# Patient Record
Sex: MALE | Race: WHITE | NOT HISPANIC OR LATINO | Employment: UNEMPLOYED | URBAN - METROPOLITAN AREA
[De-identification: names, ages, dates, MRNs, and addresses within clinical notes are randomized per-mention and may not be internally consistent; named-entity substitution may affect disease eponyms.]

---
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
## 2019-01-03 ENCOUNTER — MEDICAL CORRESPONDENCE (OUTPATIENT)
Dept: HEALTH INFORMATION MANAGEMENT | Facility: CLINIC | Age: 19
End: 2019-01-03

## 2019-01-03 ENCOUNTER — OFFICE VISIT (OUTPATIENT)
Dept: PSYCHIATRY | Facility: CLINIC | Age: 19
End: 2019-01-03
Payer: COMMERCIAL

## 2019-01-03 DIAGNOSIS — F29 PSYCHOSIS, UNSPECIFIED PSYCHOSIS TYPE (H): Primary | ICD-10-CM

## 2019-01-03 NOTE — Clinical Note
Kade Ordaz,Here's my screening note for your Eval appt with Eloy on Wednesday 1/9. He has an unspecified schizophrenia spectrum disorder with r/o for substance induced psychosis from [facility]. He's been sober since his discharge and is attending inpatient CD treatment at [facility], with still experiencing some psychosis symptoms. I have some of his forms I will get to you as well.Tamiko

## 2019-01-03 NOTE — PROGRESS NOTES
"[facility] NAVIGATE Clinical Assessment of Need  A Part of the [facility] First Episode of Psychosis Program    Patient: Eloy Storm (2000, 18 year old)     MRN: [id number]  Date:  1/03/19  Clinician: Tamiko Hartman, [facility]     Length of Actual Contact: Start Time: 3pm; End Time: 4pm  Location of Contact:  [facility], [facility]  People present:  Writer, Client,     Reviewed limits to confidentiality, Yes     Chief Complaint    \"I'm ending treatment soon and this was suggested as a follow up service.\"    History of Presenting Illness    Modified [place] Symptom Index completed (see below)    Writer met client for FEP screen appt. Client attended the appt individually and was brought by staff at [facility] in [place], where he is currently attending inpatient programming.   Client reported first noticing psychosis symptoms last New Years Brandeee 2017(making weird connections, ideas of reference, VH, people's faces had another set of eyes, glowing eyes, people looked demonic.)  He was hospitalized at [facility] and reported having more delusional thinking and feeling paranoid while there. He said he was also suicidal and having panic attacks. He reported he hadn't used drugs within the week prior to his admission, though could have been experiencing withdrawal. He was hospitalized 12/30/17-1/2/2018. He stopped taking meds right away following the discharge but was later prescribed lexapro by his primary care provider, but only took this for a short while.     Following that hospitalization, client reported he felt fine for most of the year. He continued to smoke weed regularly and went [facility]. He said he kept using during this time, though attended treatment programming for 3-4 months.   He reports his hx of drug use began in high school with using cocaine 1-2x, MDMA 3x, opiates/pills several times, and xanax.  He also reported he drank a " "lot in college and high school.     Client was living at home with his mom, but eventually got kicked out so was couch hopping at friends' houses. He was using regularly during this time.  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     He reports using weed, alcohol, and xanax, leading up to recent hospitalization at [facility] on 10/29/18 -11/14/18. Reported symptoms at that time included a \"wierd fantasy world in my head,\" ideas of reference, and paranoia. He reports using weed multiple times per day at this point, xanax occasionally, alcohol on the weekends.  He was discharged to [facility] in [place] following hospitalization and has been there for 51 days. He will be graduating programming in another couple of weeks. He will be discharging to a sober house in [place].    Reports sobriety while in treatment and feels his memory coming back. He reports still having ideas of reference at treatmentt, but can work through them. Provided example of IOR from listening to music, songs talking about stuff going on around him. He denies AH and reports having had light distortation and tracers, said he feels like he's getting an acid flash back, feeling trippy. Also reports some paranoia while at [facility] but now feels like it's more like general anxiety. Continues to experience racing thoughts, \"words get jumpled up, thinking about a lot at once, have to remember a lot of different stuff.\" Also reports continued problems with long-term memory in thinking about the past. Described regularly having clear dejavu.     Client reports no current SI, and says he experiences less depression than in the past.  Reported last having SI during the first few weeks at [facility]. No hx of attempts. Hx of ideation, never acted on them, never had a plan. No HI or hx " "of violence    Client reported possibility of hx of jamila symptoms, describing he sometimes tries to work a lot at once and get a lot done. He can pull all nighters to get stuff done, though said he will get tired the next day. He reported hx of jamila like symptoms increased energy while using.     Client also reported currently experiencing some social anxiety, with feeling shaky in social situations, sweaty palms, and racing thoughts.    Hoped for outcomes  \"Hoping to remove symptoms of psychosis I was having.\" To have a clear head, think more clearly, go back to school, do good in school without being troubled by this stuff.\"  Is interested in med management, therapy, and vocational support.     Past Psychiatric History (Brief)     Psychiatric diagnoses, date diagnosed, and associated symptoms   Per [facility] discharge note on 11/14/18:  DISCHARGE DIAGNOSES:   Unspecified schizophrenia spectrum disorder and other psychotic disorder (rule out a substance-induced psychotic disorder versus a primary psychotic illness)  Cannabis use disorder, severe  Sedative hypnotic use disorder, moderate  Alcohol use disorder, moderate    -History of a diagnosis of autism spectrum disorder? No  -History of a diagnosis of borderline personality disorder? No    Psychiatric hospitalization(s), location, admit date, and length of stay  Two hospitalizations at [facility]: 10/29/18 - 11/14/18 and 12/30/17- 1/2/18    Medications, length of use, benefits, and unpleasant effects  abilify 10 mg  Acetylcysteine 600 mg  Depakote 500mg  lexapro 300 mg  trasazone 100mg    -History of antipsychotic use (cumulative months)? Yes - Since most recent hospitalization in Oct 2018    Outpatient Programs & Services  Efe Espinoza at [facility] prescribes meds currently. He was previously seeing pcp at [facility].    HX of therapists in the past but cannot remember details, reporting services were never very long in " "duration.    Developmental & Medical History (Brief)    Past/Present developmental and/or medical issues, date diagnosed, and impact  low blood pressure, does not take medication for this    -History of significant head injury or loss of consciousness? If yes, history of brain imaging? Yes - Reports last year, he stood up really quick and fainted and hit head, woke up on the floor.   -History of a developmental delay or use of an IEP or 504? 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    Scorin-Not at all    1-Once during the month    2-Several times during the month    3-Several times a week    4-At least every day    1. In the past month, how often have you felt nervous, tense, worried, frustrated, or afraid? 4  2. In the past month, how often have you felt depressed? 3  3. In the past month, how often have you felt lonely? 3  4. In the past month, how often have others told you that you acted \"paranoid\" or \"suspicious\"? 0  5. In the past month, how often did you hear voices or hear or see things that other people didn't think were there? 2  6. (Read slowly) In the past month, how often did you have trouble making up your mind about something, like deciding where you wanted to go or what you wanted to do, or how to solve a problem? 3  7. (Read slowly) In the past month, how often did you have trouble thinking straight, or concentrating on something you needed to do like " worrying so much, or thinking about problems so much that you can't remember or focus on other things? 3  8. In the past month, how often did you feel that your behavior or actions were strange or different from that of other people? 2  9. In the past month, how often did you feel out of place or like you did not fit in? 4  10. In the past month, how often did you forget important things? 2  11. In the past month, how often did you have problems with thinking too fast (thoughts racing)? 3  12. In the past month, how often did you feel suspicious or paranoid? 2  13. In the past month, how often did you feel like hurting or killing yourself? 1  14. In the past month, how often have you felt like seriously hurting someone else? 0      Mental Status Exams:  Alertness: alert  and oriented  Appearance: well groomed  Behavior/Demeanor: cooperative and calm, with good  eye contact   Speech: regular rate and rhythm  Language: intact. Preferred language identified as English.  Psychomotor: normal or unremarkable  Mood: anxious  Affect: restricted; was congruent to mood; was congruent to content  Thought Process/Associations: unremarkable  Thought Content:  Reports delusions, preoccupations and paranoid ideation;  Denies suicidal and violent ideation  Perception:  Reports Visual hallucinations and distortions;  Denies auditory hallucinations  Insight: good  Judgment: fair  Cognition: does  appear grossly intact; formal cognitive testing was not done    Techniques Utilized:   CBT Teaching Strategies:  Reinforcement and shaping (positive feedback for steps towards goals and gains in knowledge & skills)  Coping skills training (review current coping skills)    Motivational Teaching Strategies:  Connect info and skills with personal goals  Promote hope and positive expectations  Explore pros and cons of change  Re-frame experiences in positive light    Educational Teaching Strategies:  Review of written material/education  Relate  information to client's experience  Ask questions to check comprehension  Break down information into small chunks  Adopt client's language     Psychiatric Diagnosis(es):    Psychosis, unspecified psychosis type; F29  R/O substance induced psychosis    Assessment/Progress Note:     Eloy Storm is a 18 year old single (never )  male who presented for psychosis assessment of need visit to determine potential eligibility for participation in [facility] First Episode of Psychosis services. Eloy was referred by his providers at [facility]. Eloy presented today as a Good historian with Good insight. He has a lifetime history of 2 hospitalizations and carries psychiatric diagnoses of psychosis, unspecified psychosis type. Further diagnostic clarification is needed. A psychiatric diagnostic assessment was scheduled with ROHITH Sheridan, [facility].      Eloy identified present symptoms to include social anxiety, ideas of reference, paranoia, racing thoughts, visual distortions, and difficulty with memory. Psychosocial stressors were identified as housing , mental health symptoms and occupational / vocational stress. Explored Eloy's goal(s) to include sobriety, coping and managing symptoms of psychosis.     Eloy is currently participating in inpatient CD treatment and med management services at [facility]. He may benefit from services such as psychosis specific therapy, med management, family therapy, and vocational support for the purposes of building understanding, awareness, and insight into symptoms for optimal symptom management. Eloy's willingness to pursue said services seems fair.     Identified risk factors and/or vulnerabilities include male and recent substance abuse. Protective factors and/or strengths identified as committed to sobriety, creative, educated, has a previous history of therapy, open to learning, support of family, friends and providers, wants to learn and work  "history. Suicidal ideation was not present at the time of today's visit. Safety plan was discussed and included using crisis resources.    Eloy agrees to treatment with the capacity to do so. Agrees to call clinic for any problems. The patient understands to call 911 or come to the nearest ED if life threatening or urgent symptoms present.    Billing for \"Interactive Complexity\"?    No    Plan/Referrals:     Diagnostic Assessment schedule on 1/9/19 at 2:30pm with ROHITH Sheridan, [facility]IG[facility] .      ROHITH Sun     [use CPT codes: 65553 (16-37 min), 75602 (38-52 min), 61136 (53+ min)]    "

## 2019-01-07 ASSESSMENT — PATIENT HEALTH QUESTIONNAIRE - PHQ9
SUM OF ALL RESPONSES TO PHQ QUESTIONS 1-9: 17
5. POOR APPETITE OR OVEREATING: MORE THAN HALF THE DAYS

## 2019-01-07 ASSESSMENT — ANXIETY QUESTIONNAIRES
5. BEING SO RESTLESS THAT IT IS HARD TO SIT STILL: SEVERAL DAYS
3. WORRYING TOO MUCH ABOUT DIFFERENT THINGS: NEARLY EVERY DAY
1. FEELING NERVOUS, ANXIOUS, OR ON EDGE: NEARLY EVERY DAY
6. BECOMING EASILY ANNOYED OR IRRITABLE: NOT AT ALL
2. NOT BEING ABLE TO STOP OR CONTROL WORRYING: MORE THAN HALF THE DAYS
7. FEELING AFRAID AS IF SOMETHING AWFUL MIGHT HAPPEN: NOT AT ALL
GAD7 TOTAL SCORE: 11

## 2019-01-09 ENCOUNTER — OFFICE VISIT (OUTPATIENT)
Dept: PSYCHIATRY | Facility: CLINIC | Age: 19
End: 2019-01-09
Payer: COMMERCIAL

## 2019-01-09 ENCOUNTER — TELEPHONE (OUTPATIENT)
Dept: PSYCHIATRY | Facility: CLINIC | Age: 19
End: 2019-01-09

## 2019-01-09 DIAGNOSIS — F13.20 SEDATIVE, HYPNOTIC OR ANXIOLYTIC DEPENDENCE (H): ICD-10-CM

## 2019-01-09 DIAGNOSIS — F10.20 ALCOHOL DEPENDENCE (H): ICD-10-CM

## 2019-01-09 DIAGNOSIS — F40.10 SOCIAL ANXIETY DISORDER: ICD-10-CM

## 2019-01-09 DIAGNOSIS — F41.0 PANIC DISORDER WITHOUT AGORAPHOBIA: ICD-10-CM

## 2019-01-09 DIAGNOSIS — F17.200 TOBACCO DEPENDENCE SYNDROME: ICD-10-CM

## 2019-01-09 DIAGNOSIS — F29 PSYCHOSIS, UNSPECIFIED PSYCHOSIS TYPE (H): Primary | ICD-10-CM

## 2019-01-09 DIAGNOSIS — F12.20 CANNABIS DEPENDENCE (H): ICD-10-CM

## 2019-01-09 ASSESSMENT — ANXIETY QUESTIONNAIRES
7. FEELING AFRAID AS IF SOMETHING AWFUL MIGHT HAPPEN: NOT AT ALL
3. WORRYING TOO MUCH ABOUT DIFFERENT THINGS: NEARLY EVERY DAY
2. NOT BEING ABLE TO STOP OR CONTROL WORRYING: NEARLY EVERY DAY
GAD7 TOTAL SCORE: 11
GAD7 TOTAL SCORE: 15
1. FEELING NERVOUS, ANXIOUS, OR ON EDGE: NEARLY EVERY DAY
6. BECOMING EASILY ANNOYED OR IRRITABLE: NOT AT ALL
5. BEING SO RESTLESS THAT IT IS HARD TO SIT STILL: NEARLY EVERY DAY

## 2019-01-09 ASSESSMENT — PATIENT HEALTH QUESTIONNAIRE - PHQ9: 5. POOR APPETITE OR OVEREATING: NEARLY EVERY DAY

## 2019-01-09 NOTE — TELEPHONE ENCOUNTER
----- Message from Case Flannery MA sent at 1/9/2019  9:19 AM CST -----  Regarding: navigate   Caller: Michelle    Relationship to Patient: mom    Call Back Number: [phone]    Reason for Call: Wants some info about the navigate program.

## 2019-01-09 NOTE — TELEPHONE ENCOUNTER
-Writer returned mom's call.  Discussed how Navigate Program works.  Writer answered questions that mom had regarding appointments and what would be expected of Eloy.

## 2019-01-09 NOTE — PATIENT INSTRUCTIONS
Medication Management Appointment  Thursday 1/17 at 1:00PM for 60 minutes  With Sera Sanders CNP  [address], [address]  [place] [address]  Phone: [phone]    Directions: Enter through the main entrance next to the Emergency Room entrance. Take the main elevators in the lobby to the 2nd floor. Exit to your right and the clinic is on your right.     Psychosis Young Adult Group  Friday 3:00-4:00PM  [facility]    Individual Therapy  Tuesday, 1/15 at 10:00AM for 60 minutes  With Tamiko Hartman, [facility]  [address] [place] [id number]  Phone: [phone]

## 2019-01-09 NOTE — PROGRESS NOTES
"[facility] NAVIGATE Diagnostic Assessment  A part of the [facility] First Episode of Psychosis Treatment Program    Eloy Storm MRN# [id number]   Age: 18 year old YOB: 2000      Date of Evaluation: 1/09/19  Start Time: 2:30; End Time: 3:50PM         Contributors to the Assessment     Chart Reviewed.   Interview completed with Eloy Storm.  Releases of information signed by Eloy for Terryen and mom.  Collateral information obtained from medical record only; no family present at the time of today's interview.         Chief Complaint      \"My mom found this program specific to psychosis\"         History of Present Illness      Eloy Storm is a 18 year old male who presents for evaluation for [facility] NAVIGATE services to treat first episode psychosis.    Per medical records:  Per [facility] Discharge Summary 1/2/18  Eloy Storm is a very pleasant 17-year-old  male who was admitted with altered mental status and suicide statements.  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  He failed all of his classes.  He went to visit his father yesterday, he has not seen him for 1.5 years. They had a fight and the patient threaten suicide by overdosing on marijuana.  The patient has a history of using marijuana every day since high school.  He has been using dabs almost every day.  When he was at his father's house he appeared to be very confused, anxious and had delayed responses. His stepmother called his mother and reported that he is acting strange and staring at a lamp.  His mother was not aware that he was depressed and suicidal.        Eloy appeared to be reliable historian.  He is lying in bed and falling asleep while talking to this provider.  He states that he has been depressed for quite some time and is self-medicating with marijuana.  He has been smoking every day for over a year, however, states that recently he had stopped it.  Reports last use about 2 weeks " "ago.  When confronted with positive U-tox result for marijuana, he stated that it might not have been that long.  The patient states that he is depressed, sad, and empty.  He reports sleeping \"okay but I want to sleep a little bit more.\"  His energy is low.  He has problems with memory and concentration.  He denies any issues with appetite, psychomotor slowing or agitation, feeling hopeless, helpless and worthless.  He is tired. He denies any history of jamila, psychosis, anxiety, panic attacks, PTSD, OCD, eating disorder and borderline personality disorder.  He had never attempted suicide.  He has never seen a therapist or a psychiatrist.  He does not have an official diagnosis of depression but thinks he has been depressed for a while.  He has never been on medications. He is willing to start \"if you think it will help my depression.\"  Currently, the patient denies panic attacks, hallucinations, delusions, suicidal or homicidal ideation, self-injuring behaviors, obsessions, compulsions, specific fears and manic symptoms.     Per [facility] 10/31/18:  He reports first noting auditory hallucinations 1 year ago.  He described hearing a voice that talks to him, calls his name, or comments on things that are happening.  He frequently becomes overwhelmed by this experience or the content of the hallucination which gives rise to anxiety and emotional distress.  He frequently resorts to substances to help self medicate these secondary symptoms.  He described taking 4-5 mg of Xanax on occasion and a single dose for this purpose.  He has not had Xanax for several days and his usage was fairly sporadic the past few weeks.  He denied any significant alcohol usage over the past 2 weeks.  He did endorse regular marijuana usage.  He suspects that the hallucinations worsen with cannabis use however his anxiety does reduce with usage.  He seemed to imply that hallucinations occur independent of substance usage.     He also " "endorsed depressed mood and suicidal ideation.  He identified worsening of the symptoms when he stopped taking Lexapro.  He seemed interested in restarting antidepressants.  He attributed his mood symptoms to a vague mention of psychosocial stressors however he did not elaborate.    Per [facility] 11/1/18:  The patient was noted to be pacing the halls and frequently talking to himself.  The content of his conversations seem to be disorganized and fairly random.  It represented a conversational dialogue, answering imposing questions.  When I approached him for an interview, this self talk pattern continued however the content seem to change as he mumbled to himself \"he is not by , don't say that, may be he is.\"     The patient was not able to offer much clarification regarding the content of his hallucinations.  He continues to report hearing a voice talking to him however when I attempted to explore the exact content, his responses became dismissive and he would passively agree to any descriptors I would offer.    Per family's report:  Family did not accompany patient to today's appointment.    Per [facility] Assessment 10/26/18:  Patient's mother was contacted for collateral information. She states patient has a long history of heavy marijuana use, with dabs, vapor, and bottles of alcohol have been found in his room. She reports two days ago he agreed to an intake and admission to [facility]. He called today for his phone interview, and they told him he needs to come for a mental health assessment, as it appears he was making suicidal statements to them. They could possibly admit him Monday or Tuesday depending on insurance approval and requested that this assessment be faxed to them. Mother reports patient has not eaten or slept for 24-48 hours, that in twice in the last two week he has left the house with her car and disappeared for two days, He was at parties with teenage students from [employer]. She reports he " "was in college in [place] 8/2017 - 12/2017 which he failed out of and could not return. He is scheduled to start a gap year in [place] in January. She reports he is not working, not going to appointments, and that he has lost all contact with friends. She states that he has talked about suicide in the past but never attempted to harm himself. Patient has been admitted to [facility] 12/2017 for substance induced mood disorder and discharged on Lexapro. In May he was attending [facility] at [facility]. It is unclear if he completed with program or how long he maintained his sobriety.     Per [facility] Crisis Assessment 10/29/18:  Patient's mother was contacted for collateral information... Mother reports that over the weekend, the patient's behavior worsened. She reports that he verbally threatened her. He has not been eating, sleeping or showering in almost a week. She states that he has talked about suicide in the past but never attempted to harm himself.    Per patient's report:    Eloy reports one more week of treatment at [facility] and then plans to transition to sober living in [facility]. He is hoping to establish med mgmt and psychotherapy services related to psychosis.     As discussed in Eloy's FEP Screening, He \"reported first noticing psychosis symptoms last New Years Brandeee 2017(making weird connections, ideas of reference, VH, people's faces had another set of eyes, glowing eyes, people looked demonic.)  He was hospitalized at [facility] and reported having more delusional thinking and feeling paranoid while there. He said he was also suicidal and having panic attacks. He reported he hadn't used drugs within the week prior to his admission, though could have been experiencing withdrawal. He was hospitalized 12/30/17-1/2/2018. He stopped taking meds right away following the discharge but was later prescribed lexapro by his primary care provider, but only took this for a short while. Following that hospitalization, " "client reported he felt fine for most of the year. He continued to smoke weed regularly and went to Outpatient CD treatment [facility] 2018. He said he kept using during this time, though attended treatment programming for 3-4 months. He reports his hx of drug use began in high school with using cocaine 1-2x, MDMA 3x, opiates/pills several times, and xanax.  He also reported he drank a lot in college and high school.   Client was living at home with his mom, but eventually got kicked out so was couch hopping at friends' houses. He was using regularly during this time.  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 He reports using weed, alcohol, and xanax, leading up to recent hospitalization at [facility] on 10/29/18 -11/14/18. Reported symptoms at that time included a \"wierd fantasy world in my head,\" ideas of reference, and paranoia. He reports using weed multiple times per day at this point, xanax occasionally, alcohol on the weekends. He was discharged to [facility] in [place] following hospitalization and has been there for 51 days. He will be graduating programming in another couple of weeks. He will be discharging to a sober house in [place]. Reports sobriety while in treatment and feels his memory coming back. He reports still having ideas of reference at treatmentt, but can work through them. Provided example of IOR from listening to music, songs talking about stuff going on around him. He denies AH and reports having had light distortation and tracers, said he feels like he's getting an acid flash back, feeling trippy. Also reports some paranoia while at [facility] but now feels like it's more like general anxiety. Continues to experience racing thoughts, \"words get jumpled up, thinking about a lot at once, have to remember a lot of " "different stuff.\" Also reports continued problems with long-term memory in thinking about the past. Described regularly having clear dejavu.\"    Upon further interview, Eloy reports hx of AH during his first hospitalization where he thought he heard voices talking about him as well as times where voices were muffled and intelligible.     Discussed depressive symptoms. Eloy reports \"always\" feeling depressed with anhedonia since middle school and increasing in severity throughout high school. He reports many other major depressive symptoms throughout high school as well - almost every day of decrease in appetite and weight loss, sleeping excessively, low energy, feelings of worthlessness, and difficulty concentrating and making decisions; some days (not all) with suicidal ideation; cannot recall psychomotor changes. With regard to current depressive symptoms, Eloy reports improved mood which he attributes to Lexapro and \"keeping busy.\" He reports going on/off Lexapro x3 despite reportedly finding it helpful each time. He tried counseling for depression once. He reports present increase in appetite and low energy, possibly as a side effect from antipsychotic use. He reports present difficulties with concentration and feelings of guilt about his current mental health state. When assessing for OCD, Eloy reports intrusive self deprecating thoughts.     Eloy has a history of suicidal ideation. Current frequency of suicidal thoughts is \"not often.\" He denies command AH and dreams with suicidal content. He reports having thought of a suicidal plan in the past (\"overdose on pain killers\") but has never thoughts of when or where. He reports time spent thinking about suicide has been a max of 30 minutes/day. He denies hx of suicide attempts. He reports hx of SIB (burning) 3x over the course of one month in college. Discussed safety planning to include going to a friend, use of crisis hotlines, calling 9-1-1 or visiting " "the nearest ED should safety be a concern.     Discussed potential manic/hypomanix symptoms. Eloy reports increase energy and elevated mood in the past outside of substance use. At max, he states this change in mood/energy has lasted 24-36 hours - \"all night and next day, then I crash.\" During these episodes he reports increased confidence (\"I think I am really smart\"), decreased need for sleep, racing thoughts, and increase drive in work and hobbies (computer, music, photoshop). He reports \"maybe\" having pressured speech and states distraction is a lifetime difficulty. He is unable to state if he had risk taking behaviors. He is unable to identify a number of episodes, rather, estimates this occurs 1-2x/month.     Eloy reports a history of panic attacks both precipitated by \"life stressors\" and for unidentifiable reasons. He reports his last panic attack was when he was in the hospital. To cope, he identifies helpful strategies to include distraction, meditation, and taking quite time to himself.     When assessing for agoraphobia Eloy shares a hx of feeling claustrophobic with no formal diagnosis. He denies anxiety alone at or away from home but this prompts him to share, \"I feel home sick.\" He does identify with social anxiety. He denies generalized anxiety about daily routine things. He reports mild compulsions to clean as he is bothered by \"gross things.\" He reports feeling bothered by dishwashing for this reason. He reported, \"it is hard to say if this interfered\" with functioning.     Eloy reports a hx of trauma - 1) Age 5, while playing \"I smashed my hand through a window and went to the hospital. Since I've been scared of the hospital\"; 2) Age 10-11 was bit by a dog in the face and required hospitalization. He denies symptoms of PTSD apart from having a visceral response (shiver) to the memory and having difficulty recalling some details of the events.     Eloy questions the presence of ADHD; \"in " "social situation I will randomly lose focus. I will tune out.\"    When attempting to place onset of symptoms and functional impairment on a timeline, Eloy reports:  -7th grade \"was good\"  -8th grade his mood seemed to decline  -9th grade marked onset of cannabis and alcohol use; averages C's and B's  -10th-11th grade substance use increased; grades dropped to B's, C's and D's  -11th grade marked onset of \"hard drugs,\" increase in severity of depressive symptoms, anxiety, and social and school avoidance         Psychiatric Review of Systems (Completed M.I.N.I. Version 7.0.2: Yes)     A. DEPRESSION:    Past 2 Weeks:  Low mood nearly every day: no  Anhedonia most of the time: unclear - reports more than half of the days, not sure about almost every day    Also reports:  A. Appetite or weight change (decrease or increase): Yes (appetite increase)  B. Trouble sleeping: No  C. Psychomotor changes (restless or slowed): Yes  D. Low energy: Yes  E. Worthlessness and/or guilt: Yes  F. Difficulty concentrating, thinking or making decisions: Yes  G. Suicidal ideation: No    Past Episode:  Low mood nearly every day for at least two weeks: yes  Anhedonia most of the time for at least two weeks: yes    During these times, also reports:  A. Appetite or weight change (decrease or increase): Yes  B. Trouble sleeping: Yes  C. Psychomotor changes (restless or slowed): Cannot remember  D. Low energy: Yes  E. Worthlessness and/or guilt: Yes  F. Difficulty concentrating, thinking or making decisions: Yes  G. Suicidal ideation: Yes    Approximate life time number of depressive episodes: Reports persistent feelings of depression beginning in middle school and increasing with in severity with time    B. SUICIDALITY: Current: Yes , risk High  Denies current SI, denies intent and plan  Denies current HI    C. OZZY/HYPOMANIA:    Current Episode:   Elevated mood/energy: no  Persistent irritability: no    Past Episode:   Elevated mood/energy: " "yes; max 24 hours  Persistent irritability: no    During these times, also reports:  A. Grandiosity: Yes  B. Need less sleep: Yes  C. Pressured speech: Yes  D. Racing thoughts: Yes  E. Distracted: Yes  F. Increased drive: Yes  G. Risk taking: \"maybe\"    Approximate life time number of manic/hypomanic episodes: several times, unable to articulate, max 24 hours per episode    D. PANIC:  yes  Spells peak in < 10 minutes: yes  Unprovoked: yes; Provoked: Yes - paranoia/psychotic symptoms    During worst attack reports:  A. Heart palpitations: Yes  B. Sweating/Clammy Hands: Yes  C. Tremors: Yes  D. SOB: Yes  E. Choking sensation: Yes  F. Chest pain: No  G. GI distress: Yes  H. Dizziness: No  I. Flushing or chills: No  J. Extremity or Perioral (fingers, hand) paresthesia: No  K. Derealization or depersonalization: No  L. Fear of losing control or going crazy: Yes  M. Fear of dying: No    E. AGORAPHOBIA:  Self reports feelings of claustrophobia (no hx formal diagnosis); Reports anxiety in crowds due to social anxieties     F. SOCIAL ANXIETY:  yes, always in the following situations, beginning in middle school  -Initiating or maintaining conversation: Yes  -Participating in small groups: Yes  -Dating: Yes  -Speaking to authority figures: Yes  -Attending parties: Yes  -Public speaking: Yes  -Eating in front of others: No  -Performing in front of others: Yes  -Urinating in a public washroom: No    G. OBSESSIVE-COMPULSIVE:    Obsessions: yes  Compulsions: no    Examples include paranoid thoughts amidst psychotic experiences, self deprecating thoughts such as \"I'm no good\"    H. TRAUMA:    Witnessed a traumatic event: Yes - 1) Age 5, while playing \"I smashed my hand through a window and went to the hospital. Since I've been scared of the hospital\"; 2) Age 10-11 was bit by a dog in the face and required hospitalization  Re-experienced trauma: Yes  Persistent avoidance of recollections of trauma: Yes    Also reports:   A. " Difficulty recalling trauma: Yes  B. Negativity about others or self: No  C. Blaming self or others: No  D. Negative emotions: No  E. Anhedonia: No  F. Detachment from others: No  G. Inability to experience happiness: No    I. ALCOHOL & J. NON-ALCOHOL:  See below    K. PSYCHOSIS:    Past/Present Symptoms:   1. Paranoia - past: yes; present: yes  2. Thought broadcasting or ability to read others' thoughts - past: yes; present: no  3. Thought insertion or delusions of control - past: no; present: no  4. Ideas of reference - past: yes; present: no  5. Odd beliefs - past: yes; present: no  6. Auditory hallucinations -   past: yes; command hallucinations: No  present (in last month): no; command hallucinations: N/A  7. Visual hallucinations - past: no; present: no    Clinician Observations:  8. Disorganized or incoherent speech - past: yes; present: no  9. Disorganized or catatonic behavior - past: yes; present: no  10. Negative symptoms (flat affect, alogia, avolition) - past: yes; present: yes    L-M. EATING DISORDER:   -Intense fear of weight gain: Yes  -Distorted body image: Yes  -Food restriction: Yes  -Binging: Yes  -Purging: No    N. GENERALIZED ANXIETY:  No    O. RULE OUT MEDICAL, ORGANIC OR DRUG CAUSES FOR ALL DISORDERS  During any current disorder or past mood episode, patient reports:  A. Substance use or withdrawal: Yes but not always  B. Medical illness: No    P. ANTISOCIAL PERSONALITY:  none    Other Cluster B Traits: Reports impulsivity: substance abuse, binge eating, affective instability         Past Psychiatric History     Past diagnoses:   -[facility] 1/2/18:  Substance induced mood, mood disorder  Rule out major depressive disorder    -Skyler Yeager 12/31/18:  Alcohol use disorder, moderate, dependence  Cannabis use disorder, severe, dependence  Sedative, hypnotic or anxiolytic use disorder, mild, abuse  Substance-induced psychotic disorder  Tobacco use disorder, moderate, dependence  Unspecified  "anxiety disorder  Unspecified depressive disorder    -Per [facility] 11/14/18:  Unspecified schizophrenia spectrum disorder and other psychotic disorder (rule out a substance-induced psychotic disorder versus a primary psychotic illness)  Cannabis use disorder, severe  Sedative hypnotic use disorder, moderate  Alcohol use disorder, moderate    Past medication trials: Zyprexa and Depakote    Hospitalizations: 2  -[facility] 12/30/17 - 1/2/18 \"admitted with altered mental status and suicide statements.\"    -[facility] \"admitted to our Behavioral Health Unit under a 72-hour hold for concerns related to disorganized thought process along with reports of suicidal ideation.  Noting a prominence of psychosis and manic-like symptoms on admission...\"    Commitment: No, Current [facility] order: No    ECT trials: No    Suicide attempts: No    Self-injurious behavior: Yes - burning self 3x in one month time frame during college    Violent behavior: No    Outpatient Programs & Services [Psychotherapy, DBT, Day Treatment, Eating Disorder Tx etc]:   Psychiatry  -Dr Espinoza ([phone]) at [facility] ([phone]) before [facility]    Psychotherapy  -Boo Anderson ([phone]) at [facility]  -Ernestine Parisi ([phone]) before [facility]         Substance Use History: (review [facility])     Caffeine: Coffee, tea or soda ~ 1-2 cups/day       Tobacco: Daily   Age of first tobacco use: 15   Amount of tobacco used per week: \"1 vape\"    ETOH: Past (prior averaged monthly consumption)     Age of first alcohol use: 15   Number of days patient drank over the last 30 days: 0   Number of drinks patient had per day over the last 30 days: NA   Last period of sobriety, when and how long: ~ 2 months since hospitalization and treatment   Review of Alcohol Use Disorder symptomology reveals: Alcohol use disorder, moderate    Cannabis: Past (prior averaged daily consumption of 2-3 grams/day)           Age of first cannabis use: " 15   Number of days patient used cannabis over the last 30 days: 0   Last period of sobriety, when and how long: ~ 2 months since hospitalization and treatment   Review of Substance Use Disorder (Non-Alcohol) symptomology reveals: Cannabis use disorder, severe    Other Drugs:   Past - MDMA, Cocaine, LSD   Age of first other drug use: [age]   Number of days patient used opiods over the last 30 days: 0   Last period of sobriety, when and how long: Greater than 6 months     Past - Xanaz, Oxycodone, Klonopin, Hydrocodone, Percocet (prior averaged monthly use)   Age of first other drug use: [age]   Number of days patient used opiods over the last 30 days: 0   Last period of sobriety, when and how long: ~ 2 months since hospitalization and treatment    CD treatment hx: Yes -   [facility] OP March-August 2018  [facility] IP October-January 2018    Withdrawal hx: Yes but no hx of withdrawal seizures or DTs.     Current sober supports include family.         Past Medical History:      Patient Active Problem List    Diagnosis Date Noted     Psychosis (H) 10/30/2018     Priority: Medium     Suicidal ideation 12/30/2017     Priority: Medium     Primary Care Physician: Ramon Price  Last PCP Appointment Date: Unknown    Medical problems: No  Surgical history: No    No History of: seizures or head trauma/loss of consciousness.           Allergies:      No Known Allergies         Medications:     Per patient report:  Abilify 10 mg  Acetylcysteine 600 mg  Lexapro 300 mg  Trazodone 100mg  Recent addition of Buspar unknown mg  Recent d/c Depakote 500mg          Social History:      Living situation: Eloy lives with in residential/IP treatment at [facility]; prior he lived with his mom in [place], [place]   Guns, weapons, or other means to harm oneself in the home? No  Pets at home? Yes - a dog at mom's     Education: Eloy s highest level of education is high school graduate and some college. Failed out of [employer] in [place].      Occupation: Eloy is currently unemployed.    Finances: Eloy is financial supported by Family.    Relationships: Significant relationships include family. Reports little to no contact with peers.    Family members include parents (), two stepsisters and one half sister.     Spiritual considerations: No    Cultural influences: Eloy identifies is race as . Eloy reports  No  to cultural considerations to take into account when providing treatment.     Sexuality:  Eloy identifies as male, heterosexual, with preferred pronouns he/him/his.     Legal Hx: Yes - Misdemeanor for speeding and out of state misdemeanor for possession. He is not on probation.     Abuse Hx: No     Hx: No           Developmental History:     Eloy Storm was born without complications. He reports meeting developmental milestones on time. He denies receipt of interventions for developmental delays.          Family History:     Family history of: Eloy reports unaware of a family history  denies history of completed suicides.         Most Recent Labs & Vitals (per EPIC):     Recent Labs   Lab Test 10/31/18  0745 12/31/17  0737   CHOL 115 102   TRIG 82 63   LDL 61 36   HDL 38* 53     Recent Labs   Lab Test 11/08/18  0807 10/31/18  0745 12/30/17  2024   GLC 88 89 92     Recent Labs   Lab Test 11/08/18  0807 10/31/18  0745 12/30/17 2024   WBC 6.3 9.4 14.7*   ANEU  --   --  12.5*   HGB 15.4 16.3 16.5    260 337       There were no vitals taken for this visit.         Screening/Assessment Measures     PHQ9 was completed today, 1/09/19  Scored at 7  Over the last 2 weeks, how often have you been bothered by any the following problems?     Not at all = 0     Several days = 1     More than half the days = 2     Nearly every day = 3    1. Little interest or pleasure in doing things [3]  2. Feeling down, depressed, or hopeless [1]  3. Trouble falling or staying asleep, or sleeping too much [0]  4. Feeling tired  or having little energy [0]  5. Poor appetite or overeating [0]  6. Feeling bad about yourself - or that you are a failure or have let yourself or your family down [0]  7. Trouble concentrating on things, such as reading the newspaper or watching television [2]  8. Moving or speaking so slowly that other people could have noticed. Or the opposite-being fidgety or restless that you have been moving around a lot more than usual [2]  9. Thoughts that you would be better off dead, or of hurting yourself in some way [0]    If you checked off any problems, how difficulty have these problems made it for you to do your work, take care of things at home, or get along with other people? Not answered     GAD7 was completed today, 1/09/19  Scored at 15  Over the last 2 weeks, how often have you been bothered by the following problems?     Not at all = 0     Several days = 1     More than half the days = 2     Nearly every day = 3    1. Feeling nervous, anxious or on edge [3]  2. Not being able to stop or control worrying [3]  3. Worrying too much about different things [3]  4. Trouble relaxing [3]  5. Being so restless that it is hard to sit still [3]  6. Becoming easily annoyed or irritable [0]  7. Feeling afraid as if something awful might happen [0]     CAGE-AID was completed today, 1/09/19  1. In the last three months, have you felt you should cut down or stop drinking or using drugs? yes  2. In the last three months, has anyone annoyed you or gotten on your nerves by telling you to cut down or stop drinking or using drugs? yes  3. In the last three months, have you felt guilty or bad about how much you drink or use drugs? yes  4. In the last three months, have you been waking up wanting to have an alcoholic drink or use drugs? no    WHODAS 2.0 was completed today, 1/09/19  Scored at 11  Over the past 30 days, how much difficulty you had doing the following activities.     None     Mild     Moderate     Severe     Extreme  "or cannot do    S1. Standing for long periods such as 30 minutes? [none]  S2. Taking care of your household responsibilities? [none]  S3. Learning a new task, for examples, learning how to get a new place? [mild]  S4. How much of a problem did you have joining in community activities (for example, festivities, religous or other activities) in the same way as anyone else can? [moderate]  S5. How much have you been emotionally affected by your health problems? [moderate]  S6. Concentrating on doing something for ten minutes? [moderate]  S7. Walking a long distance such as a kilometre (or equivalent)? [none]  S8. Washing your whole body? [none]  S9. Getting dressed? [none]  S10. Dealing with people you do not know? [moderate]  S11. Maintaining a friendship? [mild]  S12. Your day-to-day work? [mild]    H1. Overall, the past 30 days, how many days were these difficulties present? [~20]  H2. In the past 30 days, for how many days were you totally unable to carry out your usual activities or work because of any health condition? [0]  H3. In the past 30 days, not counting the days that you were totally unable, for how many days did you cut back or reduce your usual activities or work because of any health condition? [~10]     Modified [place] Symptom Index was completed 19.                              Scorin-Not at all                              1-Once during the month                              2-Several times during the month                              3-Several times a week                              4-At least every day     1. In the past month, how often have you felt nervous, tense, worried, frustrated, or afraid? 4  2. In the past month, how often have you felt depressed? 3  3. In the past month, how often have you felt lonely? 3  4. In the past month, how often have others told you that you acted \"paranoid\" or \"suspicious\"? 0  5. In the past month, how often did you " hear voices or hear or see things that other people didn't think were there? 2  6. (Read slowly) In the past month, how often did you have trouble making up your mind about something, like deciding where you wanted to go or what you wanted to do, or how to solve a problem? 3  7. (Read slowly) In the past month, how often did you have trouble thinking straight, or concentrating on something you needed to do like worrying so much, or thinking about problems so much that you can't remember or focus on other things? 3  8. In the past month, how often did you feel that your behavior or actions were strange or different from that of other people? 2  9. In the past month, how often did you feel out of place or like you did not fit in? 4  10. In the past month, how often did you forget important things? 2  11. In the past month, how often did you have problems with thinking too fast (thoughts racing)? 3  12. In the past month, how often did you feel suspicious or paranoid? 2  13. In the past month, how often did you feel like hurting or killing yourself? 1  14. In the past month, how often have you felt like seriously hurting someone else? 0         Mental Status Exam     Alertness: alert  and oriented  Appearance: casually groomed  Behavior/Demeanor: cooperative and pleasant, with fair  eye contact   Speech: normal  Language: intact. Preferred language identified as English.  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 Eloy was referred by his mom. Eloy presented today as a Fair historian with Fair insight. The duration of untreated psychosis was approximately 10 months. Prodromal symptoms may have been present in the form of increasing depressive and anxiety symptoms from middle school to high school, social and school avoidance, and decline in academic performace. Diagnosis of Unspecified schizophrenia spectrum and other psychotic disorder (298.9, F29) seems supported by the present of psychotic symptoms over this last year. Further diagnostic clarification is needed as nearly daily substance use precipitated his first episode of psychosis and was substance use was present on/off throughout the months to follow. Current symptoms include social anxiety, occasional paranoia, possible ideas of reference, racing thoughts and difficulty with memory, attention and concentration.  There are no medical comorbidities which impact this treatment.    Goal is to increase social/vocational/occupational functioning. Psychosocial stressors were identified as financial hardship, health issues, housing , mental health symptoms and occupational / vocational stress. Identified risk factors and/or vulnerabilities include male and recent substance abuse. Protective factors and/or strengths identified as committed to sobriety, creative,  "educated, has a previous history of therapy, open to learning, support of family, friends and providers, wants to learn and work history. Suicidal ideation was not present at the time of today's visit. Safety plan was discussed and included use of crisis hotlines, visiting the nearest ED or calling 9-1-1 with safety concerns for self or others.     Eloy is currently participating in CD treatment and med mgmt services at [facility]. He does seem appropriate for [facility] due to unspecified psychosis diagnosis and limited use of antipsychotics. Writer has provided verbal and/or written information about [facility]. Identified [facility] as an evidence based approach to treatment first episode of psychosis in schizophrenia spectrum disorders. Reported able to enroll Eloy in [facility] if interested and transfer care approx 3-5 months down the road should he be found to align more with a primary mood disorder of substance induced psychosis. He agreed.     Eloy agrees to treatment with the capacity to do so. Agrees to call clinic for any problems. The patient understands to call 911 or come to the nearest ED if life threatening or urgent symptoms present.    Billing for \"Interactive Complexity\"?    No         Plan     Medication: Per prescriber. Eloy was agreeable to seeing a [facility] prescriber.     Psychotherapy: Eloy was agreeable to meeting with an IRT. Eloy was not and family was not present to discuss participating in the [facility] Family Education Program. Eloy reported able to share information with his mom.     Supported Employment & Education/SEE: Eloy is interested in meeting with a SEE Specialist.     Case Management: Eloy is not followed by a . [facility] Case Management is not an identified need at this time.     Other Psychosocial Supports: Eloy is interested in the  Young Adult Group. Family was provided information for  Family Psychoeducation & Support Group.     Medical " "Referrals: None    Safety: Crisis Text Line (text \"LIFE\" to 15626 or call 9-575-488-GCKB, 1-186.292.6807)     Without the recommended intervention, Eloy is likely to experience possible increase in psychotic symptoms requiring hospitalization.     RTC: One week for med eval and psychotherapy.     LVM 1/9/19 at 4:30pm inquiring if patient was able/willing to schedule IRT with FÁTIMA Reed, LGSW, [facility] IRT on Wednesday 1/16/19 at 1:00PM.     ROHITH Medina   [facility] Director and Family Clinician    "

## 2019-01-14 ASSESSMENT — PATIENT HEALTH QUESTIONNAIRE - PHQ9: SUM OF ALL RESPONSES TO PHQ QUESTIONS 1-9: 7

## 2019-01-15 ASSESSMENT — ANXIETY QUESTIONNAIRES: GAD7 TOTAL SCORE: 15

## 2019-01-15 NOTE — PROGRESS NOTES
"  Psychiatry Clinic New Patient Medication Evaluation                                           Eloy Storm is a 18 year old male who prefers the name Eloy and pronoun he, him.  Therapist: Tamiko Hartman [facility]  PCP: Ramon Price  Other Providers: [facility]EP team  Referred by [facility] for evaluation of psychosis.     History was provided by patient      Chief Complaint                                                                                                        \" anxiety \"     History of Present Illness                                                                                4, 4      Mr. Eloy Storm is a 18 year old male who presents today for a new patient medication evaluation as part of the [facility] First Episode Psychosis program.      Eloy reports today that he has experienced mental health symptoms since middle school, which have increased in severity over time.    Reports being very unmotivated during this time and experiencing social anxiety and panic attacks which impacted his comfort level in school.  He started using cannabis regularly (daily) at age 15 to help cope with mood and anxiety symptoms.  Later in high school he began using other substances including cocaine, MDMA, opiates and benzodiazepines (approx 1x monthly use per records).  Reports he did poorly in high school, secondary to substance use, anxiety, and depression.     In 2017 he had his first semester of college at [employer].  Initially while at college depression and anxiety improved, however substance use picked up and he began feeling depressed with low motivation.  He failed all classes his first semester.  He reports he first experienced symptoms of psychosis in late 2017 lasting 24 hours, including VH and AH.  Denies substance use x 1-2 weeks during this episode.  Psychosis symptoms then resolved on their own after approx 24 hours.  He was first psychiatrically hospitalized for a brief admission " from 12/30/17-1/2/18 for depression, thought to be substance induced.  He was noted to be confused with delayed responses by parents prior to admission, and threatened to kill himself via overdose on cannabis.  During this admission he was started on lexapro 5 mg daily, vistaril and hydroxyzine.  He reports he did not take this medication for very long after discharge, but may have later restarted it.  Per records, he did report improvement with lexapro.  Following this admission he eventually returned to school but spent most of his time in his dorm, missing classes, with continued regular alcohol and cannabis use.  He was later referred to [facility] for outpatient treatment, which he attended however did not achieve sobriety.      In late October 2018 he reports he began experiencing psychosis again.  He reports ideas of reference when watching music videos, TV re: messages about his own fate.  Also felt people were talking about him often.  He reports paranoid ideation about cameras that could be watching him because he was part of an experiment.  He reports AH but does not recall content of these.  He was admitted for a second time to [facility] from 10/30-11/14/18 for increasing disorganization and mood lability in the context of increased cannabis use.  Per admission H&P two weeks prior to his admission he had an increase in cannabis use.  He attempted to detox himself which resulted in rebound mood and anxiety symptoms, and a relapse on substances.  He attempted two intakes at [facility], however due to SI and disorganization, they recommended mental health evaluation.  He presented to the emergency room prior to his admission at which time he was noted to be disorganized, which prompted a 72 hour hold.  During this admission he was started on zyprexa and depakote.     He reports that after his hospital discharge he started at [facility] in [place] for residential treatment.  He was in residential for 2 months and is  now living in a sober house, finished residential last week. He was followed by Dr. Espinoza for medication management while at [facility]. He reports that after about a week of being at [facility] Zyprexa was changed to Abilify due to mood symptoms and ongoing psychosis symptoms.  Denies side effects from zyprexa. He reports within a week of Abilify psychosis symptoms began to improve.  Depakote was then stopped.  He reports lexapro was started around the same time as Abilify.  He does not feel this is helpful for anxiety and cannot tell if it is making anxiety worse.  Due to continued anxiety, buspar was added.     He reports that while at [facility] his psychosis symptoms gradually improved, which he attributes to Abilify, and have been resolved since he left [facility] 1 week ago.  Has now been sober x 60+ days.  Denies cravings but reports it is hard to stay sober due to anxiety and depression.  States he feels like he needs to self medicate.      He reports ongoing depression symptoms. Endorses persistent low mood, anhedonia, amotivation, feeling worthless (not good enough, not going to reach his dreams), death ideation.  He endorses hypersomnia, low energy and low appetite.  Denies suicidal thoughts x 2 weeks, denies intent or plan. He reports depression has been persistent for at least a year.  Reports last not feeling depressed in fall 2017 when he first got to college.      He also reports frequent anxiety and panic attacks.  Panic attacks occur up to daily.  Often this will happen after thinking negative thoughts about himself.  He reports continued social anxiety that interferes with his ability to make connections or feel comfortable in school or work settings.  Reports benzodiazepines were helpful in the past for anxiety.     Regarding jamila/hypomania history:  He reports since approx age [age] he has had periods of increased energy and decreased for up to 24 hours.  Mood will be mildly elevated during this time,  and he will spend more time working on his computer or playing video games, feels more creative, sometimes racing thoughts, mildly increased self esteem (can get more done, more social) but no grandiosity.  Possibly had some mild IOR during these times an pressured speech.  This has not occurred since he has been persistently sober.      Other symptoms: He describes chronic feelings of emptiness and worthlessness and difficulty with emotion regulation.  Reports he burned himself on a few occassions to regulate emotions.  SI has  Been chronic.  He reports he has often felt lost, struggles with his identity and knowing who he is.     Medication response: He is unsure how helpful lexapro has been for depression, does not find it very helpful for anxiety.  Denies agitation or insomnia with lexapro.  Is unsure if it has lead to increased anxiety.  No effect from buspr.     Current psychiatric medications:  Lexapro 20 mg daily   Buspar 5 mg daily   Abilify 10 mg daily  Trazodone 100-150 mg at bedtime    Recent Symptoms:   Negative unless noted in the HPI       Recent Substance Use:  none reported      Substance Use History     Cannabis - daily since age 15, approx 2-3 grams per day.  Sober since 10/30/18  Tobacco - since age 15, vapor    Other Drugs:   Past - MDMA, Cocaine, LSD                Age of first other drug use: [age]                Number of days patient used opiods over the last 30 days: 0                Last period of sobriety, when and how long: Greater than 6 months      Past - Xanaz, Oxycodone, Klonopin, Hydrocodone, Percocet (prior averaged monthly use)                Age of first other drug use: [age]                Number of days patient used opiods over the last 30 days: 0                Last period     CD treatment hx: Yes -   Shobha LUEVANO March-August 2018  Shobha MONTERROSO October-January 2018     Withdrawal hx: Yes but no hx of withdrawal seizures or DTs.      Current sober supports include family.      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  Lexapro 5 mg daily started in Fall 2017 with poor adherence       Hospitalizations: 2  -[facility] 12/30/17 - 1/2/18 \"admitted with altered mental status and suicide statements.\"     -[facility]/[place] \"admitted to our Behavioral Health Unit under a 72-hour hold for concerns related to disorganized thought process along with reports of suicidal ideation.  Noting a prominence of psychosis and manic-like symptoms on admission...\"     Commitment: No, Current [facility] order: No     ECT trials: No     Suicide attempts: No     Self-injurious behavior: Yes - burning self 3x in one month time frame during college     Violent behavior: No     Outpatient Programs & Services [Psychotherapy, DBT, Day Treatment, Eating Disorder Tx etc]:   Psychiatry  -Dr Espinoza ([phone]) at [facility] ([phone]) before [facility]     Psych[facility]  [facility][phone][phone][facility] before [facility]    Family psychiatric history: unknown     Social/ Family History               [per patient report]    "                                              1ea, 1ea       Per 1/9/19 note by Sushma Blackburn, reviewed and updated as needed today:  Living situation: Eloy lives with in residential/IP treatment at [facility]; prior he lived with his mom in [place], [place]   Guns, weapons, or other means to harm oneself in the home? No  Pets at home? Yes - a dog at mom's                  Education: Eloy s highest level of education is high school graduate and some college. Failed out of [employer] in [place].      Occupation: Eloy is currently unemployed.     Finances: Eloy is financial supported by Family.     Relationships: Significant relationships include family. Reports little to no contact with peers.     Family members include parents (), two stepsisters and one half sister.      Spiritual considerations: No     Cultural influences: Eloy identifies is race as . Eloy reports  No  to cultural considerations to take into account when providing treatment.      Sexuality:  Eloy identifies as male, heterosexual, with preferred pronouns he/him/his.      Legal Hx: Yes - Misdemeanor for speeding and out of state misdemeanor for possession. He is not on probation.      Abuse Hx: No      Hx: No     Medical / Surgical History     Patient Active Problem List   Diagnosis     Suicidal ideation     Psychosis (H)       No past surgical history on file.      Medical Review of Systems                                                                                                    2, 10     A comprehensive review of systems was performed and is negative other than noted in the HPI or directly below:  Headaches throughout the day, come and go.  Dry mouth with anxiety       Allergy   Patient has no known allergies.   Current Medications     Current Outpatient Medications   Medication Sig Dispense Refill     divalproex sodium delayed-release (DEPAKOTE) 500 MG DR tablet Take 1 tablet (500 mg) by mouth every 12 hours  60 tablet 0     multivitamin, therapeutic (THERA-VIT) TABS tablet Take 1 tablet by mouth At Bedtime 30 tablet 0     OLANZapine (ZYPREXA) 20 MG tablet Take 1 tablet (20 mg) by mouth At Bedtime 30 tablet 0     traZODone (DESYREL) 100 MG tablet Take 2 tablets (200 mg) by mouth At Bedtime 30 tablet 0      Vitals                                                                                                                        3, 3     /67   Pulse 90   Wt 91.4 kg (201 lb 9.6 oz)   BMI 27.34 kg/m         Mental Status Exam                                                                                   9, 14 cog gs     Alertness: alert  and oriented  Appearance: casually groomed  Behavior/Demeanor: cooperative and pleasant, with good  eye contact   Speech: regular rate and rhythm  Language: intact  Psychomotor: normal or unremarkable  Mood: depressed and anxious  Affect: subdued; was congruent to mood; was congruent to content  Thought Process/Associations: unremarkable  Thought Content:  Reports none;  Denies suicidal ideation, violent ideation and delusions  Perception:  Reports none;  Denies auditory hallucinations and visual hallucinations  Insight: adequate  Judgment: adequate for safety  Cognition: (6) oriented: time, person, and place  attention span: intact  concentration: intact  recent memory: intact  remote memory: intact  fund of knowledge: appropriate  Gait and Station: unremarkable     Labs and Data       PHQ9 Today: see flow sheet if completed   PHQ-9 SCORE 1/7/2019 1/9/2019   PHQ-9 Total Score 17 7         Recent Labs   Lab Test 11/08/18  0807 10/31/18  0745 12/30/17 2024   CR 0.95 0.98 1.01*   GFRESTIMATED >90 >90 >90     Recent Labs   Lab Test 11/08/18  0807 10/31/18  0745   AST 16 16   ALT 16 18   ALKPHOS 84 76     Safety Assessment                                                                         m2, h3     Today Eloy Storm denies suicidal intent or plan, however he  reports chronic SI. In addition, he has notable risk factors for self-harm, including anxiety, psychosis and substance abuse. However, risk is mitigated by absence of past attempts, ability to volunteer a safety plan and history of seeking help when needed. Therefore, based on all available evidence including the factors cited above, he does not appear to be at imminent risk for self-harm, does not meet criteria for a 72-hr hold, and therefore remains appropriate for ongoing outpatient level of care. He has close follow up through [facility].     Diagnosis,  Assessment   & Plan                                                                          m2, h3     Diagnostic impressions (provisional)  Unspecified schizophrenia spectrum and other psychotic disorder (298.9, F29) (primary psychotic disorder versus primary mood disorder versus substance induced psychosis)  Social anxiety disorder   Panic disorder  Polysubstance use disorder     Mr. Eloy Storm is a 18 year old male who presents today for a new patient medication evaluation as part of the [facility] First Episode Psychosis program.  He reports a history of chronic depression and anxiety starting in middle school, which have increased in severity since this time.  He has a history of substance use starting by age 15 including heavy cannabis use, as well as use of alcohol, benzodiazepines, opiates.  He reports a duration of untreated psychosis of approximately 10 months.  Psychosis symptoms occurred in the context of substance use, and longitudinal assessment will be needed to determine the extent to which symptoms were substance induced. He is currently sober (60 days).  Psychosis symptoms have been gradually resolving with sobriety and [facility] treatment.  Today, he reports his primary concerns are managing anxiety, depression and maintaining sobriety.  He reports limited benefit from buspar for anxiety symptoms.  Will discontinue buspar today and start gabapentin.   Also recommended focusing on anxiety management in individual therapy.  Will also refer to MTM for a more thorough medication history and evaluation of efficacy/side effects of previous medications.  He agrees to contact the clinic tomorrow to confirm medication doses as he does not have this information today, however he believes the doses below are correct.       Psychosis  Continue Abilify 10 mg daily    Depression  Continue lexapro 20 mg daily     Anxiety  Lexapro as above  Discontinue buspar  Start gabapentin 100 mg TID PRN    Insomnia  Hold trazodone 100-150 mg daily due to hypersomnia    Substance use disorder  Continue to provide counseling regarding worsening of mood, anxiety and psychotic  symptoms with continued substance use. Encouraged abstinence     Long term management of high risk medications  10/18: lipids, glucose reviewed  Wt today = 201 lbs    Referals:  MTM        RTC: 1-2 weeks or sooner if needed     CRISIS NUMBERS:   Provided routinely in AVS.    Treatment Risk Statement:  The patient understands the risks, benefits, adverse effects and alternatives. Agrees to treatment with the capacity to do so. No medical contraindications to treatment. Agrees to call clinic for any problems. The patient understands to call 911 or go to the nearest ED if life threatening or urgent symptoms occur.          PROVIDER:  MAGALIS Sears CNP

## 2019-01-17 ENCOUNTER — OFFICE VISIT (OUTPATIENT)
Dept: PSYCHIATRY | Facility: CLINIC | Age: 19
End: 2019-01-17
Attending: NURSE PRACTITIONER
Payer: COMMERCIAL

## 2019-01-17 VITALS
SYSTOLIC BLOOD PRESSURE: 109 MMHG | DIASTOLIC BLOOD PRESSURE: 67 MMHG | BODY MASS INDEX: 27.34 KG/M2 | HEART RATE: 90 BPM | WEIGHT: 201.6 LBS

## 2019-01-17 DIAGNOSIS — F41.9 ANXIETY: Primary | ICD-10-CM

## 2019-01-17 PROCEDURE — G0463 HOSPITAL OUTPT CLINIC VISIT: HCPCS | Mod: ZF

## 2019-01-17 RX ORDER — GABAPENTIN 100 MG/1
100 CAPSULE ORAL 3 TIMES DAILY
Qty: 90 CAPSULE | Refills: 3 | Status: SHIPPED | OUTPATIENT
Start: 2019-01-17 | End: 2019-02-01

## 2019-01-17 ASSESSMENT — PAIN SCALES - GENERAL: PAINLEVEL: NO PAIN (0)

## 2019-01-17 NOTE — PATIENT INSTRUCTIONS
Stop taking buspar  Start gabapentin 100 mg two to three times per day    Please call the clinic with the exact doses of your medication.  [phone] - ask for Keisha Escamilla RN

## 2019-01-17 NOTE — Clinical Note
Kade Pimentel -This is a [facility] patient I saw this week.  I want to refer him for an MTM to get a better sense of his med history and response to medications.  He may be good for your study too.  Could you reach out to him about scheduling the MTM?

## 2019-01-21 ENCOUNTER — OFFICE VISIT (OUTPATIENT)
Dept: PSYCHIATRY | Facility: CLINIC | Age: 19
End: 2019-01-21
Payer: COMMERCIAL

## 2019-01-21 ENCOUNTER — TELEPHONE (OUTPATIENT)
Dept: PHARMACY | Facility: CLINIC | Age: 19
End: 2019-01-21

## 2019-01-21 DIAGNOSIS — F41.9 ANXIETY: Primary | ICD-10-CM

## 2019-01-21 DIAGNOSIS — F29 PSYCHOSIS, UNSPECIFIED PSYCHOSIS TYPE (H): Primary | ICD-10-CM

## 2019-01-21 ASSESSMENT — ANXIETY QUESTIONNAIRES
5. BEING SO RESTLESS THAT IT IS HARD TO SIT STILL: SEVERAL DAYS
2. NOT BEING ABLE TO STOP OR CONTROL WORRYING: MORE THAN HALF THE DAYS
6. BECOMING EASILY ANNOYED OR IRRITABLE: NOT AT ALL
GAD7 TOTAL SCORE: 9
1. FEELING NERVOUS, ANXIOUS, OR ON EDGE: MORE THAN HALF THE DAYS
3. WORRYING TOO MUCH ABOUT DIFFERENT THINGS: MORE THAN HALF THE DAYS
7. FEELING AFRAID AS IF SOMETHING AWFUL MIGHT HAPPEN: NOT AT ALL

## 2019-01-21 ASSESSMENT — PATIENT HEALTH QUESTIONNAIRE - PHQ9: 5. POOR APPETITE OR OVEREATING: MORE THAN HALF THE DAYS

## 2019-01-23 NOTE — PROGRESS NOTES
[facility] Program IRT Session  A Part of the [facility] First Episode of Psychosis Program     Patient Name: Eloy Storm  /Age:  2000 (18 year old)  Diagnosis(es):   Anxiety (F41.9)  Clinician: SAVANNA Reed     1. Type of contact:   IRT Session    2. People involved:   Client: Yes  SAPPHIRE IRT: FÁTIMA Reed LGSW  Other: MSW InternIrina    3. Total number of persons who participated in contact: 3, including [facility] team    4. Length of Actual Contact: 60 minutes, Record Minutes Travel Time: 0 minutes    5. Location of contact:  Psychiatry Clinic, [facility]    6. Assessment/Progress Note:     This writer and MSW Intern, Irina, facilitated an IRT orientation session with Eloy. The [facility] team members roles and contact information were discussed. Irina provided information about the standard and individualized [facility] modules. Eloy asked appropriate questions and seemed engaged. He reported depression, passive suicidal ideation, social anxiety, and ideas of reference delusions. This writer provided crisis resources to Eloy and Irina assessed for safety. Please see Irina's encounter dated 19 for more information.    This is a non-billable encounter as it was billed under a separate encounter.     8. Plan/Referrals:     This writer and Irina will continue with Module 2: Goal Setting and Assessment.    SAVANNA Reed   [facility] Direction & Family Clinician

## 2019-01-23 NOTE — PROGRESS NOTES
SAPPHIRE Clinician Contact & Progress Note  For Individual Resiliency Training (IRT)  A Part of the [facility] First Episode of Psychosis Program    NAVIGATE Enrollee: Eloy Storm (2000)     MRN: [id number]  Date:  1/21/19  Diagnosis: F41.9 Anxiety  Clinician: SAPPHIRE Individual Resiliency Trainer, Irina Davis     1. Type of contact: (majority of time spent)  IRT Session    2. People present:   Writer  Client: Eloy Storm  SAPPHIRE IRT: FÁTIMA Reed, SAVANNA    3. Total number of persons who participated in contact: 3, including writer    4. Length of Actual Contact: Start Time: 2 PM; End Time: 3 PM   Traveled?    No     5. Location of contact:  Psychiatry Clinic, [place]    6. Did the client complete the home practice option(s) from the previous session: Not Applicable    7. Motivational Teaching Strategies:  Connect info and skills with personal goals  Promote hope and positive expectations  Re-frame experiences in positive light    8. Educational Teaching Strategies:  Review of written material/education  Relate information to client's experience  Ask questions to check comprehension  Break down information into small chunks    9. CBT Teaching Strategies:  Reinforcement and shaping (positive feedback for steps towards goals and gains in knowledge & skills)  Relaxation training (model relaxation technique, practice technique and plan home practice)    10. IRT Module(s) Addressed:  Module 1 - Orientation    11. Techniques utilized:   [place] announced at beginning of session  Review of goal  Present new material  Help client choose a home practice option  Summarize progress made in current session    12. Mental Status Exam:    Alertness: alert  and oriented  Appearance: adequately groomed  Behavior/Demeanor: cooperative, pleasant and calm, with good  eye contact   Speech: normal  Language: intact. Preferred language identified as English.  Psychomotor: normal or unremarkable  Mood:  depressed  Affect: restricted and appropriate; was congruent to mood; was congruent to content  Thought Process/Associations: unremarkable  Thought Content:  Reports suicidal ideation without plan; without intent [details in Interim History] and delusions;  Denies violent ideation and paranoid ideation  Perception:  Reports none;  Denies auditory hallucinations and visual hallucinations  Insight: good  Judgment: good  Cognition: does  appear grossly intact; formal cognitive testing was not done  Suicidal ideation: reports passive SI, denies intent,  and denies plan  Homicidal Ideation: denies    13. Assessment/Progress Note:     This writer met with client for IRT orientation session. Client endorsed the following symptoms: depression, loneliness, hopelessness, and passive suicidal ideation. This writer assessed for safety and client denied suicidal intent or plan. Writer gave crisis resources and discussed coping strategies, including talking with family members, playing video games, watching TV. Client reported feeling under the weather from anxiety and sleeping up to 13 hours a day, including 8 hours at night and up to 5 hours nap. Client also discussed ideas of reference, delusions and social anxiety. Client resides at recovery facility[facility], and reports having been sober for at least a month. Patient completed outplacement CD program and discussed alcohol and marijuana as substances of choice. Writer introduced client to IRT modules. Client was engaged and responsive. Of particular interest, client identified Education about Psychosis, Relapse Prevention Planning, Processing the Psychotic Episode, Building a Bridge to Your Goals, and Dealing with Negative Feelings. Writer gave an overview to the care team members and their roles. Writer discussed client's experience with hospitalization and he defined it as scary. Client mentioned an interest in working on his budgeting skills, which had declined  "during substance use. Writer introduced relaxed breathing exercise, provided a guided exercise, and client agreed on this for home practice. Writer completed the CANSAS, [place] Symptom Index, and IMR Self-Rating Form with the client.    14. Plan/Referrals:     Writer will continue to consult with the team. Writer will assess for ongoing safety concerns. Writer will introduce module 2 Goal Setting and Assessment.     Billing for \"Interactive Complexity\"?    No      FÁTIMA Villalta Intern  NAVIGATE Individual Resiliency Trainer    Attestation:    I did not see this patient directly. This patient is discussed with me in individual clinical social work supervision, and I agree with the plan as documented.     FÁTIMA Oconnor, [facility], January 23, 2019      "

## 2019-01-23 NOTE — PROGRESS NOTES
SAPPHIRE SEE Progress Note   For Supported Employment & Education    SAPPHIRE Enrollee: Eloy Storm (2000)     MRN: [id number]  Date:  1/23/2019  Clinician: SAPPHIRE Supported Employment & , Sushma Peña    SEE met with Eloy Storm and SHERRY Gaffney to introduce services and explain what that SEE will conduct orientation and begin completing the Career and Education Inventory. Also explained that SEE will work with person to create goals and work with them to achieve these goals.     Orientation appointment set for 1/28/19 at 1p.    Sushma Peña Supported Employment and , SAPPHIRE

## 2019-01-28 ENCOUNTER — OFFICE VISIT (OUTPATIENT)
Dept: PHARMACY | Facility: CLINIC | Age: 19
End: 2019-01-28
Payer: COMMERCIAL

## 2019-01-28 ENCOUNTER — OFFICE VISIT (OUTPATIENT)
Dept: PSYCHIATRY | Facility: CLINIC | Age: 19
End: 2019-01-28
Payer: COMMERCIAL

## 2019-01-28 ENCOUNTER — BEH TREATMENT PLAN (OUTPATIENT)
Dept: PSYCHIATRY | Facility: CLINIC | Age: 19
End: 2019-01-28

## 2019-01-28 DIAGNOSIS — F29 PSYCHOSIS, UNSPECIFIED PSYCHOSIS TYPE (H): Primary | ICD-10-CM

## 2019-01-28 DIAGNOSIS — F40.10 SOCIAL ANXIETY DISORDER: ICD-10-CM

## 2019-01-28 DIAGNOSIS — F32.A DEPRESSION: ICD-10-CM

## 2019-01-28 DIAGNOSIS — F29 PSYCHOSIS (H): Primary | ICD-10-CM

## 2019-01-28 PROCEDURE — 99605 MTMS BY PHARM NP 15 MIN: CPT | Performed by: PHARMACIST

## 2019-01-28 PROCEDURE — 99607 MTMS BY PHARM ADDL 15 MIN: CPT | Performed by: PHARMACIST

## 2019-01-28 RX ORDER — ESCITALOPRAM OXALATE 20 MG/1
20 TABLET ORAL DAILY
Refills: 0 | COMMUNITY
Start: 2019-01-14 | End: 2019-02-01

## 2019-01-28 RX ORDER — TRAZODONE HYDROCHLORIDE 150 MG/1
100-150 TABLET ORAL AT BEDTIME
Refills: 0 | COMMUNITY
Start: 2019-01-02 | End: 2019-02-01 | Stop reason: DRUGHIGH

## 2019-01-28 RX ORDER — ARIPIPRAZOLE 10 MG/1
10 TABLET ORAL DAILY
Refills: 0 | COMMUNITY
Start: 2019-01-14 | End: 2019-02-01

## 2019-01-28 ASSESSMENT — ANXIETY QUESTIONNAIRES
1. FEELING NERVOUS, ANXIOUS, OR ON EDGE: SEVERAL DAYS
GAD7 TOTAL SCORE: 4
3. WORRYING TOO MUCH ABOUT DIFFERENT THINGS: NOT AT ALL
6. BECOMING EASILY ANNOYED OR IRRITABLE: NOT AT ALL
7. FEELING AFRAID AS IF SOMETHING AWFUL MIGHT HAPPEN: NOT AT ALL
2. NOT BEING ABLE TO STOP OR CONTROL WORRYING: SEVERAL DAYS
5. BEING SO RESTLESS THAT IT IS HARD TO SIT STILL: SEVERAL DAYS

## 2019-01-28 ASSESSMENT — PATIENT HEALTH QUESTIONNAIRE - PHQ9: 5. POOR APPETITE OR OVEREATING: SEVERAL DAYS

## 2019-01-28 NOTE — Clinical Note
Kade Zamora- You see Eloy tomorrow. Some med recommendations in my note. I am happy to see him again to follow-up on any changes you make. I let him know that you and I may be alternating some visits. Thanks!Loren

## 2019-01-28 NOTE — PATIENT INSTRUCTIONS
Recommendations from today's MTM visit:                                                      1. Ok to Lexapro at bedtime instead of the morning    2. I will talk to Sera about changing Lexapro and possibly increasing gabapentin    3. I will talk to Sera about writing a new prescription for trazodone to take 50-100mg nightly as needed.        To schedule another MTM appointment, please call the clinic directly or you may call the MTM scheduling line at [phone] or toll-free at [phone].     My Clinical Pharmacist's contact information:                                                      It was a pleasure talking with you today!  Please feel free to contact me with any questions or concerns you have.      Loren Castro, Annemarie, BCPP  Medication Therapy Management Pharmacist  [facility]  [phone]      You may receive a survey about the MTM services you received.  I would appreciate your feedback to help me serve you better in the future. Please fill it out and return it when you can. Your comments will be anonymous.

## 2019-01-28 NOTE — PROGRESS NOTES
"SUBJECTIVE/OBJECTIVE:                           Eloy Storm is a 18 year old male coming in for an initial visit for Medication Therapy Management.  He was referred to me from Sera Sanders. Eloy is seen as part of [facility] FEP Program.    Chief Complaint: [facility] First Episode Psychosis Team- Medication Assessment.    Allergies/ADRs: Reviewed in Epic    Substance Use:  Tobacco: Currently Vaping. Previous smoker. Has used nicotine patches for cessation in the past- didn't find them very helpful. Is not interested in other smoking cessation aids at this time.   Alcohol: sober x 3months. [facility]x [facility]   Cannabis: sober x 3months.    *Denies cravings for MJ/alcohol. Did have one fleeting thought of MJ use in the last week, but was able to overcome this.     Medication Adherence/Access:  no issues reported, living Facility assists with medications  Fills at [facility]. Consistent med fills.     Psychosis, social anxiety disorder, depression:   Current therapy includes:   Abilify 10 mg daily, helpful for psychosis  Lexapro 20 mg daily, unclear benefit  Gabapentin 100 mg 3 times daily (currently taking BID & feels like it is lasting throughout the day), helpful for anxiety  Trazodone  mg nightly as needed, hasn't needed as much since gabapentin started      In terms of medication efficacy:    - Eloy reports Abilify has been very helpful for psychosis. Denies current psychosis symptoms including AH, VH, paranoia, ideas of reference.  Reports symptoms improved within 1 week of starting Abilify.  Abilify more helpful than Zyprexa. \"My personality came back with Abilify and my symptoms improved\".   - Gabapentin was started 1/17 and has been somewhat helpful for anxiety. Reports feeling calmer, less edgy, less heart pounding.  He wonders if he can increase the dose for more benefit.  - Lexapro- Eloy has been taking Lexapro consistently since it was started " "at [facility] around the end of 11/2018 (~2 months) and is unsure if it is helpful- still having a lot of anxiety (on edge, worry, social anxiety, panic) and depression (low mood, anhedonia, amotivation, low energy, feeling worthless). Dr. Espinoza at [facility] had mentioned trying Zoloft. Eloy wonders if this would be a good choice.   - Trazodone: hasn't needed it the last 2 nights since starting gabapentin. Takes 75-150mg if needed, but sometimes feels the dose is too high. Has 100mg and 150mg tablets. Typically sleeping 10pm-8am.   - Patient asks about evaluation for ADHD. Endorses trouble with concentration and focus. Has never been evaluated for ADHD.       In terms of medication side effects:   Endorses:    - Daytime sedation: Eloy is unclear if this is related to meds. Takes all meds QAM. Sedation has not worsened with gabapentin.     - Restlessness:  Reports not liking to sit still. When probed identifies with \"wanting to crawl out of my skin sensation\". Rates as a 2 on scale of 1-10. Was taking Cogentin for ~1 week with Abilify. Discontinued d/t sedation.     Denies: Tremor, muscle stiffness/jerking, increased appetite, increased agitation/decreased need for sleep with Lexapro      HPI:   Patient was seen by Sera Sanders for diagnostic assessment (DA) on 1/17/2019 as part of [facility] First Episode Psychosis Program.  See DA for details regarding symptoms and history.    In brief, patient reports history of depression and anxiety starting in middle school. He first experienced symptoms of psychosis in late 2017 (VH and AH).  Denied substance use x 1-2 weeks during this episode. He was psychiatrically hospitalized at [facility] from 12/30/17-1/2/18 for depression, thought to be substance induced. He was started on lexapro 5 mg daily but stopped taking it shortly after discharge.      In late October 2018 he began experiencing psychosis again.  He reported ideas of reference when watching music videos, TV re: messages about " "his own fate.  [place] people were talking about him often.  Also reported paranoid ideation about cameras that could be watching him because he was part of an experiment.  He reported AH during this time.  He was admitted for a second time to [facility] from 10/30-11/14/18 for increasing disorganization and mood lability in the context of increased cannabis use. He was started on zyprexa and depakote. He was discharged to [facility] in [place] for residential treatment.  Was at [facility] for 2 months and is now living in a sober house.     Med changes made at [facility] (by Dr. Espinoza):   - Zyprexa changed to Abilify (per patient: d/t sedation, ongoing psychosis and mood symptoms)  - Depakote discontinued shortly after Abilify started  - Buspar started for anxiety      Past medication trials:     Drug Dose Duration Efficacy Side effects   Lexapro 20mg daily 2 months (20mg started 12/14/18)  11/2018-present  brief trial 5-6/2018 Unclear depression benefit, not helpful for anxiety Possibly sedation   Buspar (+Lexapro) 10mg BID 3-4 weeks  12/2018-1/2019 No- likely not adequate trial No   Depakote (+Zyprexa) 500mg BID, level 99 1-2 months  11-12/2018 Prescribed for jamila-like symptoms during [facility] hospitalization. D/C'ed by Shobha SARAH Possibly sedation   Zyprexa (+Depakote) 20mg QHS 1-2 months  11-12/1018 Somewhat, ongoing mood and psychosis symptoms per pt \"Zombie-like\", Sedation, weight gain   Abilify 10mg daily 2 months  12/2018-present Yes- improved psychosis  Possibly mild akathisia   Cogentin (+Abilify) 0.5mg BID 1 week  12/2018 Unknown Possibly sedation   Trazodone 75-150mg QHS PRN  3 months  11/2018-present Yes Possible next day sedation              Cholesterol   Date Value Ref Range Status   10/31/2018 115 <170 mg/dL Final   12/31/2017 102 <170 mg/dL Final     HDL Cholesterol   Date Value Ref Range Status   10/31/2018 38 (L) >45 mg/dL Final     Comment:     Low:             <40 mg/dl  Borderline low:   40-45 mg/dl   "   12/31/2017 53 >45 mg/dL Final     LDL Cholesterol Calculated   Date Value Ref Range Status   10/31/2018 61 <110 mg/dL Final   12/31/2017 36 <110 mg/dL Final     Triglycerides   Date Value Ref Range Status   10/31/2018 82 <90 mg/dL Final   12/31/2017 63 <90 mg/dL Final     Glucose:  88 11/8/2018    Wt Readings from Last 4 Encounters:   01/17/19 201 lb 9.6 oz (91.4 kg) (93 %)*   11/13/18 179 lb (81.2 kg) (81 %)*   05/26/18 167 lb 6.4 oz (75.9 kg) (71 %)*     * Growth percentiles are based on CDC (Boys, 2-20 Years) data.     BP Readings from Last 1 Encounters:   01/17/19 109/67     Pulse Readings from Last 1 Encounters:   01/17/19 90     Wt Readings from Last 1 Encounters:   01/17/19 201 lb 9.6 oz (91.4 kg) (93 %)*     * Growth percentiles are based on CDC (Boys, 2-20 Years) data.     Ht Readings from Last 1 Encounters:   10/30/18 6' (1.829 m) (81 %)*     * Growth percentiles are based on CDC (Boys, 2-20 Years) data.     Estimated body mass index is 27.34 kg/m  as calculated from the following:    Height as of 10/30/18: 6' (1.829 m).    Weight as of 1/17/19: 201 lb 9.6 oz (91.4 kg).        ASSESSMENT:                             Current medications were reviewed today.     Medication Adherence: good    Psychosis, social anxiety disorder, depression: Improving.  Abilify appears helpful for psychosis and gabapentin is somewhat helpful for anxiety. However, depression and anxiety still uncontrolled.  Eloy has been on max dose of Lexapro for 6 weeks, with limited benefit. It is noted that [facility] inpatient team was concerned for possible manic symptoms on 10/30/18 admission, however pt has not endorsed jamila symptoms since Lexapro initiation. Could consider antidepressant medication change. He has no other past antidepressant trials. Agree that Zoloft would be a good next choice as it tends not to be sedating and can be helpful for low energy, low motivation, hypersomnia.  Given that it may take several weeks to see  efficacy from antidepressant change, pt may benefit from gabapentin dose increase (i.e. 300mg BID) to cover transition period. If Zoloft is effective for depression/anxiety, gabapentin may be able to be eliminated.     Pt's low energy and daytime fatigue can be a medication side effect or a symptom of depression or psychosis.  Abilify and Lexapro tend not to be very sedating medications, but may be in some people.  Directed Eloy to change Lexapro from QAM to QHS administration this week to see if sedation improves.  If not, may consider changing Abilify to QHS dosing next week. Would continue to monitor sedation if gabapentin dose is increased. Counseled pt on side effect profiles of all his medications today. Also recommend trazodone dose reduction as pt's insomnia has improved and he is needing less trazodone. Counseled to use PRN only.     Pt may be experiencing mild akathesia from Abilify. Recommend continued monitoring. If psychosis symptoms remain stable and pt continues to abstain from substance use, may be able to reduce Abilify dose. If not, addition of propranolol may be helpful.     Finally, we discussed ADHD symptoms (focus, concentration) and that these symptoms can be present in depression, anxiety and psychosis as well. Reviewed that these symptoms may improve as we gain better control of depression and anxiety.  Pt expressed understanding. Will also discuss with Sera Sanders.       PLAN:                               1. Eloy to change Lexapro to QHS dosing. May consider changing Abilify to QHS dosing next week.     Considerations for psychiatry:   1. Could consider changing Lexapro to Zoloft d/t limited efficacy  2. Could consider increasing gabapentin to 300mg BID (pt feels AM dose lasts until evening) for anxiety control  3. Could consider reducing trazodone to 25-50mg PRN at bedtime.    4. Ongoing monitoring for akathesia. In the future: Could consider small Abilify dose reduction to 7.5mg if  symptoms remain stable or initiation of propranolol.      I spent 60 minutes with this patient today. A copy of the visit note was provided to the patient's referring provider.    Follow-up timeline per Sera jama. Eloy to see Sera 2/1/19.    The patient was given a summary of these recommendations as an after visit summary.     Chart documentation was completed in part with Dragon voice-recognition software. Even though reviewed, some grammatical, spelling, and word errors may remain.    Loren Castro, PharmD, BCPP  Medication Therapy Management Pharmacist  [facility]  [phone]

## 2019-01-28 NOTE — PROGRESS NOTES
NAVIGATE SEE Progress Note   For Supported Employment & Education    NAVIGATE Enrollee: Eloy Storm (2000)     MRN: [id number]  Date:  1/28/19  Clinician: ARIANNAATE Supported Employment & , Sushma Peña    1. Client Status Update:   Eloy Storm is interested in education (Client working on completing Career Profile) and employment (Client working on completing Career Profile)    2. People present:   SEE/Writer  Client: Eloy Storm      3. Total number of persons who participated in contact: (do not count yourself/SEE) 1    4. Length of Actual Contact: 60 minutes   Traveled? No    5. Location of contact:  Psychiatry Clinic, [place]    6. Brief description of session, contact, or client status (include: strategies, interventions, client reaction to contact, next steps, etc)    Eloy and this writer met at the [facility] clinic to complete the SEE orientation and to start the career and education inventory. Eloy is a 18 year old who is living in a sober living home with staff supports. Eloy said he would like to move out within the next 6 months though he likes the home and his roommates. Eloy will be allowed to work once he has lived at the home for 30 days, which will be in 2 weeks. He describes himself as liking people, good at snowboarding, enjoys music, the outdoors, creativity, rap and seeing friends. His short term goals are to start working soon as well as work out more and meet new, sober friends. We discussed ways we could search for jobs and Eloy is open to doing traditional apply/interview type searching as well as informational interviews and interest assessments to explore different careers.    7. Completion of mutually agreed upon client task from previous meeting:  Not Applicable    8. Orientation and Treatment Planning:  SEE orientation meeting    9. Assessment:  Gathering SEE information/inventory regarding work and/or education history,  skills, goals, and preferences with client    10. Placement:  Not Applicable    11. Follow Along Supports: (for clients who are working or attending school)   Not Applicable    12. Mutually agreed upon client task for next meeting:     na    13. Next Meeting Scheduled for: next Monday at 1    [facility] Supported Employment &

## 2019-01-29 ASSESSMENT — PATIENT HEALTH QUESTIONNAIRE - PHQ9: SUM OF ALL RESPONSES TO PHQ QUESTIONS 1-9: 13

## 2019-01-29 NOTE — PROGRESS NOTES
NAVIGATE Program IRT Session  A Part of the [facility] First Episode of Psychosis Program     Patient Name: Eloy Storm  /Age:  2000 (18 year old)  Diagnosis(es):   Psychosis, unspecified (F29)  Clinician: SAVANNA Reed     1. Type of contact:   IRT Session    2. People involved:   Client: Yes  SAPPHIRE IRT: FÁTIMA Reed LGSW  Other: SAPPHIRE MSW Intern: Irina Davis     3. Total number of persons who participated in contact: 3, including NAVIGATE team    4. Length of Actual Contact: 60 minutes, Record Minutes Travel Time: 0 minutes    5. Location of contact:  Psychiatry Clinic, [facility]    6. Techniques utilized:   [place] announced at beginning of session  Review of goals and associated strengths, barriers, objectives, and interventions  Illicit client/family feedback    7. Assessment/Progress Note:     This writer attended an IRT session with Irina (MSW Intern) and Eloy. This writer provided observations, feedback, and facilitated treatment planning during the session. Eloy reviewed his previous week and the reduction in symptoms he's been experiencing. He reported hope for his future recovery throughout the session. Irina guided Eloy through the beginning of Module 2: Goal Planning and Assessment. Please see Irina's encounter dated 19 for more information.     This is a non-billable encounter as it was billed under a separate encounter.    8. Plan/Referrals:     This writer will create an initial treatment plan for Eloy.     SAVANNA Reed Direction & Family Clinician

## 2019-01-30 ASSESSMENT — ANXIETY QUESTIONNAIRES: GAD7 TOTAL SCORE: 4

## 2019-02-01 ENCOUNTER — OFFICE VISIT (OUTPATIENT)
Dept: PSYCHIATRY | Facility: CLINIC | Age: 19
End: 2019-02-01
Attending: NURSE PRACTITIONER
Payer: COMMERCIAL

## 2019-02-01 VITALS
DIASTOLIC BLOOD PRESSURE: 54 MMHG | BODY MASS INDEX: 27.26 KG/M2 | SYSTOLIC BLOOD PRESSURE: 99 MMHG | WEIGHT: 201 LBS | HEART RATE: 70 BPM

## 2019-02-01 DIAGNOSIS — F41.9 ANXIETY: ICD-10-CM

## 2019-02-01 DIAGNOSIS — F29 PSYCHOSIS, UNSPECIFIED PSYCHOSIS TYPE (H): Primary | ICD-10-CM

## 2019-02-01 PROCEDURE — G0463 HOSPITAL OUTPT CLINIC VISIT: HCPCS | Mod: ZF

## 2019-02-01 RX ORDER — ARIPIPRAZOLE 10 MG/1
10 TABLET ORAL DAILY
Qty: 30 TABLET | Refills: 2 | Status: SHIPPED | OUTPATIENT
Start: 2019-02-01 | End: 2019-03-01

## 2019-02-01 RX ORDER — ESCITALOPRAM OXALATE 20 MG/1
20 TABLET ORAL DAILY
Qty: 30 TABLET | Refills: 2 | Status: SHIPPED | OUTPATIENT
Start: 2019-02-01 | End: 2019-03-01

## 2019-02-01 RX ORDER — GABAPENTIN 300 MG/1
300 CAPSULE ORAL 2 TIMES DAILY PRN
Qty: 60 CAPSULE | Refills: 2 | Status: SHIPPED | OUTPATIENT
Start: 2019-02-01 | End: 2019-03-01

## 2019-02-01 ASSESSMENT — PAIN SCALES - GENERAL: PAINLEVEL: NO PAIN (0)

## 2019-02-01 ASSESSMENT — PATIENT HEALTH QUESTIONNAIRE - PHQ9: SUM OF ALL RESPONSES TO PHQ QUESTIONS 1-9: 4

## 2019-02-01 NOTE — PATIENT INSTRUCTIONS
Medication changes today:    Increase gabapentin to 300 mg BID as needed for anxiety  Decrease trazodone to 25-50 mg at bedtime

## 2019-02-01 NOTE — NURSING NOTE
Patient arrived late for appointment and was taken back to treatment room before getting vitals done today. Laura Daniels MA

## 2019-02-01 NOTE — PROGRESS NOTES
"  Psychiatry Clinic New Patient Medication Evaluation                                           Eloy Storm is a 18 year old male who prefers the name Eloy and pronoun he, him.  Therapist: Tamiko Hartman [facility]  PCP: Ramon Price  Other Providers: [facility] FEP team  Referred by [facility] for evaluation of psychosis.     History was provided by patient      Chief Complaint                                                                                                        \" anxiety \"     History of Present Illness                                                                                4, 4      Mr. Eloy Storm is a 18 year old male with a history of polysubstance use disorder, psychosis NOS, social anxiety and panic attacks. He is enrolled in [facility]. He was first and last seen by me on 1/17/19 at which time gabapentin was started at 100 mg TID for anxiety and he was instructed to hold trazodone due to low energy/daytime sedation.  Since his visit with me he completed an MTM with Loren Castro, PharmD.  We reviewed recommendations today.    He reports that gabapentin has been helpful for anxiety, feels much more \"mellowed out.\"  Is not experiencing as much chest tightness with his anxiety.  He denies any recent panic attacks.  However, continues to experience persistent anxiety symptoms and would like to increase gabapentin today.   Describes mood as \"bored.\"  Reports little interest in doing activities and not enjoying activities as much.  Reports feelings of hopelessness and worthlessness that come and go throughout the day.  Denies any recent change in appetite.  Energy is somewhat lower than usual. Is sleeping 9-10 hours per night, which is longer than usual for him.  Concentration continues to be poor. Suggests ADHD evaluation.   Denies any recent psychosis symptoms.     He is living at [facility].  Medications are self dispensed.  He goes to some groups in the morning and then " has free time throughout the day.  Is considering getting a job at a coffee shop.  Has to wait 30 days before starting to work per his residential treatment rules.  Denies any recent cravings for substances.    He reports in the evening he feels that he can't get comfortable, needs to move around more.  Denies any other medication SE today.     Current psychiatric medications:  Lexapro 20 mg daily   Abilify 10 mg daily  Gabapentin 100 mg TID PRN  Trazodone 100-150 mg at bedtime    Recent Symptoms:   Negative unless noted in the HPI       Recent Substance Use:  none reported      Substance Use History (no change today)     Cannabis - daily since age 15, approx 2-3 grams per day.  Sober since 10/30/18  Tobacco - since age 15, vapor    Other Drugs:   Past - MDMA, Cocaine, LSD                Age of first other drug use: [age]                Number of days patient used opiods over the last 30 days: 0                Last period of sobriety, when and how long: Greater than 6 months      Past - Xanaz, Oxycodone, Klonopin, Hydrocodone, Percocet (prior averaged monthly use)                Age of first other drug use: [age]                Number of days patient used opiods over the last 30 days: 0                Last period     CD treatment hx: Yes -   [facility] March-August 2018  [facility] October-January 2018     Withdrawal hx: Yes but no hx of withdrawal seizures or DTs.   Current sober supports include family.     Psychiatric History     Per 1/9/18 DA, reviewed with patient and updated where needed:    Past diagnoses:   -[facility] 1/2/18:  Substance induced mood, mood disorder  Rule out major depressive disorder     -Per [facility] 12/31/18:  Alcohol use disorder, moderate, dependence  Cannabis use disorder, severe, dependence  Sedative, hypnotic or anxiolytic use disorder, mild, abuse  Substance-induced psychotic disorder  Tobacco use disorder, moderate, dependence  Unspecified anxiety disorder  Unspecified  "depressive disorder     -Per [facility] 11/14/18:  Unspecified schizophrenia spectrum disorder and other psychotic disorder (rule out a substance-induced psychotic disorder versus a primary psychotic illness)  Cannabis use disorder, severe  Sedative hypnotic use disorder, moderate  Alcohol use disorder, moderate     Past medication trials: Zyprexa, Depakote started during 10/2018 admission.  Lexapro 5 mg daily started in Fall 2017 with poor adherence  See note by Loren Castro, PharmD dated 1/28/18 for detailed med hx    Hospitalizations: 2  -[facility] 12/30/17 - 1/2/18 \"admitted with altered mental status and suicide statements.\"     -[facility]/[place] \"admitted to our Behavioral Health Unit under a 72-hour hold for concerns related to disorganized thought process along with reports of suicidal ideation.  Noting a prominence of psychosis and manic-like symptoms on admission...\"     Commitment: No, Current [place] order: No     ECT trials: No  Suicide attempts: No  Self-injurious behavior: Yes - burning self 3x in one month time frame during college  Violent behavior: No  Outpatient Programs & Services [Psychotherapy, DBT, Day Treatment, Eating Disorder Tx etc]:   Psychiatry  -Dr Espinoza ([phone]) at [facility] ([phone]) before [facility]     Psychotherapy  -Boo Anderson ([phone]) at [facility]  -Ernestine Parisi ([phone]) before [facility]    Family psychiatric history: unknown    Recent medication changes:  1/17/19: Start gabapentin 100 mg TID PRN for anxiety      Social/ Family History    (no change today)           [per patient report]                                                 1ea, 1ea       Per 1/9/19 note by Sushma Blackburn, reviewed and updated as needed today:  Living situation: Eloy lives with in residential/IP treatment at [facility]; prior he lived with his mom in [place], [place]   Guns, weapons, or other means to harm oneself in the home? No  Pets at home? Yes - a dog at " mom's                  Education: Eloy s highest level of education is high school graduate and some college. Failed out of [employer] in [place].   Occupation: Eloy is currently unemployed.  Finances: Eloy is financial supported by Family.  Relationships: Significant relationships include family. Reports little to no contact with peers.  Family members include parents (), two stepsisters and one half sister.   Cultural influences: Eloy identifies is race as . Eloy reports  No  to cultural considerations to take into account when providing treatment.   Sexuality:  Eloy identifies as male, heterosexual, with preferred pronouns he/him/his.   Legal Hx: Yes - Misdemeanor for speeding and out of state misdemeanor for possession. He is not on probation.   Abuse Hx: No   Hx: No     Medical / Surgical History     Patient Active Problem List   Diagnosis     Suicidal ideation     Psychosis (H)       No past surgical history on file.      Medical Review of Systems                                                                                                    2, 10     A comprehensive review of systems was performed and is negative other than noted in the HPI     Allergy   Patient has no known allergies.   Current Medications     Current Outpatient Medications   Medication Sig Dispense Refill     ARIPiprazole (ABILIFY) 10 MG tablet Take 10 mg by mouth daily  0     escitalopram (LEXAPRO) 20 MG tablet Take 20 mg by mouth daily  0     gabapentin (NEURONTIN) 100 MG capsule Take 1 capsule (100 mg) by mouth 3 times daily 90 capsule 3     multivitamin, therapeutic (THERA-VIT) TABS tablet Take 1 tablet by mouth At Bedtime 30 tablet 0     traZODone (DESYREL) 150 MG tablet Take 100-150 mg by mouth At Bedtime  0      Vitals                                                                                                                        3, 3   BP 99/54   Pulse 70   Wt 91.2 kg (201 lb)   BMI  27.26 kg/m         Mental Status Exam                                                                                   9, 14 cog gs     Alertness: alert  and oriented  Appearance: casually groomed  Behavior/Demeanor: cooperative and pleasant, with good  eye contact   Speech: regular rate and rhythm  Language: intact  Psychomotor: normal or unremarkable  Mood: depressed and anxious  Affect: subdued; was congruent to mood; was congruent to content  Thought Process/Associations: unremarkable  Thought Content:  Reports none;  Denies suicidal ideation, violent ideation and delusions  Perception:  Reports none;  Denies auditory hallucinations and visual hallucinations  Insight: adequate  Judgment: adequate for safety  Cognition: (6) oriented: time, person, and place  attention span: intact  concentration: intact  recent memory: intact  remote memory: intact  fund of knowledge: appropriate  Gait and Station: unremarkable     Labs and Data       PHQ9 Today: see flow sheet if completed   PHQ-9 SCORE 1/9/2019 1/21/2019 1/28/2019   PHQ-9 Total Score 7 4 13         Recent Labs   Lab Test 11/08/18  0807 10/31/18  0745 12/30/17 2024   CR 0.95 0.98 1.01*   GFRESTIMATED >90 >90 >90     Recent Labs   Lab Test 11/08/18  0807 10/31/18  0745   AST 16 16   ALT 16 18   ALKPHOS 84 76     Safety Assessment                                                                         m2, h3     Today Eloy Storm denies suicidal intent or plan, however he reports chronic SI. In addition, he has notable risk factors for self-harm, including anxiety, psychosis and substance abuse. However, risk is mitigated by absence of past attempts, ability to volunteer a safety plan and history of seeking help when needed. Therefore, based on all available evidence including the factors cited above, he does not appear to be at imminent risk for self-harm, does not meet criteria for a 72-hr hold, and therefore remains appropriate for ongoing outpatient  level of care. He has close follow up through [facility].     Diagnosis,  Assessment   & Plan                                                                          m2, h3     Diagnostic impressions (provisional)  Unspecified schizophrenia spectrum and other psychotic disorder (298.9, F29) (primary psychotic disorder versus primary mood disorder versus substance induced psychosis)  Social anxiety disorder   Panic disorder  Polysubstance use disorder     Mr. Eloy Storm is a 18 year old male who is followed in the [facility] First Episode Psychosis program.  He reports a history of chronic depression and anxiety starting in middle school, which have increased in severity since this time.  He has a history of substance use starting by age 15 including heavy cannabis use, as well as use of alcohol, benzodiazepines, opiates.  He reports a duration of untreated psychosis of approximately 10 months.  Psychosis symptoms occurred in the context of substance use, and longitudinal assessment will be needed to determine the extent to which symptoms were substance induced. He is currently sober (60+ days).  Psychosis symptoms have been gradually resolving with sobriety and SGA treatment.  He reports some improvement in anxiety with gabapentin and requests dose increase today.  Will consider change from lexapro to Zoloft per MTM recommendations at next visit.      Psychosis  Continue Abilify 10 mg daily    Depression  Continue lexapro 20 mg daily     Anxiety  Lexapro as above  Increase gabapentin 300 mg BID PRN    Insomnia  Decrease trazodone to 25-50 mg at bedtime, as needed only     Substance use disorder  Continue to provide counseling regarding worsening of mood, anxiety and psychotic  symptoms with continued substance use. Encouraged abstinence     Long term management of high risk medications  10/18: lipids, glucose reviewed  Wt today = 201 lbs      RTC: 2-3 weeks or sooner if needed     CRISIS NUMBERS:   Provided  routinely in AVS.    Treatment Risk Statement:  The patient understands the risks, benefits, adverse effects and alternatives. Agrees to treatment with the capacity to do so. No medical contraindications to treatment. Agrees to call clinic for any problems. The patient understands to call 911 or go to the nearest ED if life threatening or urgent symptoms occur.          PROVIDER:  MAGALIS Sears CNP

## 2019-02-02 ASSESSMENT — ANXIETY QUESTIONNAIRES: GAD7 TOTAL SCORE: 9

## 2019-02-04 ENCOUNTER — OFFICE VISIT (OUTPATIENT)
Dept: PSYCHIATRY | Facility: CLINIC | Age: 19
End: 2019-02-04
Payer: COMMERCIAL

## 2019-02-04 DIAGNOSIS — F29 PSYCHOSIS (H): Primary | ICD-10-CM

## 2019-02-04 ASSESSMENT — PATIENT HEALTH QUESTIONNAIRE - PHQ9: SUM OF ALL RESPONSES TO PHQ QUESTIONS 1-9: 9

## 2019-02-11 ENCOUNTER — OFFICE VISIT (OUTPATIENT)
Dept: PSYCHIATRY | Facility: CLINIC | Age: 19
End: 2019-02-11
Payer: COMMERCIAL

## 2019-02-11 DIAGNOSIS — F29 PSYCHOSIS, UNSPECIFIED PSYCHOSIS TYPE (H): Primary | ICD-10-CM

## 2019-02-11 ASSESSMENT — ANXIETY QUESTIONNAIRES
GAD7 TOTAL SCORE: 5
1. FEELING NERVOUS, ANXIOUS, OR ON EDGE: MORE THAN HALF THE DAYS
3. WORRYING TOO MUCH ABOUT DIFFERENT THINGS: NOT AT ALL
7. FEELING AFRAID AS IF SOMETHING AWFUL MIGHT HAPPEN: NOT AT ALL
2. NOT BEING ABLE TO STOP OR CONTROL WORRYING: NOT AT ALL
6. BECOMING EASILY ANNOYED OR IRRITABLE: NOT AT ALL
5. BEING SO RESTLESS THAT IT IS HARD TO SIT STILL: MORE THAN HALF THE DAYS

## 2019-02-11 ASSESSMENT — PATIENT HEALTH QUESTIONNAIRE - PHQ9: 5. POOR APPETITE OR OVEREATING: SEVERAL DAYS

## 2019-02-11 NOTE — PROGRESS NOTES
SAPPHIRE Clinician Contact & Progress Note  For Individual Resiliency Training (IRT)  A Part of the [facility] First Episode of Psychosis Program    NAVIGATE Enrollee: Eloy Storm (2000)     MRN: [id number]  Date:  2/11/19  Diagnosis: Psychosis, unspecified psychosis type (H)   Clinician: SAPPHIRE Individual Resiliency Trainer, Irina Davis     1. Type of contact: (majority of time spent)  IRT Session    2. People present:   Writer  Client: Eloy Storm  Employer/Work Supervisor, [employer] IRT: FÁTIMA Reed, SAVANNA    3. Total number of persons who participated in contact: 3, including writer    4. Length of Actual Contact: Start Time: 1:00 PM; End Time: 2:00 PM   Traveled?    No     5. Location of contact:  Psychiatry Clinic, [place]    6. Did the client complete the home practice option(s) from the previous session: Completed    7. Motivational Teaching Strategies:  Connect info and skills with personal goals  Promote hope and positive expectations  Explore pros and cons of change  Re-frame experiences in positive light  Affirm client for self-guided meditation practice    8. Educational Teaching Strategies:  Review of written material/education  Relate information to client's experience  Ask questions to check comprehension  Break down information into small chunks  Adopt client's language     9. CBT Teaching Strategies:  Reinforcement and shaping (positive feedback for steps towards goals, gains in knowledge & skills and follow-through on home assignments)    10. IRT Module(s) Addressed:  Module 2 - Assessment/Initial Goal Setting    11. Techniques utilized:   [place] announced at beginning of session  Review of homework  Review of goal  Review of previous meeting  Present new material  Problem-solving practice  Help client choose a home practice option  Summarize progress made in current session    12. Mental Status Exam:    Alertness: drowsy  Appearance: adequately  groomed  Behavior/Demeanor: cooperative, pleasant and calm, with good  eye contact   Speech: slowed  Language: intact. Preferred language identified as English.  Psychomotor: slowed  Mood: depressed  Affect: appropriate; was congruent to mood; was congruent to content  Thought Process/Associations: unremarkable  Thought Content:  Reports none;  Denies suicidal and violent ideation  Perception:  Reports none;  Denies auditory hallucinations and visual hallucinations  Insight: good  Judgment: good  Cognition: does  appear grossly intact; formal cognitive testing was not done  Suicidal ideation: denies SI, denies intent,  and denies plan  Homicidal Ideation: denies    13. Assessment/Progress Note:     Client report current symptoms as feeling tired (sleeping 6-8 hours nightly and not taking naps this past week), feeling restless and not able to sit still, having a hard time focusing and feels distracted, feeling restless internally. Clinician (Katelynn) suggested a possible tie to medication and offered to write a note to the prescriber, which client agreed with.   Client reports no current SI, HI, SIB, or safety concerns.  Client discussed being sober but experiencing cravings this past week. We discussed coping mechanisms including meditation, new friends, time with parents, and clinician alerting provider of new symptoms.  Client concerned about low energy and low interest in normally enjoyable activities.  The session focus was Module two, Identifying Strengths. Client's assessed top strengths were 1. Playfulness & Humor 2. Modesty & Humility 3. Curiosity & Interest 4. Zest & Enthusiasm and 5. Love & Attachment.   Client completed home practice of discussing resiliency with Mom. Client also visited Dad, which was a monthly goal. Agreed upon home practice for this week is working on a video editing project using laptop computer.     14. Plan/Referrals:     Navigate [facility]/Sushma Peña for session next week. Client has  "interest in working towards community college, work, and independent living long-term goals.    Billing for \"Interactive Complexity\"?    No      FÁTIMA Villalta Intern  NAVIGATE Individual Resiliency Trainer    Attestation:    I did not see this patient directly. This patient is discussed with me in individual clinical social work supervision, and I agree with the plan as documented.     FÁTIMA Oconnor, [facility], February 20, 2019      "

## 2019-02-12 NOTE — PROGRESS NOTES
NAVIGATE SEE Progress Note   For Supported Employment & Education    NAVIGATE Enrollee: Eloy Storm (2000)     MRN: [id number]  Date:  2/11/19  Clinician: ARIANNAATE Supported Employment & , Sushma Peña    1. Client Status Update:   Eloy Storm is interested in employment (Client completed Career Profile)    2. People present:   SEE/Writer  Client: Eloy Storm  Other: Inocencia Gar mSW intern    3. Total number of persons who participated in contact: (do not count yourself/SEE) 2    4. Length of Actual Contact: 15 minutes   Traveled? No    5. Location of contact:  Psychiatry Clinic, [place]    6. Brief description of session, contact, or client status (include: strategies, interventions, client reaction to contact, next steps, etc)    Eloy, this writer and MSW intern Inocencia Gar met to complete the career and education inventory and to check in on how his employment search is going. Eloy is unsure at this time if he is ready for employment. He has concerns that his lack of motivation and feelings of depression and anxiety will get in the way of him being successful. He is more interested in learning about graphic design and attending college online. We discussed ways we could explore these options such as visiting colleges or exploring online school formats, taking personality and/or Onet interest assessments which Eloy was interested in. He also reports significant concerns about his attention and concentration and would like an assessment for ADHD. We discussed the Coping with Cognitive Difficulties checklist and he was also open to learning about techniques that can assist with cognitive difficulties. Eloy had to leave early for another appt today, so time meeting was only 20 min.    7. Completion of mutually agreed upon client task from previous meeting:  Completed    8. Orientation and Treatment Planning:  Developing SEE treatment plan goals    9.  Assessment:  Gathering SEE information/inventory regarding work and/or education history, skills, goals, and preferences with client    10. Placement:  Not Applicable    11. Follow Along Supports: (for clients who are working or attending school)   Not Applicable    12. Mutually agreed upon client task for next meeting:     na    13. Next Meeting Scheduled for: Monday at 2    [facility] Supported Employment &

## 2019-02-13 ASSESSMENT — PATIENT HEALTH QUESTIONNAIRE - PHQ9: SUM OF ALL RESPONSES TO PHQ QUESTIONS 1-9: 15

## 2019-02-14 ASSESSMENT — ANXIETY QUESTIONNAIRES: GAD7 TOTAL SCORE: 5

## 2019-02-18 ENCOUNTER — OFFICE VISIT (OUTPATIENT)
Dept: PSYCHIATRY | Facility: CLINIC | Age: 19
End: 2019-02-18
Payer: COMMERCIAL

## 2019-02-18 DIAGNOSIS — F29 PSYCHOSIS, UNSPECIFIED PSYCHOSIS TYPE (H): Primary | ICD-10-CM

## 2019-02-18 NOTE — PROGRESS NOTES
SAPPHIRE Clinician Contact & Progress Note  For Individual Resiliency Training (IRT)  A Part of the [facility] First Episode of Psychosis Program    NAVIGATE Enrollee: Eloy Storm (2000)     MRN: [id number]  Date:  1/28/19  Diagnosis: Psychosis, unspecified type (H) F29  Clinician: SAPPHIRE Individual Resiliency Trainer, Irina Davis     1. Type of contact: (majority of time spent)  IRT Session    2. People present:   Writer  Client: Eloy Storm  NAVIGVESTA IRT: FÁTIMA Reed, SAVANNA    3. Total number of persons who participated in contact: 3, including writer    4. Length of Actual Contact: Start Time: 2:00 PM; End Time: 3:00 PM   Traveled?    No     5. Location of contact:  Psychiatry Clinic, [place]    6. Did the client complete the home practice option(s) from the previous session: Completed    7. Motivational Teaching Strategies:  Connect info and skills with personal goals  Promote hope and positive expectations  Re-frame experiences in positive light    8. Educational Teaching Strategies:  Review of written material/education  Relate information to client's experience  Ask questions to check comprehension  Break down information into small chunks  Adopt client's language   Affirmation of progress, sobriety, meditation practice    9. CBT Teaching Strategies:  Reinforcement and shaping (positive feedback for steps towards goals, gains in knowledge & skills and follow-through on home assignments)    10. IRT Module(s) Addressed:  Module 2 - Assessment/Initial Goal Setting    11. Techniques utilized:   [place] announced at beginning of session  Review of homework  Review of goal  Review of previous meeting  Present new material  Help client choose a home practice option  Summarize progress made in current session    12. Mental Status Exam:    Alertness: oriented  Appearance: adequately groomed  Behavior/Demeanor: cooperative, pleasant and calm, with good  eye contact   Speech:  slowed  Language: intact. Preferred language identified as English.  Psychomotor: normal or unremarkable  Mood: depressed  Affect: restricted and appropriate; was congruent to mood; was congruent to content  Thought Process/Associations: unremarkable  Thought Content:  Reports none;  Denies suicidal and violent ideation  Perception:  Reports none;  Denies auditory hallucinations and visual hallucinations  Insight: good  Judgment: good  Cognition: does  appear grossly intact; formal cognitive testing was not done  Suicidal ideation: denies SI, denies intent,  and denies plan  Homicidal Ideation: denies    13. Assessment/Progress Note:     Client reported current symptoms as low energy, tired, and depressed. Client denied all positive psychosis symptoms, denies SI, HI, SIG, and safety concerns. Regular med compliance confirmed, no new medication concerns.  Significant updates this week include: None reported.   The session module was Recovery and Resiliency. The focus was What is Recovery, What is Resiliency, client/writer expectations of each other, and introduction to Assessment and Goal Setting.  Client reiterated goals to include: making new non-using friends, eventually getting a job and an apartment, and focusing immediately on better physical health and exercise. Writer discussed recovery and resiliency module, including client's current strategies of making new friend in the Sober Living residence, sleeping 7 hours nightly, drinking more water, continuing sober lifestyle, exercising 4-5 times weekly, using client's phone brigido for nightly medication exercise, staying substance free, and coming to weekly IRT sessions. Client also expressed interest in talking to his mom about getting involved in family IRT - phone consult was offered as an option if in-person not possible.   Home practice included: Talk to mom about concepts covered in session about recovery. Client also wanted to work out 4-5 times, sleep 7+  "hours nightly, and have med compliance.  Client completed assessments including: The CANSAS, [place] Symptoms Index, IMR Self-Rating Form  Writer assessed for current needs and challenges. Client reported a history of feeling like life isn't going anywhere, reported that watching TV and reading books helps with that thought. Client also reports enjoying video games and playing soccer.   Client discussed a goal to go back to community college, as he reported previous difficultly proceeding in his college.    14. Plan/Referrals:     Writer will continue with module two, goal setting and assessment.    Billing for \"Interactive Complexity\"?    No      FÁTIMA Villalta Intern  NAVIGATE Individual Resiliency Trainer    Attestation:    I did not see this patient directly. This patient is discussed with me in individual clinical social work supervision, and I agree with the plan as documented.     FÁTIMA Oconnor, [facility], February 20, 2019      "

## 2019-02-25 ENCOUNTER — OFFICE VISIT (OUTPATIENT)
Dept: PSYCHIATRY | Facility: CLINIC | Age: 19
End: 2019-02-25
Payer: COMMERCIAL

## 2019-02-25 DIAGNOSIS — F29 PSYCHOSIS, UNSPECIFIED PSYCHOSIS TYPE (H): Primary | ICD-10-CM

## 2019-02-25 ASSESSMENT — ANXIETY QUESTIONNAIRES
5. BEING SO RESTLESS THAT IT IS HARD TO SIT STILL: SEVERAL DAYS
6. BECOMING EASILY ANNOYED OR IRRITABLE: NOT AT ALL
1. FEELING NERVOUS, ANXIOUS, OR ON EDGE: NOT AT ALL
3. WORRYING TOO MUCH ABOUT DIFFERENT THINGS: NOT AT ALL
2. NOT BEING ABLE TO STOP OR CONTROL WORRYING: NOT AT ALL
7. FEELING AFRAID AS IF SOMETHING AWFUL MIGHT HAPPEN: NOT AT ALL
GAD7 TOTAL SCORE: 2

## 2019-02-25 ASSESSMENT — PATIENT HEALTH QUESTIONNAIRE - PHQ9: 5. POOR APPETITE OR OVEREATING: SEVERAL DAYS

## 2019-02-25 NOTE — PROGRESS NOTES
"NAVIGATE Clinician Contact & Progress Note   For Family Education Program    NAVIGATE Enrollee: Eloy Storm (2000)     MRN: [id number]  Date:  2/04/19  Diagnosis(es):   Psychosis (H)  Clinician: SAPPHIRE Family Clinician, Isa Helms     1. Type of contact: (majority of time spent)  Family Session    2. People present:   Writer  Client: No  Significant Other/Family/Friend:  Mother  Family Clinician Keisha Charles    3. Total number of persons who participated in contact: 3, including writer    4. Length of Actual Contact: Start Time: 1:05; End Time: 2:00   Traveled?    No     5. Location of contact:  Psychiatry Clinic, [place]    6. Did the client complete the home practice option(s) from the previous session: Not Applicable    7. Motivational Teaching Strategies:  Promote hope and positive expectations    11. Techniques utilized:   [place] announced at beginning of session  Present new material  Other techniques (please specify): family interview questions     12. Assessment/Progress Note:     Omarir, Mom/Michelle and Keisha Charles, Family Clinician met for the first Family Session. The family introduction to Navigate module was reviewed on a high level and the family member interview was started.     Mom reports that Eloy has now been sober for 4 months, having spent 60 days at [facility] and currently residing at [facility] where he will be for 3 months and has completed 2 weeks as of the 2/4/19 appointment. He is participating in a sober living environment and receives counseling in house. Program offers weekly outings as well to support skills of enjoying activities while sober.     Mom shared that she and Eloy get along pretty well, and that they are working on their trust together. Eloy has been pretty talkative; but she is not sure if he is forthcoming about his symptoms. Mom reports having limited understanding about Eloy's psychiatric problems, and holds the belief that \"chronic " "marijuana use may have triggered psychosis in his genes.\"     Mom also shared that stress, depression, anxiety are part of his history. Mom  Dad when Eloy was 1 (they were  in their teens) and she had a restraining order placed on Eloy's dad when Eloy was 9 due to harassing behavior. Dad relationship continues to be 'tough' for Eloy. Due to Mom's restraining order, communication with dad all happens through dad's wife. They live in [facility] [place] [facility] [place] [facility].     For home practice, Michelle will review Roderick's story.     13. Plan/Referrals:     Will meet with family weekly as schedule allows for evidence based family psychoeducation and therapeutic support aimed at maximizing Eloy's opportunity for recovery from psychosis.     Isa Helms   NAVIGATE Family Clinician     Attestation:    I did not see this patient directly. This patient is discussed with me in individual clinical social work supervision as well as between Ms Helms and her , Tamiko Hartman, FÁTIMA BURNS, LICSW. I agree with the plan as documented.     Sushma Blackburn, FÁTIMA, LICSW, February 26, 2019          "

## 2019-02-25 NOTE — PROGRESS NOTES
SAPPHIRE Clinician Contact & Progress Note  For Individual Resiliency Training (IRT)  A Part of the [facility] First Episode of Psychosis Program    NAVIGATE Enrollee: Eloy Storm (2000)     MRN: [id number]  Date:  2/25/19  Diagnosis: Psychosis, unspecified   Clinician: SAPPHIRE Individual Resiliency Trainer, FÁTIMA Villalta Intern    1. Type of contact: (majority of time spent)  IRT Session    2. People present:   Writer  Client: Eloy Storm  SAPPHIRE IRT: FÁTIMA Reed, LGNIR    3. Total number of persons who participated in contact: 3, including writer    4. Length of Actual Contact: Start Time: 2 PM; End Time: 2:55 PM   Traveled?    No     5. Location of contact:  Psychiatry Clinic, [place]    6. Did the client complete the home practice option(s) from the previous session: Completed    7. Motivational Teaching Strategies:  Connect info and skills with personal goals  Promote hope and positive expectations  Explore pros and cons of change  Re-frame experiences in positive light    8. Educational Teaching Strategies:  Review of written material/education  Relate information to client's experience  Ask questions to check comprehension  Break down information into small chunks    9. CBT Teaching Strategies:  Reinforcement and shaping (positive feedback for steps towards goals, gains in knowledge & skills and follow-through on home assignments)    10. IRT Module(s) Addressed:  Module 2 - Assessment/Initial Goal Setting    11. Techniques utilized:   [place] announced at beginning of session  Review of homework  Review of goal  Review of previous meeting  Present new material  Role-play  Problem-solving practice  Help client choose a home practice option  Summarize progress made in current session    12. Mental Status Exam:    Alertness: alert  and oriented  Appearance: well groomed  Behavior/Demeanor: cooperative, pleasant and calm, with good  eye contact   Speech: normal  Language: intact.  Preferred language identified as English.  Psychomotor: normal or unremarkable  Mood: depressed and anxious  Affect: full range; was congruent to mood; was congruent to content  Thought Process/Associations: unremarkable  Thought Content:  Reports none;  Denies suicidal and violent ideation  Perception:  Reports none;  Denies auditory hallucinations and visual hallucinations  Insight: good  Judgment: good  Cognition: does  appear grossly intact; formal cognitive testing was not done  Suicidal ideation: denies SI, denies intent,  and denies plan  Homicidal Ideation: denies    13. Assessment/Progress Note:     Client report current symptoms as feeling tired, feeling restless and not able to sit still, having a hard time focusing and feels distracted, feeling restless internally. Client will see prescriber this week. Client reports no current SI, HI, SIB, or safety concerns.  Client discussed being sober but experiencing continued cravings this past week, mainly for Zanax. We discussed coping mechanisms including meditation, talking to his two friends at [employer], time with parents, time with sister, distracting self with creative editing projects, movies, and TV. Client plans to alert  provider of new med symptoms. As discussed in 2/11 note, provider received a note from IRT clinician, Katelynn, last week as to these new symptoms.  Client reported feeling better all the way around today, recently enjoying a zoo day with his 6 year old sister, enjoying skiing at [facility] last weekend, and feeling more positive, while still feeling also feeling depressed and anxious.   The session focus was Module two, reviewing substance use history. 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 Writer role played an interview. Client reported the practice helpful and requested emailed interview questions. Writer followed-up with [facility] - Sushma MUNOZ had just emailed client some questions.  Client completed  "home practice of completing an editing project, which he reported as a positive experience. Client reported enjoying recent family time, appreciating being able to talk about cravings with 2 friends at [employer], and agreed upon home practice for this week of preparing for his job interview by reviewing mock questions.     14. Plan/Referrals:      Navigate [facility]/Sushma Peña for session next week. Client has interest in working towards community college, work, and independent living long-term goals.  IRT plan for 3/4 session is setting goals and creating an action plan.     Billing for \"Interactive Complexity\"?    No     FÁTIMA Villalta Intern  NAVIGATE Individual Resiliency     14. Plan/Referrals:     Will work activity with SEE - Sushma Peña. Will see psychiatrist this week Friday.    FÁTIMA Villalta Intern  ARIANNAATE Individual Resiliency Trainer    Attestation:    I did not see this patient directly. This patient is discussed with me in individual clinical social work supervision, and I agree with the plan as documented.     FÁTIMA Oconnor, [facility], February 26, 2019      "

## 2019-02-26 ASSESSMENT — PATIENT HEALTH QUESTIONNAIRE - PHQ9: SUM OF ALL RESPONSES TO PHQ QUESTIONS 1-9: 9

## 2019-02-26 NOTE — PROGRESS NOTES
NAVIGATE Clinician Contact & Progress Note   For Family Education Program    NAVIGATE Enrollee: Eloy Storm (2000)     MRN: [id number]  Date:  2/18/19  Diagnosis(es):   Psychosis, unspecified (F29)  Clinician: SAPPHIRE Individual Resiliency  & Family Clinician, SAVANNA Mena     1. Type of contact: (majority of time spent)  Family Session    2. People present:   Writer  Client: No  Significant Other/Family/Friend:  Mother    3. Total number of persons who participated in contact: 2, including writer    4. Length of Actual Contact: Start Time: 11am; End Time: 12pm   Traveled?    No     [facility]    6. Did the client complete the home practice option(s) from the previous session: Completed    7. Motivational Teaching Strategies:  Connect info and skills with personal goals  Promote hope and positive expectations  Explore pros and cons of change  Re-frame experiences in positive light    8. Educational Teaching Strategies:  Review of written material/education  Relate information to client's experience  Ask questions to check comprehension  Break down information into small chunks  Adopt client's language     9. CBT Teaching Strategies:  Reinforcement and shaping (positive feedback for steps towards goals and gains in knowledge & skills)  Relapse prevention planning (review of stressors and early warning signs)  Coping skills training (review current coping skills and increase currently used skills)  Behavioral tailoring (fit taking medication into client's daily routine)    10. Psychoeducational Topic(s) Addressed:  Family Education Orientation & Tip Sheet    11. Techniques utilized:   [place] announced at beginning of session  Review of goal  Review of previous meeting  Present new material  Problem-solving practice  Help client choose a home practice option  Summarize progress made in current session    12. Assessment/Progress Note:     Writer  "met with Eloy's Mom for family session on this date. Writer set agenda to check-in, discuss and assess symptoms, explore material in family modules, discuss ways in which family can provide support and encouragement for ongoing symptom management, and identify goals for family's continued participation in Eloy's well-being and recovery.    During check-in, Michelle reported Eloy seems to be doing well. She shared that he seems to be bored but overall his highs and lows seem more leveled out. She described having daily contact with him via phone and typically seeing him about 2-3x/week, one time usually being at a the larger family dinner held in their home Sunday nights. Family member interview completed and designed to illicit knowledge of illness, diagnosis, medication prescribed, patient and family goals, relationship with patient, strengths, prognosis, and barriers to successful engagement and treatment related \"hoped for\" outcomes. Considered together the ways in which Eloy's substance use contributed to his experience of symptoms and emphasized the importance of ongoing sobriety for symptom management. Additionally discussed Eloy's relationships with other family members and identified potential protective and detrimental dynamics within the family system. Praised the efforts of both Eloy and family in proactively taking steps promoting recovery and well-being.   13. Plan/Referrals:     Will meet with family weekly as schedule allows for evidence based family psychoeducation and therapeutic support aimed at maximizing Eloy's opportunity for recovery from psychosis.     Next family session scheduled for next week. Client and family aware they can reach out to writer directly and/or NAVIGATE team should concerns or needs arise prior to next scheduled appointment.     Keisha Charles, [facility]   NAVIGATE Individual Resiliency  & Family Clinician     Attestation:    I did not see this patient directly. This " patient is discussed with me in individual clinical social work supervision, and I agree with the plan as documented.     FÁTIMA Medina, [facility], March 15, 2019

## 2019-02-27 ENCOUNTER — TELEPHONE (OUTPATIENT)
Dept: PSYCHIATRY | Facility: CLINIC | Age: 19
End: 2019-02-27

## 2019-02-27 ASSESSMENT — ANXIETY QUESTIONNAIRES: GAD7 TOTAL SCORE: 2

## 2019-02-27 NOTE — TELEPHONE ENCOUNTER
Writer received incoming phone call from pt and [facility] staff member, Cori at pt's cell number. Cori and pt were wanting to confirm medication doses and directions now that he has been discharged from treatment. Writer relayed the providers orders. Cori and pt voiced understanding. Pt said he was also prescribed a medication by the provider in treatment and is uncertain of this dose and directions. Asked that he call this provider directly, as this was not prescribed by the clinic provider. He will do this and discuss with provider further on Friday, 3/1 at his appt.

## 2019-02-28 NOTE — PROGRESS NOTES
"NAVIGATE SEE Progress Note   For Supported Employment & Education    NAVIGATE Enrollee: Eloy Storm (2000)     MRN: [id number]  Date:  2/25/19  Clinician: NAVIGATE Supported Employment & , Sushma Peña    1. Client Status Update:   Eloy Storm is interested in employment (Client completed Career Profile)    2. People present:   SEE/Writer  Client: Eloy Storm  Other: MSW intern July Zheng    3. Total number of persons who participated in contact: (do not count yourself/SEE) 2    4. Length of Actual Contact: 60 minutes   Traveled? No    5. Location of contact:  [facility]    6. Brief description of session, contact, or client status (include: strategies, interventions, client reaction to contact, next steps, etc)    MSW intern July Gar, this writer and Eloy met for a follow up SEE session. July led today's appt by checking in with Eloy on his last week and how he has been feeling. Eloy reports he went snowboarding with friends and visited the [employer] with his family and little sister. Eloy said he enjoys seeing his sister but that going out and doing fun things \"felt like a chore\". He reports that he has an interview on Friday as a  but isn't sure if he wants a job right now. We briefly brushed up on interview skills but spent the majority of the session completing the Martínez Montalvo personality inventory and completing the [facility] interest . 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 Eloy was agreeable to this and his interest  came back as a photo or , creative arts or other artistic endeavors. We will continue to explore career and online schooling options as well as interview prep for a job working pt as a .    7. Completion of mutually agreed upon client task from previous meeting:  Not Applicable    8. Orientation and Treatment " Planning:  Pursuing current SEE goals    9. Assessment:  Gathering SEE information/inventory regarding work and/or education history, skills, goals, and preferences with client    10. Placement:  Not Applicable    11. Follow Along Supports: (for clients who are working or attending school)   Not Applicable    12. Mutually agreed upon client task for next meeting:     Practice interviewing skills    13. Next Meeting Scheduled for: Monday at 1    [facility] Supported Employment &

## 2019-03-01 ENCOUNTER — OFFICE VISIT (OUTPATIENT)
Dept: PSYCHIATRY | Facility: CLINIC | Age: 19
End: 2019-03-01
Attending: NURSE PRACTITIONER
Payer: COMMERCIAL

## 2019-03-01 VITALS
DIASTOLIC BLOOD PRESSURE: 65 MMHG | SYSTOLIC BLOOD PRESSURE: 113 MMHG | WEIGHT: 206.2 LBS | BODY MASS INDEX: 27.97 KG/M2 | HEART RATE: 76 BPM

## 2019-03-01 DIAGNOSIS — F41.9 ANXIETY: ICD-10-CM

## 2019-03-01 DIAGNOSIS — F29 PSYCHOSIS, UNSPECIFIED PSYCHOSIS TYPE (H): ICD-10-CM

## 2019-03-01 DIAGNOSIS — F32.A DEPRESSION, UNSPECIFIED DEPRESSION TYPE: Primary | ICD-10-CM

## 2019-03-01 PROCEDURE — G0463 HOSPITAL OUTPT CLINIC VISIT: HCPCS | Mod: ZF

## 2019-03-01 RX ORDER — BUPROPION HYDROCHLORIDE 150 MG/1
150 TABLET ORAL EVERY MORNING
Qty: 30 TABLET | Refills: 2 | Status: SHIPPED | OUTPATIENT
Start: 2019-03-01 | End: 2019-05-30

## 2019-03-01 RX ORDER — GABAPENTIN 300 MG/1
300 CAPSULE ORAL 2 TIMES DAILY PRN
Qty: 60 CAPSULE | Refills: 2 | Status: SHIPPED | OUTPATIENT
Start: 2019-03-01 | End: 2019-06-03

## 2019-03-01 RX ORDER — ARIPIPRAZOLE 10 MG/1
10 TABLET ORAL DAILY
Qty: 30 TABLET | Refills: 2 | Status: SHIPPED | OUTPATIENT
Start: 2019-03-01 | End: 2024-04-08

## 2019-03-01 RX ORDER — ESCITALOPRAM OXALATE 20 MG/1
20 TABLET ORAL DAILY
Qty: 30 TABLET | Refills: 2 | Status: SHIPPED | OUTPATIENT
Start: 2019-03-01 | End: 2019-04-16

## 2019-03-01 ASSESSMENT — PATIENT HEALTH QUESTIONNAIRE - PHQ9: SUM OF ALL RESPONSES TO PHQ QUESTIONS 1-9: 8

## 2019-03-01 ASSESSMENT — PAIN SCALES - GENERAL: PAINLEVEL: NO PAIN (0)

## 2019-03-01 NOTE — PROGRESS NOTES
"  Psychiatry Clinic New Patient Medication Evaluation                                           Eloy Storm is a 18 year old male who prefers the name Eloy and pronoun he, him.  Therapist: Tamiko [facility]  PCP: Ramon Price  Other Providers: [facility] FEP team  Referred by [facility] for evaluation of psychosis.     History was provided by patient      Chief Complaint                                                                                                        \" anxiety is improved, depression is worse \"     History of Present Illness                                                                                4, 4      Mr. Eloy Storm is a 18 year old male with a history of polysubstance use disorder, psychosis NOS, social anxiety and panic attacks. He is enrolled in [facility]. He was last seen by me on 2/1/19 at which time gabapentin dose was increased and trazodone dose decreased. He reports today that since his last visit he has been feeling more depressed, hopeless, tired and would rather sleep than engage in activities.  Denies death ideation or SI.  Is sleeping 8 hours per night.  Appetite is somewhat increased.  He reports depression symptoms have been present for several months and currently rates these as a 6/10. Reports last period of prolonged euthymia was around age [age] when using substances.    Reports mild IOR.  Denies any other psychosis symptoms.  He recently interviewed and was offered a job as a .  Will be starting in the next few weeks. Continues to live at [facility], plans for another 3 months.  Has been remaining sober.  Some cravings for cannabis.   Medication SE: reports restlessness which mostly occurs when in groups, not when engaged in enjoyable activities, however finds this very uncomfrotable. Denies any other medication SE today.  He is requesting to start Wellbutrin today due to limited efficacy with lexapro.    Current psychiatric " medications:  Lexapro 20 mg daily   Abilify 10 mg daily  Gabapentin 300 BID PRN  Trazodone 25-50 mg at bedtime    Recent Symptoms:   Negative unless noted in the HPI       Recent Substance Use:  none reported      Substance Use History (no change today)     Cannabis - daily since age 15, approx 2-3 grams per day.  Sober since 10/30/18  Tobacco - since age 15, vapor    Other Drugs:   Past - MDMA, Cocaine, LSD                Age of first other drug use: [age]                Number of days patient used opiods over the last 30 days: 0                Last period of sobriety, when and how long: Greater than 6 months      Past - Xanaz, Oxycodone, Klonopin, Hydrocodone, Percocet (prior averaged monthly use)                Age of first other drug use: [age]                Number of days patient used opiods over the last 30 days: 0                Last period     CD treatment hx: Yes -   [facility] LUEVANO March-August 2018  [facility] MONTERROSO October-January 2018     Withdrawal hx: Yes but no hx of withdrawal seizures or DTs.   Current sober supports include family.     Psychiatric History     Per 1/9/18 DA, reviewed with patient and updated where needed:    Past diagnoses:   -[facility] 1/2/18:  Substance induced mood, mood disorder  Rule out major depressive disorder     -Per [facility] 12/31/18:  Alcohol use disorder, moderate, dependence  Cannabis use disorder, severe, dependence  Sedative, hypnotic or anxiolytic use disorder, mild, abuse  Substance-induced psychotic disorder  Tobacco use disorder, moderate, dependence  Unspecified anxiety disorder  Unspecified depressive disorder     -Per [facility] 11/14/18:  Unspecified schizophrenia spectrum disorder and other psychotic disorder (rule out a substance-induced psychotic disorder versus a primary psychotic illness)  Cannabis use disorder, severe  Sedative hypnotic use disorder, moderate  Alcohol use disorder, moderate     Past medication trials: Zyprexa, Depakote started during  "10/2018 admission.  Lexapro 5 mg daily started in Fall 2017 with poor adherence  See note by Loren Castro, PharmD dated 1/28/18 for detailed med hx    Hospitalizations: 2  -[facility] 12/30/17 - 1/2/18 \"admitted with altered mental status and suicide statements.\"     -[facility] \"admitted to our Behavioral Health Unit under a 72-hour hold for concerns related to disorganized thought process along with reports of suicidal ideation.  Noting a prominence of psychosis and manic-like symptoms on admission...\"     Commitment: No, Current [place] order: No     ECT trials: No  Suicide attempts: No  Self-injurious behavior: Yes - burning self 3x in one month time frame during college  Violent behavior: No  Outpatient Programs & Services [Psychotherapy, DBT, Day Treatment, Eating Disorder Tx etc]:   Psychiatry  -Dr Espinoza ([phone]) at [facility] ([phone]) before [facility]     Psychotherapy  [facility][phone][facility] before [facility]    Family psychiatric history: unknown    Recent medication changes:  1/17/19: Start gabapentin 100 mg TID PRN for anxiety   2/1/19: Increase gabapentin to 300  Mg BID PRN, decrease trazodone to 25-50 mg at bedtime     Social/ Family History    (no change today)           [per patient report]                                                 1ea, 1ea       Per 1/9/19 note by Sushma Blackburn, reviewed and updated as needed today:  Living situation: Eloy lives with in residential/IP treatment at [facility]; prior he lived with his mom in [place], [place]   Guns, weapons, or other means to harm oneself in the home? No  Pets at home? Yes - a dog at mom's                  Education: Eloy s highest level of education is high school graduate and some college. Failed out of [employer] in [place].   Occupation: Eloy is currently unemployed.  Finances: Eloy is financial supported by Family.  Relationships: Significant " relationships include family. Reports little to no contact with peers.  Family members include parents (), two stepsisters and one half sister.   Cultural influences: Eloy identifies is race as . Eloy reports  No  to cultural considerations to take into account when providing treatment.   Sexuality:  Eloy identifies as male, heterosexual, with preferred pronouns he/him/his.   Legal Hx: Yes - Misdemeanor for speeding and out of state misdemeanor for possession. He is not on probation.   Abuse Hx: No   Hx: No     Medical / Surgical History     Patient Active Problem List   Diagnosis     Suicidal ideation     Psychosis (H)       No past surgical history on file.      Medical Review of Systems                                                                                                    2, 10     A comprehensive review of systems was performed and is negative other than noted in the HPI     Allergy   Patient has no known allergies.   Current Medications     Current Outpatient Medications   Medication Sig Dispense Refill     ARIPiprazole (ABILIFY) 10 MG tablet Take 1 tablet (10 mg) by mouth daily 30 tablet 2     escitalopram (LEXAPRO) 20 MG tablet Take 1 tablet (20 mg) by mouth daily 30 tablet 2     gabapentin (NEURONTIN) 300 MG capsule Take 1 capsule (300 mg) by mouth 2 times daily as needed (anxiety) 60 capsule 2     multivitamin, therapeutic (THERA-VIT) TABS tablet Take 1 tablet by mouth At Bedtime 30 tablet 0      Vitals                                                                                                                        3, 3     /65   Pulse 76   Wt 93.5 kg (206 lb 3.2 oz)   BMI 27.97 kg/m         Mental Status Exam                                                                                   9, 14 cog gs     Alertness: alert  and oriented  Appearance: casually groomed  Behavior/Demeanor: cooperative and pleasant, with good  eye contact   Speech: regular  rate and rhythm  Language: intact  Psychomotor: normal or unremarkable  Mood: depressed and anxious  Affect: subdued; was congruent to mood; was congruent to content  Thought Process/Associations: unremarkable  Thought Content:  Reports none;  Denies suicidal ideation, violent ideation and delusions  Perception:  Reports none;  Denies auditory hallucinations and visual hallucinations  Insight: adequate  Judgment: adequate for safety  Cognition: (6) oriented: time, person, and place  attention span: intact  concentration: intact  recent memory: intact  remote memory: intact  fund of knowledge: appropriate  Gait and Station: unremarkable     Labs and Data       PHQ9 Today: see flow sheet if completed   PHQ-9 SCORE 2/1/2019 2/11/2019 2/25/2019   PHQ-9 Total Score 9 15 9         Recent Labs   Lab Test 11/08/18  0807 10/31/18  0745 12/30/17 2024   CR 0.95 0.98 1.01*   GFRESTIMATED >90 >90 >90     Recent Labs   Lab Test 11/08/18  0807 10/31/18  0745   AST 16 16   ALT 16 18   ALKPHOS 84 76     Safety Assessment                                                                         m2, h3     Today Eloy Storm denies suicidal intent or plan, however he reports chronic SI. In addition, he has notable risk factors for self-harm, including anxiety, psychosis and substance abuse. However, risk is mitigated by absence of past attempts, ability to volunteer a safety plan and history of seeking help when needed. Therefore, based on all available evidence including the factors cited above, he does not appear to be at imminent risk for self-harm, does not meet criteria for a 72-hr hold, and therefore remains appropriate for ongoing outpatient level of care. He has close follow up through [facility].     Diagnosis,  Assessment   & Plan                                                                          m2, h3     Diagnostic impressions (provisional)  Unspecified schizophrenia spectrum and other psychotic disorder  (298.9, F29) (primary psychotic disorder versus primary mood disorder versus substance induced psychosis)  Social anxiety disorder   Panic disorder  Polysubstance use disorder     Mr. Eloy Storm is a 18 year old male who is followed in the [facility] First Episode Psychosis program.  He reports a history of chronic depression and anxiety starting in middle school, which have increased in severity since this time.  He has a history of substance use starting by age 15 including heavy cannabis use, as well as use of alcohol, benzodiazepines, opiates.  He reports a duration of untreated psychosis of approximately 10 months.  Psychosis symptoms occurred in the context of substance use, and longitudinal assessment will be needed to determine the extent to which symptoms were substance induced. He is currently sober (60+ days).  Psychosis symptoms have been gradually resolving with sobriety and [facility] treatment.    Today, he reports improved anxiety with gabapentin, however reports worsening depression that has been ongoing x years.  He is requesting to start Wellbutrin.  Discussed risks/benefits of Wellbutrin, including exacerbation of anxiety, and discussed alternative medications including SNRIs and SSRIs.  He opts to start Wellbutrin. Plan will be to taper/discontinue lexapro if tolerating.  He declines dose reduction of Abilify d/t possible akathisia today.     Psychosis  Continue Abilify 10 mg daily    Depression  Continue lexapro 20 mg daily   Start Wellbutrin  mg in the morning    Anxiety  Lexapro as above  Increase gabapentin 300 mg BID PRN    Insomnia  Continue trazodone to 25-50 mg at bedtime, as needed only     Substance use disorder  Continue to provide counseling regarding worsening of mood, anxiety and psychotic  symptoms with continued substance use. Encouraged abstinence     Long term management of high risk medications  10/18: lipids, glucose reviewed  Wt today = 206 lbs (last = 201 lbs)        RTC: 4  weeks or sooner if needed     CRISIS NUMBERS:   Provided routinely in AVS.    Treatment Risk Statement:  The patient understands the risks, benefits, adverse effects and alternatives. Agrees to treatment with the capacity to do so. No medical contraindications to treatment. Agrees to call clinic for any problems. The patient understands to call 911 or go to the nearest ED if life threatening or urgent symptoms occur.          PROVIDER:  MAGALIS Sears CNP

## 2019-03-01 NOTE — NURSING NOTE
Chief Complaint   Patient presents with     RECHECK     Psychosis, unspecified psychosis type

## 2019-03-04 ENCOUNTER — OFFICE VISIT (OUTPATIENT)
Dept: PSYCHIATRY | Facility: CLINIC | Age: 19
End: 2019-03-04
Payer: COMMERCIAL

## 2019-03-04 DIAGNOSIS — F29 PSYCHOSIS, UNSPECIFIED PSYCHOSIS TYPE (H): Primary | ICD-10-CM

## 2019-03-05 NOTE — PROGRESS NOTES
"[facility] Clinician Contact & Progress Note  For Individual Resiliency Training (IRT)  A Part of the [facility] First Episode of Psychosis Program    NAVIGATE Enrollee: Eloy Storm (2000)     MRN: [id number]  Date:  2/11/19  Diagnosis: Psychosis, unspecified (F29)  Clinician: [facility] Individual Resiliency Trainer, SAVANNA Reed     1. Type of contact: (majority of time spent)  IRT Session    2. People present:   Writer  Client: Eloy Storm  Other: MSW Intern, Irina    3. Total number of persons who participated in contact: 3, including writer    4. Length of Actual Contact: Start Time: 1:00; End Time: 2:00   Traveled?    No    13. Assessment/Progress Note:     This note will be billed under Irina Davis's encounter dated 2/11/19. This writer provided guidance and support during the IRT session facilitated by Irina. During this session, Eloy discussed internal restlessness and having a hard time sitting still. This writer will follow up with his prescriber, Sera Sanders. Eloy was able to discuss and identify his top strengths and identify a home practice for the week. Eloy denied any immediate concerns at this time. Please see Irina's note for more information regarding the session.    14. Plan/Referrals:     This writer will follow-up with eSra Sanders regarding a concern in possible side effects.    This writer will continue to provide support during IRT sessions.    Billing for \"Interactive Complexity\"?    No      SAVANNA Reed Individual Resiliency Trainer  "

## 2019-03-15 NOTE — PROGRESS NOTES
[facility] Clinician Contact & Progress Note   For Family Education Program     NAVIGATE Enrollee: Eloy Storm (2000)     MRN: [id number]  Date:  2/25/19  Diagnosis(es):   Psychosis, unspecified (F29)  Clinician: [facility] Individual Resiliency  & Family Clinician, SAVANNA Mena      1. Type of contact: (majority of time spent)  Family Session     2. People present:   Writer  Client: No  Significant Other/Family/Friend:  Mother     3. Total number of persons who participated in contact: 2, including writer     4. Length of Actual Contact: Start Time: 2pm; End Time: 3pm                Traveled?    No                  [facility]     6. Did the client complete the home practice option(s) from the previous session: Completed     7. Motivational Teaching Strategies:  Connect info and skills with personal goals  Promote hope and positive expectations  Explore pros and cons of change  Re-frame experiences in positive light     8. Educational Teaching Strategies:  Review of written material/education  Relate information to client's experience  Ask questions to check comprehension  Break down information into small chunks  Adopt client's language      9. CBT Teaching Strategies:  Reinforcement and shaping (positive feedback for steps towards goals and gains in knowledge & skills)  Relapse prevention planning (review of stressors and early warning signs)  Coping skills training (review current coping skills and increase currently used skills)  Behavioral tailoring (fit taking medication into client's daily routine)     10. Psychoeducational Topic(s) Addressed:  Family Education Orientation & Tip Sheet     11. Techniques utilized:   [place] announced at beginning of session  Review of goal  Review of previous meeting  Present new material  Problem-solving practice  Help client choose a home practice option  Summarize progress made in current session     12.  "Assessment/Progress Note:      Writer met with Eloy's Mom for family session on this date. Writer set agenda to check-in, discuss and assess symptoms, explore material in family modules, discuss ways in which family can provide support and encouragement for ongoing symptom management, and identify goals for family's continued participation in Eloy's well-being and recovery.     During check-in, Michelle shared about this past week and interactions she had with Eloy including going to the zoo on Saturday. She reports that Eloy continues to seem to be doing well overall and emphasized again that he seems bored. Writer normalized this and offered praise and encouragement for Michelle's role in taking Eloy out and offering different activities to offset some of his boredom. Continued and completed Family Member Interview. Michelle shared about Eloy's childhood and some of the challenging dynamics Eloy was subject to from an early age with she and her ex-. Michelle shared about the events that led up to Eloy's most recent hospitalization from her perspective. Writer offered support and validation. Explored things that seem to help Eloy with regards to symptom management to include sobriety, getting good sleep and taking his medication. Identified detrimental factors to include using marijuana or other drugs, not getting enough sleep, too much time alone and \"too much screen time.\" Michelle shared she feels overall his treatment is going well. She expressed hope that Eloy will be able to build a new social community that is positive and sober. Michelle described Eloy's strengths to include having a variety of interests, a good sense of humor and an innate ability to connect with others. Writer emphasized these in the context of resiliency factors for ongoing symptom management and recovery.     13. Plan/Referrals:      Will meet with family weekly as schedule allows for evidence based family psychoeducation and " therapeutic support aimed at maximizing Eloy's opportunity for recovery from psychosis.      Next family session scheduled for next week. Client and family aware they can reach out to writer directly and/or [facility] team should concerns or needs arise prior to next scheduled appointment.      Keisha Charles NIR   [facility] Individual Resiliency  & Family Clinician     Attestation:    I did not see this patient directly. This patient is discussed with me in individual clinical social work supervision, and I agree with the plan as documented.     FÁTIMA Medina, LICSW, March 15, 2019

## 2019-03-15 NOTE — PROGRESS NOTES
[facility] Clinician Contact & Progress Note   For Family Education Program     NAVIGATE Enrollee: Eloy Storm (2000)     MRN: [id number]  Date:  3/4/19  Diagnosis(es):   Psychosis, unspecified (F29)  Clinician: [facility] Individual Resiliency  & Family Clinician, SAVANNA Mena      1. Type of contact: (majority of time spent)  Family Session     2. People present:   Writer  Client: No  Significant Other/Family/Friend:  Mother     3. Total number of persons who participated in contact: 2, including writer     4. Length of Actual Contact: Start Time: 2pm; End Time: 3pm                Traveled?    No                  [facility]. [facility]     6. Did the client complete the home practice option(s) from the previous session: Completed     7. Motivational Teaching Strategies:  Connect info and skills with personal goals  Promote hope and positive expectations  Explore pros and cons of change  Re-frame experiences in positive light     8. Educational Teaching Strategies:  Review of written material/education  Relate information to client's experience  Ask questions to check comprehension  Break down information into small chunks  Adopt client's language      9. CBT Teaching Strategies:  Reinforcement and shaping (positive feedback for steps towards goals and gains in knowledge & skills)  Relapse prevention planning (review of stressors and early warning signs)  Coping skills training (review current coping skills and increase currently used skills)  Behavioral tailoring (fit taking medication into client's daily routine)     10. Psychoeducational Topic(s) Addressed:  Family Education Just the Facts - Psychosis     11. Techniques utilized:   [place] announced at beginning of session  Review of goal  Review of previous meeting  Present new material  Problem-solving practice  Help client choose a home practice option  Summarize progress made in current session     12.  "Assessment/Progress Note:      Writer met with Eloy's Mom for family session on this date. Writer set agenda to check-in, discuss and assess symptoms, explore material in family modules, discuss ways in which family can provide support and encouragement for ongoing symptom management, and identify goals for family's continued participation in Eloy's well-being and recovery.     During check-in, Michelle shared positively about her interactions with Eloy this past week. She also reported he has been going out with people from his [employer] and appears actively present during family interactions. Began Just the Facts - Psychosis. Asked for family's definition of psychosis. Identified psychosis as different for each individual but consisting of common types of symptoms (hallucinations, delusions, confused thinking). Michelle described observing hallucinations, some delusions and confused thinking. She described Eloy's presentation when symptoms were present as \"an empty shell,\" \"blank,\" \"zoned out,\" and \"flat.\" She also reports remembering a time in the hospital before she left where Eloy commented to her that she had \"cat eyes.\" Writer offered space for Michelle to process Eloy's hospitalizations and provided support and validation. Highlighted Eloy and Michelle's strengths and resiliency in participating in treatment for ongoing symptom management and recovery. Identified plan to continue JTF-Psychosis module next session.    13. Plan/Referrals:      Will meet with family weekly as schedule allows for evidence based family psychoeducation and therapeutic support aimed at maximizing Eloy's opportunity for recovery from psychosis.      Next family session scheduled for next week. Client and family aware they can reach out to writer directly and/or [facility] team should concerns or needs arise prior to next scheduled appointment.      Keisha Charles, [facility]   [facility] Individual Resiliency  & Family Clinician "     Attestation:    I did not see this patient directly. This patient is discussed with me in individual clinical social work supervision, and I agree with the plan as documented.     FÁTIMA Medina, [facility], March 15, 2019

## 2019-03-18 ENCOUNTER — OFFICE VISIT (OUTPATIENT)
Dept: PSYCHIATRY | Facility: CLINIC | Age: 19
End: 2019-03-18
Payer: COMMERCIAL

## 2019-03-18 DIAGNOSIS — F32.A DEPRESSION: Primary | ICD-10-CM

## 2019-03-20 NOTE — PROGRESS NOTES
SAPPHIRE IRT session  A Part of the [facility] First Episode of Psychosis Program     Patient Name: Eloy Storm  /Age:  2000 (18 year old)  Diagnosis(es):   Psychosis, unspecified (F29)  Clinician: SAVANNA Reed     1. Type of contact:   IRT session    2. People involved:   Client: Yes  SAPPHIRE IRT: FÁTIMA Reed LGSW  Other: FÁTIMA InternIrina    3. Total number of persons who participated in contact: 3, including NAVIGATE team    4. Length of Actual Contact: 60 minutes    5. Location of contact:  Psychiatry Clinic, [facility]    6. Assessment/Progress Note:     This writer attended an IRT session facilitated by MSW InternIrina. Eloy discussed his current cravings and coping mechanisms he is utilizing. He reported ongoing depressive symptoms and feelings of restlessness. He plans to discuss these with his prescriber. Eloy has an upcoming interview and was able to practice interview questions with Irina during this session. Irina will be referring to Sushma MCMULLEN) for coordination of other interview practice questions. Please see Irina's encounter dated 19 for more information.     This is a non-billable encounter as it was billed under a separate encounter.    SAVANNA Reed Direction & Family Clinician

## 2019-03-22 ENCOUNTER — TELEPHONE (OUTPATIENT)
Dept: PSYCHIATRY | Facility: CLINIC | Age: 19
End: 2019-03-22

## 2019-03-22 NOTE — TELEPHONE ENCOUNTER
NAVIGATE Outreach  A Part of the [facility] First Episode of Psychosis Program     Patient Name: Eloy Storm  /Age:  2000 (18 year old)      Writer attempted to call patient to discuss enrollment in program as he has missed several appointments.  Received voicemail.  Left message asking for a return call.          Poonam Tillman, RN   NAVIGATE RN Care Coordinator

## 2019-03-25 ENCOUNTER — OFFICE VISIT (OUTPATIENT)
Dept: PSYCHIATRY | Facility: CLINIC | Age: 19
End: 2019-03-25
Payer: COMMERCIAL

## 2019-03-25 DIAGNOSIS — F32.A DEPRESSION, UNSPECIFIED DEPRESSION TYPE: Primary | ICD-10-CM

## 2019-03-25 DIAGNOSIS — F29 PSYCHOSIS, UNSPECIFIED PSYCHOSIS TYPE (H): Primary | ICD-10-CM

## 2019-03-25 DIAGNOSIS — F32.A DEPRESSION: Primary | ICD-10-CM

## 2019-03-25 ASSESSMENT — ANXIETY QUESTIONNAIRES
2. NOT BEING ABLE TO STOP OR CONTROL WORRYING: SEVERAL DAYS
7. FEELING AFRAID AS IF SOMETHING AWFUL MIGHT HAPPEN: NOT AT ALL
5. BEING SO RESTLESS THAT IT IS HARD TO SIT STILL: NOT AT ALL
6. BECOMING EASILY ANNOYED OR IRRITABLE: NOT AT ALL
GAD7 TOTAL SCORE: 3
3. WORRYING TOO MUCH ABOUT DIFFERENT THINGS: NOT AT ALL
1. FEELING NERVOUS, ANXIOUS, OR ON EDGE: SEVERAL DAYS

## 2019-03-25 ASSESSMENT — PATIENT HEALTH QUESTIONNAIRE - PHQ9: 5. POOR APPETITE OR OVEREATING: SEVERAL DAYS

## 2019-03-25 NOTE — PROGRESS NOTES
SAPPHIRE Clinician Contact & Progress Note  For Individual Resiliency Training (IRT)  A Part of the [facility] First Episode of Psychosis Program    NAVIGATE Enrollee: Eloy Storm (2000)     MRN: [id number]  Date:  3/25/19  Diagnosis: Unspecified psychosis  Clinician: SAPPHIRE Individual Resiliency Trainer, FÁTIMA Villalta Intetn    1. Type of contact: (majority of time spent)  IRT Session    2. People present:   Writer  Client: Eloy Storm  SAPPHIRE IRT: FÁTIMA Reed, LGNIR    3. Total number of persons who participated in contact: 3, including writer    4. Length of Actual Contact: Start Time: 2:00; End Time: 3:00 Traveled?    No     5. Location of contact:  Psychiatry Clinic, [place]    6. Did the client complete the home practice option(s) from the previous session: Partially Completed    7. Motivational Teaching Strategies:  Connect info and skills with personal goals  Promote hope and positive expectations  Explore pros and cons of change  Re-frame experiences in positive light    8. Educational Teaching Strategies:  Review of written material/education  Relate information to client's experience  Ask questions to check comprehension  Break down information into small chunks  Adopt client's language     9. CBT Teaching Strategies:  Reinforcement and shaping (positive feedback for steps towards goals, gains in knowledge & skills and follow-through on home assignments)    10. IRT Module(s) Addressed:  Module 2 - Assessment/Initial Goal Setting    11. Techniques utilized:   [place] announced at beginning of session  Review of homework  Review of goal  Review of previous meeting  Present new material  Help client choose a home practice option  Summarize progress made in current session    12. Mental Status Exam:    Alertness: alert   Appearance: well groomed  Behavior/Demeanor: cooperative and pleasant, with good  eye contact   Speech: normal  Language: intact. Preferred language  identified as English.  Psychomotor: restless  Mood: depressed  Affect: full range and restricted; was congruent to mood; was congruent to content  Thought Process/Associations: unremarkable  Thought Content:  Reports none;  Denies suicidal and violent ideation  Perception:  Reports none;  Denies auditory hallucinations and visual hallucinations  Insight: good  Judgment: good  Cognition: does  appear grossly intact; formal cognitive testing was not done  Suicidal ideation: denies SI, denies intent,  and denies plan  Homicidal Ideation: denies    13. Assessment/Progress Note:     Client was engaged in the goal setting process today.  Top 3 overarching goals established by client are 1. Apply for jobs and get one (Steps: 5 [employer] job applications this week. Meet with SEE. Evaluate happy job fit) 2. Save Money (Steps: After getting a job, create spending budget and goals, set aside % of each paycheck) 3. Be Happy (Steps: Prescriber Med appointment, exercise at [facility] 3x's this week, walk/hike outside). Client expressed feeling depressed, anxious, and feeling as if he may have ADHD (not assessed or diagnosed). He reported that he has stayed sober, is making closer friends in sober housing, and enjoyed time ice fishing with dad and ice skating with sister last weekend. 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     14. Plan/Referrals:     Client plans to continue meeting for weekly IRT, SEE, and has an appointment set up with provider for med update. Clinician (Katelynn) sending update to provider regarding symptoms.      FÁTIMA Villalta Intern  NAVIGATE Individual Resiliency Trainer    Attestation:    I did not see this patient directly. This patient is discussed with me in individual clinical social work supervision, and I agree with the plan as documented.     FÁTIMA Medina, [facility], March 27, 2019

## 2019-03-25 NOTE — PROGRESS NOTES
NAVIGATE SEE Progress Note   For Supported Employment & Education    NAVIGATE Enrollee: Eloy Storm (2000)     MRN: [id number]  Date:  3/25/2019  Clinician: SAPPHIRE Supported Employment & Benji Supported Employment&, Benji Gar    1. Client Status Update:   Eloy Storm is interested in employment (Client developed employement goals)    2. People present:   SEE/Writer SEE/Specialist   Client: Eloy Storm      3. Total number of persons who participated in contact: (do not count yourself/SEE) 2    4. Length of Actual Contact: 30 minutes   Traveled? No    5. Location of contact:  Psychiatry Clinic, [place]    6. Brief description of session, contact, or client status (include: strategies, interventions, client reaction to contact, next steps, etc)     Client dressed well and behaved very polite and friendly to SEE specialist. He said that he had pretty great weekend with his dad and he just went to grocery store shopping today. 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 The employer asked him to to drug test, he went there two times, however, his code was  in last time. SEE speaclist encouraged him to contact them again and try to explain. Client stated that he was preferring to work in coffee store or tea spot. There is a bubble tea store that he is very interested in.     He also told us his doctor updated his medication, and sometimes this would make him feel tired. Client asked about social security, because he needed more money to support his interests and life. He is under his mom's insurance. Client stated that his mom helps him most and his dad helps a little bit, but he has a good relationship with his dad. He was hospitalized about 6 months ago. For now, he just feels depression sometimes and suffers some anxiety. He didn't  "know what those anxiety comes from. One of his most important wishes for now is to get back his  license and he also wants to have his own car, like [employer]. Client said that he has more energy than before, especially for social activities. He would drink energy boost beverage to increase his energy but not too much. He goes to gym three times a week with friends, which also helps him with increased energy.    Client said that his educational goal is to get back to school and take some online-class first. He is interested in music editing or IT. He wants to start a job with minim $12.00/hour and 3 times/week. He lives in [place], he prefers to find a job locate closer to his apartment. After he gets a job, he will learn more budgeting skills. His current goal is to searching more job opportunities in  Indeed and work with SEE specialist for next time to go through all job opportunities. He is confident with interview skills. He said that he didn't need any help with training interview tips but he accepted materials of interview tips from SEE specialists.     7. Completion of mutually agreed upon client task from previous meeting:  Completed    8. Orientation and Treatment Planning:  Developing SEE treatment plan goals  Pursuing current SEE goals    9. Assessment:  Other, specify: Specify client's current goals and support him to find a new job    10. Placement:  Employment  (Information gathering/planning re: \"benefits applications\" or \"work incentive planning\")    11. Follow Along Supports: (for clients who are working or attending school)   Employment  (Assisting with development and use of natural support for work)    12. Mutually agreed upon client task for next meeting:     Check with clients about his job opportunities  Check with clients about his on-line class possibilities (Music editing or IT)    13. Next Meeting Scheduled for: April 1st, 2019 Monday    Benji LEARY Supported " Employment &

## 2019-03-27 ENCOUNTER — TELEPHONE (OUTPATIENT)
Dept: PSYCHIATRY | Facility: CLINIC | Age: 19
End: 2019-03-27

## 2019-03-27 ASSESSMENT — PATIENT HEALTH QUESTIONNAIRE - PHQ9: SUM OF ALL RESPONSES TO PHQ QUESTIONS 1-9: 9

## 2019-03-27 NOTE — TELEPHONE ENCOUNTER
NAVIGATE Family Peer Support  A Part of the [employer] First Episode of Psychosis Program     Patient Name: Eloy Storm  /Age:  2000 (18 year old)  Date of Encounter: 19  Length of Contact: 7 minutes    Reached out to offer resources and support to mother, Michelle Storm.     Michelle stated that she believes Eloy is getting a lot out of the services he's receiving.      This writer will be meeting with Michelle on 19 at 10:00 a.m. at the [facility] prior to her meeting with GREGORY Charles.    JAMES GoldmanATE Family Peer    [Billing Code 52193 for No Billable Service as family peer support is a nonbillable service]

## 2019-03-28 ASSESSMENT — ANXIETY QUESTIONNAIRES: GAD7 TOTAL SCORE: 3

## 2019-03-29 ENCOUNTER — TELEPHONE (OUTPATIENT)
Dept: PSYCHIATRY | Facility: CLINIC | Age: 19
End: 2019-03-29

## 2019-03-29 NOTE — TELEPHONE ENCOUNTER
-Writer attempted to reach out to patient, but received voicemail.  Left message asking for a return call.  Clinic number provided.

## 2019-03-29 NOTE — TELEPHONE ENCOUNTER
----- Message from SAVANNA Reed sent at 3/27/2019 11:23 AM CDT -----  Regarding: Call to Follow-Up  Please call Eloy to discuss why he shouldn't donate plasma due to medications. I told him this, but it seemed he had additional questions. Thanks!

## 2019-04-01 ENCOUNTER — OFFICE VISIT (OUTPATIENT)
Dept: PSYCHIATRY | Facility: CLINIC | Age: 19
End: 2019-04-01
Payer: COMMERCIAL

## 2019-04-01 DIAGNOSIS — F29 PSYCHOSIS, UNSPECIFIED PSYCHOSIS TYPE (H): Primary | ICD-10-CM

## 2019-04-01 ASSESSMENT — ANXIETY QUESTIONNAIRES
GAD7 TOTAL SCORE: 2
1. FEELING NERVOUS, ANXIOUS, OR ON EDGE: SEVERAL DAYS
2. NOT BEING ABLE TO STOP OR CONTROL WORRYING: NOT AT ALL
6. BECOMING EASILY ANNOYED OR IRRITABLE: NOT AT ALL
5. BEING SO RESTLESS THAT IT IS HARD TO SIT STILL: NOT AT ALL
7. FEELING AFRAID AS IF SOMETHING AWFUL MIGHT HAPPEN: NOT AT ALL
3. WORRYING TOO MUCH ABOUT DIFFERENT THINGS: NOT AT ALL

## 2019-04-01 ASSESSMENT — PATIENT HEALTH QUESTIONNAIRE - PHQ9: 5. POOR APPETITE OR OVEREATING: SEVERAL DAYS

## 2019-04-02 NOTE — PROGRESS NOTES
NAVIGATE SEE Progress Note   For Supported Employment & Education    NAVIGATE Enrollee: Eloy Storm (2000)     MRN: [id number]  Date:  4/1/19  Clinician: SAPPHIRE Supported Employment & , Sushma Peña    1. Client Status Update:   Eloy Storm is interested in employment (Client had in person contact with potential employer)    2. People present:   SEE/Writer  Client: Eloy Storm  Other: Karrie Sheffield - research    3. Total number of persons who participated in contact: (do not count yourself/SEE) 2    4. Length of Actual Contact: 10 minutes   Traveled? No    5. Location of contact:  Psychiatry Clinic, [place]    6. Brief description of session, contact, or client status (include: strategies, interventions, client reaction to contact, next steps, etc)    Eloy arrived at 1:50 for his 1pm appt so this writer briefly checked in on how he is doing. He has not applied to any other jobs but visited a [employer] shop and said he liked the casual environment. Writer encouraged him to reach out and/or send this writer his resume for review. Eloy also met with Karrie for research purposes.    7. Completion of mutually agreed upon client task from previous meeting:  Nothing Completed    8. Orientation and Treatment Planning:  Pursuing current SEE goals    9. Assessment:  Assisting client to visit work or school settings to develop client preferences and goals re: work and/or school    10. Placement:  Not Applicable    11. Follow Along Supports: (for clients who are working or attending school)   Not Applicable    12. Mutually agreed upon client task for next meeting:     na    13. Next Meeting Scheduled for: next week 1    Sushma LEARY Supported Employment &

## 2019-04-03 ENCOUNTER — TELEPHONE (OUTPATIENT)
Dept: PSYCHIATRY | Facility: CLINIC | Age: 19
End: 2019-04-03

## 2019-04-03 NOTE — TELEPHONE ENCOUNTER
Writer received incoming phone call from pt at [phone]. Pt reports he has continued to struggle with anxiety and depression. The pt started Wellbutrin approximately one month ago, but has not noticed a change in his depressive symptoms, which includes fatigue, increased sleep, low mood, decreased energy, and anxiety. Pt denies SI or other safety concerns. He currently lives in a sober living house. He went onto explain that his anxiety has not improved either. The pt still suffers from unmanageable social anxiety. He feels the PRN gabapentin is somewhat helpful, but when he's in social settings, he feels it's unhelpful. He confirmed he's taking the following medications:    -Lexapro 20 mg every day  -Abilify 10 mg every day  -gabapentin 300 mg BID PRN  -Wellbutrin 150 mg every day    He takes the gabapentin twice a day everyday. He reports manageable akathisia, but says this is not a concern. Agreed to reach out to the provider for recommendations. Pt's upcoming appt is 4/19. He would like to be scheduled sooner if possible.

## 2019-04-03 NOTE — TELEPHONE ENCOUNTER
If he wants to move his appointment up that would be ok.  I would like to continue the current doses until I can see him in person. His next appointment would put him at about 6 wks since starting the medication, which is a normal time frame before increasing the dose.      Vicenta Sanders, CNP, 4/3/2019 2:30 PM

## 2019-04-03 NOTE — TELEPHONE ENCOUNTER
Writer called pt and relayed the provider's recommendations. Pt rescheduled for 4/16. He is okay with waiting until that time to make med changes. Writer discussed safety plan of going into the ED/calling 911, if he has safety concerns or feels his symptoms worsen. Also offered on-call number, but pt was not interested in this option at this time.

## 2019-04-04 ASSESSMENT — PATIENT HEALTH QUESTIONNAIRE - PHQ9: SUM OF ALL RESPONSES TO PHQ QUESTIONS 1-9: 6

## 2019-04-05 ASSESSMENT — ANXIETY QUESTIONNAIRES: GAD7 TOTAL SCORE: 2

## 2019-04-08 ENCOUNTER — OFFICE VISIT (OUTPATIENT)
Dept: PSYCHIATRY | Facility: CLINIC | Age: 19
End: 2019-04-08

## 2019-04-08 ENCOUNTER — TELEPHONE (OUTPATIENT)
Dept: PSYCHIATRY | Facility: CLINIC | Age: 19
End: 2019-04-08

## 2019-04-08 ENCOUNTER — OFFICE VISIT (OUTPATIENT)
Dept: PSYCHIATRY | Facility: CLINIC | Age: 19
End: 2019-04-08
Payer: COMMERCIAL

## 2019-04-08 DIAGNOSIS — F29 PSYCHOSIS, UNSPECIFIED PSYCHOSIS TYPE (H): Primary | ICD-10-CM

## 2019-04-08 DIAGNOSIS — F32.A DEPRESSION: Primary | ICD-10-CM

## 2019-04-08 DIAGNOSIS — F32.A DEPRESSION, UNSPECIFIED DEPRESSION TYPE: Primary | ICD-10-CM

## 2019-04-08 NOTE — Clinical Note
Just wanted to give you a head's up on the transition plan and specific things he wanted to discuss at your next appointment.

## 2019-04-08 NOTE — PROGRESS NOTES
NAVIGATE Family Peer Support  A Part of the [facility] First Episode of Psychosis Program     Patient Name: Eloy Storm  /Age:  2000 (18 year old)  Date of Encounter: 19  Length of Contact: 50 minutes    Reached out to offer resources and support to Michelle Lyons, patient's mother.     This writer shared SARAH resources, discussed benefits of support groups and having NAVIGATE services in place for Eloy.    Most of the conversation was focused on recovery from addiction and family support.  Michelle expressed concern over Eloy's sobriety upon leaving his current recovery program. This writer suggested having a comprehensive plan in place including input/engagement  with the NAVIGATE team so that Eloy would have continued support on multiple levels of his recovery process.    Brittany Rothman CFPS  NAVIGATE Family Peer    [Billing Code 30329 for No Billable Service as family peer support is a nonbillable service]

## 2019-04-08 NOTE — TELEPHONE ENCOUNTER
NAVIGATE Outreach  A Part of the [facility] First Episode of Psychosis Program     Patient Name: Eloy Storm  /Age:  2000 (18 year old)    Contact: Received call from Eloy's Mom informing writer that per [facility] staff, Eloy tested positive for THC. Writer offered support and shared plan to update team. Mom will let writer know when she is able to discuss with Eloy.     Plan: Note routed to team.     Keisha Charles AM, LGSW  NAVIGATE Individual Resiliency [place] & Family Clinician

## 2019-04-09 ENCOUNTER — TELEPHONE (OUTPATIENT)
Dept: PSYCHIATRY | Facility: CLINIC | Age: 19
End: 2019-04-09

## 2019-04-09 NOTE — PROGRESS NOTES
NAVIGATE Program Case Management/Care Coordination  A Part of the [facility] First Episode of Psychosis Program     Patient Name: Eloy Storm  /Age:  2000 (18 year old)  Diagnosis(es):   Depression (F32.9)  Clinician: SAVANNA Reed     1. Type of contact:   IRT Session    2. People involved:   Client: Yes  SAPPHIRE IRT: FÁTIMA Reed LGSW  Other: FÁTIMA Arevalo Intern    3. Total number of persons who participated in contact: 3, including NAVIGATE team    4. Length of Actual Contact: 45 minutes, Record Minutes Travel Time: 0 minutes    5. Location of contact:  Psychiatry Clinic, [facility]    6. Techniques utilized:   [place] announced at beginning of session  Review of diagnosis, symptoms to substantiate the diagnosis, and affected level of functioning  Review of goals and associated strengths, barriers, objectives, and interventions  Review of frequency of appointments and anticipated length of treatment  Illicit client/family feedback    7. Assessment/Progress Note:     This writer attended and participated in Irina (MSW Intern)'s IRT appointment with Eloy. Eloy reported a decrease in cravings and denied any substance use. He stated he has been sleeping more often this week, but is participating in social activities with his sober house. Eloy would like to discuss fatigue, restlessness, and concerns with donating plasma at his next prescriber visit. Irina discussed his diagnosis being primary depression, and therefore, he no longer meets criteria for the NAVIGATE program. Irina offered several transition strategies to Eloy. He ultimately decided on receiving therapy and medication management through the [facility]. Irina then assisted Eloy in identifying short-term goals until transitioning to [place]. Please see Irina's encounter dated 19 for more information.    This is a non-billable encounter as it was billed under a separate encounter.     8. Plan/Referrals:     This writer  will facilitate a referral for therapy to the [facility].     SAVANNA Reed   NAVIGATE Direction & Family Clinician

## 2019-04-09 NOTE — TELEPHONE ENCOUNTER
-Writer called Michelle and discussed medications with her.  Educated on the medications that patient is taking.  Discussed plan with her, and she let writer know that it is possible that patient requested to take Wellbutrin as his Dad has been taking it as well.  Let her know that patient has appointment with provider and he can further discuss this with her then.  She agreed and thanked writer for call.

## 2019-04-10 ASSESSMENT — PATIENT HEALTH QUESTIONNAIRE - PHQ9: SUM OF ALL RESPONSES TO PHQ QUESTIONS 1-9: 5

## 2019-04-10 NOTE — PROGRESS NOTES
SAPPHIRE Clinician Contact & Progress Note  For Individual Resiliency Training (IRT)  A Part of the [facility] First Episode of Psychosis Program    NAVIGATE Enrollee: Eloy Storm (2000)     MRN: [id number]  Date:  4/01/19  Diagnosis: unspecified psychosis  Clinician: SAPPHIRE Individual Resiliency Trainer, Irina Davis     1. Type of contact: (majority of time spent)  IRT Session    2. People present:   Writer  Client: Eloy Storm  SAPPHIRE IRT: FÁTIMA Reed, SAVANNA    3. Total number of persons who participated in contact: 3, including writer    4. Length of Actual Contact: Start Time: 2:00 PM; End Time: 3:00 PM Traveled?    No     5. Location of contact:  Psychiatry Clinic, [place]    6. Did the client complete the home practice option(s) from the previous session: Partially Completed    7. Motivational Teaching Strategies:  Connect info and skills with personal goals  Promote hope and positive expectations  Re-frame experiences in positive light    8. Educational Teaching Strategies:  Review of written material/education  Relate information to client's experience  Ask questions to check comprehension  Break down information into small chunks  Adopt client's language     9. CBT Teaching Strategies:  Reinforcement and shaping (positive feedback for steps towards goals, gains in knowledge & skills and follow-through on home assignments)    10. IRT Module(s) Addressed:  Module 2 - Assessment/Initial Goal Setting    11. Techniques utilized:   [place] announced at beginning of session  Review of homework  Review of goal  Review of previous meeting  Present new material  Help client choose a home practice option  Summarize progress made in current session    12. Mental Status Exam:    Alertness: drowsy  Appearance: well groomed  Behavior/Demeanor: cooperative and pleasant, with good  eye contact   Speech: normal  Language: intact. Preferred language identified as English.  Psychomotor:  "fidgety  Mood: depressed  Affect: restricted; was congruent to mood; was congruent to content  Thought Process/Associations: unremarkable  Thought Content:  Reports none;  Denies suicidal and violent ideation and delusions  Perception:  Reports none;  Denies auditory hallucinations and visual hallucinations  Insight: good  Judgment: good  Cognition: does  appear grossly intact; formal cognitive testing was not done  Suicidal ideation: denies SI, denies intent,  and denies plan  Homicidal Ideation: denies    13. Assessment/Progress Note:     Completed top 3 goals and action steps for each. Discussed the concept of using SMART goal setting guidelines with measurable actions and outcomes.    Client expressed feeling an increased level of depression and boredom, with other symptoms including sleeping 7-9 hours nightly and then frequently taking naps also.    Client reported making deeper friendships in the sober living house and finding value in that. He reportedly has not used substances and does not feel cravings currently.    While client expresses a continued interest and motivation to gain independent living over the next year (job, apartment, car), my clinical observation is that his depression is a significant barrier to completing action steps.    Client reported continued fidgeting and desire to \"get the med equation right\". He is scheduled to see his provider late April.    14. Plan/Referrals:     Continue weekly IRT, Encouraged use of SEE, Scheduled for provider appointment late April.    Billing for \"Interactive Complexity\"?    No      FÁTIMA Villalta Intern  NAVIGATE Individual Resiliency Trainer    Attestation:    I did not see this patient directly. This patient is discussed with me in individual clinical social work supervision, and I agree with the plan as documented.     FÁTIMA Medina, [facility], April 10, 2019        "

## 2019-04-11 NOTE — PROGRESS NOTES
NAVIGATE Clinician Contact & Progress Note   For Family Education Program    NAVIGATE Enrollee: Eloy Storm (2000)     MRN: [id number]  Date:  3/18/19  Diagnosis(es):   Depression with psychosis (F32.9)  Clinician: SAPPHIRE Individual Resiliency  & Family Clinician, SAVANNA Mena     1. Type of contact: (majority of time spent)  Family Session    2. People present:   Writer  Client: No  Significant Other/Family/Friend:  Mother    3. Total number of persons who participated in contact: 2, including writer    4. Length of Actual Contact: Start Time: 2pm; End Time: 3pm   Traveled?    No     [facility]    6. Did the client complete the home practice option(s) from the previous session: Completed    7. Motivational Teaching Strategies:  Connect info and skills with personal goals  Promote hope and positive expectations  Explore pros and cons of change  Re-frame experiences in positive light    8. Educational Teaching Strategies:  Review of written material/education  Relate information to client's experience  Ask questions to check comprehension  Break down information into small chunks  Adopt client's language     9. CBT Teaching Strategies:  Reinforcement and shaping (positive feedback for steps towards goals and gains in knowledge & skills)  Relapse prevention planning (review of stressors and early warning signs)  Coping skills training (review current coping skills and increase currently used skills)  Behavioral tailoring (fit taking medication into client's daily routine)    10. Psychoeducational Topic(s) Addressed:  Just the Facts - Psychosis    11. Techniques utilized:   [place] announced at beginning of session  Review of goal  Review of previous meeting  Present new material  Problem-solving practice  Help client choose a home practice option  Summarize progress made in current session    12. Assessment/Progress Note:     Writer met with  "Eloy's Mom-Michelle on this date for family session. Writer set agenda to check-in, discuss and assess symptoms, explore material in family modules, discuss ways in which family can provide support and encouragement for ongoing symptom management, and identify goals for family's continued participation in Eloy's well-being and recovery.    During check-in, Michelle shared things seem \"pretty good\" with Eloy. She saw him this past weekend when she picked him up from [employer] and went skating with both Eloy and her other children. Mom expressed some concern because he called her around 7pm Sunday evening asking if she could pick him up. During her interactions with him at this time he let her know that he had donated plasma, which Mom expressed concerns regarding both his motivation behind this as well as concern related to impact of medication efficacy if Eloy did donate plasma. Writer shared plan to discuss with TROY Brown; will follow-up with Mom if there are immediate concerns prior to next appointment.    Continued exploration of Just the Facts - Psychosis. Reviewed symptoms sometimes associated with psychosis. Michelle reported observing negative symptoms (current), suicidal thoughts (past), depression (past and current), and anxiety (past and current). Mom identified the suicidal thoughts to be most concerning for her. She noted that his personality \"seems to be returning\" and writer and Mom reflected positively on this. Reviewed criteria for diagnoses including schizophreniform, schizophrenia and schizoaffective disorder. Additionally discussed mood disorders with psychosis and psychosis unspecified diagnoses. Shared with Mom that [place] diagnosis most seems to fit a major depressive disorder with psychosis, but team will continue to monitor and assess for diagnostic clarity. Reviewed the Stress-Vulnerability Model. Discussed Eloy's experience and onset of symptoms through this lens. " Discussed things people can to do influence stress including engage in meaningful activities, develop relationships with supportive people, learn strategies for managing stress, keep family conflict low, and develop coping strategies for persistent symptoms. From Mom's perspective, she is more concerned about Eloy developing relationships with supportive people and hopes this can be part of his recovery as most of his previous friends use substances. Emphasized the importance of abstinence from substances for overall recovery. Will continue to discuss next week.     13. Plan/Referrals:     Will meet with family weekly as schedule allows for evidence based family psychoeducation and therapeutic support aimed at maximizing Eloy's opportunity for recovery from psychosis.     Next family session scheduled for next week. Will follow up with TROY Brown regarding plasma donation and will reach out to Mom if there are concerns to be addressed prior to next appt.     Client and family aware they can reach out to writer directly and/or NAVIGATE team should concerns or needs arise prior to next scheduled appointment.     Keisha Charles, [facility]   NAVIGATE Individual Resiliency  & Family Clinician     Attestation:    I did not see this patient directly. This patient is discussed with me in individual clinical social work supervision, and I agree with the plan as documented.     FÁTIMA Medina, LICSW, April 12, 2019

## 2019-04-15 NOTE — PROGRESS NOTES
SAPPHIRE Clinician Contact & Progress Note  For Individual Resiliency Training (IRT)  A Part of the [facility] First Episode of Psychosis Program    NAVIGATE Enrollee: Eloy Storm (2000)     MRN: [id number]  Date:  4/08/19  Diagnosis: psychosis, unspecified  Clinician: SAPPHIRE Individual Resiliency Trainer, Irina Davis     1. Type of contact: (majority of time spent)  IRT Session    2. People present:   Writer  Client: Eloy Storm  SAPPHIRE IRT: FÁTIMA Reed, SAVANNA    3. Total number of persons who participated in contact: 3, including writer    4. Length of Actual Contact: Start Time: 2:00 PM; End Time: 3:00 PM   Traveled?    No     5. Location of contact:  [facility], [place]    6. Did the client complete the home practice option(s) from the previous session: Partially Completed    7. Motivational Teaching Strategies:  Connect info and skills with personal goals  Promote hope and positive expectations  Explore pros and cons of change  Re-frame experiences in positive light    8. Educational Teaching Strategies:  Review of written material/education  Relate information to client's experience  Ask questions to check comprehension  Break down information into small chunks  Adopt client's language     9. CBT Teaching Strategies:  Reinforcement and shaping (positive feedback for steps towards goals, gains in knowledge & skills and follow-through on home assignments)    10. IRT Module(s) Addressed:  Module 7 - Building a Bridging to Your Goals    11. Techniques utilized:   [place] announced at beginning of session  Review of homework  Review of goal  Review of previous meeting  Present new material  Help client choose a home practice option  Summarize progress made in current session    12. Mental Status Exam:    Alertness: drowsy  Appearance: well groomed  Behavior/Demeanor: cooperative, pleasant, calm and passive, with good  eye contact   Speech: normal  Language: intact. Preferred  "language identified as English.  Psychomotor: slowed  Mood: depressed  Affect: restricted; was congruent to mood; was congruent to content  Thought Process/Associations: unremarkable  Thought Content:  Reports none;  Denies suicidal and violent ideation  Perception:  Reports none;  Denies auditory hallucinations and visual hallucinations  Insight: fair  Judgment: fair  Cognition: does  appear grossly intact; formal cognitive testing was not done  Suicidal ideation: denies SI, denies intent,  and denies plan  Homicidal Ideation: denies    13. Assessment/Progress Note:     Client reported feeling an increased level of feeling tired, sleeping up to 10 hours at night and another 2 during the day. Client appeared drowsy, depressed and lacking energy. Appearance appeared more depressed than last session. Client denies SI, SIB, HI.    While client reported complete sobriety and lack of any drug use, clinician became aware through mother's family IRT therapist that client had test positive for recent marijuana use. Client reported that sober living house is working well for him, with friendships being helpful for sobriety.    Client reported partial completion of home practice, completing one on-line job application for [employer] (client's goal was 5 online applications). Client reported wanting a job but having a hard time with motivation to apply.    Current goals center around getting a job, staying sober, increasing physical activity, and maintaining time with family members.    14. Plan/Referrals:     Continue IRT, SEE, Provider appointment late April    Billing for \"Interactive Complexity\"?    No      FÁTIMA Villalta Intern  NAVIGATE Individual Resiliency Trainer    Attestation:    I did not see this patient directly. This patient is discussed with me in individual clinical social work supervision, and I agree with the plan as documented.     FÁTIMA Medina, [facility], April 16, 2019      "

## 2019-04-16 ENCOUNTER — OFFICE VISIT (OUTPATIENT)
Dept: PSYCHIATRY | Facility: CLINIC | Age: 19
End: 2019-04-16
Attending: NURSE PRACTITIONER
Payer: COMMERCIAL

## 2019-04-16 VITALS
BODY MASS INDEX: 28.86 KG/M2 | HEART RATE: 81 BPM | DIASTOLIC BLOOD PRESSURE: 83 MMHG | SYSTOLIC BLOOD PRESSURE: 125 MMHG | WEIGHT: 212.8 LBS

## 2019-04-16 DIAGNOSIS — F41.9 ANXIETY: ICD-10-CM

## 2019-04-16 PROCEDURE — G0463 HOSPITAL OUTPT CLINIC VISIT: HCPCS | Mod: ZF

## 2019-04-16 RX ORDER — ESCITALOPRAM OXALATE 10 MG/1
10 TABLET ORAL DAILY
Qty: 15 TABLET | Refills: 0 | Status: SHIPPED | OUTPATIENT
Start: 2019-04-16 | End: 2024-04-08

## 2019-04-16 ASSESSMENT — PAIN SCALES - GENERAL: PAINLEVEL: NO PAIN (0)

## 2019-04-16 ASSESSMENT — PATIENT HEALTH QUESTIONNAIRE - PHQ9: SUM OF ALL RESPONSES TO PHQ QUESTIONS 1-9: 17

## 2019-04-16 NOTE — PROGRESS NOTES
ARIANNAATE Program Case Management/Care Coordination  A Part of the [facility] First Episode of Psychosis Program     Patient Name: Eloy Storm  /Age:  2000 (18 year old)  Diagnosis(es):   Depression, unspecified (F32.9)  Clinician: SAVANNA Reed     1. Type of contact:   Case Management/Care Coordination    2. People involved:   Client: Yes  SAPPHIRE IRT: FÁTIMA Reed LGSW  Other: MSW Intern, Irina    3. Total number of persons who participated in contact: 2, including NAVIGATE team    4. Length of Actual Contact: 60 minutes    5. Location of contact:  Psychiatry Clinic, [facility]    6. Techniques utilized:   [place] announced at beginning of session  Review of diagnosis, symptoms to substantiate the diagnosis, and affected level of functioning  Review of goals and associated strengths, barriers, objectives, and interventions  Illicit client/family feedback    7. Assessment/Progress Note:     This writer joined Irina's (MSW Intern) IRT session with Eloy. Eloy denied any substance use or cravings within the past week. He discussed symptoms of depression (low energy, low mood, anhedonia). However, he mentioned increased social activities this week. Irina initiated goal setting and strategies for a 3 month period. Please see Irina's note dated 3/25/19 for more information.    This is a non-billable encounter as it was billed under a separate encounter.    8. Plan/Referrals:     Please see Irina's encounter dated 3/25/19 for more information.    SAVANNA Reed   NAVIGATE Direction & Family Clinician

## 2019-04-16 NOTE — PATIENT INSTRUCTIONS
Start zoloft 50 mg daily   Decrease lexapro to 10 mg daily x 2 weeks, then stop     Thank you for coming to the PSYCHIATRY CLINIC.    Lab Testing:  If you had lab testing today and your results are reassuring or normal they will be mailed to you or sent through [employer] within 7 days.   If the lab tests need quick action we will call you with the results.  The phone number we will call with results is # [phone] (home) . If this is not the best number please call our clinic and change the number.    Medication Refills:  If you need any refills please call your pharmacy and they will contact us. Our fax number for refills is [phone]. Please allow three business for refill processing.   If you need to  your refill at a new pharmacy, please contact the new pharmacy directly. The new pharmacy will help you get your medications transferred.     Scheduling:  If you have any concerns about today's visit or wish to schedule another appointment please call our office during normal business hours [phone] (8-5:00 M-F)    Contact Us:  Please call [phone] during business hours (8-5:00 M-F).  If after clinic hours, or on the weekend, please call  [phone].    Financial Assistance [phone]  [facility] Billing [phone]  [facility] Billing [phone]  Medical Records [phone]      MENTAL HEALTH CRISIS NUMBERS:  [facility]:   [facility] - [phone]   Crisis Residence [facility] - [phone]   Walk-In Counseling [facility] [phone]   COPE 24/7 [place] Mobile Team for Adults - [[phone]]; Child - [[phone]]        [facility]:   [facility] - [phone]   Walk-in counseling [facility] - [phone]   Walk-in counseling [facility] - [phone]   Crisis Residence [facility] - [phone]   Urgent Care Adult Mental Health:   --Drop-in, 24/7 crisis line, and [place] Mobile Team [[phone]]    CRISIS TEXT LINE: Text [phone] from anywhere, anytime, any crisis 24/7;    OR SEE [email or web address]     Poison Control Center - [phone]    CHILD: [facility] needs assessment team - [phone]     [facility] Lifeline - [phone]; or [facility] Lifeline - [phone]    If you have a medical emergency please call 911or go to the nearest ER.                    _____________________________________________    Again thank you for choosing PSYCHIATRY CLINIC and please let us know how we can best partner with you to improve you and your family's health.  You may be receiving a survey in the mail regarding this appointment. We would love to have your feedback, both positive and negative, so please fill out the survey and return it using the provided envelope. The survey is done by an external company, so your answers are anonymous.

## 2019-04-16 NOTE — PROGRESS NOTES
"  Psychiatry Clinic New Patient Medication Evaluation                                           Eloy Storm is a 18 year old male who prefers the name Eloy and pronoun he, him.  Therapist: Tamiko Hartman [facility]  PCP: Ramon Price  Other Providers: [facility] FEP team  Referred by [facility] for evaluation of psychosis.     History was provided by patient      Chief Complaint                                                                                                        \" anxiety is improved, depression is worse \"     History of Present Illness                                                                                4, 4      Mr. Eloy Storm is a 18 year old male with a history of polysubstance use disorder, psychosis NOS, social anxiety and panic attacks. [facility] however is being discharged from this program due to no longer meeting their diagnostic criteria. He was last seen by me on 3/1/19 at which time gabapentin dose was increased and Wellbutrin was started. He reports today that he has noticed no benefit from his medication changes recently.  He reports that gabapentin helps with shaking due to anxiety, but does not help with thoughts of impending doom, chest tightness, stuttering.  Denies worried thoughts or ruminations.  Does not think anxiety is worse since starting Wellbutrin.  He reports mood is generally low, and endorses anhedonia, thoughts he would be better off dead occurring every other day, low energy, hypersomnia, increased appetite.    Denies SI, intent or plan.   He would like to discontinue Wellbutrin due to limited efficacy and he would like to start new medication to target anxiety.   He will be starting a job at [employer] in the next few weeks, and is worried that anxiety will interfere with his work.  Will be working 20-30 hours per week.   He is at [employer] until June.  Recently used cannabis, 2 weeks ago, 1x.  Also used vicoden (1 pill, " "unknown dose) the week before he used cannabis.  He attributes substance use to depression and \"boredom.\"  We reviewed history of depression today, and he reports mood has been \"more empty than sad\" starting in middle school.  He endorses fear of abandonment (\"I will go to great lengths to please people, \"I am always worried people are going to leave me\"; no frantic threats to avoid abandonment except trying to please people);  Difficulty with attachment to friends, persistently unstable sense of self; impulsivity (spending, substance use) in response to affective instability, affective instability precipitated by interpersonal events primarily (ie friend cancelling plans), and transient stress related thoughts about others plotting against him or talking about him.  He has a history of SIB on limited occassions, including cutting and burning.  Affective instability tends towards sadness, denies anger.      Medication SE: reports restlessness which mostly occurs when in groups, not when engaged in enjoyable activities, however finds this very uncomfrotable. Denies any other medication SE today.    Current psychiatric medications:  Lexapro 20 mg daily   Abilify 10 mg daily  Gabapentin 300 BID PRN  Wellbutrin  mg daily   Trazodone 25-50 mg at bedtime    Recent Symptoms:   Negative unless noted in the HPI       Recent Substance Use:  none reported      Substance Use History (no change today)     Cannabis - daily since age 15, approx 2-3 grams per day.  Sober since 10/30/18  Tobacco - since age 15, vapor    Other Drugs:   Past - MDMA, Cocaine, LSD                Age of first other drug use: [age]                Number of days patient used opiods over the last 30 days: 0                Last period of sobriety, when and how long: Greater than 6 months      Past - Xanaz, Oxycodone, Klonopin, Hydrocodone, Percocet (prior averaged monthly use)                Age of first other drug use: [age]                Number of " "days patient used opiods over the last 30 days: 0                Last period     CD treatment hx: Yes -   [facility] March-August 2018  [facility] October-January 2018     Withdrawal hx: Yes but no hx of withdrawal seizures or DTs.   Current sober supports include family.     Psychiatric History     Per 1/9/18 DA, reviewed with patient and updated where needed:    Past diagnoses:   -[facility] 1/2/18:  Substance induced mood, mood disorder  Rule out major depressive disorder     [facility]:  Alcohol use disorder, moderate, dependence  Cannabis use disorder, severe, dependence  Sedative, hypnotic or anxiolytic use disorder, mild, abuse  Substance-induced psychotic disorder  Tobacco use disorder, moderate, dependence  Unspecified anxiety disorder  Unspecified depressive disorder     -Per [facility] 11/14/18:  Unspecified schizophrenia spectrum disorder and other psychotic disorder (rule out a substance-induced psychotic disorder versus a primary psychotic illness)  Cannabis use disorder, severe  Sedative hypnotic use disorder, moderate  Alcohol use disorder, moderate     Past medication trials: Zyprexa, Depakote started during 10/2018 admission.  Lexapro 5 mg daily started in Fall 2017 with poor adherence  See note by Loren Castro, PharmD dated 1/28/18 for detailed med hx    Hospitalizations: 2  -[facility] 12/30/17 - 1/2/18 \"admitted with altered mental status and suicide statements.\"     -[facility] \"admitted to our Behavioral Health Unit under a 72-hour hold for concerns related to disorganized thought process along with reports of suicidal ideation.  Noting a prominence of psychosis and manic-like symptoms on admission...\"     Commitment: No, Current [place] order: No     ECT trials: No  Suicide attempts: No  Self-injurious behavior: Yes - burning self 3x in one month time frame during college  Violent behavior: No  Outpatient Programs & Services [Psychotherapy, DBT, Day Treatment, " Eating Disorder Tx etc]:   Psychiatry  -Dr Espinoza ([phone]) at [facility] ([phone]) before [facility]     Psychotherapy  -Boo Anderson ([phone]) at [facility]  -Ernestine Parisi ([phone]) before [facility]    Family psychiatric history: unknown    Recent medication changes:  1/17/19: Start gabapentin 100 mg TID PRN for anxiety   2/1/19: Increase gabapentin to 300  Mg BID PRN, decrease trazodone to 25-50 mg at bedtime  3/1/19: Start Wellbutrin  mg daily      Social/ Family History    (no change today)           [per patient report]                                                 1ea, 1ea       Per 1/9/19 note by Sushma Blackburn, reviewed and updated as needed today:  Living situation: Eloy lives with in residential/IP treatment at [facility]; prior he lived with his mom in [place], [place]   Guns, weapons, or other means to harm oneself in the home? No  Pets at home? Yes - a dog at mom's                  Education: Eloy s highest level of education is high school graduate and some college. Failed out of [employer] in [place].   Occupation: Eloy is currently unemployed.  Finances: Eloy is financial supported by Family.  Relationships: Significant relationships include family. Reports little to no contact with peers.  Family members include parents (), two stepsisters and one half sister.   Cultural influences: Eloy identifies is race as . Eloy reports  No  to cultural considerations to take into account when providing treatment.   Sexuality:  Eloy identifies as male, heterosexual, with preferred pronouns he/him/his.   Legal Hx: Yes - Misdemeanor for speeding and out of state misdemeanor for possession. He is not on probation.   Abuse Hx: No   Hx: No     Medical / Surgical History     Patient Active Problem List   Diagnosis     Suicidal ideation     Psychosis (H)       No past surgical history on file.      Medical Review of Systems                                                                                                     2, 10     A comprehensive review of systems was performed and is negative other than noted in the HPI     Allergy   Patient has no known allergies.   Current Medications     Current Outpatient Medications   Medication Sig Dispense Refill     ARIPiprazole (ABILIFY) 10 MG tablet Take 1 tablet (10 mg) by mouth daily 30 tablet 2     buPROPion (WELLBUTRIN XL) 150 MG 24 hr tablet Take 1 tablet (150 mg) by mouth every morning 30 tablet 2     escitalopram (LEXAPRO) 20 MG tablet Take 1 tablet (20 mg) by mouth daily 30 tablet 2     gabapentin (NEURONTIN) 300 MG capsule Take 1 capsule (300 mg) by mouth 2 times daily as needed (anxiety) 60 capsule 2     multivitamin, therapeutic (THERA-VIT) TABS tablet Take 1 tablet by mouth At Bedtime 30 tablet 0      Vitals                                                                                                                        3, 3     /83   Pulse 81   Wt 96.5 kg (212 lb 12.8 oz)   BMI 28.86 kg/m         Mental Status Exam                                                                                   9, 14 cog gs     Alertness: alert  and oriented  Appearance: casually groomed  Behavior/Demeanor: cooperative and pleasant, with good  eye contact   Speech: regular rate and rhythm  Language: intact  Psychomotor: normal or unremarkable  Mood: depressed and anxious  Affect: subdued; was congruent to mood; was congruent to content  Thought Process/Associations: unremarkable  Thought Content:  Reports none;  Denies suicidal ideation, violent ideation and delusions  Perception:  Reports none;  Denies auditory hallucinations and visual hallucinations  Insight: adequate  Judgment: adequate for safety  Cognition: (6) oriented: time, person, and place  attention span: intact  concentration: intact  recent memory: intact  remote memory: intact  fund of knowledge: appropriate  Gait and Station:  unremarkable     Labs and Data       PHQ9 Today: 17  PHQ-9 SCORE 3/25/2019 4/1/2019 4/10/2019   PHQ-9 Total Score 9 6 5         Recent Labs   Lab Test 11/08/18  0807 10/31/18  0745 12/30/17 2024   CR 0.95 0.98 1.01*   GFRESTIMATED >90 >90 >90     Recent Labs   Lab Test 11/08/18  0807 10/31/18  0745   AST 16 16   ALT 16 18   ALKPHOS 84 76     Safety Assessment                                                                         m2, h3     Today Eloy Storm denies suicidal intent or plan, however he reports chronic SI and current death ideation. In addition, he has notable risk factors for self-harm, including anxiety, psychosis and substance abuse. However, risk is mitigated by absence of past attempts, ability to volunteer a safety plan and history of seeking help when needed. Therefore, based on all available evidence including the factors cited above, he does not appear to be at imminent risk for self-harm, does not meet criteria for a 72-hr hold, and therefore remains appropriate for ongoing outpatient level of care. He has close follow up through [facility].     Diagnosis,  Assessment   & Plan                                                                          m2, h3     Diagnostic impressions (provisional)  Substance induced psychosis   MDD vs persistent depressive disorder  Social anxiety disorder   Panic disorder  Polysubstance use disorder  R/o borderline personality disorder      Mr. Eloy Storm is a 18 year old male who is followed in the [facility] First Episode Psychosis program.  He reports a history of chronic depression and anxiety starting in middle school, which have increased in severity since this time.  He has a history of substance use starting by age 15 including heavy cannabis use, as well as use of alcohol, benzodiazepines, opiates.  He reports a duration of untreated psychosis of approximately 10 months.  Psychosis symptoms occurred in the context of substance use, and  longitudinal assessment will be needed to determine the extent to which symptoms were substance induced. He is currently sober (60+ days).  Psychosis symptoms have been gradually resolving with sobriety and SGA treatment.    Today, he reports ongoing depression and anxiety, and recent relapse with use of cannabis and opioids x 1.  Review of depression history raises question of borderline personality disorder traits, and further assessment (possibly MMPI) is warranted.  [facility] team will be transferring care soon due to hx of psychosis being substance induced.   Due to limited efficacy of lexapro for depression or anxiety symptoms, will begin cross taper to Sertraline today.  Discussed goals of reducing polypharmacy.  Will plan to gradually taper Abilify and likely discontinue Wellbutrin after cross taper to Sertraline is complete.      Psychosis  Continue Abilify 10 mg daily    Depression  Decrease lexapro to 10 mg daily then stop  Start Sertraline 50 mg daily   Continue Wellbutrin  mg in the morning    Anxiety  Lexapro as above  Continue gabapentin 300 mg BID PRN    Insomnia  Continue trazodone to 25-50 mg at bedtime, as needed only     Substance use disorder  Recent relapse  Continue to provide counseling regarding worsening of mood, anxiety and psychotic  symptoms with continued substance use. Encouraged abstinence     Long term management of high risk medications  10/18: lipids, glucose reviewed  Wt today = 212 lbs (last = 206 lbs)      RTC: 2 weeks with Loren Castro, Pharm D to assess Sertraline tolerance.  May increase dose to 100 mg daily at this time if tolerating.  May also consider either discontinuation of Wellbutrin or dose reduction of Ablify to 7.5 mg daily if psychosis symptoms remain stable. 4 weeks or sooner if needed with me.     40 minutes were spent face to face with this patient, and greater than 50% of this time was spent in counseling and coordination of care, including diagnostic  impressions, recommended treatment plan, and discussion of transfer plans when [facility] program wraps up.      CRISIS NUMBERS:   Provided routinely in AVS.    Treatment Risk Statement:  The patient understands the risks, benefits, adverse effects and alternatives. Agrees to treatment with the capacity to do so. No medical contraindications to treatment. Agrees to call clinic for any problems. The patient understands to call 911 or go to the nearest ED if life threatening or urgent symptoms occur.       PROVIDER:  MAGALIS eSars CNP

## 2019-04-16 NOTE — NURSING NOTE
Chief Complaint   Patient presents with     RECHECK     Depression, unspecified depression type

## 2019-04-22 NOTE — PROGRESS NOTES
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 Type of contact: (majority of time spent)  Family Session    2. People present:   Writer  Client: No  Significant Other/Family/Friend:  Mother    3. Total number of persons who participated in contact: 2, including writer    4. Length of Actual Contact: Start Time: 11am; End Time: 12pm   Traveled?    No     [facility]    6. Did the client complete the home practice option(s) from the previous session: Completed    7. Motivational Teaching Strategies:  Connect info and skills with personal goals  Promote hope and positive expectations  Explore pros and cons of change  Re-frame experiences in positive light    8. Educational Teaching Strategies:  Review of written material/education  Relate information to client's experience  Ask questions to check comprehension  Break down information into small chunks  Adopt client's language     9. CBT Teaching Strategies:  Reinforcement and shaping (positive feedback for steps towards goals and gains in knowledge & skills)  Relapse prevention planning (review of stressors and early warning signs)  Coping skills training (review current coping skills and increase currently used skills)  Behavioral tailoring (fit taking medication into client's daily routine)    10. Psychoeducational Topic(s) Addressed:  Just the Facts - Psychosis    11. Techniques utilized:   [place] announced at beginning of session  Review of goal  Review of previous meeting  Present new material  Problem-solving practice  Help client choose a home practice option  Summarize progress made in current session    12. Assessment/Progress Note:     Writer met with  Eloy's Mom-Michelle on this date for family session. Writer set agenda to check-in, discuss and assess symptoms, explore material in family modules, discuss ways in which family can provide support and encouragement for ongoing symptom management, and identify goals for family's continued participation in Eloy's well-being and recovery.    During check-in, writer followed up with recommendation that Eloy does not donate plasma while taking medications. Mom reported Eloy seems to be doing well; he passed his most recent UA on 3/19. Eloy has been staying at Dad's during the weekends and has also started a new relationship with a girl in that area. Mom reports per [facility], he is overall doing well. Writer engaged Mom in continued exploration of JTF-Psychosis. Reviewed things people can do to influence the stress factors of psychosis. Reflected on the impact of stress over time specific to Eloy. Mom identified positive activities and interests for Eloy to include cooking, art, sketching, soccer, photography and graphic design. Writer offered validation, support and encouragement to Mom for her ongoing participation in Eloy's well-being and recovery.     13. Plan/Referrals:     Will meet with family weekly as schedule allows for evidence based family psychoeducation and therapeutic support aimed at maximizing Eloy's opportunity for recovery from psychosis.     Next family session scheduled for next week. Client and family aware they can reach out to writer directly and/or [facility] team should concerns or needs arise prior to next scheduled appointment.     Keisha Charles [facility]   [facility] Individual [facility]iliency  & Family Clinician     Attestation:    I did not see this patient directly. This patient is discussed with me in individual clinical social work supervision, and I agree with the plan as documented.     Sushma Blackburn, FÁTIMA, LICSW, April 22, 2019

## 2019-04-29 ENCOUNTER — OFFICE VISIT (OUTPATIENT)
Dept: PSYCHIATRY | Facility: CLINIC | Age: 19
End: 2019-04-29
Payer: COMMERCIAL

## 2019-04-29 DIAGNOSIS — F32.A DEPRESSION: Primary | ICD-10-CM

## 2019-04-30 ENCOUNTER — TELEPHONE (OUTPATIENT)
Dept: PSYCHIATRY | Facility: CLINIC | Age: 19
End: 2019-04-30

## 2019-05-01 NOTE — PROGRESS NOTES
[facility] Clinician Contact & Progress Note   For Family Education Program    [facility] Enrollee: Eloy Storm (2000)     MRN: [id number]  Date:  4/08/19  Diagnosis(es):   Depression (F32.9)  Clinician: [facility] [place] & Family Clinician, SAVANNA Mena     1. Type of contact: (majority of time spent)  Family Session    2. People present:   Writer  Client: No  Significant Other/Family/Friend:  Mother    3. Total number of persons who participated in contact: 2, including writer    4. Length of Actual Contact: Start Time: 11am; End Time: 12pm   Traveled?    No     [facility]    6. Did the client complete the home practice option(s) from the previous session: Completed    7. Motivational Teaching Strategies:  Connect info and skills with personal goals  Promote hope and positive expectations  Explore pros and cons of change  Re-frame experiences in positive light    8. Educational Teaching Strategies:  Review of written material/education  Relate information to client's experience  Ask questions to check comprehension  Break down information into small chunks  Adopt client's language     9. CBT Teaching Strategies:  Reinforcement and shaping (positive feedback for steps towards goals and gains in knowledge & skills)  Relapse prevention planning (review of stressors and early warning signs)  Coping skills training (review current coping skills and increase currently used skills)  Behavioral tailoring (fit taking medication into client's daily routine)    10. Psychoeducational Topic(s) Addressed:  Just the Facts - Psychosis  Just the Facts - Basic Facts About Alcohol and Drugs    11. Techniques utilized:   [place] announced at beginning of session  Review of goal  Review of previous meeting  Present new material  Problem-solving practice  Help client choose a home practice option  Summarize progress made in current session    12.  Assessment/Progress Note:     Writer met with Eloy's MomAllen on this date for family session. Writer set agenda to check-in, discuss and assess symptoms, explore material in family modules, discuss ways in which family can provide support and encouragement for ongoing symptom management, and identify goals for family's continued participation in Eloy's well-being and recovery. Additionally discussed with Mom potential for transfer of services for Eloy as his diagnosis is looking more primary mood with psychosis, as opposed to schizophrenia spectrum, and it is important that he is getting the treatment and support that best fits his specific experience of symptoms. Notified Mom this possibility was discussed with Eloy during his Diagnostic Assessment; Mom is open to transfer of services and was in agreement diagnostically.     During check-in, Michelle reported Eloy seems to be down this past weekend. She went to pick him up on Friday night and they drove around together, he opted not to go to family dinner on Saturday night and on Sunday he spent time with his potential new girlfriend. Reflected on Eloy's experience of depression over time; Mom reports observing potential signs of depression since around 7th grade. Dialogued about diagnosis again and shared perspective that he seems to present with primary depression with potential psychosis, especially when using substances. Mom verbalized agreement with this. Continued exploration of Education about Psychosis; dialogued about the phases of psychosis including prodromal, acute and recovery. Discussed treatment recommendations to include antipsychotic medication, individual, group, and family therapy, taking steps toward school/employment goals, socialization, abstinence from alcohol and drugs, learning strategies to manage stress and symptoms, exercise and health eating, strong family communication, and ongoing involvement in [facility]. Emphasized the  importance of sobriety and abstinence from substances, especially given Eloy's history of substance use and implications.     Overall family seemed very engaged in conversation. They did express interest in continuing to meet for family therapy and psychoeducation during time when Eloy remains in [facility]. As of today's appt their insight into [place] mental illness appears good. They seem they would benefit from continued clinical intervention aimed at assisting them implement helpful strategies at home and increase their understanding of psychosis.    13. Plan/Referrals:     Will meet with family weekly as schedule allows for evidence based family psychoeducation and therapeutic support aimed at maximizing Eloy's opportunity for recovery from psychosis.     Mom will schedule next family appointment. Client and family aware they can reach out to writer directly and/or [facility] team should concerns or needs arise prior to next scheduled appointment.     Keisha Charles, [facility]   [facility] [place] & Family Clinician     Attestation:    I did not see this patient directly. This patient is discussed with me in individual clinical social work supervision, and I agree with the plan as documented.     FÁTIMA Medina, LICSW, May 2, 2019

## 2019-05-07 ENCOUNTER — TELEPHONE (OUTPATIENT)
Dept: PSYCHIATRY | Facility: CLINIC | Age: 19
End: 2019-05-07

## 2019-05-07 NOTE — TELEPHONE ENCOUNTER
-Writer sent letter to patient due to missed appointments.  All future appointments have been cancelled.  Patient will need to call in future to make appointments.

## 2019-05-17 ENCOUNTER — TELEPHONE (OUTPATIENT)
Dept: PSYCHIATRY | Facility: CLINIC | Age: 19
End: 2019-05-17

## 2019-05-17 ENCOUNTER — DOCUMENTATION ONLY (OUTPATIENT)
Dept: PSYCHIATRY | Facility: CLINIC | Age: 19
End: 2019-05-17

## 2019-05-17 NOTE — TELEPHONE ENCOUNTER
PSYCHIATRY DEPT  Intradepartmental Specialty Program Referral     Patient:  Eloy Storm     Referring Provider:  Katelynn Gaffney     Program Requested:     -Early Stage Mood Program  [ESMP 18-25y]   (Nhan Boateng)   -General Psychiatry Clinic (therapy)          Type of Care Requested :   -consider transfer to specialty program     Referral Reason:   -clarify diagnosis   -make medication recommendations   -make treatment program recommendations     Referral Reason further explained:   After following Eloy for a few months, it seems psychosis is not primary and MDD seems likely. He no longer meets criteria for NAVIGATE. We are hoping to transfer to either the general clinic for therapy services or the ESMD. Can you please advise on the current wait times?

## 2019-05-17 NOTE — TELEPHONE ENCOUNTER
[facility]      Patient Name:  Eloy Storm  /Age:  2000 (18 year old)      Intervention: Called patient to schedule ESMD evaluation per referral from SAVANNA Reed.    Status of Referral: Appointments scheduled. Patient declined to schedule a family meeting with COOPER Graham.    Plan: No further action required by the writer at this time.      Alem Ruiz,     Psychiatry Clinic

## 2019-05-28 NOTE — PROGRESS NOTES
NAVIGATE Clinician Contact & Progress Note   For Family Education Program     NAVIGATE Enrollee: Eloy Storm (2000)     MRN: [id number]  Date:  4/29/19  Diagnosis(es):   Depression (F32.9)  Clinician: SAPPHIRE Individual Resiliency  & Family Clinician, SAVANNA Mena      1. Type of contact: (majority of time spent)  Family Session     2. People present:   Writer  Client: No  Significant Other/Family/Friend:  Mother     3. Total number of persons who participated in contact: 2, including writer     4. Length of Actual Contact: Start Time: 1:30pm; End Time: 2:30pm                Traveled?    No                  [facility]. [facility]     6. Did the client complete the home practice option(s) from the previous session: Completed     7. Motivational Teaching Strategies:  Connect info and skills with personal goals  Promote hope and positive expectations  Explore pros and cons of change  Re-frame experiences in positive light     8. Educational Teaching Strategies:  Review of written material/education  Relate information to client's experience  Ask questions to check comprehension  Break down information into small chunks  Adopt client's language      9. CBT Teaching Strategies:  Reinforcement and shaping (positive feedback for steps towards goals and gains in knowledge & skills)  Relapse prevention planning (review of stressors and early warning signs)  Coping skills training (review current coping skills and increase currently used skills)  Behavioral tailoring (fit taking medication into client's daily routine)     10. Psychoeducational Topic(s) Addressed:  Just the Facts - Psychosis  Just the Facts - Basic Facts About Alcohol and Drugs     11. Techniques utilized:   Pratts announced at beginning of session  Review of goal  Review of previous meeting  Present new material  Problem-solving practice  Help client choose a home practice option  Summarize  progress made in current session     12. Assessment/Progress Note:      Writer met with Eloy's MomAllen on this date for family session. Writer set agenda to check-in, discuss and assess symptoms, explore material in family modules, discuss ways in which family can provide support and encouragement for ongoing symptom management, and identify goals for family's continued participation in Eloy's well-being and recovery. During check-in, Mom provided update related to Eloy testing positive for marijuana. She reports he has been on a contract with [employer] and seems to be doing well. She described program offering support around relapse in the context of recovery, which writer validated. Mom reports Eloy has been working about 20 hrs/week at [employer] and is also planning to switch houses within [employer]. Reviewed the Stress-Vulnerability Model and emphasized the importance of positive coping strategies and self-care in the context of transition and adjustment through this model's lens. Discussed upcoming transfer of services to a different specialty program as previously discussed last session. Mom is still in agreement with plan. Plan for NAVIGATE IRT - FÁTIMA Reed LGSW to discuss options with Eloy during IRT session and will make appropriate referral.     13. Plan/Referrals:      Will meet with family weekly as schedule allows for evidence based family psychoeducation and therapeutic support aimed at maximizing Eloy's opportunity for recovery from psychosis.      Writer will continue to remain available for care coordination and support to family while client is still enrolled in [facility]IG[facility] Program. Plan to transfer to other specialty clinic. Client and family aware they can reach out to writer directly and/or NAVIGATE team should concerns or needs arise prior to next scheduled appointment.      SAVANNA Mena   NAVIGATE Individual Resiliency [place] & Family Clinician      Attestation:    I did not see this patient directly. This patient is discussed with me in individual clinical social work supervision, and I agree with the plan as documented.     FÁTIMA Medina, [facility], May 28, 2019

## 2019-05-29 DIAGNOSIS — F41.9 ANXIETY: ICD-10-CM

## 2019-05-30 DIAGNOSIS — F41.9 ANXIETY: ICD-10-CM

## 2019-05-30 DIAGNOSIS — F32.A DEPRESSION, UNSPECIFIED DEPRESSION TYPE: ICD-10-CM

## 2019-06-03 RX ORDER — GABAPENTIN 300 MG/1
300 CAPSULE ORAL 2 TIMES DAILY PRN
Qty: 60 CAPSULE | Refills: 1 | Status: SHIPPED | OUTPATIENT
Start: 2019-06-03 | End: 2024-04-08

## 2019-06-03 RX ORDER — GABAPENTIN 300 MG/1
CAPSULE ORAL
Qty: 60 CAPSULE | Refills: 0 | OUTPATIENT
Start: 2019-06-03

## 2019-06-03 RX ORDER — BUPROPION HYDROCHLORIDE 150 MG/1
150 TABLET ORAL EVERY MORNING
Qty: 30 TABLET | Refills: 0 | Status: SHIPPED | OUTPATIENT
Start: 2019-06-03 | End: 2024-04-08

## 2019-06-03 NOTE — TELEPHONE ENCOUNTER
Medication requested:   buPROPion (WELLBUTRIN XL) 150 MG 24 hr tablet   gabapentin (NEURONTIN) 300 MG capsule  Last refilled: 4/24/19 5/1/19  Qty: 60      30      Last seen: 4/16/19  RTC: 4 WEEKS  Cancel: 0  No-show: 1  Next appt: 7/23/19    Refill decision:   Refill pended and routed to the provider for review/determination due to   NO SHOW X 1  Pt outside of RTC timeframe.

## 2019-06-10 NOTE — PROGRESS NOTES
[facility] NAVIGATE Program Treatment Plan Summary  A Part of the [facility] First Episode of Psychosis Program     NAVIGATE Enrollee: Eloy Storm  /Age:  2000 (19 year old)  MRN: [id number]    Date of Initial Service: 19  Date of INTIAL Treatment Plan: 19  Last Review/Update Date:  19  90-Day Review Date:  19    The following represents initial treatment plan.    1. DSM-V Diagnosis (include numeric code)  Unspecified schizophrenia spectrum and other psychotic disorder, 298.9 (F29)    2. Current symptoms and circumstances that substantiate the diagnosis    Eloy has a history of psychosis (delusions and negative symptoms (diminished emotional expression, avolition, anhedonia and apathy)) and depression (low mood nearly every day, anhedonia most of the time, low energy, worthlessness and/or guilt, difficulty concentrating, thinking or making decisions and suicidal ideation without plan, without intent).     3. How symptoms and/or behaviors are affecting level of function    Eloy s systems are impacting functioning with respect to IADLs, social relationships and employment.    4. Risk Assessment:  Suicide:  Assessed Level of Immediate Risk: Medium  Ideation: Yes  Plan:  No  Means: No  Intent: No    Homicide/Violence:  Assessed Level of Immediate Risk: Low  Ideation: No  Plan: No  Means: No  Intent: No    5. Medications    Current Outpatient Medications   Medication     ARIPiprazole (ABILIFY) 10 MG tablet     buPROPion (WELLBUTRIN XL) 150 MG 24 hr tablet     escitalopram (LEXAPRO) 10 MG tablet     gabapentin (NEURONTIN) 300 MG capsule     multivitamin, therapeutic (THERA-VIT) TABS tablet     sertraline (ZOLOFT) 50 MG tablet     No current facility-administered medications for this visit.        6. Strengths     Medication adherence  Supportive friends, family, recovery environment  Capacity to love and be loved    Caution, Prudence, & Discretion    Curiosity    Honest, Authentic, Genuine     Industry, diligence, & perseverance    Kind & Generous    Modesty & Humility    Self-control & Self-regulation    Social Intelligence      7. Barriers & Suicide Risk Factors     Depression and/or Hopelessness  Male  SI  Symptoms of psychosis, negative (flat affect, avolition, anhedonia, alogia, and/or apathy)    8. Treatment Domains and Goals    Domain 1: Illness Management & Recovery  Identify and engage possible areas of improvement related to medication optimization and psychosis (delusions) and ability to management illness.     Measurable Objectives Interventions Target Dates & Discharge Criteria   Medication Objectives    -Paricipate in a medication evaluation   -Take medications as prescribed and have reduced frequency and severity of symptoms  -Learn and implement strategies for overcoming barriers to taking medication  -Support system assists with overcoming barriers to taking medications   Medication Management  -Prescribe and monitor medications  -Monitor and treat side effects  -Psychoeducation    IRT/Psychotherapy  -Psychoeducation  -Motivation interviewing  -CBT  -Behavioral activation    Family Therapy  -Psychoeducation  -Motivational interviewing  -Behavioral family therapy  -CBT  -Behavioral activation    Case Management  -NA or None   Target date:   12 months from 1/28/19    Discharge criteria:  Marked and sustained symptom improvement     Gains Made:  -Paricipate in a medication evaluation      Individual s Objectives    -Complete a safety plan with therapist and share with support system  -Define what recovery means to self  -Identify psychosocial areas of need  -Identify top 5 strengths and use those strengths when working toward goal achievement; simultaneously choose one area for improvement and identify two actionable steps toward improvement  -Create a goal plan consisting of one long-term goal, three short-term goals, and actionable steps toward short-term goal achievement  -Learn at  least 2 coping strategies to successfully target current symptoms  -Demonstrate understanding for how substance use impacts symptoms, identify stage of change, and experiment with reduced use or abstinence from all illicit substances   -Learn strategies to build positive emotions and facilitate resiliency   -Develop and implement a relapse prevention plan including identification of warning signs, triggers, coping mechanisms, and how other persons can be supportive if symptoms increase or reemerge   -Process the psychotic episode by demonstrating understanding of how the episode impacted self, identifying positive coping strategies and resiliency used during that time, challenging self-stigmatizing beliefs, and developing a positive attitude towards facing future life challenges  -Identify primary styles of thinking, and demonstrate understanding of and use cognitive restructuring to successfully deal with negative feelings  -Identify persistent symptoms that interfere with activities and/or enjoyment and successfully implement two coping strategies to reduce symptoms severity   Medication Management  -Prescribe and monitor medications  -Monitor and treat side effects  -Psychoeducation    IRT/Psychotherapy  -Psychoeducation  -Motivation interviewing  -CBT  -Behavioral activation    Family Therapy  -Psychoeducation  -Motivational interviewing  -Behavioral family therapy  -CBT  -Behavioral activation    Case Management  -NA or None   Target date:   12 months from 1/28/19    Discharge criteria:  Marked and sustained symptom improvement     Eloy demonstrates understanding of mental illness     Eloy successfully implements strategies to cope with stressors and/or symptoms to mitigate risk for increase in symptom severity or relapse    Gains Made:  -To be determined     Support System Objectives    -Supports agree to provide supervision and monitor suicidal potential   -Supports, including family members, verbally  reinforce the client's active attempts to build self-esteem and rapport   -Supports verbalize increased understanding of an knowledge about the client's illness and treatment   -Identify psychosocial areas of need  -Verbalize understanding of the client's long-term and short-term goals  -Learn the client's signs of stress and possible coping skills  -Demonstrate understanding for how substance use impacts symptoms and how to support decrease in or abstinence from illicit substance use  -Learn skills that strengthen and support the client's positive behavior change  -Learn strategies to build positive emotions and facilitate resiliency including use of a resiliency story   Medication Management  -Prescribe and monitor medications  -Monitor and treat side effects  -Psychoeducation    IRT/Psychotherapy  -Psychoeducation  -Motivation interviewing  -CBT  -Behavioral activation    Family Therapy  -Psychoeducation  -Motivational interviewing  -Behavioral family therapy  -CBT  -Behavioral activation    Case Management  -NA or None   Target date:   12 months from 1/28/19    Discharge criteria:  Support system demonstrates understanding of mental illness     Support system successfully implements strategies to assist Eloy cope with stressors and/or symptoms to mitigate risk for increase in symptom severity or relapse     Gains Made:  -Supports agree to provide supervision and monitor suicidal potential   -Identify psychosocial areas of need       Domain 2: Health & Basic Living Needs  Identify and engage possible areas of improvement related to basic needs being met and maintaining or improving overall health and well-being     Measurable Objectives Interventions Discharge Criteria   -Learn and implement at least healthy nutritional practices  -Learn and implement at least 2 skills to promote health sleep  -Establish and adhere to a plan to increase physical exercise  -Learn budgeting strategies for finances and develop a  personal budget   Medication Management  -Prescribe and monitor medications  -Monitor and treat side effects  -Psychoeducation    IRT/Psychotherapy  -Psychoeducation  -Motivation interviewing  -CBT  -Behavioral activation    Family Therapy  -Psychoeducation  -Motivational interviewing  -Behavioral family therapy  -CBT  -Behavioral activation    Supported Education & Employment  -Motivational interviewing  -Individualized placement and support   -Behavioral Activation  -Family involvement    Case Management  -NA or None   Target date:   12 months from 1/28/19    Discharge criteria:  Eloy, his supports and treatment team report no unmet health and basic living needs    Gains Made:  -To be determined       Domain 3: Family & Other Supports  Identify and engage possible areas of improvement related to engaging family, friends and other supports     Measurable Objectives Interventions Discharge Criteria   -Identify support system  -Invite support system to be involved in treatment  -Participate in family therapy  -Participate in group therapy   -Improve the quality of relationships by developing skills to better understand other people, communicate more effectively, manage disclosure, and understand social cues  -Increase communication with the parents, resulting in feeling attended to and understood  -Identify and implement two approaches to how strengths and interests can be used to initiate social contacts and develop peer friendships  -Learn and implement calming and coping strategies to manage anxiety symptoms and focus attention usefully during moments of social anxiety    -Strengthen the social support network with friends by initiating social contact and participating in social activities with peers    Medication Management  -Prescribe and monitor medications  -Monitor and treat side effects  -Psychoeducation    IRT/Psychotherapy  -Psychoeducation  -Motivation interviewing  -CBT  -Behavioral activation    Family  Therapy  -Psychoeducation  -Motivational interviewing  -Behavioral family therapy  -CBT  -Behavioral activation    Supported Education & Employment  -Motivational interviewing  -Individualized placement and support   -Behavioral Activation  -Family involvement    Case Management  -NA or None   Target date:   12 months from 1/28/19    Discharge criteria:  Eloy and his support system report feeling equipped with the necessary skills to communicate and problem solve during times of disagreement    Conflict with supports and peers are resolved constructively and consistently over time; 6 months    Gains Made:  -Identify support system  -Invite support system to be involved in treatment       Domain 4: Academic and Employment  Identify and engage possible areas of improvement relates to education and employment     Measurable Objectives Interventions Discharge Criteria   -Explore areas of interest for employment purposes  -Obtain/maintain gainful employment   -Family members provide praise, encouragement for the client's attempts and successes at school/work   Medication Management  -Prescribe and monitor medications  -Monitor and treat side effects  -Psychoeducation    IRT/Psychotherapy  -Psychoeducation  -Motivation interviewing  -CBT  -Behavioral activation    Family Therapy  -Psychoeducation  -Motivational interviewing  -Behavioral family therapy  -CBT  -Behavioral activation    Supported Education & Employment  -Motivational interviewing  -Individualized placement and support   -Behavioral Activation  -Family involvement    Case Management  -NA or None   Target date:   12 months from 1/28/19    Discharge criteria:  Work and school goals are achieved and maintained without follow along NAVIGATE Supported Education and Employment supports for 6 months    Gains Made:  -Explore areas of interest for employment purposes       9. Frequency of sessions and expected duration of treatment:   1-4x per month Medication  Management with Prescriber ongoing  6 months of weekly IRT/Individual Psychotherapy followed by 12-18 months of biweekly or monthly IRT  2-4x per month Supported Education and Employment Services for 6 months  2-4x per month Family Education and Support Services for 6 months    10. Participants in therapy plan:   Eloy Storm  Support System: Family, Friends, Treatment Staff at [facility]    NAVIGATE Team:   SAPPHIRE IRT, FÁTIMA Reed, LGNIR, SAPPHIRE IRT & Family Clinician, Keisha Charles AM, LGSW, SAPPHIRE ACEVEDO, MEÑO Marin, SAPPHIRE Prescriber: Sera Sanders, SELENE    See scanned document for Acknowledgement of Current Treatment Plan    Regulatory Guidelines for Updating Treatment Plan  [place] Medical Assistance: Reviewed & signed at least every 90 days  Medicare:  Update per policy

## 2019-06-19 ENCOUNTER — TELEPHONE (OUTPATIENT)
Dept: PSYCHIATRY | Facility: CLINIC | Age: 19
End: 2019-06-19

## 2019-06-19 NOTE — TELEPHONE ENCOUNTER
NAVIGATE Family Peer Support  A Part of the [facility] First Episode of Psychosis Program     Patient Name: Eloy Storm  /Age:  2000 (19 year old)  Date of Encounter: 19  Length of Contact: 15 minutes    This writer reached out to offer resources and support to patient's mother, Michelle.     Eloy continues to do well in maintaining his sobriety. He is currently living at the [facility] and staying active. He is currently looking for a job and has applied at [employer]. Michelle said he is looking forward to starting classes again this fall.    JAMES GoldmanATE Family Peer    [Billing Code 52277 for No Billable Service as family peer support is a nonbillable service]

## 2019-06-21 ENCOUNTER — TELEPHONE (OUTPATIENT)
Dept: PSYCHIATRY | Facility: CLINIC | Age: 19
End: 2019-06-21

## 2019-06-21 NOTE — TELEPHONE ENCOUNTER
NAVIGATE Outreach  A Part of the [facility] First Episode of Psychosis Program     Patient Name: Eloy Storm  /Age:  2000 (19 year old)    Contact: Writer received phone call from Eloy's Mom-Michelle who shared that [place] [facility] and staff noticed he was losing his balance, had slurred speech, and his right hand was curled/stuck in a posture. They brought him to the ED on Tuesday where he got a CT and blood work that all came back okay; also confirmed he was not using substances at the time. Mom also shared ongoing concern related to Eloy's memory issues, stating Eloy will sometimes eat a meal and forget entirely that he has already eaten. Per Mom's report, he is getting a full physical today. Mom is wondering if this could be a result of medication side effects. Discussed plan for writer to pass along concern and question to Keisha Dang-RNCC and Poonam Tillman, RNCC for follow-up. Mom was appreciative.     Plan: Will route note to Keisha Dang and Poonam Tillman, TROY for follow-up. Mom can be reached at [phone].    Keisha Charles AM, LGSW  NAVIGATE Individual Resiliency [place] & Family Clinician

## 2019-06-21 NOTE — TELEPHONE ENCOUNTER
Returned call to Eloy Martinez's mom, re: symptoms Eloy experienced on his retreat with [facility].  He is undergoing a work up with PCP today.   Discussed that gabapentin could contribute to memory and motor problems, however is unlikely at his current dose.  It is possible that Eloy took more than the prescribed dose, or that he is sensitive to the medication.  Recommended holding gabapentin until Eloy can be further evaluated.   Reviewed most recent appointment, which was in April with a request to follow up in 2 weeks, but Eloy has not returned to the clinic.  Mom thinks Eloy will be transferring care to [facility], has an intake appointment next week.  They will follow up with the clinic about whether Eloy decides to transfer care, or if he will be attending the ESMD evaluation here in July.      Vicenta Sanders, CNP, 6/21/2019 4:13 PM

## 2019-06-27 NOTE — PROGRESS NOTES
NAVIGATE Discharge  A Part of the [facility] First Episode of Psychosis Program     Patient Name: Eloy Storm  /Age:  2000 (19 year old)  Diagnosis(es):   MDD with psychotic features; R/O Substance induced psychotic disorder    Program Discharge Date:   19  Reason for Discharge:   Lost contact with patient; intended transfer to [facility]  Patient/Family informed of discharge via letter and telephone    Sushma Blackburn [facility]   NAVIGATE Director & Family Clinician

## 2019-07-09 DIAGNOSIS — F41.9 ANXIETY: ICD-10-CM

## 2019-07-11 NOTE — TELEPHONE ENCOUNTER
Medication requested: sertraline (ZOLOFT) 50 MG tablet  Last refilled: 6/11/19  Qty: 30       Last seen: 4/16/19  RTC: 4 WEEKS  Cancel: 0  No-show: 1  Next appt: 7/23/19     Refill decision:   Refill pended and routed to the provider for review/determination due to   NO SHOW X 1  Pt outside of RTC timeframe.

## 2019-08-31 DIAGNOSIS — F32.A DEPRESSION, UNSPECIFIED DEPRESSION TYPE: ICD-10-CM

## 2019-09-09 NOTE — TELEPHONE ENCOUNTER
Last seen: 4/16/19  RTC: 2 weeks with Loren Castro, Pharm D to assess Sertraline tolerance.  May increase dose to 100 mg daily at this time if tolerating.  May also consider either discontinuation of Wellbutrin or dose reduction of Ablify to 7.5 mg daily if psychosis symptoms remain stable. 4 weeks or sooner if needed with me.   Cancel: none  No-show: 5/14/19, 7/23/19, 7/30/19 (pt currently living in sober living, may be reason for no shows)  Next appt: none     Incoming refill from pharmacy via fax     Medication requested: buPROPion (WELLBUTRIN XL) 150 MG 24 hr tablet  Directions: Take 1 tablet (150 mg) by mouth every morning  Qty: 30  Last refilled: approximately 6/3/19 per med tab      -Called pt's mother, Michelle (consent to communicate on file). No answer. LVM requesting a c/b if pt needs refill of Wellbutrin.

## 2019-09-17 RX ORDER — BUPROPION HYDROCHLORIDE 150 MG/1
TABLET ORAL
Qty: 30 TABLET | Refills: 0 | OUTPATIENT
Start: 2019-09-17

## 2020-07-13 ENCOUNTER — TELEPHONE (OUTPATIENT)
Dept: PSYCHIATRY | Facility: CLINIC | Age: 20
End: 2020-07-13

## 2020-07-13 NOTE — TELEPHONE ENCOUNTER
What is the concern that needs to be addressed by a nurse? Patient would like a call back from Britni to get some recommendation because patient had another episode of psychosis.      May a detailed message be left on [employer]? yes    Date of last office visit:04/29/2019     Message routed to: [facility]

## 2020-07-14 NOTE — TELEPHONE ENCOUNTER
-Writer called Michelle back.  Discussed different IRTS facilities and IOP programs with her.  Did let her know that they are welcome to come back to Navigate if they think that would be helpful as well.  Per Michelle patient is currently inpatient at [facility].

## 2020-09-08 ENCOUNTER — AMBULATORY - HEALTHEAST (OUTPATIENT)
Dept: LAB | Facility: CLINIC | Age: 20
End: 2020-09-08

## 2020-09-08 DIAGNOSIS — F29 PSYCHOSIS, UNSPECIFIED PSYCHOSIS TYPE (H): ICD-10-CM

## 2020-09-16 ENCOUNTER — COMMUNICATION - HEALTHEAST (OUTPATIENT)
Dept: INTERNAL MEDICINE | Facility: CLINIC | Age: 20
End: 2020-09-16

## 2020-09-16 ENCOUNTER — OFFICE VISIT - HEALTHEAST (OUTPATIENT)
Dept: INTERNAL MEDICINE | Facility: CLINIC | Age: 20
End: 2020-09-16

## 2020-09-16 DIAGNOSIS — I26.99 PULMONARY EMBOLISM, UNSPECIFIED CHRONICITY, UNSPECIFIED PULMONARY EMBOLISM TYPE, UNSPECIFIED WHETHER ACUTE COR PULMONALE PRESENT (H): ICD-10-CM

## 2020-09-16 DIAGNOSIS — Z79.899 ENCOUNTER FOR LONG-TERM (CURRENT) USE OF MEDICATIONS: ICD-10-CM

## 2020-09-16 DIAGNOSIS — F29 PSYCHOSIS, UNSPECIFIED PSYCHOSIS TYPE (H): ICD-10-CM

## 2020-09-16 DIAGNOSIS — E66.811 CLASS 1 DRUG-INDUCED OBESITY WITHOUT SERIOUS COMORBIDITY WITH BODY MASS INDEX (BMI) OF 33.0 TO 33.9 IN ADULT: ICD-10-CM

## 2020-09-16 DIAGNOSIS — E66.1 CLASS 1 DRUG-INDUCED OBESITY WITHOUT SERIOUS COMORBIDITY WITH BODY MASS INDEX (BMI) OF 33.0 TO 33.9 IN ADULT: ICD-10-CM

## 2020-09-16 LAB
BASOPHILS # BLD AUTO: 0 THOU/UL (ref 0–0.2)
BASOPHILS NFR BLD AUTO: 1 % (ref 0–2)
CHOLEST SERPL-MCNC: 166 MG/DL
CHOLEST SERPL-MCNC: 166 MG/DL
EOSINOPHIL # BLD AUTO: 0.2 THOU/UL (ref 0–0.4)
EOSINOPHIL NFR BLD AUTO: 2 % (ref 0–6)
ERYTHROCYTE [DISTWIDTH] IN BLOOD BY AUTOMATED COUNT: 11.6 % (ref 11–14.5)
FASTING STATUS PATIENT QL REPORTED: YES
FASTING STATUS PATIENT QL REPORTED: YES
HBA1C MFR BLD: 5.1 %
HCT VFR BLD AUTO: 45.8 % (ref 40–54)
HDLC SERPL-MCNC: 42 MG/DL
HDLC SERPL-MCNC: 42 MG/DL
HGB BLD-MCNC: 15.5 G/DL (ref 14–18)
LDLC SERPL CALC-MCNC: 90 MG/DL
LDLC SERPL CALC-MCNC: 90 MG/DL
LYMPHOCYTES # BLD AUTO: 2.4 THOU/UL (ref 0.8–4.4)
LYMPHOCYTES NFR BLD AUTO: 28 % (ref 20–40)
MCH RBC QN AUTO: 31.1 PG (ref 27–34)
MCHC RBC AUTO-ENTMCNC: 33.9 G/DL (ref 32–36)
MCV RBC AUTO: 92 FL (ref 80–100)
MONOCYTES # BLD AUTO: 0.6 THOU/UL (ref 0–0.9)
MONOCYTES NFR BLD AUTO: 7 % (ref 2–10)
NEUTROPHILS # BLD AUTO: 5.3 THOU/UL (ref 2–7.7)
NEUTROPHILS NFR BLD AUTO: 62 % (ref 50–70)
PLATELET # BLD AUTO: 227 THOU/UL (ref 140–440)
PMV BLD AUTO: 9 FL (ref 7–10)
RBC # BLD AUTO: 4.99 MILL/UL (ref 4.4–6.2)
TRIGL SERPL-MCNC: 170 MG/DL
TRIGL SERPL-MCNC: 170 MG/DL
VALPROATE SERPL-MCNC: 30.9 UG/ML (ref 50–150)
WBC: 8.6 THOU/UL (ref 4–11)

## 2020-09-16 ASSESSMENT — MIFFLIN-ST. JEOR: SCORE: 2171.32

## 2020-09-16 ASSESSMENT — PATIENT HEALTH QUESTIONNAIRE - PHQ9: SUM OF ALL RESPONSES TO PHQ QUESTIONS 1-9: 11

## 2020-10-09 ENCOUNTER — COMMUNICATION - HEALTHEAST (OUTPATIENT)
Dept: INTERNAL MEDICINE | Facility: CLINIC | Age: 20
End: 2020-10-09

## 2020-10-09 DIAGNOSIS — I26.99 PULMONARY EMBOLISM, UNSPECIFIED CHRONICITY, UNSPECIFIED PULMONARY EMBOLISM TYPE, UNSPECIFIED WHETHER ACUTE COR PULMONALE PRESENT (H): ICD-10-CM

## 2020-10-30 ENCOUNTER — COMMUNICATION - HEALTHEAST (OUTPATIENT)
Dept: INTERNAL MEDICINE | Facility: CLINIC | Age: 20
End: 2020-10-30

## 2020-11-09 ENCOUNTER — COMMUNICATION - HEALTHEAST (OUTPATIENT)
Dept: INTERNAL MEDICINE | Facility: CLINIC | Age: 20
End: 2020-11-09

## 2020-11-11 ENCOUNTER — COMMUNICATION - HEALTHEAST (OUTPATIENT)
Dept: INTERNAL MEDICINE | Facility: CLINIC | Age: 20
End: 2020-11-11

## 2020-11-11 DIAGNOSIS — I26.99 PULMONARY EMBOLISM, UNSPECIFIED CHRONICITY, UNSPECIFIED PULMONARY EMBOLISM TYPE, UNSPECIFIED WHETHER ACUTE COR PULMONALE PRESENT (H): ICD-10-CM

## 2020-11-27 ENCOUNTER — AMBULATORY - HEALTHEAST (OUTPATIENT)
Dept: LAB | Facility: CLINIC | Age: 20
End: 2020-11-27

## 2020-11-27 DIAGNOSIS — I26.99 PULMONARY EMBOLISM, UNSPECIFIED CHRONICITY, UNSPECIFIED PULMONARY EMBOLISM TYPE, UNSPECIFIED WHETHER ACUTE COR PULMONALE PRESENT (H): ICD-10-CM

## 2020-11-30 LAB
AT III ACT/NOR PPP CHRO: 112 % (ref 85–135)
CARDIOLIPIN IGG SER IA-ACNC: <1.6 GPL-U/ML (ref 0–19.9)
CARDIOLIPIN IGM SER IA-ACNC: 0.9 MPL-U/ML (ref 0–19.9)

## 2020-12-01 LAB
LA PPP-IMP: POSITIVE
PROT C ACT/NOR PPP CHRO: 140 % (ref 70–170)
PROT S FREE AG ACT/NOR PPP IA: 107 % (ref 70–148)

## 2020-12-03 LAB — FACTOR 5 LEIDEN AND FACTOR 2 PROTHROMBIN MUTATION: NORMAL

## 2020-12-10 ENCOUNTER — AMBULATORY - HEALTHEAST (OUTPATIENT)
Dept: INTERNAL MEDICINE | Facility: CLINIC | Age: 20
End: 2020-12-10

## 2020-12-10 ENCOUNTER — COMMUNICATION - HEALTHEAST (OUTPATIENT)
Dept: INTERNAL MEDICINE | Facility: CLINIC | Age: 20
End: 2020-12-10

## 2020-12-10 DIAGNOSIS — I26.99 PULMONARY EMBOLISM, UNSPECIFIED CHRONICITY, UNSPECIFIED PULMONARY EMBOLISM TYPE, UNSPECIFIED WHETHER ACUTE COR PULMONALE PRESENT (H): ICD-10-CM

## 2020-12-28 ENCOUNTER — COMMUNICATION - HEALTHEAST (OUTPATIENT)
Dept: ADMINISTRATIVE | Facility: HOSPITAL | Age: 20
End: 2020-12-28

## 2021-01-06 ENCOUNTER — COMMUNICATION - HEALTHEAST (OUTPATIENT)
Dept: INTERNAL MEDICINE | Facility: CLINIC | Age: 21
End: 2021-01-06

## 2021-01-06 ENCOUNTER — COMMUNICATION - HEALTHEAST (OUTPATIENT)
Dept: ADMINISTRATIVE | Facility: HOSPITAL | Age: 21
End: 2021-01-06

## 2021-01-15 ENCOUNTER — COMMUNICATION - HEALTHEAST (OUTPATIENT)
Dept: ADMINISTRATIVE | Facility: CLINIC | Age: 21
End: 2021-01-15

## 2021-01-15 DIAGNOSIS — I26.99 PULMONARY EMBOLISM, UNSPECIFIED CHRONICITY, UNSPECIFIED PULMONARY EMBOLISM TYPE, UNSPECIFIED WHETHER ACUTE COR PULMONALE PRESENT (H): ICD-10-CM

## 2021-01-18 ENCOUNTER — COMMUNICATION - HEALTHEAST (OUTPATIENT)
Dept: ADMINISTRATIVE | Facility: CLINIC | Age: 21
End: 2021-01-18

## 2021-01-19 ENCOUNTER — AMBULATORY - HEALTHEAST (OUTPATIENT)
Dept: INTERNAL MEDICINE | Facility: CLINIC | Age: 21
End: 2021-01-19

## 2021-01-19 DIAGNOSIS — I26.99 PULMONARY EMBOLISM, UNSPECIFIED CHRONICITY, UNSPECIFIED PULMONARY EMBOLISM TYPE, UNSPECIFIED WHETHER ACUTE COR PULMONALE PRESENT (H): ICD-10-CM

## 2021-01-20 ENCOUNTER — COMMUNICATION - HEALTHEAST (OUTPATIENT)
Dept: FAMILY MEDICINE | Facility: CLINIC | Age: 21
End: 2021-01-20

## 2021-02-04 ENCOUNTER — COMMUNICATION - HEALTHEAST (OUTPATIENT)
Dept: ONCOLOGY | Facility: HOSPITAL | Age: 21
End: 2021-02-04

## 2021-02-04 ENCOUNTER — COMMUNICATION - HEALTHEAST (OUTPATIENT)
Dept: ADMINISTRATIVE | Facility: HOSPITAL | Age: 21
End: 2021-02-04

## 2021-02-19 ENCOUNTER — OFFICE VISIT - HEALTHEAST (OUTPATIENT)
Dept: ONCOLOGY | Facility: HOSPITAL | Age: 21
End: 2021-02-19

## 2021-02-19 DIAGNOSIS — I26.99 PULMONARY EMBOLISM, UNSPECIFIED CHRONICITY, UNSPECIFIED PULMONARY EMBOLISM TYPE, UNSPECIFIED WHETHER ACUTE COR PULMONALE PRESENT (H): ICD-10-CM

## 2021-02-19 ASSESSMENT — MIFFLIN-ST. JEOR: SCORE: 2347.94

## 2021-03-05 ENCOUNTER — AMBULATORY - HEALTHEAST (OUTPATIENT)
Dept: LAB | Facility: HOSPITAL | Age: 21
End: 2021-03-05

## 2021-03-05 DIAGNOSIS — I26.99 PULMONARY EMBOLISM, UNSPECIFIED CHRONICITY, UNSPECIFIED PULMONARY EMBOLISM TYPE, UNSPECIFIED WHETHER ACUTE COR PULMONALE PRESENT (H): ICD-10-CM

## 2021-03-05 LAB
APTT PPP: 31 SECONDS (ref 24–37)
D DIMER PPP FEU-MCNC: <0.27 FEU UG/ML
INR PPP: 1.03 (ref 0.9–1.1)

## 2021-03-08 LAB
B2 GLYCOPROT1 IGG SERPL IA-ACNC: 1 U/ML
B2 GLYCOPROT1 IGM SERPL IA-ACNC: <2.9 U/ML

## 2021-03-09 LAB — LA PPP-IMP: NEGATIVE

## 2021-03-26 ENCOUNTER — OFFICE VISIT - HEALTHEAST (OUTPATIENT)
Dept: ONCOLOGY | Facility: HOSPITAL | Age: 21
End: 2021-03-26

## 2021-03-26 DIAGNOSIS — I26.99 ACUTE PULMONARY EMBOLISM WITHOUT ACUTE COR PULMONALE, UNSPECIFIED PULMONARY EMBOLISM TYPE (H): ICD-10-CM

## 2021-05-27 ASSESSMENT — PATIENT HEALTH QUESTIONNAIRE - PHQ9: SUM OF ALL RESPONSES TO PHQ QUESTIONS 1-9: 11

## 2021-06-04 VITALS
TEMPERATURE: 97.8 F | DIASTOLIC BLOOD PRESSURE: 67 MMHG | WEIGHT: 247 LBS | HEART RATE: 79 BPM | OXYGEN SATURATION: 97 % | BODY MASS INDEX: 32.74 KG/M2 | HEIGHT: 73 IN | SYSTOLIC BLOOD PRESSURE: 106 MMHG

## 2021-06-05 VITALS
SYSTOLIC BLOOD PRESSURE: 104 MMHG | DIASTOLIC BLOOD PRESSURE: 51 MMHG | TEMPERATURE: 98.7 F | OXYGEN SATURATION: 96 % | HEART RATE: 94 BPM | HEIGHT: 73 IN | WEIGHT: 282.7 LBS | BODY MASS INDEX: 37.47 KG/M2

## 2021-06-05 VITALS
DIASTOLIC BLOOD PRESSURE: 56 MMHG | TEMPERATURE: 98.3 F | HEART RATE: 84 BPM | BODY MASS INDEX: 37.14 KG/M2 | OXYGEN SATURATION: 98 % | WEIGHT: 284.8 LBS | SYSTOLIC BLOOD PRESSURE: 119 MMHG

## 2021-06-11 NOTE — PROGRESS NOTES
Office Visit - New Patient   Eloy Storm   20 y.o.  male    Date of visit: 9/16/2020  Physician: Adams Michel MD     Assessment and Plan   1. Encounter for long-term (current) use of medications  2. Class 1 drug-induced obesity without serious comorbidity with body mass index (BMI) of 33.0 to 33.9 in adult  3. Psychosis, unspecified psychosis type (H)  In the setting of substance use.  Currently on sertraline, Depakote, benztropine and Abilify.  Fortunately following with psychiatry and denies any further episodes psychosis or drug use.  Will remain in group home indefinitely per report.  - Lipid Profile  - Valproic Acid (Depakene )  - Glycosylated Hemoglobin A1c  - HM1 (CBC with Diff)    4. Pulmonary embolism, unspecified chronicity, unspecified pulmonary embolism type, unspecified whether acute cor pulmonale present (H)  Reportedly on the third month of Eliquis 5 mg twice daily.  No records from his reported hospital stay in [place] to better understand etiology, work-up or treatment plan.  Counseled patient to speak with his mother regarding our knowledge of his hospital stay and for him to communicate that information and his current number of Eliquis tablets left back to the clinic, ideally via my chart.  Depending on amount of information we can obtain, we may not need to contact the clinic in [place], but the need for a KELBY may be necessary.  May require hematology referral if deemed to not have been provoked.    Return if symptoms worsen or fail to improve.     Much or all of the text in this note was generated through the use of Dragon Dictate voice-to-text software. Errors in spelling or words which seem out of context are unintentional. Sound alike errors, in particular, may have escaped editing     Chief Complaint   Chief Complaint   Patient presents with     Establish Care     Labs Only     Ordered by Dr. Khan        Social History     Socioeconomic History     Marital status:  Single     Smoking status: Current Every Day Smoker     Start date: 1/1/2017     Smokeless tobacco: Never Used     Tobacco comment: vaping   Substance and Sexual Activity     Alcohol use: Never     Frequency: Never     Drug use: Yes, but not currently     Sexual activity: Not Currently     Partners: Female    Was in [place] for a gap year in schooling. [place] in [place] due to a PE and an episode of a PE. Has a history of substance use and lives in a sober house. Oldest sibling. Interested in cultural anthropology and photography.       Past Medical History   Patient Active Problem List   Diagnosis     Psychosis, unspecified psychosis type (H)     Class 1 drug-induced obesity without serious comorbidity with body mass index (BMI) of 33.0 to 33.9 in adult     Past Medical History:   Diagnosis Date     Major depression        Past Surgical History  He has no past surgical history on file.  Family History   Problem Relation Age of Onset     Dementia Paternal Grandmother         History of Present Illness   This 20 y.o. old male. Has been on Eliquis since May for a PE dx when in [place]. States he was told to be on it for 3 months. Denies any hemoptysis or hematemesis. No FHX of a blood clotting issue. No shortness of breath, MAZARIEGOS, CP or palpitations. Thinks the [place] of the LEs were negative. [place] to the [place] in June, was at [facility] and now in a sober house. States a doctor at [facility] Rx him the Eliquis. States he thinks the psychosis was due to marijuana. Had had other episodes in the past, mainly in the setting of chemical dependency, but one time he was not using. He see his psychiatrist every Tuesday.     Review of Systems: A comprehensive review of systems was negative except as noted.     Medications and Allergies   Current Outpatient Medications   Medication Sig Dispense Refill     ARIPiprazole (ABILIFY) 20 MG tablet Take 20 mg by mouth daily.       benztropine (COGENTIN) 1 MG tablet Take 1 mg by  "mouth 2 (two) times a day.       divalproex (DEPAKOTE ER) 500 MG 24 hour tablet Take 500 mg by mouth at bedtime.       ELIQUIS 5 mg Tab tablet Take 5 mg by mouth 2 (two) times a day.       sertraline (ZOLOFT) 50 MG tablet Take 75 mg by mouth daily.       No current facility-administered medications for this visit.      No Known Allergies     Family and Social History   Family History   Problem Relation Age of Onset     Dementia Paternal Grandmother       Social History     Tobacco Use     Smoking status: Current Every Day Smoker     Start date: 1/1/2017     Smokeless tobacco: Never Used     Tobacco comment: vaping   Substance Use Topics     Alcohol use: Never     Frequency: Never     Drug use: Never        Physical Exam   /67 (Patient Site: Right Arm, Patient Position: Sitting, Cuff Size: Adult Regular)   Pulse 79   Temp 97.8  F (36.6  C) (Tympanic)   Ht 6' 0.5\" (1.842 m)   Wt (!) 247 lb (112 kg)   SpO2 97%   BMI 33.04 kg/m      GENERAL APPEARANCE: Pleasant, nontoxic-appearing, NAD, alert and oriented x4  EYES: Eyelids, conjunctiva, and sclera were normal. PERRLA  HEAD, EARS, NOSE, MOUTH, AND THROAT: Head normal. Hearing was normal to voice   RESPIRATORY: Bilaterally with no crackles, wheezing or rhonchi  CARDIOVASCULAR: Regular S1 and S2.  Radial pulses intact.  No edema.  MUSCULOSKELETAL: Skeletal configuration was normal and muscle mass was normal for age. No calf tenderness or swelling.  SKIN/HAIR/NAILS: Skin color was normal.     NEUROLOGIC: Alert and oriented to person, place, time, and circumstance. Speech was normal. Motor strength was normal for age   PSYCHIATRIC: Flat affect and the patient had normal recent and remote memory.  GENITOURINARY: patient declined.      Additional Information   Time: total time spent with the patient was 30 minutes of which >50% was spent in counseling and coordination of care     Adams Michel MD  Internal Medicine  Contact me at [phone]  "

## 2021-06-11 NOTE — PATIENT INSTRUCTIONS - HE
Please set up Mychart and have your mother give you details on your hospitalization in [place]. Also, let us know how much of th Eliquis you have at home. After which, we can try to obtain records from the hospital in [place] regarding the nature of the PE and whether you need to be evaluated by Hematology for a genetic disorder.

## 2021-06-12 NOTE — TELEPHONE ENCOUNTER
Medication Request  Medication name:    Disp Refills Start End    ELIQUIS 5 mg Tab tablet   9/8/2020     Sig - Route: Take 5 mg by mouth 2 (two) times a day. - Oral    Class: Historical Med        [facility]  Reason for request: refills   When did you use medication last?:    Patient offered appointment:  patient declined  Okay to leave a detailed message: yes

## 2021-06-12 NOTE — TELEPHONE ENCOUNTER
Who is calling:  Patient's mother, Michelle  Reason for Call:  Caller stated she faxed in a letter from the doctor from [place], Dr. Rayne Fischer. Caller stated the letter describes what happened to the patient when he was in [place].    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 Caller stated the patient was then sent back to the hospital after being discharged, for a high fever. Caller stated the patient was sick for 3 weeks. Caller stated they did CT and COVID testing on the patient. Caller stated they discovered blood clots in the patient's lung. Caller stated the patient is doing better after he was put on Eliquis.    Caller stated the patient was sent to [facility] when he got home and is now at the [facility] house. Caller asked if Adams Michel MD would order another CT to see of the clots are gone and to see if the patient still needs to be on Eliquis.    Caller stated she faxed the letter from the [place] doctor, on 10/27 to [phone].    Please let her know if the patient can get a CT done.  Date of last appointment with primary care: 9/16/20  Okay to leave a detailed message: Yes [phone]

## 2021-06-12 NOTE — TELEPHONE ENCOUNTER
Patient Returning Call  Reason for call:  Return call   Information relayed to patient:    Patient has additional questions:  Yes  If YES, what are your questions/concerns:  Caller stated that she is returning April's call about getting more information. Caller stated that she had also refaxed forms over on Saturday as well and needs to know if its been received.   Okay to leave a detailed message?: Yes

## 2021-06-12 NOTE — TELEPHONE ENCOUNTER
Please let mother know that I have reviewed the document/letter regarding her son's hospital stay at the psychiatric unit in [place].  Unfortunately the document is not very specific regarding any kind of work-up that was performed to identify the cause for his blood clot/pulmonary embolism.  Nonetheless, a repeat CT scan is not indicated as it would not provide any additional information, especially as her son is asymptomatic.  The Eliquis served it's purpose and the clots of most likely resolved.    The bigger issue is what to do next.  It entails what is called a hypercoagulable work-up to identify if there is an underlying reason for why he developed a clot.  The preliminary labs can either be performed in the clinic or with referral to a hematologist.  Either way he probably should continue the Eliquis until we have a better understanding of why the clots occurred in the first place.      I am okay to speak with her if the patient is okay with it for further details/specific.

## 2021-06-12 NOTE — TELEPHONE ENCOUNTER
Left message to call back for: Michelle/Mother  Information to relay to patient:  Asked that mother re-fax the document to me at [phone].    Any questions asked that she call back.    Thank you.    Bianca MUNOZ LPN .......... 9:22 AM  11/04/20  [facility]\

## 2021-06-12 NOTE — TELEPHONE ENCOUNTER
Received fax today will give to Dr. Michel to review Monday.    Bianca MUNOZ LPN .......... 2:49 PM  11/06/20  [facility]

## 2021-06-12 NOTE — TELEPHONE ENCOUNTER
See other encounter addressing this - new encounter should've never been created.    Bianca MUNOZ LPN .......... 1:35 PM  11/09/20  [facility]

## 2021-06-13 NOTE — TELEPHONE ENCOUNTER
Please let patient's mother know that his lupus anticoagulant lab, which was the last one to get back, returned back as positive.  This is suggestive of antiphospholipid syndrome.  Typical guidelines state that the patient should remain on anticoagulation and this lab test should be rechecked at least 12 weeks apart.  Consistent with her and the patient's hope to cease taking the anticoagulation, I have placed a hematology referral for them for further management.  Thank you

## 2021-06-13 NOTE — TELEPHONE ENCOUNTER
Orders being requested: CT scan of lungs   Reason service is needed/diagnosis: blood clots taking EliQuis   When are orders needed by: asap  Where to send Orders: somehwere close by   Okay to leave detailed message?  Yes

## 2021-06-13 NOTE — TELEPHONE ENCOUNTER
In order to speak with the patient I dialed the number [phone], and it went to the voicemail of Cassius [email or web address] for  at the [facility].  Is sending him an email another way for us to contact him to set up an appointment to discuss treatment options?  Alternatively I could refer him to hematology, however they would also need to have a way to contact him.

## 2021-06-13 NOTE — TELEPHONE ENCOUNTER
Who is calling:  Patient  Reason for Call:  This patient has not gotten orders entered so that he can have a CT scan yet.   Has not been contacted to schedule- they need orders first  Date of last appointment with primary care: n/a  Okay to leave a detailed message: Yes

## 2021-06-13 NOTE — TELEPHONE ENCOUNTER
Test Results  Who is calling?:  Patient Mother Tamara Cortes   Who ordered the test:  Adams Michel MD  Type of test: Lab  Date of test:  11/27/20  Where was the test performed:  [facility]   What are your questions/concerns?:  Patient mother is requesting for results.  Okay to leave a detailed message?:  No

## 2021-06-13 NOTE — TELEPHONE ENCOUNTER
Patient did sign the consent to communicate listing his mom. Do you want me to call this patient and ask still? Thank you.    Sushma Champion, CMA

## 2021-06-13 NOTE — TELEPHONE ENCOUNTER
I think the patient's call was in relation to my discussion with his mother and my recommendation to pursue a hypercoagulable work-up.  Basically, according to his mother the patient was significantly restrained due to his resistance to receive therapy during his psychosis.  She thinks this duration may have lasted for up to 3 weeks.  She notes that he does not really recall what happened to him.  Nonetheless, he is easily completed 3+ months of treatment with the Eliquis.  His mother also noted that some distant for members do have a history of a PE and there 50s and 60s however they had significant medical problems.  She also inquired whether vaping may be related to.his blood clot, which I addressed.  We discussed how a repeat CT scan is not of much benefit here especially as he is asymptomatic.  Options included referring to hematology versus doing a hypercoagulable work-up and if results are unremarkable, then stopping the Eliquis.  If results are abnormal, then would refer to hematology.    As a result, if possible could you please order for future orders for prothrombin gene mutation, Antithrombin, factor V Leiden, protein C, protein S and antiphospholipid antibody.  Thank you

## 2021-06-13 NOTE — TELEPHONE ENCOUNTER
Attempted to call but was not able to leave voicemail. Will try back at a later time.    If patient or mother call back, please give them message below.  Jemma Rios CMA ............... 8:54 AM, 12/11/20

## 2021-06-13 NOTE — TELEPHONE ENCOUNTER
Patient's mother is asking Cassius to call and see where this stands. Patient needs this CT and is still waiting. [facility] does the [facility]'s scheduling for accountability. Can you please address this? Thank you. Multiple encounter have been opened on this same issue.     Sushma Champion, CMA

## 2021-06-14 NOTE — TELEPHONE ENCOUNTER
Reason for Call:  Medication or medication refill:    Do you use a [facility]?  Name of the pharmacy and phone number for the current request: [facility]    Name of the medication requested: Eliquis 5 mg    Other request: Patient reports that he only has 1 day of medication left at this time.    Can we leave a detailed message on this number? Yes    Phone number patient can be reached at: Home number on file [phone] (home)    Best Time: Any time    Call taken on 1/15/2021 at 10:21 AM by Dave Parisi

## 2021-06-14 NOTE — TELEPHONE ENCOUNTER
Please let patient/mother know that as the patient has been off the medication for roughly 3 days, and that Eliquis might be the cause for his elevated lupus anticoagulant, we can recheck that specific lab before putting him on any further anticoagulation.  If that comes back negative, then likely he will not need to be any on any further anticoagulation, in light of all the other thrombosis tests point back negative.  Thank you

## 2021-06-14 NOTE — TELEPHONE ENCOUNTER
Spoke with patients Mom and she stated he already restarted Eliquis yesterday and is wondering if he should stop again and have the test still or if he should wait for his hematology appointment? Will they want him to stop it also? He has only taken 2 doses she thinks. He is currently on a ski trip.    Leora Chi CSS

## 2021-06-14 NOTE — TELEPHONE ENCOUNTER
I think at this point, it would not be unreasonable to stop the medication for at least 72 hours and have the test repeated. which is what I would to his mother when I called her today. She is okay with keeping the medication until he see Dr Lopez as the patients paternal grandfather had a PE. His mother will try to call Dr Lopezs clinic next week to see if it is okay for the patient to hold the Rx for a few days before the appointment as he will be running out a few days before. I will include Dr Lopez in this message.     Dr Lopez, Mr Storm: Unclear if patient had unprovoked PE [place].  Was hospitalized for psychiatric illnes at the time.  Started on anticoagulation which was continued upon [place].  Labs pertinent for isolated positive lupus anticoagulant with unremarkable Antithrombin C, cardiolipin IgM/IgG, factor V Leiden, and protein C/S. As noted above, I think the postiive lupus anticoagulant is from the Eliquis and thus want to repeat the lab. He has an appt with you on 2/19/21.  If you have any thoughts, please advise.  Thank you and take care

## 2021-06-14 NOTE — TELEPHONE ENCOUNTER
Called and spoke with Dr. Dawkins.  She did receive Dr. Michel's message yesterday.    She is generally available during lunch times 12pm to 1pm.  She will be also available Thursday 1/14/21 from 10.30am to 12.30pm

## 2021-06-14 NOTE — TELEPHONE ENCOUNTER
Mom would like to know if provider will fill out  formulaic exception form so that [facility] will approve the medication to be covered. Her friend works at [facility] [facility] and this is the form she recommended to have RX approved.    Patient has not taken medication since Friday. Would provider be able to send a small RX in the meantime so patient can take the med.

## 2021-06-14 NOTE — TELEPHONE ENCOUNTER
I have called this number multiple times to no avail.  Voicemail suggested is a private number.  Therefore did not leave a voicemail message.

## 2021-06-14 NOTE — TELEPHONE ENCOUNTER
----- Message from Adams Michel MD sent at 1/19/2021  9:48 AM CST -----  Please reach out to patient regarding going to the lab to have repeat lupus anticoagulant.  As he is been off the Eliquis for over 72 hours, this can help decipher if the original test was a false positive. I have placed a future order that expires in 2 weeks.  Thank you

## 2021-06-14 NOTE — TELEPHONE ENCOUNTER
Spoke to Dr. Khan today.  Updated her on the current status of Mr. Storm's anticoagulation/clot management.  Not formally diagnosed with antiphospholipid syndrome hence the reason for the hematology referral.  She appreciated this clarification in respect to her managing her psychiatric symptoms

## 2021-06-14 NOTE — TELEPHONE ENCOUNTER
Unfortunately the hematology appointment is not for another month.  In the short-term, I am okay with filling the necessary paperwork.  Patient appears to have had unprovoked PE with positive lupus anticoagulant.  Has been on Eliquis for a number of months now. Alternatively, he could start warfarin or rivaroxaban. We will inquire some assistance from pharmacy.  Appreciate their assistance   Outpatient follow up

## 2021-06-14 NOTE — TELEPHONE ENCOUNTER
Reason for Call: Provider Communication.  Dr. Khan (Psychiatry)  Return Phone Number: [phone].  Call before 1pm today or after 1pm tomorrow.  Reason for call: Dr. Khan would like to discuss if Antiphospholipid syndrome would contribute to patient's Psychosis.  Would like to change patient's sertraline (ZOLOFT) 50 MG tablet to Effexor.  Urgency for return call:  as available  Okay to leave detailed message?:  Yes  Call taken on 1/6/2021 at 12:20 PM by Jessica Lemus

## 2021-06-14 NOTE — TELEPHONE ENCOUNTER
Reason for Call:  Other prescription     Detailed comments: Mom (Michelle) calling this morning with questions about patients Eloquis Rx.  Mom stating this medication is very expensive and is wondering if there's something else he can take instead.  Mom reporting that she's unable to reach insurance company today due to holiday, but would like to discuss with Dr Michel as patient has been out of this med since Friday.  Please call Mom at [phone].    Phone Number Patient can be reached at: Other phone number:  [phone]    Best Time: Any time    Can we leave a detailed message on this number?: Yes    Call taken on 1/18/2021 at 12:05 PM by Dave Parisi

## 2021-06-15 NOTE — PROGRESS NOTES
[facility] Hematology and Oncology Consult Note    Patient: Eloy Storm  MRN: [id number]  Date of Service: 02/19/2021      Reason for Visit    Patient presents with:  Benign Hematology      Assessment/Plan    ECOG Performance   ECOG Performance Status: 0  Distress Assessment  Distress Assessment Score: 4(visit today)    #.  Acute pulmonary emboli in spring 2020, first episode.  Probably symptomatic.  Probably provoked during the time of acute illness.  #.  Positive lupus anticoagulant  #.  History of substance abuse.  #.  Overweight.     Upon limited history and medical record review, I believe this is first episode of possibly provoked VTE event.  He does not appear to have catastrophic antiphospholipid syndrome at that time per report.     He had thrombophilia work-up in November 2020 including factor V Leiden, prothrombin gene mutation, lupus anticoagulant, cardiolipin antibodies, protein C, protein S, Antithrombin level.  All came back negative except lupus anticoagulant was positive.  He completed this test while he was taking Eliquis.  Therefore the lupus anticoagulant could be falsely positive.     He is now about 8 months of treatment with Eliquis.  It is reasonable to stop at this point especially he does not have any symptoms at this point.     Recommended to repeat blood test including INR, PTT, lupus anticoagulant, beta-2 glycoprotein antibody, D-dimer in about 2-3 weeks to clarify the lupus anticoagulant positivity.   Follow-up with me in about a week after completion of the blood work.      Problem List    Pulmonary embolism, unspecified chronicity, unspecified pulmonary embolism type, unspecified whether acute cor pulmonale present (H)  Plan: Lupus Anticoagulant, INR, APTT(PTT), D-dimer,         Quantitative    ______________________________________________________________________________    History    Eloy Storm is a very pleasant 20-year-old gentleman presented by himself today for  evaluation of pulmonary emboli diagnosed in spring 2020 when he was in [place].  I do not have any medical records to review after several attempts to obtain the records.  Upon his report, he was in hospital due to delirium from substance abuse at that time [place].  He remembered that he was told he has blood clot in his lungs and the blood clot traveled from his legs.  He did not recall he had chest pain, shortness of breath.  He recalls that he was very sick.  He was placed on Eliquis and he has been taking it regularly since.  Denies any intolerance or bleeding.    No prior history of DVT or PE.  No history of venous thromboembolism in his parents.  He has 7-year-old sister who is well.  He is maternal grandfather had history of blood clot and very old age.    He is currently living in a sober living.  He is single.  No children.  Currently unemployed.  Former smoker and he smoked for about 7 years.  He used several recreational drugs but never used IV drugs.  Past History    Past Medical History:  No date: Major depression  No date: Pulmonary embolism (H) Review of patient's family history indicates:  Problem: Dementia      Relation: Paternal Grandmother          Age of Onset: (Not Specified)  Problem: Clotting disorder      Relation: Paternal Grandfather          Age of Onset: (Not Specified)    History reviewed. No pertinent surgical history. Social History    Socioeconomic History      Marital status: Single      Spouse name: Not on file      Number of children: Not on file      Years of education: Not on file      Highest education level: Not on file    Occupational History      Not on file    Social Needs      Financial resource strain: Not on file      Food insecurity        Worry: Not on file        Inability: Not on file      Transportation needs        Medical: Not on file        Non-medical: Not on file    Tobacco Use      Smoking status: Former Smoker        Start date: 1/1/2017      Smokeless  tobacco: Never Used      Tobacco comment: vaping    Substance and Sexual Activity      Alcohol use: Never        Frequency: Never      Drug use: Never      Sexual activity: Not Currently        Partners: Female    Lifestyle      Physical activity        Days per week: Not on file        Minutes per session: Not on file      Stress: Not on file    Relationships      Social connections        Talks on phone: Not on file        Gets together: Not on file        Attends [employer] service: Not on file        Active member of club or organization: Not on file        Attends meetings of clubs or organizations: Not on file        Relationship status: Not on file      Intimate partner violence        Fear of current or ex partner: Not on file        Emotionally abused: Not on file        Physically abused: Not on file        Forced sexual activity: Not on file    Other Topics      Concerns:        Not on file    Social History Narrative      Was in [place] for a gap year in schooling. [place] the [place] in [place] due to a PE and an episode of a PE. Has a history of substance use and lives in a sober house. Oldest sibling. Interested in cultural anthropology and photography.       Allergies     -- Cefuroxime     --  PN: LW Reaction: Rash, Generalized   -- Other Allergy (See Comments)     --  Other reaction(s): *Unknown - Childhood Rxn             Patient had a reaction to some medication when he             went to the dentist as a toddler    Review of Systems    General  General (WDL): Exceptions to WDL  Fatigue: Yes - Recent (Less than 3 months)  Fever: None  Generalized Muscle Weakness: None  Weight Loss: No  ENT  ENT (WDL): Exceptions to WDL  Changes in vision: None  Glasses or Contacts: Yes - Chronic (Greater than 3 months)  Hearing loss: None  Hearing Aids: None  Tinnitus: None  Pain/Pressure in ears: None  Sinus Congestion/Drainage: None  Hoarseness: None  Sore Throat: None  Dental Problems: None  Dentures:  None  Respiratory  Respiratory (WDL): All respiratory elements are within defined limits  Cardiovascular  Cardiovascular (WDL): All cardiovascular elements are within defined limits  Endocrine  Endocrine (WDL): All endocrine elements are within defined limits  Gastrointestinal  Gastrointestinal (WDL): All gastrointestinal elements are within defined limits  Musculoskeletal  Musculoskeletal (WDL): All musculoskeletal elements are within defined limits  Neurological  Neurological (WDL): All neurological elements are within defined limits  Dominant Hand: Right  Psychological/Emotional  Psychological/Emotional (WDL): Exceptions to WDL  Depression: Yes - Chronic (Greater than 3 months)  Insomnia: None  Panic attacks: Yes - Recent (Less than 3 months)  Anxiety: Yes - Chronic (Greater than 3 months)  Daytime sleepiness: Yes - Chronic (Greater than 3 months)  Hallucinations: None  Psychosocial needs or not coping well: None  Hematological/Lymphatic  Hematological/Lymphatic (WDL): All hematological/lymphatic elements are within defined limits  Dermatological  Dermatologic (WDL): All dermatological elements are within defined limits  Genitourinary/Reproductive  Genitourinary/Reproductive (WDL): All genitourinary/reproductive elements are within defined limits  Reproductive (Females only)     Pain  Currently in Pain: No/denies    Physical Exam  General: alert, awake, not in acute distress  HEENT: Head: Normal, normocephalic, atraumatic.  Eye: Normal external eye, conjunctiva, lids cornea, HIGINIO.  Nose: Normal external nose, mucus membranes and septum.  Pharynx: Normal buccal mucosa. Normal pharynx.  Neck / Thyroid: Supple, no masses, nodes, nodules or enlargement.  Lymphatics: No abnormally enlarged lymph nodes.  Chest: Normal chest wall and respirations. Clear to auscultation.  Heart: S1 S2 RRR, no murmur.   Abdomen: abdomen is soft without significant tenderness, masses, organomegaly or guarding  Extremities: normal strength,  tone, and muscle mass.  No bilateral lower extremity swelling.  No redness.  No tenderness.  Skin: normal. no rash or abnormalities  CNS: non focal.    Lab Results    No results found for this or any previous visit (from the past 168 hour(s)).    Imaging Results    No results found.      Signed by: Irvin Lopez MD

## 2021-06-15 NOTE — TELEPHONE ENCOUNTER
Call came in yesterday and detailed message left on nurse triage line from someone named Michelle, stating that she was the mother of Eloy.  She is not listed anywhere in his chart but the number that she left is listed in his chart is the home phone number.  There is also a cell phone number in his chart.  Said mother stated that he will be out of Eliquis before the appointment with Dr. Lopez on 2/19/2021.  She wants to know if he should just finish that and stay off until the appointment with Dr. Lopez or if he needs a refill and to stay on it.  Per Dr. Lopez, she is not comfortable giving any advice without seeing the patient nor seeing any medical records on him.    Since patient's mother is not listed in the chart, I called the cell phone and was not able to reach him on the initial try.  I was able to reach him later in the day today.  He did not know that his mother had called and I explained that I could not talk to her because she is not listed in his chart.  I let him know why she was calling.  He states that he does want to know the answer to this.  I did let him know that he should contact his PCP office as they were the ones who wrote the initial prescription.  I explained to him that Dr. Childress was not comfortable giving any advice without seeing him nor seeing any medical records.  He verbalized understanding and was appreciative.    Queta Velasquez RN

## 2021-06-15 NOTE — PROGRESS NOTES
Patient here today for initial consultation with Dr. Lopez for benign hematology/DOMINIQUE Yan RN

## 2021-06-15 NOTE — PATIENT INSTRUCTIONS - HE
Stop Eliquis.   Return to clinic during the week of 3/8 for blood work.  See me about a week after blood test done.

## 2021-06-16 NOTE — PROGRESS NOTES
[facility] Hematology and Oncology Progress Note    Patient: Eloy Storm  MRN: [id number]  Date of Service: 03/26/2021        Reason for Visit    Chief Complaint   Patient presents with     Benign Hematology       Assessment and Plan  Cancer Staging  No matching staging information was found for the patient.    ECOG Performance   ECOG Performance Status: 0    Distress Assessment  Distress Assessment Score: 4    Pain       #.  Acute pulmonary emboli in spring 2020, first episode.  Probably symptomatic and provoked during the time of acute illness.  #.  Overweight.  #.  History of substance abuse.  #.  Mood disorder.     I reviewed the additional hypercoagulable work-up with him.  Lupus anticoagulant repeat testing was negative after being off of Eliquis.  D-dimer was normal.  Baseline APTT, INR were also normal.  Beta 2 glycoprotein level was negative.  Therefore, he does not have antiphospholipid syndrome.  He currently does not have any symptoms or sign suggestive of venous thromboembolism.   I discussed about secondary prophylaxis with starting on aspirin 81 mg daily.  We also discussed about signs and symptoms of venous thromboembolism to watch for and time to seek medical attention.  I advised him to lose weight.   I do not have any reservation about treating him with Topamax as indicated per. Bill.   Follow-up with me as needed.    Problem List    1. Acute pulmonary embolism without acute cor pulmonale, unspecified pulmonary embolism type (H)          CC: Adams Michel MD    ______________________________________________________________________________    History of Present Illness    Mr. Eloy Storm presented today to review lab results.  No pain.  No leg swelling or leg pain.  No chest pain, no shortness of breath.  He does not have any abdominal pain.    Pain Status  Currently in Pain: No/denies    Review of Systems    Constitutional  Constitutional (WDL): All constitutional  elements are within defined limits  Neurosensory  Neurosensory (WDL): All neurosensory elements are within defined limits  Cardiovascular  Cardiovascular (WDL): All cardiovascular elements are within defined limits  Pulmonary  Respiratory (WDL): Within Defined Limits  Gastrointestinal  Gastrointestinal (WDL): All gastrointestinal elements are within defined limits  Genitourinary  Genitourinary (WDL): All genitourinary elements are within defined limits  Integumentary  Integumentary (WDL): All integumentary elements are within defined limits  Patient Coping  Patient Coping: Accepting;Open/discussion  Distress Assessment  Distress Assessment Score: 4  Accompanied by  Accompanied by: Alone    Past History  Past Medical History:   Diagnosis Date     Major depression      Pulmonary embolism (H)        History reviewed. No pertinent surgical history.    Physical Exam    Recent Vitals 3/26/2021   Height -   Weight 284 lbs 13 oz   BSA (m2) 2.59 m2   /56   Pulse 84   Temp 98.3   Temp src 1   SpO2 98     General: alert, awake, not in acute distress  HEENT: Head: Normal, normocephalic, atraumatic.  Eye: Normal external eye, conjunctiva, lids cornea, HIGINIO.  Nose: Normal external nose, mucus membranes and septum.  Pharynx: Normal buccal mucosa. Normal pharynx.  Neck / Thyroid: Supple, no masses, nodes, nodules or enlargement.  Lymphatics: No abnormally enlarged lymph nodes.  Extremities: normal strength, tone, and muscle mass  Skin: normal. no rash or abnormalities  CNS: non focal.    Lab Results    No results found for this or any previous visit (from the past 168 hour(s)).    Imaging    No results found.      Signed by: Irvin Lopez MD

## 2021-06-17 NOTE — TELEPHONE ENCOUNTER
Telephone Encounter by Tyler Reddy, PharmD at 1/18/2021  3:07 PM     Author: Tyler Reddy, PharmD Service: -- Author Type: Pharmacist    Filed: 1/18/2021  3:18 PM Encounter Date: 1/18/2021 Status: Signed    : Tyler Reddy, PharmD (Pharmacist)       I called the pharmacy and he has already used a 1 month free coupon for eliquis.  Additionally, he has a very large deductible therefore co-pay cards may be minimally effective.  There is a 1 month free Xarelto coupon that we could consider, however it looks like clinical trials had suggested that Xarelto was inferior to warfarin.  The most data for lupus anticoagulant comes from studies with warfarin, therefore that is likely preferred.  However I understand that adherence and/or monitoring may be difficult with warfarin.  If in the meantime prior to follow-up with hematology Xarelto is the preferred option due to adherence, would ensure to educate to take this once daily with a large meal about the same time every day, and the coupon below could be used for 1 month free trial.

## 2021-06-21 NOTE — LETTER
Letter by Adams Michel MD at      Author: Adams Michel MD Service: -- Author Type: --    Filed:  Encounter Date: 12/28/2020 Status: (Other)                   Office Hours: Monday - Friday 8:00 - 4:30PM    Eloy Storm  [address] [place] [address]           December 28, 2020      Dear Eloy:    We received a referral from Dr. Michel for you to meet with a hematologist. Unfortunatley we have been unable to reach you by phone. If you would like to schedule this please call [phone]         Sincerely,      [facility]

## 2021-06-21 NOTE — LETTER
Letter by Irvin Lopez MD at      Author: Irvin Lopez MD Service: -- Author Type: --    Filed:  Encounter Date: 1/6/2021 Status: (Other)       Dear lEoy Storm    Thank you for choosing [facility] for your care.  We are committed to providing you with the highest quality and compassionate healthcare services.  The following information pertains to your first appointment with our clinic.    Date/Time of appointment: 2/19/2021 12:45pm    Note: Please arrive 30 minutes prior to your appointment time.  This allows time to complete forms, possible labs and nursing assessment.     Name of your Physician: Irvin Lopez MD    What to bring to your appointment:    Completed Patient History/Initial Nursing Assessment and Medication/Allergy List (these forms were sent to you).    Any paperwork or films from your physician that we have asked you to bring.    Your current insurance card(s).    Parking:    Please refer to the map included to direct you.  The [facility] is located at the [place] end of [facility] in [place], [place].      After turning onto [address] from [facility], take a right turn at the first stop sign.  We have designated parking on the left, identified as parking for Cancer Care patients (Lot D).     The Code to Enter Lot D is: 0201. This code changes monthly and will always coincide with the current month followed by 01. For example August will be 0801.  The month will continue to change but the 01 will remain constant.  If lot D is full please use Parking Lot A, directly across the street.    Please enter the Cancer Care Center on the north end of the [facility].  You will see a sign on the building.        For Medical Oncology or Hematology appointments, please take the elevator to the second floor to check in.   For Radiation Oncology appointments, please go straight through the double doors and check in.     Also please note appointments can last  1.5-2 hours.      We hope these instructions are helpful to you.  If you have any questions or concerns, please call us at [phone].  It is our pleasure to assist you.    Warm Regards,    [phone]

## 2021-07-03 NOTE — ADDENDUM NOTE
Addendum Note by Armida Valera at 11/27/2020  2:00 PM     Author: Armida Valera Service: -- Author Type:     Filed: 12/22/2020 12:04 PM Encounter Date: 11/27/2020 Status: Signed    : Armida Valera ()    Addended by: ARMIDA VALERA on: 12/22/2020 12:04 PM        Modules accepted: Orders

## 2021-09-05 ENCOUNTER — HEALTH MAINTENANCE LETTER (OUTPATIENT)
Age: 21
End: 2021-09-05

## 2021-12-02 ENCOUNTER — TELEPHONE (OUTPATIENT)
Dept: ONCOLOGY | Facility: HOSPITAL | Age: 21
End: 2021-12-02
Payer: COMMERCIAL

## 2021-12-02 NOTE — TELEPHONE ENCOUNTER
Nurse from crisis center calls in for verbal orders for a baby aspirin per patient's mother.  She is nervous that since he is in a 10-day treatment/crisis center that he is not getting his baby aspirin.  This is a patient that we have not seen since March 2021 and is not recommended to come back and see us only on an as-needed basis.  I did asked the nurse to reach out to the patient's primary care provider and get this order.  The name and phone number were given to the nurse to connect with them.    Queta Velasquez RN

## 2021-12-02 NOTE — TELEPHONE ENCOUNTER
Lillie with [facility] is calling to get verbal orders regarding the baby aspirin.  Please call back to discuss [phone] asap.

## 2021-12-06 ENCOUNTER — VIRTUAL VISIT (OUTPATIENT)
Dept: FAMILY MEDICINE | Facility: CLINIC | Age: 21
End: 2021-12-06
Payer: COMMERCIAL

## 2021-12-06 DIAGNOSIS — Z86.718 PERSONAL HISTORY OF DVT (DEEP VEIN THROMBOSIS): Primary | ICD-10-CM

## 2021-12-06 PROCEDURE — 99212 OFFICE O/P EST SF 10 MIN: CPT | Mod: 95 | Performed by: FAMILY MEDICINE

## 2021-12-06 NOTE — LETTER
[address]  [place] [address]  Phone: [phone]    December 6, 2021        Eloy Storm  [address] [place] [address]          To whom it may concern:    RE: Eloy Storm    On review of his chart. It was recommended by heme/onc provider that patient take an aspirin 81 mg daily for secondary DVT/PE prophylaxis.     Please contact me for questions or concerns.      Sincerely,        Milad Schneider, DO

## 2021-12-06 NOTE — PROGRESS NOTES
Eloy is a 21 year old who is being evaluated via a billable video visit.      How would you like to obtain your AVS? MyChart  If the video visit is dropped, the invitation should be resent by: Text to cell phone: [phone]  Will anyone else be joining your video visit? No      Video Start Time: 4:09 PM    Assessment & Plan     Personal history of DVT (deep vein thrombosis)  -Records reviewed. Last saw heme/onc 3/2021, recommended asa 81 mg for secondary ppx.  -Letter on mychart stating patient can take daily asa 81 mg.     Milad Schneider,   [facility]    Isreal Lyons is a 21 year old who presents for the following health issues:    New patient to me. Wants a letter stating that he can take aspirin.    Hx of DVT/PE in 2020, was following with heme/onc. Had hypercoagulability work-up that was negative. Initially was on Eliquis, but is no longer on it. Was told he should take a daily aspirin to prevent further blood clots. Currently living in a group home and needs a letter stating he can take the aspirin.     Review of Systems         Objective           Vitals:  No vitals were obtained today due to virtual visit.    Physical Exam   GENERAL: Healthy, alert and no distress  EYES: Eyes grossly normal to inspection.  No discharge or erythema, or obvious scleral/conjunctival abnormalities.  RESP: No audible wheeze, cough, or visible cyanosis.  No visible retractions or increased work of breathing.    SKIN: Visible skin clear. No significant rash, abnormal pigmentation or lesions.  NEURO: Cranial nerves grossly intact.  Mentation and speech appropriate for age.  PSYCH: Mentation appears normal, affect normal/bright, judgement and insight intact, normal speech and appearance well-groomed.          Video-Visit Details    Type of service:  Video Visit    Video End Time:4:09 PM    Originating Location (pt. Location): Home    Distant Location (provider location):  [facility]  [place]     Platform used for Video Visit: [place]

## 2021-12-27 NOTE — TELEPHONE ENCOUNTER
Returned call and was told that patient is no longer at this Residence/Crisis Center..  Yessenia Fay RN  [facility]

## 2021-12-27 NOTE — TELEPHONE ENCOUNTER
Seen in virtual visit 3 weeks ago. Okay to take aspirin 81 mg daily. There is also a letter stating this from our visit.

## 2022-06-23 ENCOUNTER — MEDICAL CORRESPONDENCE (OUTPATIENT)
Dept: HEALTH INFORMATION MANAGEMENT | Facility: CLINIC | Age: 22
End: 2022-06-23
Payer: COMMERCIAL

## 2022-10-23 ENCOUNTER — HEALTH MAINTENANCE LETTER (OUTPATIENT)
Age: 22
End: 2022-10-23

## 2022-12-05 ENCOUNTER — TELEPHONE (OUTPATIENT)
Dept: ONCOLOGY | Facility: CLINIC | Age: 22
End: 2022-12-05

## 2022-12-05 NOTE — TELEPHONE ENCOUNTER
Nurse from [facility] calls with questions for Dr. Lopez from Dr. Khan. Dr. Khan is wondering if Dr. Lopez knows what caused patient's past blood clot? Are there any risks of antipsychotic medications contributing in the future?    Per review, patient had acute pulmonary emboli in spring 2020, last visit with Dr. Lopez 3/26/21.     Please call Dr. Khan' office back, may leave detailed message if needed.    Nohemi Asif RN

## 2022-12-06 NOTE — TELEPHONE ENCOUNTER
Detailed message left, per request at Dr. Khan [facility].   Daisy James RN

## 2022-12-06 NOTE — TELEPHONE ENCOUNTER
Did they have access to or a copy of my last note?  Did he have any new blood clot and any new concerns since last visit? If my last visit note does not answer their questions, please set up a follow-up appointment with me. Thanks

## 2022-12-09 NOTE — TELEPHONE ENCOUNTER
"Moni, nurse from [facility] working with Bill, received message from Daisy. She LM on nurse triage that Dr. Dawkins relays that there has not been any further issues with clots but wanted to clarify understanding of what causesd clots, and if there are any concerns with him starting anti-psychotic drugs. Detailed message can be returned to the nurse line [phone].  -Spoke with Dr. Lopez and relayed that previous PE was \"Probably symptomatic and provoked during the time of acute illness\". Patient has his own risks and we cannot predict if he will develop more PE  Dr. Khan will need to use his own judgement /discuss options with pharmacy with regard to prescribing antipsychotic. Dr. Lopez has a very thorough note and if they do not have records and are in need of these, they can have Eloy complete KELBY and they can request records by calling our medical records  Department at [phone]/Jessica Grimes RN  "

## 2023-10-25 DIAGNOSIS — F40.10 SOCIAL PHOBIA: ICD-10-CM

## 2023-10-25 DIAGNOSIS — F25.0 SCHIZOAFFECTIVE DISORDER, BIPOLAR TYPE (H): ICD-10-CM

## 2023-10-25 DIAGNOSIS — F42.9 OBSESSIVE COMPULSIVE DISORDER: ICD-10-CM

## 2023-10-25 DIAGNOSIS — F12.20 CANNABIS DEPENDENCE, CONTINUOUS (H): Primary | ICD-10-CM

## 2023-10-27 ENCOUNTER — LAB (OUTPATIENT)
Dept: LAB | Facility: CLINIC | Age: 23
End: 2023-10-27
Payer: COMMERCIAL

## 2023-10-27 DIAGNOSIS — F42.9 OBSESSIVE COMPULSIVE DISORDER: ICD-10-CM

## 2023-10-27 DIAGNOSIS — F25.0 SCHIZOAFFECTIVE DISORDER, BIPOLAR TYPE (H): ICD-10-CM

## 2023-10-27 DIAGNOSIS — F12.20 CANNABIS DEPENDENCE, CONTINUOUS (H): ICD-10-CM

## 2023-10-27 DIAGNOSIS — F40.10 SOCIAL PHOBIA: ICD-10-CM

## 2023-10-27 LAB
ALBUMIN SERPL BCG-MCNC: 4.6 G/DL (ref 3.5–5.2)
ALP SERPL-CCNC: 92 U/L (ref 40–129)
ALT SERPL W P-5'-P-CCNC: 28 U/L (ref 0–70)
ANION GAP SERPL CALCULATED.3IONS-SCNC: 13 MMOL/L (ref 7–15)
AST SERPL W P-5'-P-CCNC: 32 U/L (ref 0–45)
BASOPHILS # BLD AUTO: 0 10E3/UL (ref 0–0.2)
BASOPHILS NFR BLD AUTO: 0 %
BILIRUB SERPL-MCNC: 0.3 MG/DL
BUN SERPL-MCNC: 12.3 MG/DL (ref 6–20)
CALCIUM SERPL-MCNC: 9.6 MG/DL (ref 8.6–10)
CHLORIDE SERPL-SCNC: 102 MMOL/L (ref 98–107)
CHOLEST SERPL-MCNC: 147 MG/DL
CREAT SERPL-MCNC: 0.87 MG/DL (ref 0.67–1.17)
DEPRECATED HCO3 PLAS-SCNC: 23 MMOL/L (ref 22–29)
EGFRCR SERPLBLD CKD-EPI 2021: >90 ML/MIN/1.73M2
EOSINOPHIL # BLD AUTO: 0.2 10E3/UL (ref 0–0.7)
EOSINOPHIL NFR BLD AUTO: 2 %
ERYTHROCYTE [DISTWIDTH] IN BLOOD BY AUTOMATED COUNT: 11.7 % (ref 10–15)
GLUCOSE SERPL-MCNC: 104 MG/DL (ref 70–99)
HCT VFR BLD AUTO: 44.4 % (ref 40–53)
HDLC SERPL-MCNC: 35 MG/DL
HGB BLD-MCNC: 15.2 G/DL (ref 13.3–17.7)
IMM GRANULOCYTES # BLD: 0 10E3/UL
IMM GRANULOCYTES NFR BLD: 0 %
IRON BINDING CAPACITY (ROCHE): 298 UG/DL (ref 240–430)
IRON SATN MFR SERPL: 13 % (ref 15–46)
IRON SERPL-MCNC: 38 UG/DL (ref 61–157)
LDLC SERPL CALC-MCNC: 75 MG/DL
LYMPHOCYTES # BLD AUTO: 2.3 10E3/UL (ref 0.8–5.3)
LYMPHOCYTES NFR BLD AUTO: 23 %
MCH RBC QN AUTO: 29.7 PG (ref 26.5–33)
MCHC RBC AUTO-ENTMCNC: 34.2 G/DL (ref 31.5–36.5)
MCV RBC AUTO: 87 FL (ref 78–100)
MONOCYTES # BLD AUTO: 0.8 10E3/UL (ref 0–1.3)
MONOCYTES NFR BLD AUTO: 8 %
NEUTROPHILS # BLD AUTO: 6.5 10E3/UL (ref 1.6–8.3)
NEUTROPHILS NFR BLD AUTO: 67 %
NONHDLC SERPL-MCNC: 112 MG/DL
PLATELET # BLD AUTO: 298 10E3/UL (ref 150–450)
POTASSIUM SERPL-SCNC: 4.3 MMOL/L (ref 3.4–5.3)
PROT SERPL-MCNC: 7.3 G/DL (ref 6.4–8.3)
RBC # BLD AUTO: 5.11 10E6/UL (ref 4.4–5.9)
SODIUM SERPL-SCNC: 138 MMOL/L (ref 135–145)
T4 FREE SERPL-MCNC: 1.66 NG/DL (ref 0.9–1.7)
TRIGL SERPL-MCNC: 186 MG/DL
WBC # BLD AUTO: 9.7 10E3/UL (ref 4–11)

## 2023-10-27 PROCEDURE — 84439 ASSAY OF FREE THYROXINE: CPT

## 2023-10-27 PROCEDURE — 80053 COMPREHEN METABOLIC PANEL: CPT

## 2023-10-27 PROCEDURE — 83540 ASSAY OF IRON: CPT

## 2023-10-27 PROCEDURE — 85025 COMPLETE CBC W/AUTO DIFF WBC: CPT

## 2023-10-27 PROCEDURE — 80061 LIPID PANEL: CPT | Performed by: NURSE PRACTITIONER

## 2023-10-27 PROCEDURE — 83550 IRON BINDING TEST: CPT

## 2023-10-27 PROCEDURE — 36415 COLL VENOUS BLD VENIPUNCTURE: CPT

## 2023-12-19 ENCOUNTER — TRANSFERRED RECORDS (OUTPATIENT)
Dept: HEALTH INFORMATION MANAGEMENT | Facility: CLINIC | Age: 23
End: 2023-12-19
Payer: COMMERCIAL

## 2024-03-19 ENCOUNTER — TRANSCRIBE ORDERS (OUTPATIENT)
Dept: OTHER | Age: 24
End: 2024-03-19

## 2024-03-19 DIAGNOSIS — G24.01 TARDIVE DYSKINESIA: Primary | ICD-10-CM

## 2024-03-26 ENCOUNTER — TRANSFERRED RECORDS (OUTPATIENT)
Dept: HEALTH INFORMATION MANAGEMENT | Facility: CLINIC | Age: 24
End: 2024-03-26
Payer: COMMERCIAL

## 2024-03-30 ENCOUNTER — HEALTH MAINTENANCE LETTER (OUTPATIENT)
Age: 24
End: 2024-03-30

## 2024-04-05 NOTE — TELEPHONE ENCOUNTER
Action 4/5/24 MV 2.14pm   Action Taken 1) records request faxed to [facility]  2) imaging request faxed to [facility]     Action 4/19/24 MV 1.48pm   Action Taken 1) Records received from [facility] and sent to scanning  2) 2nd request faxed to [facility]     Action 4/24/24 MV 10.30am   Action Taken Called [facility] to push over images      UPDATE: images resolved in PACS       RECORDS RECEIVED FROM: [facility]   REASON FOR VISIT: tardive dyskinesia    PROVIDER: Dr. Carson Carbajal   DATE OF APPT: 5/2/24   NOTES (FOR ALL VISITS) STATUS DETAILS   OFFICE NOTE from referring provider Received Dr Tenisha Khan @ [facility]:  3/26/24  3/22/24  3/13/24  3/6/22   MEDICATION LIST Received     IMAGING  (FOR ALL VISITS)     CT (HEAD, NECK, SPINE) PACS [facility]:  CT Head 6/19/19

## 2024-04-08 PROBLEM — F41.9 ANXIETY: Status: ACTIVE | Noted: 2018-05-07

## 2024-04-08 PROBLEM — F29 PSYCHOSIS, UNSPECIFIED PSYCHOSIS TYPE (H): Status: ACTIVE | Noted: 2020-09-16

## 2024-04-08 PROBLEM — F10.21 ALCOHOL USE DISORDER, MODERATE, IN SUSTAINED REMISSION, DEPENDENCE (H): Status: ACTIVE | Noted: 2019-10-22

## 2024-04-08 PROBLEM — E66.811 OBESITY, CLASS I, BMI 30-34.9: Status: ACTIVE | Noted: 2019-06-26

## 2024-04-08 PROBLEM — F41.1 GAD (GENERALIZED ANXIETY DISORDER): Status: ACTIVE | Noted: 2019-06-27

## 2024-04-08 PROBLEM — F12.91 HISTORY OF MARIJUANA USE: Status: ACTIVE | Noted: 2018-06-06

## 2024-04-08 PROBLEM — E66.1 CLASS 1 DRUG-INDUCED OBESITY WITHOUT SERIOUS COMORBIDITY WITH BODY MASS INDEX (BMI) OF 33.0 TO 33.9 IN ADULT: Status: ACTIVE | Noted: 2020-09-16

## 2024-04-08 PROBLEM — F39 EPISODIC MOOD DISORDER (H): Status: ACTIVE | Noted: 2021-11-10

## 2024-04-08 PROBLEM — I26.99 ACUTE PULMONARY EMBOLISM WITHOUT ACUTE COR PULMONALE (H): Status: ACTIVE | Noted: 2020-03-28

## 2024-04-08 PROBLEM — F17.201 TOBACCO USE DISORDER, MODERATE, IN SUSTAINED REMISSION: Status: ACTIVE | Noted: 2019-10-22

## 2024-04-08 PROBLEM — E66.811 CLASS 1 DRUG-INDUCED OBESITY WITHOUT SERIOUS COMORBIDITY WITH BODY MASS INDEX (BMI) OF 33.0 TO 33.9 IN ADULT: Status: ACTIVE | Noted: 2020-09-16

## 2024-04-08 PROBLEM — F25.0 SCHIZOAFFECTIVE DISORDER, BIPOLAR TYPE (H): Status: ACTIVE | Noted: 2021-11-12

## 2024-04-08 PROBLEM — G24.01 TARDIVE DYSKINESIA: Status: ACTIVE | Noted: 2024-04-08

## 2024-04-08 PROBLEM — F32.1 DEPRESSION, MAJOR, SINGLE EPISODE, MODERATE (H): Status: ACTIVE | Noted: 2018-05-07

## 2024-04-08 PROBLEM — F12.20 CANNABIS USE DISORDER, SEVERE, DEPENDENCE (H): Status: ACTIVE | Noted: 2019-10-22

## 2024-04-08 PROBLEM — E66.9 OBESITY (BMI 30-39.9): Status: ACTIVE | Noted: 2019-06-26

## 2024-04-19 ENCOUNTER — TELEPHONE (OUTPATIENT)
Dept: NEUROLOGY | Facility: CLINIC | Age: 24
End: 2024-04-19
Payer: COMMERCIAL

## 2024-04-23 NOTE — TELEPHONE ENCOUNTER
Left a message asking Eloy to review and update his medication list through [facility] or send us a current medication list. Currently there are multiple medications listed of various strengths.

## 2024-05-02 ENCOUNTER — PRE VISIT (OUTPATIENT)
Dept: NEUROLOGY | Facility: CLINIC | Age: 24
End: 2024-05-02

## 2024-08-28 ENCOUNTER — HOSPITAL ENCOUNTER (EMERGENCY)
Facility: CLINIC | Age: 24
Discharge: PSYCHIATRIC OR SUBSTANCE USE FACILITY | End: 2024-09-03
Attending: EMERGENCY MEDICINE | Admitting: EMERGENCY MEDICINE
Payer: COMMERCIAL

## 2024-08-28 DIAGNOSIS — F25.0 SCHIZOAFFECTIVE DISORDER, BIPOLAR TYPE (H): ICD-10-CM

## 2024-08-28 DIAGNOSIS — F30.9 MANIA (H): Primary | ICD-10-CM

## 2024-08-28 DIAGNOSIS — F12.90 CANNABIS USE DISORDER: ICD-10-CM

## 2024-08-28 PROCEDURE — 250N000013 HC RX MED GY IP 250 OP 250 PS 637: Performed by: EMERGENCY MEDICINE

## 2024-08-28 PROCEDURE — 99285 EMERGENCY DEPT VISIT HI MDM: CPT

## 2024-08-28 RX ORDER — QUETIAPINE 400 MG/1
800 TABLET, FILM COATED, EXTENDED RELEASE ORAL AT BEDTIME
Status: DISCONTINUED | OUTPATIENT
Start: 2024-08-28 | End: 2024-08-28

## 2024-08-28 RX ORDER — OLANZAPINE 5 MG/1
5 TABLET ORAL AT BEDTIME
Status: DISCONTINUED | OUTPATIENT
Start: 2024-08-28 | End: 2024-09-03

## 2024-08-28 RX ORDER — OLANZAPINE 10 MG/1
10 TABLET, ORALLY DISINTEGRATING ORAL ONCE
Status: COMPLETED | OUTPATIENT
Start: 2024-08-28 | End: 2024-08-28

## 2024-08-28 RX ORDER — QUETIAPINE 200 MG/1
800 TABLET, FILM COATED, EXTENDED RELEASE ORAL AT BEDTIME
Status: DISCONTINUED | OUTPATIENT
Start: 2024-08-29 | End: 2024-09-03

## 2024-08-28 RX ORDER — QUETIAPINE 200 MG/1
400 TABLET, FILM COATED, EXTENDED RELEASE ORAL ONCE
Status: DISCONTINUED | OUTPATIENT
Start: 2024-08-28 | End: 2024-08-29

## 2024-08-28 RX ADMIN — QUETIAPINE 300 MG: 200 TABLET, FILM COATED ORAL at 22:31

## 2024-08-28 RX ADMIN — OLANZAPINE 10 MG: 10 TABLET, ORALLY DISINTEGRATING ORAL at 22:23

## 2024-08-28 ASSESSMENT — ACTIVITIES OF DAILY LIVING (ADL)
ADLS_ACUITY_SCORE: 35

## 2024-08-28 ASSESSMENT — COLUMBIA-SUICIDE SEVERITY RATING SCALE - C-SSRS
6. HAVE YOU EVER DONE ANYTHING, STARTED TO DO ANYTHING, OR PREPARED TO DO ANYTHING TO END YOUR LIFE?: NO
2. HAVE YOU ACTUALLY HAD ANY THOUGHTS OF KILLING YOURSELF IN THE PAST MONTH?: NO
1. IN THE PAST MONTH, HAVE YOU WISHED YOU WERE DEAD OR WISHED YOU COULD GO TO SLEEP AND NOT WAKE UP?: NO

## 2024-08-28 NOTE — ED NOTES
Patient seen on camera using a marker and writing, coloring on the walls. Security notified and marker taken away from patient.

## 2024-08-28 NOTE — ED NOTES
Patient came out of room 18, walked out to the nurses desk, took his glass of water and threw it on the nurses station. Patient was moved to [facility].
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
## 2024-08-28 NOTE — ED NOTES
Bed: [facility]  Expected date: 8/28/24  Expected time: 6:18 PM  Means of arrival:   Comments:   bed?
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
## 2024-08-29 PROCEDURE — 99285 EMERGENCY DEPT VISIT HI MDM: CPT | Performed by: NURSE PRACTITIONER

## 2024-08-29 PROCEDURE — 250N000012 HC RX MED GY IP 250 OP 636 PS 637: Performed by: EMERGENCY MEDICINE

## 2024-08-29 PROCEDURE — 250N000013 HC RX MED GY IP 250 OP 250 PS 637: Performed by: EMERGENCY MEDICINE

## 2024-08-29 PROCEDURE — 250N000013 HC RX MED GY IP 250 OP 250 PS 637: Performed by: NURSE PRACTITIONER

## 2024-08-29 RX ORDER — OLANZAPINE 10 MG/1
10 TABLET, ORALLY DISINTEGRATING ORAL 2 TIMES DAILY PRN
Status: DISCONTINUED | OUTPATIENT
Start: 2024-08-29 | End: 2024-09-03 | Stop reason: HOSPADM

## 2024-08-29 RX ORDER — OLANZAPINE 10 MG/2ML
10 INJECTION, POWDER, FOR SOLUTION INTRAMUSCULAR 2 TIMES DAILY PRN
Status: DISCONTINUED | OUTPATIENT
Start: 2024-08-29 | End: 2024-08-29

## 2024-08-29 RX ORDER — WATER 10 ML/10ML
INJECTION INTRAMUSCULAR; INTRAVENOUS; SUBCUTANEOUS
Status: COMPLETED
Start: 2024-08-29 | End: 2024-08-29

## 2024-08-29 RX ORDER — BUPRENORPHINE AND NALOXONE 2; .5 MG/1; MG/1
0.5 FILM, SOLUBLE BUCCAL; SUBLINGUAL ONCE
Status: DISCONTINUED | OUTPATIENT
Start: 2024-08-29 | End: 2024-08-29

## 2024-08-29 RX ORDER — PROPRANOLOL HYDROCHLORIDE 10 MG/1
20 TABLET ORAL 3 TIMES DAILY PRN
Status: DISCONTINUED | OUTPATIENT
Start: 2024-08-29 | End: 2024-09-03 | Stop reason: HOSPADM

## 2024-08-29 RX ORDER — FLUVOXAMINE MALEATE 25 MG
50 TABLET ORAL AT BEDTIME
Status: DISCONTINUED | OUTPATIENT
Start: 2024-08-29 | End: 2024-09-03 | Stop reason: HOSPADM

## 2024-08-29 RX ORDER — LORAZEPAM 1 MG/1
1 TABLET ORAL ONCE
Status: COMPLETED | OUTPATIENT
Start: 2024-08-29 | End: 2024-08-29

## 2024-08-29 RX ORDER — NICOTINE 21 MG/24HR
1 PATCH, TRANSDERMAL 24 HOURS TRANSDERMAL DAILY
Status: DISCONTINUED | OUTPATIENT
Start: 2024-08-29 | End: 2024-09-03 | Stop reason: HOSPADM

## 2024-08-29 RX ORDER — BUPRENORPHINE AND NALOXONE 2; .5 MG/1; MG/1
1 FILM, SOLUBLE BUCCAL; SUBLINGUAL
Status: COMPLETED | OUTPATIENT
Start: 2024-08-29 | End: 2024-08-29

## 2024-08-29 RX ORDER — BUPRENORPHINE AND NALOXONE 2; .5 MG/1; MG/1
0.5 FILM, SOLUBLE BUCCAL; SUBLINGUAL AT BEDTIME
Status: DISCONTINUED | OUTPATIENT
Start: 2024-08-29 | End: 2024-08-29

## 2024-08-29 RX ORDER — BUPRENORPHINE AND NALOXONE 2; .5 MG/1; MG/1
1 FILM, SOLUBLE BUCCAL; SUBLINGUAL ONCE
Status: COMPLETED | OUTPATIENT
Start: 2024-08-29 | End: 2024-08-29

## 2024-08-29 RX ADMIN — LORAZEPAM 1 MG: 1 TABLET ORAL at 12:19

## 2024-08-29 RX ADMIN — QUETIAPINE 800 MG: 200 TABLET, FILM COATED, EXTENDED RELEASE ORAL at 21:57

## 2024-08-29 RX ADMIN — OLANZAPINE 10 MG: 10 TABLET, ORALLY DISINTEGRATING ORAL at 14:35

## 2024-08-29 RX ADMIN — METFORMIN HYDROCHLORIDE 500 MG: 500 TABLET ORAL at 09:22

## 2024-08-29 RX ADMIN — NICOTINE POLACRILEX 2 MG: 2 GUM, CHEWING BUCCAL at 11:20

## 2024-08-29 RX ADMIN — BUPRENORPHINE AND NALOXONE 1 FILM: 2; .5 FILM, SOLUBLE BUCCAL; SUBLINGUAL at 23:07

## 2024-08-29 RX ADMIN — OLANZAPINE 5 MG: 5 TABLET, FILM COATED ORAL at 21:57

## 2024-08-29 RX ADMIN — BUPRENORPHINE AND NALOXONE 1 FILM: 2; .5 FILM, SOLUBLE BUCCAL; SUBLINGUAL at 15:14

## 2024-08-29 RX ADMIN — NICOTINE 1 PATCH: 21 PATCH, EXTENDED RELEASE TRANSDERMAL at 22:43

## 2024-08-29 RX ADMIN — FLUVOXAMINE MALEATE 50 MG: 50 TABLET ORAL at 21:57

## 2024-08-29 ASSESSMENT — COLUMBIA-SUICIDE SEVERITY RATING SCALE - C-SSRS
SUICIDE, SINCE LAST CONTACT: NO
6. HAVE YOU EVER DONE ANYTHING, STARTED TO DO ANYTHING, OR PREPARED TO DO ANYTHING TO END YOUR LIFE?: NO
1. SINCE LAST CONTACT, HAVE YOU WISHED YOU WERE DEAD OR WISHED YOU COULD GO TO SLEEP AND NOT WAKE UP?: NO
TOTAL  NUMBER OF INTERRUPTED ATTEMPTS SINCE LAST CONTACT: NO
ATTEMPT SINCE LAST CONTACT: NO
2. HAVE YOU ACTUALLY HAD ANY THOUGHTS OF KILLING YOURSELF?: NO
TOTAL  NUMBER OF ABORTED OR SELF INTERRUPTED ATTEMPTS SINCE LAST CONTACT: NO

## 2024-08-29 NOTE — ED NOTES
Pt observed walking around milieu. Up to window calmly and politely asking for a way to listen to music. Pt given a TV remote per request.

## 2024-08-29 NOTE — PHARMACY-ADMISSION MEDICATION HISTORY
Pharmacist Admission Medication History    Admission medication history is complete. The information provided in this note is only as accurate as the sources available at the time of the update.    [facility]    Pertinent Information: None    Changes made to PTA medication list:  Added: None  Deleted: aripiprazole, aspirin, clonidine, clotrimazole/betamethasone, escitalopram, fluoxetine x4 (duplicates), hydroxyzine, melatonin, meloxicam, metformin (duplicate), multivitamin, flax seed oil, nystatin, propranolol, quetiapine x6 (duplicates), risperidone x5, terbinafine, topiramate  Changed: updated directions for fluoxetine, metformin, olanzapine, and quetiapine    Allergies reviewed with patient and updates made in EHR: no    Medication History Completed By: John Gu RPH 8/28/2024 10:28 PM    PTA Med List   Medication Sig Last Dose    FLUoxetine (PROZAC) 20 MG capsule Take 20 mg by mouth daily. Unknown    metFORMIN (GLUCOPHAGE) 500 MG tablet Take 500 mg by mouth 2 times daily (with meals) Unknown    OLANZapine (ZYPREXA) 5 MG tablet Take 5 mg by mouth at bedtime Unknown    QUEtiapine ER (SEROQUEL XR) 400 MG 24 hr tablet Take 800 mg by mouth at bedtime. Unknown

## 2024-08-29 NOTE — ED NOTES
Pt requested ice water. Pt given ice water which he proceeded to dump the water on the walls in his room.

## 2024-08-29 NOTE — ED NOTES
Patient sitting and pacing in BH common area. Patient appears restless, making frequent hand gestures, and talking to self. Patient informed that we are waiting for psych to come see him. Requested nicotine gum, Dr Shetty informed.     Also requesting selective serotonin reuptake inhibitor and suboxone, Dr Shetty aware.

## 2024-08-29 NOTE — ED NOTES
Psych finished with patient at this time. Patient initially jittery and anxious with psych but appears more calm following psych assessment. Psych stated he would adjust patient's medications and reassess tomorrow.
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
## 2024-08-29 NOTE — ED NOTES
Pt gave permission to speak to mother regarding medication list. Pt keeps requesting suboxone, but is not currently prescribed the medication.

## 2024-08-29 NOTE — CONSULTS
Reviewed patients chart and due to patients behaviors including needing to be in seclusion patient isn't appropriate for a JACOB assessment or JACOB treatment referrals at this time.          Jerome Landis [facility] on 8/29/2024 at 2:12 PM
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
## 2024-08-29 NOTE — ED NOTES
Patient in  area with air pods and remainder of belongings. PT searched by security and belongings placed in locker.     Video Observation initiated, patient informed.     Simona Saul RN

## 2024-08-29 NOTE — ED NOTES
Seclusion restraint discontinued at 1440. Patient now calm and cooperative, took oral zyprexa. Agreed to safe behaviors at this time.     Currently sitting on mattress in room drawing with marker.

## 2024-08-29 NOTE — ED NOTES
"Patient given lunch tray. Poured drink all over tray stating \"I'm a vegetarian didn't you guys know that?\" Patient informed seclusion can be discontinued when there is absence of behaviors and evidence of learning.     1415: Diet order updated to vegetarian. Patient given vegetarian food options. Eating lunch at this time.   "
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
## 2024-08-29 NOTE — ED NOTES
Pt took his mattress and threw it against the staff window and hit the window after. Pt told he had to go to his room now in to seclusion because he was not following directions given by multiple staff all night. Verbally redirected to go back into his room and placed into seclusion. Bed removed due to pt taking apart equipment. Pt provided with his mattress, pillow, and blanket. Pt remains refusing meds due to not having his phone.

## 2024-08-29 NOTE — ED NOTES
Pt attempted to go into room while getting BH1 out of restraints. Pt has been going into multiple pt's rooms tonight and slamming doors. Pt told he had to leave other patients alone and stop hitting the walls. Pt informed he will have to go to his room into seclusion if he keeps slamming doors.

## 2024-08-29 NOTE — ED NOTES
Patient pounded once on garage door in room. Jumping off mattress and hitting ceiling. Alternating between standing by door staring out window and pacing in room.

## 2024-08-29 NOTE — ED NOTES
Ramirez Shields from psychiatry paged in regards to the patient's one time dose of suboxone. The pt was upset and confused about why he would only have a 1 time dose and pt is wanting to be on it regularly.

## 2024-08-29 NOTE — PROGRESS NOTES
"Triage & Transition Services, Extended Care     Therapy Progress Note    Patient: Eloy goes by \"Eloy,\" uses he/him pronouns  Date of Service: August 29, 2024  Site of Service: [facility] EMERGENCY DEPT                             BH3  Patient was seen yes  Mode of Assessment: In person    Presentation Summary: Writer presented to behavioral area of the ED to engage Pt in therapeutic check-in. Writer introduced self and stated purpose of interaction and Pt was agreeable to meet with Writer. When prompted by Writer, Pt states he is waiting to get his medications and wants to speak with the attending psychiatric provider to discuss his suboxone. Pt reports he is withdrawing from the drugs he took and he prefers to only smoke weed which is better than taking \"a bunch of\" pharmaceutical medications. Writer discussed while waiting for medication concerns to be addressed about desire for plan of care. [facility] told [facility]. Writer discussed [facility]'s layout. Pt stated he did not want to come because of no private rooms and how he would continue to be unable to use his cell phone over there. When prompted, Pt denies any SI, HI, AH/VH. Pt continued to voice desire to have medication and to meet with the psychiatrist. Pt stated once he talks to the medication provider he can decide what he wants to do.    Therapeutic Intervention(s) Provided: Engaged in social skills training., Engaged in guided discovery, explored patient's perspectives and helped expand them through socratic dialogue.    Current Symptoms:   irritable   agitation      Mental Status Exam   Affect: Appropriate  Appearance: Disheveled  Attention Span/Concentration: Attentive  Eye Contact: Engaged    Fund of Knowledge: Appropriate   Language /Speech Content: Fluent  Language /Speech Volume: Normal  Language /Speech Rate/Productions: Pressured  Recent Memory: Intact  Remote Memory: " Intact  Mood: Anxious, Irritable  Orientation to Person: Yes   Orientation to Place: Yes  Orientation to Time of Day: Yes  Orientation to Date: Yes     Situation (Do they understand why they are here?): Yes  Psychomotor Behavior: Normal  Thought Content: Other (please comment) (may be responding to internal stimuli)  Thought Form: Obsessive/Perseverative, Goal Directed    Treatment Objective(s) Addressed: rapport building, assessing safety, identifying treatment goals    Patient Response to Interventions: verbalizes understanding, acceptance expressed    Progress Towards Goals: Patient Reports Symptoms Are: improving  Patient Progress Toward Goals: is making progress  Comment: Pt was able to engage in conversation with Writer, no disruptive behaviors exhibited or observed.  Next Step to Work Toward Discharge: patient ability to engage in safety planning  Ability to Engage Comment: Pt will need to engage in and complete an aftercare/safety plan.    Case Management: Case Management Included: participating in a care conference on patient's behalf  Details on participating in a care conference on patient's behalf: Coordinated with Justo Francis NP  Summary of Interaction: Pt met with Justo for initial psychiatric consult. It was reported Pt will be kept on newly placed 72 hour hold due to presentation and reported paranoia and possibly responding to internal stimuli as reported by attending psychiatric provider. It was reported Pt will be started on medication and if presentation/symptoms do not improve, petition for commitment may need to be pursued.    Plan: observation  yes provider, RN Dr. Shetty  no (Pt is placed on a 72 hour hold.)    Clinical Substantiation:     At this time it does appear that Pt would benefit from further observation and psychiatric stabilization via medication management and ED level therapeutic interventions, due to continued verbal and physical agitation and exhibited responding to  internal stimuli. Pt was not able to fully safety plan with Writer. Pt does appear to be at higher risk of death by suicide accidental or intentional due to mental health history and substance use sx. If Pt is able to effectively safety plan and/or Pts sx improve it would be beneficial to pursue a less restrictive alternative. Pt met with Justo for initial psychiatric consult. It was reported Pt will be kept on newly placed 72 hour hold due to presentation and reported paranoia and possibly responding to internal stimuli as reported by attending psychiatric provider. It was reported Pt will be started on medication and if presentation/symptoms do not improve, petition for commitment may need to be pursued. Pt will be placed on observation status will plan for therapeutic check-in/reassessment on 08/30/2024 to determine route of care, whether working towards discharge or purusing a petition for commitment.       Legal Status: Legal Status at Admission: 72 Hour Hold  72 Hour Hold - Date/Time Initiated: 08/29/2024 1214  72 Hour Hold - Date/Time Ends: 09/04/2024 1214    Session Status: Time session started: 0937  Time session ended: 0945  Session Duration (minutes): 8 minutes  Session Number: 1  Anticipated number of sessions or this episode of care: 3    Time Spent: 8 minutes    CPT Code: CPT Codes: Non-Billable    Diagnosis:   Patient Active Problem List   Diagnosis Code    Suicidal ideation R45.851    Psychosis (H) F29    Acute pulmonary embolism without acute cor pulmonale (H) I26.99    Alcohol use disorder, moderate, in sustained remission, dependence (H) F10.21    Anxiety F41.9    Cannabis use disorder, severe, dependence (H) F12.20    Class 1 drug-induced obesity without serious comorbidity with body mass index (BMI) of 33.0 to 33.9 in adult E66.1, Z68.33    Depression, major, single episode, moderate (H) F32.1    Episodic mood disorder (H24) F39    KATHE (generalized anxiety disorder) F41.1    History of marijuana  use F12.91    Obesity (BMI 30-39.9) E66.9    Obesity, Class I, BMI 30-34.9 E66.9    Tobacco use disorder, moderate, in sustained remission F17.201    Schizoaffective disorder, bipolar type (H) F25.0    Psychosis, unspecified psychosis type (H) F29    Tardive dyskinesia G24.01       Primary Problem This Admission: Active Hospital Problems    Schizoaffective disorder, bipolar type (H)      Anxiety      *Depression, major, single episode, moderate (H)        [place] [facility]   Licensed Mental Health Professional (LMHP), Extended Care  [phone]

## 2024-08-29 NOTE — ED NOTES
Dr Shetty informed that patient was escalating. Patient cursing at staff, pacing in villarreal, and becoming verbally aggressive. Stated he wanted something for his withdrawals. Dr Shetty came to assess patient in  common area and oral Ativan ordered. Patient took medication calmly.    Patient now under 72 hour hold.

## 2024-08-29 NOTE — PLAN OF CARE
Eloykristen Juarezlan  August 29, 2024  Plan of Care Hand-off Note     Patient Care Path: observation    Plan for Care:   At this time it does appear that Pt would benefit from further observation and psychiatric stabilization via medication management and ED level therapeutic interventions, due to continued verbal and physical agitation and exhibited responding to internal stimuli. Pt was not able to fully safety plan with Writer. Pt does appear to be at higher risk of death by suicide accidental or intentional due to mental health history and substance use sx. If Pt is able to effectively safety plan and/or Pts sx improve it would be beneficial to pursue a less restrictive alternative.     Pt met with Justo for initial psychiatric consult.     Identified Goals and Safety Issues:    It was reported Pt will be kept on newly placed 72 hour hold due to presentation and reported paranoia and possibly responding to internal stimuli as reported by attending psychiatric provider. It was reported Pt will be started on medication and if presentation/symptoms do not improve, petition for commitment may need to be pursued. Pt will be placed on observation status will plan for therapeutic check-in/reassessment on 08/30/2024 to determine route of care, whether working towards discharge or purusing a petition for commitment.    Overview:   (Megan) [phone]            Legal Status: Legal Status at Admission: 72 Hour Hold  72 Hour Hold - Date/Time Initiated: 08/29/2024 1214  72 Hour Hold - Date/Time Ends: 09/04/2024 1214    Psychiatry Consult: was seen by psychiatry       Updated MD regarding plan of care.           [place], [facility]

## 2024-08-29 NOTE — ED NOTES
" Seroquel offered and patient states \"I will be refusing all medication until I get to use my phone.\" RN informed patient that in the  area he is not allowed to have his phone, but offered a hospital phone and numbers. He states \"I want to watch something on my phone, so I won't take this medication.\"  "

## 2024-08-29 NOTE — ED PROVIDER NOTES
ED course:  Patient received as a handoff from my partner Dr. Gabriel.  On face-to-face evaluation patient continues to demonstrate evidence of psychosis/ashwini.  Placed on 72-hour hold.  Initially patient was reporting significant anxiety and provided 1 mg of oral Ativan.  Later patient became more agitated requiring seclusion.  Patient's outpatient psychiatrist Dr. Khan, phoned into the emergency department to express concern that patient was significantly decompensated from baseline as well as likely contributing factor of recent kratom use.  Evaluated by inpatient psychiatry in the ED.  Current plan is to board in the emergency department overnight with reassessment by psychiatry tomorrow.  Seroquel ordered.    Impression:    ICD-10-CM    1. Ashwini (H)  F30.9       2. Aggressive behavior  R46.89             Disposition:  Signed out to my partner Dr. Douglas Shetty, Maicol Adair,   08/29/24 1483
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
## 2024-08-30 ENCOUNTER — TELEPHONE (OUTPATIENT)
Dept: BEHAVIORAL HEALTH | Facility: CLINIC | Age: 24
End: 2024-08-30
Payer: COMMERCIAL

## 2024-08-30 ENCOUNTER — MEDICAL CORRESPONDENCE (OUTPATIENT)
Dept: EMERGENCY MEDICINE | Facility: CLINIC | Age: 24
End: 2024-08-30
Payer: COMMERCIAL

## 2024-08-30 ENCOUNTER — HOSPITAL ENCOUNTER (INPATIENT)
Age: 24
End: 2024-08-30
Attending: PSYCHIATRY & NEUROLOGY
Payer: COMMERCIAL

## 2024-08-30 LAB
AMPHETAMINES UR QL SCN: ABNORMAL
BARBITURATES UR QL SCN: ABNORMAL
BENZODIAZ UR QL SCN: ABNORMAL
BZE UR QL SCN: ABNORMAL
CANNABINOIDS UR QL SCN: ABNORMAL
FENTANYL UR QL: ABNORMAL
OPIATES UR QL SCN: ABNORMAL
PCP QUAL URINE (ROCHE): ABNORMAL
SARS-COV-2 RNA RESP QL NAA+PROBE: NEGATIVE

## 2024-08-30 PROCEDURE — 99285 EMERGENCY DEPT VISIT HI MDM: CPT | Performed by: NURSE PRACTITIONER

## 2024-08-30 PROCEDURE — 80307 DRUG TEST PRSMV CHEM ANLYZR: CPT | Performed by: EMERGENCY MEDICINE

## 2024-08-30 PROCEDURE — 96372 THER/PROPH/DIAG INJ SC/IM: CPT | Performed by: EMERGENCY MEDICINE

## 2024-08-30 PROCEDURE — 250N000013 HC RX MED GY IP 250 OP 250 PS 637: Performed by: EMERGENCY MEDICINE

## 2024-08-30 PROCEDURE — 250N000013 HC RX MED GY IP 250 OP 250 PS 637: Performed by: NURSE PRACTITIONER

## 2024-08-30 PROCEDURE — 250N000012 HC RX MED GY IP 250 OP 636 PS 637: Performed by: EMERGENCY MEDICINE

## 2024-08-30 PROCEDURE — 250N000011 HC RX IP 250 OP 636: Performed by: EMERGENCY MEDICINE

## 2024-08-30 PROCEDURE — 87635 SARS-COV-2 COVID-19 AMP PRB: CPT | Performed by: EMERGENCY MEDICINE

## 2024-08-30 RX ORDER — OLANZAPINE 10 MG/1
10 TABLET, ORALLY DISINTEGRATING ORAL ONCE
Status: COMPLETED | OUTPATIENT
Start: 2024-08-30 | End: 2024-08-30

## 2024-08-30 RX ORDER — LORAZEPAM 1 MG/1
1 TABLET ORAL ONCE
Status: COMPLETED | OUTPATIENT
Start: 2024-08-30 | End: 2024-08-30

## 2024-08-30 RX ORDER — BUPRENORPHINE AND NALOXONE 2; .5 MG/1; MG/1
1 FILM, SOLUBLE BUCCAL; SUBLINGUAL DAILY
Status: DISCONTINUED | OUTPATIENT
Start: 2024-08-30 | End: 2024-09-03 | Stop reason: HOSPADM

## 2024-08-30 RX ORDER — DROPERIDOL 2.5 MG/ML
5 INJECTION, SOLUTION INTRAMUSCULAR; INTRAVENOUS ONCE
Status: COMPLETED | OUTPATIENT
Start: 2024-08-30 | End: 2024-08-30

## 2024-08-30 RX ADMIN — FLUVOXAMINE MALEATE 50 MG: 50 TABLET ORAL at 21:16

## 2024-08-30 RX ADMIN — OLANZAPINE 5 MG: 5 TABLET, FILM COATED ORAL at 21:17

## 2024-08-30 RX ADMIN — PROPRANOLOL HYDROCHLORIDE 20 MG: 20 TABLET ORAL at 00:39

## 2024-08-30 RX ADMIN — LORAZEPAM 1 MG: 1 TABLET ORAL at 12:55

## 2024-08-30 RX ADMIN — METFORMIN HYDROCHLORIDE 500 MG: 500 TABLET ORAL at 21:17

## 2024-08-30 RX ADMIN — NICOTINE POLACRILEX 4 MG: 2 GUM, CHEWING ORAL at 19:04

## 2024-08-30 RX ADMIN — BUPRENORPHINE AND NALOXONE 1 FILM: 2; .5 FILM, SOLUBLE BUCCAL; SUBLINGUAL at 10:20

## 2024-08-30 RX ADMIN — QUETIAPINE 800 MG: 200 TABLET, FILM COATED, EXTENDED RELEASE ORAL at 21:18

## 2024-08-30 RX ADMIN — DROPERIDOL 5 MG: 2.5 INJECTION, SOLUTION INTRAMUSCULAR; INTRAVENOUS at 23:46

## 2024-08-30 RX ADMIN — NICOTINE POLACRILEX 2 MG: 2 GUM, CHEWING BUCCAL at 04:20

## 2024-08-30 RX ADMIN — NICOTINE POLACRILEX 4 MG: 2 GUM, CHEWING ORAL at 15:47

## 2024-08-30 RX ADMIN — NICOTINE POLACRILEX 2 MG: 2 GUM, CHEWING BUCCAL at 02:11

## 2024-08-30 RX ADMIN — NICOTINE POLACRILEX 4 MG: 2 GUM, CHEWING ORAL at 13:43

## 2024-08-30 RX ADMIN — OLANZAPINE 10 MG: 10 TABLET, ORALLY DISINTEGRATING ORAL at 01:29

## 2024-08-30 RX ADMIN — OLANZAPINE 10 MG: 10 TABLET, ORALLY DISINTEGRATING ORAL at 15:47

## 2024-08-30 ASSESSMENT — COLUMBIA-SUICIDE SEVERITY RATING SCALE - C-SSRS
1. SINCE LAST CONTACT, HAVE YOU WISHED YOU WERE DEAD OR WISHED YOU COULD GO TO SLEEP AND NOT WAKE UP?: NO
ATTEMPT SINCE LAST CONTACT: NO
SUICIDE, SINCE LAST CONTACT: NO
6. HAVE YOU EVER DONE ANYTHING, STARTED TO DO ANYTHING, OR PREPARED TO DO ANYTHING TO END YOUR LIFE?: NO
2. HAVE YOU ACTUALLY HAD ANY THOUGHTS OF KILLING YOURSELF?: NO
TOTAL  NUMBER OF ABORTED OR SELF INTERRUPTED ATTEMPTS SINCE LAST CONTACT: NO
TOTAL  NUMBER OF INTERRUPTED ATTEMPTS SINCE LAST CONTACT: NO

## 2024-08-30 NOTE — ED NOTES
Pt remains seated in the  lounge talking to himself in a low tone of voice with his eyes wide open.

## 2024-08-30 NOTE — ED NOTES
Pt up to window stating he feels as though he is being ignored and has banged on the window several times but will sit down on the bench again without staff intervention.

## 2024-08-30 NOTE — ED NOTES
Spoke to patient's mother and gave an update. Mother, Michelle, would like to be updated when patient tranfers to a different place. She is also ok with patient calling her if he wants. Phone number is [phone].

## 2024-08-30 NOTE — TELEPHONE ENCOUNTER
[facility] Access Inpatient Bed Call Log 8/30/2024 4:10 PM  Intake has called facilities that have not updated the bed status within the last 12 hours.        (Adults);        [place]:                [facility] is posting 0 beds.            [facility] is posting 0 beds. [phone]: 4:12 PM Per facility, currently at capacity.  [facility] is posting 0 beds. Negative covid required.  [facility] is posting 0 beds. Neg covid. No high school/Megan-psych. [phone]   [facility] is posting 0 beds. [phone]  [facility] is posting 0 bed. [phone]    [facility] is posting 5 beds. (Ages 18-35) Negative covid. [phone]  [facility] is posting 0 beds.    [facility] is posting 2 beds [phone]     [place]:  [facility] is posting 6 beds. LOW acuity ONLY. Mixed unit 12+. Negative covid- [phone]  [facility] has 7 beds posted. No aggression. Negative Covid. Low acuity.  [facility] is posting 1 beds. Low acuity only. Neg covid.  [phone]    [facility] is posting 3 beds. Low acuity. No current aggression.    [facility] is posting 0 beds. Negative covid. [phone].    [facility] is posting 4 beds available. Negative covid.  [phone].      [facility] is posting 1 beds. Low acuity. 72 HH hold preferred. Negative covid required. [phone]. 4:16 PM NO BEDS AVAILABLE.  [facility] is posting 8 beds. Low acuity only. Neg covid.  [phone].  [facility] is posting 5 beds.  Negative covid required.   Vol only, No history of aggression, violence, or assault. No sexual offenders. No 72 HH holds. [phone]       [facility] is posting 4 beds. Negative covid required.  (Must have the cognitive ability to do programming. No aggressive or violent behavior or recent HX in the last 2 yrs. MH must be primary.)  Always low acuity. [phone]    [facility] has 2 beds posted. Negative covid required.  Low acuity only. Violence and aggression capped.  [phone]  [facility] is posting 0 beds. Low acuity, Negative covid required. [phone] 4:18 PM Per Missy CURRENTLY AT CAPACITY.  [facility] posting 4 beds Negative covid required.  [phone].  [facility] is posting 4 beds. Negative covid. LOW acuity. (No lines, drains, or tubes, oxygen, CPAP, IV, etc.) Must Have a Ride Home. [phone] 4:25PM PER [facility], 6 beds.  [facility] is posting 4 beds. Negative covid. (No. lines, drains, or tubes, oxygen, CPAP, IV, etc.) [phone]   [facility] is posting 14 beds. No covid test required. OUT OF STATE. [phone] 8:14AM PER [facility] NO PEDS BEDS, AND 15 ADULT BEDS.     Pt remains on the work list pending appropriate bed availability.

## 2024-08-30 NOTE — CONSULTS
"    Psychiatry Consultation; Follow up          Reason for Consult, requesting source:    Follow-up  Requesting source: Angela Munoz            Interim history:    Eloy Storm is a 24 year old male who presented to the emergency department for evaluation of erratic behavior after being found by police to be driving 100 mph down the road, with some report that he had been evading and also driving down the wrong side of the road, which has not been confirmed. PMH significant for anxiety, mood disorder, possible schizoaffective disorder, and polysubstance use (opioids, kratom, cannabis). Psychiatry is consulted for evaluation.     Pt seen for follow-up today. Overnight events reviewed, noting patient remained awake for much of the night. He was noted to be pounding on the staff window very loudly at around 1:30AM. Code 21 was called and olanzapine 10 mg was administered.     On interview, patient is observed to be lying on the mat in his room. He presents as somnolent this morning. He is mumbling and difficult to understand at times. He is minimally engaged in interview. He agrees that he did not sleep much overnight last night. When asking about how he tolerated Suboxone, he stated \"fine, I was playing the game.\" When asked to clarify what he means by \"game,\" he stated \"a card game or board game,\" then stated he was playing this game \"a long time ago.\" Difficult to determine to what he was referring. When asked if he is willing to consider inpatient hospitalization, he states \"I will help if I can, but I don't know what I can do.\" Thoughts appear disorganized and he is observed to have difficulty with linear conversation. Does not appear to be responding to internal stimuli. Is not making any suicidal or homicidal threats at this time.            Medications:     Current Facility-Administered Medications   Medication Dose Route Frequency Provider Last Rate Last Admin    buprenorphine HCl-naloxone HCl (SUBOXONE) " 2-0.5 MG per film 1 Film  1 Film Sublingual Daily Alexander, Angela, DO   1 Film at 08/30/24 1020    fluvoxaMINE (LUVOX) tablet 50 mg  50 mg Oral At Bedtime Ramirez Francis CNP   50 mg at 08/29/24 2157    metFORMIN (GLUCOPHAGE) tablet 500 mg  500 mg Oral BID w/meals Maicol Chaves MD   500 mg at 08/29/24 0922    nicotine (NICODERM CQ) 21 MG/24HR 24 hr patch 1 patch  1 patch Transdermal Daily Janelle Peoples MD   1 patch at 08/29/24 2243    nicotine (NICORETTE) gum 2 mg  2 mg Buccal Q1H PRN McAdooMaicol Winkler DO   2 mg at 08/30/24 0420    OLANZapine (zyPREXA) tablet 5 mg  5 mg Oral At Bedtime Maicol Chaves MD   5 mg at 08/29/24 2157    OLANZapine zydis (zyPREXA) ODT tab 10 mg  10 mg Oral BID PRN McAdooMaicol Winkler DO   10 mg at 08/30/24 0129    propranolol (INDERAL) tablet 20 mg  20 mg Oral TID PRN Ramirez Francis CNP   20 mg at 08/30/24 0039    QUEtiapine ER (SEROquel XR) 24 hr tablet 800 mg  800 mg Oral At Bedtime Maicol Chaves MD   800 mg at 08/29/24 2157     Current Outpatient Medications   Medication Sig Dispense Refill    FLUoxetine (PROZAC) 20 MG capsule Take 20 mg by mouth daily.      metFORMIN (GLUCOPHAGE) 500 MG tablet Take 500 mg by mouth 2 times daily (with meals)      OLANZapine (ZYPREXA) 5 MG tablet Take 5 mg by mouth at bedtime      QUEtiapine ER (SEROQUEL XR) 400 MG 24 hr tablet Take 800 mg by mouth at bedtime.                MSE:     Appearance: appeared as age stated, somnolent, casually dressed, and disheveled   Attitude:  cooperative  Eye Contact:  fair  Mood:   tired  Affect:  mood congruent and intensity is blunted  Speech:  mumbling  Psychomotor Behavior:  no evidence of tardive dyskinesia, dystonia, or tics  Thought Process:  disorganized  Associations:  no loose associations  Thought Content:   no evidence of suicidal or homicidal thinking today. Does not appear to respond to internal stimuli. Is disorganized in thought. Did not elicit paranoia today.  "  Insight: impaired  Judgement: poor  Oriented to:   self and place  Attention Span and Concentration:  limited  Recent and Remote Memory:  fair  Language: able to name/identify objects without impairment  Fund of Knowledge: intact with awareness of current and past events    Vital signs:  Temp: 98.4  F (36.9  C) Temp src: Oral BP: (!) 146/89 Pulse: 100   Resp: 22 SpO2: 95 % O2 Device: None (Room air)   Height: 185.4 cm (6' 1\") Weight: 90.7 kg (200 lb)  Estimated body mass index is 26.39 kg/m  as calculated from the following:    Height as of this encounter: 1.854 m (6' 1\").    Weight as of this encounter: 90.7 kg (200 lb).              DSM-5 Diagnosis:   Schizoaffective disorder, bipolar type  Kratom use disorder, severe, dependence  Cannabis use, r/o use disorder  Anxiety          Assessment:   Pt seen for follow-up today. Overnight, continued with intermittent agitation. He did not sleep. Was noted by nursing to be pounding on the staff windows. He received a prn dose of olanzapine overnight. A bit more somnolent this morning, does not appear to have insight. Judgement appears poor. He did receive the 800 mg dose of quetiapine XR overnight, unclear if he was taking this consistently at home as he manages his own medications. Tolerated initial dose of Suboxone 2 mg yesterday, and agreeable to continue to help reduce kratom withdrawal symptoms, which he stopped using about 3 days ago per his report yesterday.  Given his lack of insight and disorganized thought process, with ongoing agitation and disorganized behavior, will plan to petition for commitment with Johan.           Summary of Recommendations:   1) Continue 72 hour hold. Will petition for commitment with Johan through [facility]. Paperwork completed and submitted to [facility].   2) Continue fluvoxamine 50 mg nightly for symptoms of anxiety. Consider whether this may be contributing to recent jamila symptoms. Looks like it was prescribed on 8/7/24. We may " need to consider discontinuation, although patient asking to continue at this time.   3) Continue quetiapine  mg nightly for mood stabilization and psychosis symptoms. Unclear if he was taking this consistently at home.   4) Continue Suboxone 2-0.5 mg daily for kratom withdrawal symptoms. Continue discussion with patient regarding continuation of this for maintenance or utilization short-term to help ease withdrawals.   5) Continue olanzapine 10 mg bid prn for acute agitation  6) Continue propranolol 20 mg tid prn for acute anxiety symptoms  7) Proceed with inpatient psychiatric hospitalization for further stabilization      Justo Francis, JOSH-BC, APRN, CNP  Consult/Liaison Psychiatry  [facility]  Provider can be paged via [employer]  If I am unavailable, please contact [facility] at [phone] to reach the on-call provider.

## 2024-08-30 NOTE — ED NOTES
Pt requesting Ativan; writer offered PRN Zyprexa and Inderal for anxiety but pt refused. MD notified, awaiting response.

## 2024-08-30 NOTE — ED NOTES
IP MH Referral Acuity Rating Score (RARS)    LMHP complete at referral to IP MH, with DEC; and, daily while awaiting IP MH placement. Call score to PPS.  CRITERIA SCORING   New 72 HH and Involuntary for IP MH (not adolescent) 1/1   Boarding over 24 hours 1/1   Vulnerable adult at least 55+ with multiple co morbidities; or, Patient age 11 or under 0/1   Suicide ideation without relief of precipitating factors 0/1   Current plan for suicide 0/1   Current plan for homicide 0/1   Imminent risk or actual attempt to seriously harm another without relief of factors precipitating the attempt 1/1   Severe dysfunction in daily living (ex: complete neglect for self care, extreme disruption in vegetative function, extreme deterioration in social interactions) 1/1   Recent (last 2 weeks) or current physical aggression in the ED 1/1   Restraints or seclusion episode in ED 1/1   Verbal aggression, agitation, yelling, etc., while in the ED 1/1   Active psychosis with psychomotor agitation or catatonia 1/1   Need for constant or near constant redirection (from leaving, from others, etc).  0/1   Intrusive or disruptive behaviors 1/1   TOTAL Acuity Total Score: 9

## 2024-08-30 NOTE — TELEPHONE ENCOUNTER
S: [facility] ED , DEC  Jess  calling at 10:02 PM about a 24 year old/Male presenting with Paranoia and Substance Abuse     B: Pt arrived via Police. Presenting problem, stressors: Pt BIB to ED on 8/28/24 by police after trying to evade police in a car and driving at high speeds. Pt presenting with psychosis, Paranoia, disorganized  and Substance Abuse.  Pt was allowed to clear in the ED but behaviors didn't stop.  Pt also required restraints and the seclusion room in the ED.  Pt lives at  and does not appear to be taking his MH meds.  Pt also endorses substance abuse with use of kratom and THC.  Pt also appears to be on Suboxone. Pt has been yelling, posturing towards staff and banging on the window during his ED stay.     Pt affect in ED: Anxious  and Disorganized  Pt Dx: Major Depressive Disorder, Generalized Anxiety Disorder, Bipolar Disorder, and Schizoaffective Disorder  Previous [facility]? Yes[place] [facility]  Pt denies SI   Hx of suicide attempt? No  Pt denies SIB  Pt denies HI   Pt unable to be assessed for hallucinations due to current presentation .   Pt RARS Score: 9    Hx of aggression/violence, sexual offenses, legal concerns, Epic care plan? describe: Unknown  Current concerns for aggression this visit? Yes: posturing towards staff and bangin on the windows and yelling at staff  Does pt have a history of Civil Commitment? No  Is Pt their own guardian? Yes    Pt is prescribed medication. Is patient medication compliant? Pt refusing to take prescribed medications   Pt endorses OP services: Psychiatrist, , and Group Home  CD concerns: Actively using/consuming kratom and THC  Acute or chronic medical concerns: None  Does Pt present with specific needs, assistive devices, or exclusionary criteria? None      Pt is ambulatory  Pt is able to perform ADLs independently      A: Pt to be reviewed for IPMH admission. Pt is on a 72HH, initiated    08/29/24 12:14 PM   Preferred placement:  Statewide    COVID Symptoms: No  If yes, COVID test required   Utox: Not ordered, intake to request lab    CMP: N/A  CBC: N/A  HCG: N/A    R: Patient cleared and ready for behavioral bed placement: Yes  Pt placed on IPMH worklist? Yes    Does Patient need a Transfer Center request created? Yes, writer completed Transfer Center request at:  10:15 AM    2:01 PM Called [facility] ED and requested Covid and Utox labs.  Howard, ED RN stated Pt has been cooperative and doing fine in ED today    3:35 PM  Paged unit 6A Provider    6A Provider decline via Teams Chat Message  Naegele, Debra A  4:17 PM  MRN [id number] declining for 6A. Pt is too acute for 6A. Pt has been in seclusion today and yesterday. He threw a mattress at a window, threatening staff, pounding on glass.     4:26 PM Paged Vincenzo per escalation policy    Vincenzo backed decline by Teams Chat message  Nan Loya   4:39 PM  MRN [id number] support decline for 6A.

## 2024-08-30 NOTE — ED NOTES
Pt given warm tea and headphones, per request. Pt states he understands appropriate use of headphones to writer. Pt sitting in  common area coloring. Pt polite and cooperative with staff at this time.

## 2024-08-30 NOTE — ED NOTES
VSS. Pt given Inderal 20 mg po as ordered for anxiety. Pt took med without issue and is [facility].

## 2024-08-30 NOTE — ED NOTES
Hand off given to petra MONTEZ.  Juliann Craft RN,.......................................... 8/30/2024   7:15 AM

## 2024-08-30 NOTE — ED NOTES
Assumed care.    Pt is on a 72 hr hold as of 08/29/24; 12:14 PM awaiting inpatient psych bed.    Pt intermittently pacing in the common area and going in and out of room.   Coming to the window and asking for different things each time ( TOD, tooth brush/ paste, water, nicotine gum and his slippers).  Reasonable boundaries set at this time, with time limits for each request.    Juliann Craft RN,.......................................... 8/30/2024   3:30 AM

## 2024-08-30 NOTE — ED PROVIDER NOTES
This patient was taken in signout.  He requested a dose of Suboxone.  In reviewing psychiatry's notes he was given a trial dose earlier today.  He felt that it did help with his withdrawal from great time.  He 1 more as needed dose ordered tonight we will defer to psychiatry whether to continue this going forward.     Constantino Cole MD  08/29/24 6197

## 2024-08-30 NOTE — PROGRESS NOTES
"  Triage and Transition Services Extended Care Reassessment     Patient: Eloy goes by \"Eloy,\" uses he/him pronouns  Date of Service: August 30, 2024  Site of Service: [facility] EMERGENCY DEPT                             BH3  Patient was seen yes  Mode of Assessment: In person     Reason for Reassessment: other (see comment) increase of sx     History of Patient's Original Emergency Room Encounter: Patient stated that he has previous mental health diagnoses of bipolar disorder, schizoaffective disorder, anxiety, and depression.  Recently he has been feeling depressed because he does not have access to marijuana and kratom.  He last smoked marijuana 2 weeks ago and took kratom pills 1.5 weeks ago.  Patient stated, \"Sometimes I have ideas of reference and intrusive thoughts\" (most recently 1 month ago).  He reported feeling bored without substances, so he attempted to drive to [facility] today. Patient stated that besides today, he is compliant with his medications (Seroquel and Luvox).  Patient stated that he has a psychiatrist through [facility], but he recently started meeting with a new provider from [facility].  He stated that the new provider is supposed to prescribe him Suboxone, which he has not yet started but is requesting to start it here in the ED. In addition to starting Suboxone, patient is hoping to find \"calmness\" and \"a place to stay for a while.\" Patient has been living in his current group home for the last 2 months.  Prior to that he lived in a sober house.  Patient stated that he does not feel safe at the group home because the 2 other residents keep to themselves and he gets \"so bored.\"  Patient denied recent self-harm.  In the past he has engaged in head-banging and scratching self.  Patient is currently unemployed.  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  Patient stated that he has family that lives in [place] but does not find them " "supportive.    Presentation Summary: Pt presented with a irritable and labile affect. Pts mood was congruent with this presentation. Pt was oriented x4. Pt presented with some paranoid ideations about staff and the hospital. Pt endorsed \"I have been mistreated but I won't get them in trouble.\" Pt endorsed that he felt that staff have been purposefully ignoring him.  Pt appears to have limited insight into his current mental health sx and severity of sx, Pt either minimized or denied his aggressive behaviors/ high risk behaviors in the ED. When writer attempted to talk to Pt about treatment recommendations and medication compliance pt endorsed \"you are projecting that onto me, you need medication.\" Pt endorsed that he wanted a medication that is like marijuana, Pt endorsed \"I smoke weed because I am so fucking bored all the time!\" Writer observed that Pt had things written all over his body with a  marker, when writer asked about these markings, Pt endorsed \"these are my ideas for my art or whatever.\" Pt endorsed not understanding why he was in the hospital and is not agreeable to IP  admission recommendation. Pt was placed on a 72 hour hold and hospital staff have decided to petition for MICD commitment.    Changes Observed Since Initial Assessment: increase in presenting symptoms, provider request    Therapeutic Interventions Provided: Engaged in guided discovery, explored patient's perspectives and helped expand them through socratic dialogue., Reviewed healthy living that supports positive mental health, including looking at sleep hygiene, regular movement, nutrition, and regular socialization., Provided positive reinforcement for progress towards goals, gains in knowledge, and application of skills previously taught.    Current Symptoms: anxious irritable anxious, racing thoughts displaces blame, agitation, distractability loss of appetite    Mental Status Exam   Affect: Labile  Appearance: " Disheveled  Attention Span/Concentration: Attentive  Eye Contact: Variable    Fund of Knowledge: Appropriate   Language /Speech Content: Fluent  Language /Speech Volume: Normal  Language /Speech Rate/Productions: Pressured, Hyperverbal  Recent Memory: Intact  Remote Memory: Intact  Mood: Anxious, Irritable  Orientation to Person: Yes   Orientation to Place: Yes  Orientation to Time of Day: Yes  Orientation to Date: Yes     Situation (Do they understand why they are here?): Yes  Psychomotor Behavior: Normal  Thought Content: Paranoia  Thought Form: Obsessive/Perseverative, Goal Directed, Paranoia    Treatment Objective(s) Addressed: rapport building, assessing safety, identifying treatment goals    Patient Response to Interventions: verbalizes understanding, acceptance expressed    Progress Towards Goals:  Patient Reports Symptoms Are: ongoing  Patient Progress Toward Goals: is making progress  Comment: Pt has been somewhat receptive to ED interventions, Pt has presented with mild agitation today  Next Step to Work Toward Discharge: symptom stabilization  Symptom Stabilization Comment: Pt continues to present with manic like sx and agitation  Ability to Engage Comment: Pt will need to engage in and complete an aftercare/safety plan.    Case Management: Case Management Included: participating in a care conference on patient's behalf  Details on participating in a care conference on patient's behalf: Coordinated with Justo Francis NP  Summary of Interaction: Hospital staff have decided to petition for MICD commitment through [facility] Since Last Contact:   1. Wish to be Dead (Since Last Contact): No  2. Non-Specific Active Suicidal Thoughts (Since Last Contact): No     Actual Attempt (Since Last Contact): No  Has subject engaged in non-suicidal self-injurious behavior? (Since Last Contact): No  Interrupted Attempts (Since Last Contact): No  Aborted or Self-Interrupted Attempt (Since Last Contact):  No  Preparatory Acts or Behavior (Since Last Contact): No  Suicide (Since Last Contact): No     Calculated C-SSRS Risk Score (Since Last Contact): No Risk Indicated    Plan: Final Disposition / Recommended Care Path: inpatient mental health  Plan for Care reviewed with assigned Medical Provider: yes  Plan for Care Team Review: provider, RN  Comments: Dr. Shetty  Patient and/or validated legal guardian concurs: yes  Clinical Substantiation: At this time IP MH admission is being recommended due to increased psychosis sx, high risk behaviors, agitation and medication non compliance. Pts current sx appear to be impacting his ability to safety and appropriately function in the community. Pt was not able to fully safety plan with writer. Pt does appear to be at higher risk of death by suicide accidental or intentional due to mental health hx and substance use hx. If Pt is able to effectively safety plan and/or Pts sx improve it would be beneficial to pursue a less restrictive alternative. Pt was placed on a 72 hour hold on 8/29/24 at 1214. Hospital staff have decided to petition for MICD commitment through [facility] and hospital staff are awaiting court determination.    Legal Status: Legal Status at Admission: 72 Hour Hold  72 Hour Hold - Date/Time Initiated: 08/29/2024 1214  72 Hour Hold - Date/Time Ends: 09/04/2024 1214    Session Status: Time session started: 0850  Time session ended: 0906  Session Duration (minutes): 16 minutes  Session Number: 2  Anticipated number of sessions or this episode of care: 6    Session Start Time: 0850  Session Stop Time: 0906  CPT codes: 11222 - Psychotherapy (with patient) - 30 (16-37*) min  Time Spent: 16 minutes      CPT code(s) utilized: 52239 - Psychotherapy (with patient) - 30 (16-37*) min    Diagnosis:   Patient Active Problem List   Diagnosis Code    Suicidal ideation R45.851    Psychosis (H) F29    Acute pulmonary embolism without acute cor pulmonale (H) I26.99     Alcohol use disorder, moderate, in sustained remission, dependence (H) F10.21    Anxiety F41.9    Cannabis use disorder, severe, dependence (H) F12.20    Class 1 drug-induced obesity without serious comorbidity with body mass index (BMI) of 33.0 to 33.9 in adult E66.1, Z68.33    Depression, major, single episode, moderate (H) F32.1    Episodic mood disorder (H24) F39    KATHE (generalized anxiety disorder) F41.1    History of marijuana use F12.91    Obesity (BMI 30-39.9) E66.9    Obesity, Class I, BMI 30-34.9 E66.9    Tobacco use disorder, moderate, in sustained remission F17.201    Schizoaffective disorder, bipolar type (H) F25.0    Psychosis, unspecified psychosis type (H) F29    Tardive dyskinesia G24.01       Primary Problem This Admission: Active Hospital Problems    Schizoaffective disorder, bipolar type (H)      Anxiety      *Depression, major, single episode, moderate (H)        Candy Kelly [facility]   Licensed Mental Health Professional (LMHP), [facility]  [phone]

## 2024-08-30 NOTE — ED NOTES
"Pt pounded on the staff window very loud. After pt pounded on the window, he sat in the  lounge with his legs crossed  and appeared calm. Code 21 called. The provider, security x4 and multiple staff members responded to the Code 21. Writer informed pt it was unacceptable for him to pound on the window and if he pounds on the window again he will be placed in Seclusion. Pt stated \" Perfect. I need someone to attend to me. I need dinner\" Pt informed dinner will be later today and that it was 1:30 in the morning.  Zyprexa 10 mg po administered. Pt given courtesy meal. He ate 100%.Pt remains seated in the  lounge calm.   "

## 2024-08-30 NOTE — ED NOTES
72 Hour Hold - Date/Time Initiated 08/29/2024 1214    72 Hour Hold - Date/Time Ends 09/04/2024 1214    Pt was placed on [facility] admission work list and Hospital staff will petition through [facility].     [facility] PPS worker is Bear MOYER # [phone]      Candy Kelly [facility] on 8/30/2024 at 12:05 PM

## 2024-08-30 NOTE — ED PROVIDER NOTES
8/30/2024     Received signout from Dr. Peoples.  Patient currently on 72-hour hold.  Discussed with DEC civil commitment placed.  Patient pending inpatient psych.  Daily Suboxone was ordered.  Also given Ativan for anxiety.      Disposition: pending inpatient psych.     Angela Munoz DO  08/30/24 1252

## 2024-08-31 ENCOUNTER — TELEPHONE (OUTPATIENT)
Dept: BEHAVIORAL HEALTH | Facility: CLINIC | Age: 24
End: 2024-08-31
Payer: COMMERCIAL

## 2024-08-31 PROCEDURE — 250N000011 HC RX IP 250 OP 636: Performed by: EMERGENCY MEDICINE

## 2024-08-31 PROCEDURE — 250N000013 HC RX MED GY IP 250 OP 250 PS 637: Performed by: EMERGENCY MEDICINE

## 2024-08-31 PROCEDURE — 250N000013 HC RX MED GY IP 250 OP 250 PS 637: Performed by: NURSE PRACTITIONER

## 2024-08-31 PROCEDURE — 250N000012 HC RX MED GY IP 250 OP 636 PS 637: Performed by: EMERGENCY MEDICINE

## 2024-08-31 PROCEDURE — 96372 THER/PROPH/DIAG INJ SC/IM: CPT | Performed by: EMERGENCY MEDICINE

## 2024-08-31 RX ORDER — DIPHENHYDRAMINE HCL 25 MG
50 CAPSULE ORAL EVERY 8 HOURS PRN
Status: DISCONTINUED | OUTPATIENT
Start: 2024-08-31 | End: 2024-09-03 | Stop reason: HOSPADM

## 2024-08-31 RX ORDER — LORAZEPAM 2 MG/ML
2 INJECTION INTRAMUSCULAR EVERY 8 HOURS PRN
Status: DISCONTINUED | OUTPATIENT
Start: 2024-08-31 | End: 2024-09-03 | Stop reason: HOSPADM

## 2024-08-31 RX ORDER — HALOPERIDOL 5 MG/ML
5 INJECTION INTRAMUSCULAR EVERY 8 HOURS PRN
Status: DISCONTINUED | OUTPATIENT
Start: 2024-08-31 | End: 2024-09-03 | Stop reason: HOSPADM

## 2024-08-31 RX ORDER — HALOPERIDOL 5 MG/1
5 TABLET ORAL EVERY 8 HOURS PRN
Status: DISCONTINUED | OUTPATIENT
Start: 2024-08-31 | End: 2024-09-03 | Stop reason: HOSPADM

## 2024-08-31 RX ORDER — DIPHENHYDRAMINE HYDROCHLORIDE 50 MG/ML
50 INJECTION INTRAMUSCULAR; INTRAVENOUS EVERY 8 HOURS PRN
Status: DISCONTINUED | OUTPATIENT
Start: 2024-08-31 | End: 2024-09-03 | Stop reason: HOSPADM

## 2024-08-31 RX ORDER — LORAZEPAM 1 MG/1
1 TABLET ORAL ONCE
Status: COMPLETED | OUTPATIENT
Start: 2024-08-31 | End: 2024-08-31

## 2024-08-31 RX ORDER — LORAZEPAM 1 MG/1
2 TABLET ORAL EVERY 8 HOURS PRN
Status: DISCONTINUED | OUTPATIENT
Start: 2024-08-31 | End: 2024-09-03 | Stop reason: HOSPADM

## 2024-08-31 RX ORDER — HYDROXYZINE HYDROCHLORIDE 25 MG/1
25 TABLET, FILM COATED ORAL 3 TIMES DAILY PRN
Status: DISCONTINUED | OUTPATIENT
Start: 2024-08-31 | End: 2024-09-03

## 2024-08-31 RX ADMIN — HYDROXYZINE HYDROCHLORIDE 25 MG: 25 TABLET, FILM COATED ORAL at 16:24

## 2024-08-31 RX ADMIN — FLUVOXAMINE MALEATE 50 MG: 50 TABLET ORAL at 21:02

## 2024-08-31 RX ADMIN — PROPRANOLOL HYDROCHLORIDE 20 MG: 20 TABLET ORAL at 21:00

## 2024-08-31 RX ADMIN — METFORMIN HYDROCHLORIDE 500 MG: 500 TABLET ORAL at 09:57

## 2024-08-31 RX ADMIN — NICOTINE 1 PATCH: 21 PATCH, EXTENDED RELEASE TRANSDERMAL at 16:03

## 2024-08-31 RX ADMIN — METFORMIN HYDROCHLORIDE 500 MG: 500 TABLET ORAL at 21:01

## 2024-08-31 RX ADMIN — NICOTINE POLACRILEX 4 MG: 2 GUM, CHEWING ORAL at 14:55

## 2024-08-31 RX ADMIN — NICOTINE POLACRILEX 4 MG: 2 GUM, CHEWING ORAL at 10:29

## 2024-08-31 RX ADMIN — LORAZEPAM 1 MG: 1 TABLET ORAL at 23:40

## 2024-08-31 RX ADMIN — HYDROXYZINE HYDROCHLORIDE 25 MG: 25 TABLET, FILM COATED ORAL at 22:29

## 2024-08-31 RX ADMIN — BUPRENORPHINE AND NALOXONE 1 FILM: 2; .5 FILM, SOLUBLE BUCCAL; SUBLINGUAL at 09:58

## 2024-08-31 RX ADMIN — HALOPERIDOL LACTATE 5 MG: 5 INJECTION, SOLUTION INTRAMUSCULAR at 00:18

## 2024-08-31 RX ADMIN — QUETIAPINE 800 MG: 200 TABLET, FILM COATED, EXTENDED RELEASE ORAL at 21:02

## 2024-08-31 RX ADMIN — OLANZAPINE 5 MG: 5 TABLET, FILM COATED ORAL at 21:00

## 2024-08-31 RX ADMIN — NICOTINE POLACRILEX 4 MG: 2 GUM, CHEWING ORAL at 21:28

## 2024-08-31 RX ADMIN — LORAZEPAM 2 MG: 2 INJECTION INTRAMUSCULAR; INTRAVENOUS at 00:18

## 2024-08-31 RX ADMIN — NICOTINE POLACRILEX 4 MG: 2 GUM, CHEWING ORAL at 22:36

## 2024-08-31 RX ADMIN — DIPHENHYDRAMINE HYDROCHLORIDE 50 MG: 50 INJECTION INTRAMUSCULAR; INTRAVENOUS at 00:18

## 2024-08-31 NOTE — ED NOTES
He continues to have multiple requests. Given snacks. He was seen writing on the windows, walls, and door, so the markers and crayons were taken away.

## 2024-08-31 NOTE — ED PROVIDER NOTES
I was called to help with why it is he can become agitated and kicked a  on the wall and kicked a hole in the wall.  He was then placed in seclusion has been kicking the wall.  Apparently he is upset as he feels he should be getting more Suboxone than he has ordered.  He was given 5 mg of IM droperidol and he will remain in seclusion until he is more calm.    I was notified that the patient was continued to kick the wall and punched the door after droperidol was administered.  Certainly at risk for harming himself.  As such we brought the bed back in the room, put him in 5 point restraints, and administered a B-52.  The restraints will be removed as soon as he has calm down and is more cooperative.     Carter Doll MD  08/30/24 [id number]       Carter Doll MD  08/31/24 0019

## 2024-08-31 NOTE — TELEPHONE ENCOUNTER
10:53 AM: Intake called [facility] to check for bed availability.     12:07 PM: Pt labs, ED notes, 72HH and face sheet sent to [facility] via fax.    1:16 PM: [facility] declined d/t Pt acuity/behaviors.    2:27 PM: Intake called [place]. [place] is on divert, [place] is low acuity only and [place] is at cap.      R: [facility] Access Inpatient Bed Call Log 8/31/24 8:05 AM    Intake has called facilities that have not updated the bed status within the last 12 hours.                               [facility] is at capacity           [facility] is posting 0 beds. [phone] Per call @8:21am, at [facility].  [facility] is posting 0 beds. Negative covid required  [facility] is posting 0 beds. Neg covid. No high school/Megan-psych.   [facility] is posting 0 beds. [phone]  [facility] is posting 0 beds. [phone]   [facility] is posting 4 beds. Negative covid.   [facility] is posting 2 beds [phone]    [facility] is posting 5 beds. LOW acuity ONLY. Mixed unit 12+. Negative covid- [phone]  [facility] has 2 beds posted. No aggression. Negative Covid. Low acuity.  [facility] ([place]) is posting 1 bed. Low acuity only. Neg covid.  [phone]   [facility] is posting 3 beds. Low acuity. No current aggression.    [facility] is posting 0 beds. Negative covid. [phone]   [facility] ([place]) is posting 6 beds available. Negative covid.  [phone].      [facility] is posting 0 beds. Low acuity. 72 HH hold preferred. Negative covid required.   [facility] ([place]) is posting 6 beds. Low acuity only. Neg covid.  [phone]   [facility] [place] is posting 2 beds.  Negative covid required.   Vol only, No history of aggression, violence, or assault. No sexual offenders. No 72 HH holds. [phone]     [facility] is posting 4 beds. Negative covid required.  (Must have the  cognitive ability to do programming. No aggressive or violent behavior or recent HX in the last 2 yrs. MH must be primary.) Always low acuity. [phone]   [facility] has 2 beds posted. Negative covid required.  Low acuity only. Violence and aggression capped.  [phone] Per call @8:18am, low acuity only.  [facility] is posting 2 beds. Low acuity, Negative covid required. [phone] Per call @8:16am, no beds on day shift.  [facility] Negative covid required.  [phone] Per call @7:15, no beds d/t staffing  [facility] beds. Negative covid. LOW acuity. (No lines, drains, or tubes, oxygen, CPAP, IV, etc.) Must Have a Ride Home. [phone] Per call @8:14am, low acute pts only.  [facility] is posting 5 beds. Negative covid. (No. lines, drains, or tubes, oxygen, CPAP, IV, etc.) [phone]     Pt remains on the work list pending appropriate bed availability.
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
## 2024-08-31 NOTE — TELEPHONE ENCOUNTER
R: [facility] Access [facility]patient Bed Call Log  8/31/24 @ 1:00am   Intake has called facilities that have not updated their bed status within the last 12 hours.     *METRO:  [place] -- [facility]: @ capacity.  [facility]: @ cap per website. Reporting no reviews overnight.  [place] -- [facility]: @ cap per website. Low acuity  [place] -- [facility]: @ cap per website. Low acuity only.  [place] -- [facility]: @ cap per website.  [facility]: @ cap per website.   [facility]/ beds: POSTING 5 BEDS. Ages 18-35, Voluntary only, NO aggression/physical/sexual assault, violence hx or drug abuse, or psychosis. Negative Covid  [place] -- [facility]: @ cap per website.  [place] -- [facility]: @ cap per website.  [place] -- [facility]: POSTING 2 BEDS. Do not review overnight.     *STATEWIDE (by distance):  [facility]: POSTING 5 BEDS. Mixed unit. Ages 12 and up/Low acuity only.   [facility] - POSTING 2 BEDS. Low acuity, No aggression.  [facility] -- @ cap per website.   [facility] - POSTING 3 BEDS. Low acuity only. No current aggression.  [facility] - POSTING 1 BED. Negative Covid. Lower acuity only.  [facility] - POSTING 7 BEDS. Low acuity only. Prefer med-adjustment placements.  [facility] - POSTING 6 BEDS. No aggression. - Only Low Acuity reviews.   [place] - [facility]: @ cap per website. No aggressive behaviors. Do not review overnight.  [place] -- [facility]: POSTING 2 BEDS.  No hx of aggression. No sexual offenders. Voluntary patients only.  [place] -- [facility]: POSTING 4 BEDS. Low acuity only. Must be able to do programming. No aggression/violent behavior in 2 years. No CD treatment.  [place] -- [facility]: POSTING 2 BEDS. Negative Covid test. Must be low acuity ONLY.  [place] -- [facility]: POSTING 2 BEDS. Low acuity. Negative  Covid.    [place] -- [facility]: POSTING 4 BEDS. Low acuity only currently.   [facility]: POSTING 6 BEDS. No hx of aggression/assault. No lines, drains or tubes. Does not provide detox or CD treatment. Require a confirmed ride upon discharge.  [place] -- [facility]: POSTING 4 BEDS. Negative COVID. No medical devices.     Pt remains on waitlist pending appropriate placement availability
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
## 2024-08-31 NOTE — ED NOTES
Patient at the window asking for the medications that he just took. Also asking for his suboxone. I reminded him that he just took all his HS medications and that his suboxone is only once a day. He stood at the window arguing that his suboxone is supposed to be twice a day and when asked to please step away from the window he took his glass of water and dumped it onto the counter and staff.

## 2024-08-31 NOTE — ED NOTES
Pt awake, cooperative apologetic for behavior last night. Warmed up pt breakfast and gave him his am meds

## 2024-09-01 ENCOUNTER — TELEPHONE (OUTPATIENT)
Dept: BEHAVIORAL HEALTH | Facility: CLINIC | Age: 24
End: 2024-09-01
Payer: COMMERCIAL

## 2024-09-01 PROCEDURE — 250N000013 HC RX MED GY IP 250 OP 250 PS 637: Performed by: EMERGENCY MEDICINE

## 2024-09-01 PROCEDURE — 250N000012 HC RX MED GY IP 250 OP 636 PS 637: Performed by: EMERGENCY MEDICINE

## 2024-09-01 PROCEDURE — 250N000013 HC RX MED GY IP 250 OP 250 PS 637: Performed by: NURSE PRACTITIONER

## 2024-09-01 RX ORDER — POLYETHYLENE GLYCOL 3350 17 G
2 POWDER IN PACKET (EA) ORAL
Status: DISCONTINUED | OUTPATIENT
Start: 2024-09-01 | End: 2024-09-02

## 2024-09-01 RX ORDER — CLONIDINE HYDROCHLORIDE 0.1 MG/1
0.1 TABLET ORAL ONCE
Status: COMPLETED | OUTPATIENT
Start: 2024-09-01 | End: 2024-09-01

## 2024-09-01 RX ADMIN — NICOTINE 1 PATCH: 21 PATCH, EXTENDED RELEASE TRANSDERMAL at 15:45

## 2024-09-01 RX ADMIN — METFORMIN HYDROCHLORIDE 500 MG: 500 TABLET ORAL at 07:42

## 2024-09-01 RX ADMIN — FLUVOXAMINE MALEATE 50 MG: 50 TABLET ORAL at 21:11

## 2024-09-01 RX ADMIN — HYDROXYZINE HYDROCHLORIDE 25 MG: 25 TABLET, FILM COATED ORAL at 07:42

## 2024-09-01 RX ADMIN — BUPRENORPHINE AND NALOXONE 1 FILM: 2; .5 FILM, SOLUBLE BUCCAL; SUBLINGUAL at 07:42

## 2024-09-01 RX ADMIN — NICOTINE POLACRILEX 4 MG: 2 GUM, CHEWING ORAL at 21:50

## 2024-09-01 RX ADMIN — NICOTINE POLACRILEX 4 MG: 2 GUM, CHEWING ORAL at 09:26

## 2024-09-01 RX ADMIN — CLONIDINE HYDROCHLORIDE 0.1 MG: 0.1 TABLET ORAL at 21:41

## 2024-09-01 RX ADMIN — METFORMIN HYDROCHLORIDE 500 MG: 500 TABLET ORAL at 21:11

## 2024-09-01 RX ADMIN — LORAZEPAM 2 MG: 1 TABLET ORAL at 15:51

## 2024-09-01 RX ADMIN — NICOTINE POLACRILEX 4 MG: 2 GUM, CHEWING ORAL at 06:53

## 2024-09-01 RX ADMIN — OLANZAPINE 5 MG: 5 TABLET, FILM COATED ORAL at 21:11

## 2024-09-01 RX ADMIN — NICOTINE POLACRILEX 4 MG: 2 GUM, CHEWING ORAL at 00:11

## 2024-09-01 RX ADMIN — PROPRANOLOL HYDROCHLORIDE 20 MG: 20 TABLET ORAL at 07:42

## 2024-09-01 RX ADMIN — QUETIAPINE 800 MG: 200 TABLET, FILM COATED, EXTENDED RELEASE ORAL at 21:11

## 2024-09-01 RX ADMIN — NICOTINE POLACRILEX 2 MG: 2 LOZENGE ORAL at 21:50

## 2024-09-01 RX ADMIN — NICOTINE POLACRILEX 4 MG: 2 GUM, CHEWING ORAL at 15:52

## 2024-09-01 RX ADMIN — NICOTINE POLACRILEX 2 MG: 2 LOZENGE ORAL at 23:14
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
## 2024-09-01 NOTE — ED PROVIDER NOTES
Patient taken in signout.  Awaiting inpatient mental health bed.  No acute events during my shift and they were signed over to my oncoming partner at the routine end of my shift.     Jose Pinedo MD  08/31/24 4531

## 2024-09-01 NOTE — TELEPHONE ENCOUNTER
R: [place]  Access Inpatient Bed Call Log 9/1/2024 3:20PM  Intake has called facilities that have not updated the bed status within the last 12 hours.                               [facility] is at capacity.            [facility] is posting 0 beds. [phone]    [facility] is posting 0 beds. Negative covid required   [facility] is posting 0 beds. Neg covid. No high school/Megan-psych. [phone] Per call at 7:54a to [facility] at capacity.   [facility] is posting 0 beds. [phone]   [facility] is posting 0 beds. [phone]    [facility] is posting 4 beds. Negative covid. [phone]   [facility] is posting 0 beds [phone]      [facility] is posting 6 beds. LOW acuity ONLY. Mixed unit 12+. Negative covid- [phone]   [facility] has 0 beds posted. No aggression. Negative Covid. Low acuity.   [facility] ([place]) is posting 0 bed. Low acuity only. Neg covid.  [phone]    [facility] is posting 2 beds. Low acuity. No current aggression.     [facility] is posting 0 beds. Negative covid. [phone]    [facility] ([place]) is posting 3 beds available. Negative covid.  [phone]. Mood d/o only.  No acute.  [facility] is posting 0 beds. Low acuity. 72 HH hold preferred. Negative covid required. [phone] Per call at 7:58am to [place] at capacity.    [facility] ([place]) is posting 6 beds. Low acuity only. Neg covid.  [phone]       [facility] in [place] is posting 2 beds.  Negative covid required.   Vol only, No history of aggression, violence, or assault. No sexual offenders. No 72 HH holds. [phone]       [facility] is posting 5 beds. Negative covid required.  (Must have the cognitive ability to do programming. No aggressive or violent behavior or recent HX in the last 2 yrs. MH must be primary.) Always low acuity. [phone] Per  call at 7:59a to [facility] unit is full.   [facility] has 1 bed posted. Negative covid required.  Low acuity only. Violence and aggression capped.  [phone] Per call at 8:00a to [place] for low acuity female.   [facility] is posting 2 beds. Low acuity, Negative covid required. [phone] Per call at 8:01a to [place] no beds for review.   [place] [facility] posting 4 beds Negative covid required.  [phone] 7:12am-at capacity.  [facility] is posting 3 beds. Negative covid. LOW acuity. (No lines, drains, or tubes, oxygen, CPAP, IV, etc.) Must Have a Ride Home. [phone]    [facility] is posting 5 beds. Negative covid. (No. lines, drains, or tubes, oxygen, CPAP, IV, etc.) [phone] Per call at 8:03a to [facility] beds only.   [facility] is posting 4 beds. No covid test required. [phone] Per call at 8:05a to [place] 1 adult, and no adol or child beds, however, can take referrals for Monday acceptance.          Pt remains on the work list pending appropriate bed availability.

## 2024-09-01 NOTE — ED PROVIDER NOTES
Received signout this morning.  No acute events during shift.  Patient pending inpatient psych placement.     Angela Munoz DO  09/01/24 2171

## 2024-09-01 NOTE — TELEPHONE ENCOUNTER
Pt too acute for beds available.     [place]: [place]  Access Inpatient Bed Call Log  8/31/2024 3:24 PM  Intake has called facilities that have not updated their bed status within the last 12 hours.??      ADULTS:     *METRO  [place] -- [facility]: @ Cap per website.  [place] -- [facility]:  @ Cap per website.    [place] -- [facility]: @ Cap per website.  [place] -- [facility]: @ Cap per website. - 3:30 PM Per Rebeca, they are able to review low acuity.   [place] -- [facility]: @ Cap per website.  [facility] -- [facility]: @ Cap per website.   [place] [facility] @ posting 3 beds. Ages 18-35, Voluntary only, COVID test req'd, NO aggression, physical or sexual assault, violence hx or drug abuse, or psychosis   [place] -- [facility]: @ Cap per website.  [place] -- [facility]: @ cap per website.  [place] -- [facility]:  @ Posting 2 beds.      *[facility]: @ Posting 5 beds.  Mixed unit/Low acuity only.    [facility]: @Posting 2 beds. Low acuity, No aggression   [facility]: @ cap per website.  [facility]:  @ Posting 3 beds. Low acuity only. No current aggression.  [facility]:  @ Cap per website. COVID negative test req. Lower acuity only. No Aggression.  [facility]: @ Posting 6 beds. Low acuity only. Prefer med adjustments placement.  . No aggression   [facility]:  @ Posting 6 beds. COVID negative test req. Lower acuity only. No Aggression.   [place] -- [facility]: @ Cap per website. NO reviews after 10PM. Low acuity only.    [place] [facility] @ Posting 2 bed. No hx of aggression. No sexual offenders. Voluntary patients only   [place] -- [facility]:  @ Posting 5 beds. Low acuity only. Must have the cognitive ability to do programming. No aggressive or violent behavior or recent HX in the last 2 yrs. MH must be primary.    [place] -- [facility]: @ Posting 3 beds. COVID negative test. Must be low  acuity ONLY.   [place] -- [facility]: @ Posting 2 bed. Low acuity. Negative Covid. -3:33 PM Per Missy, they are capped.    [place] -- [facility]: @Posting 4 beds. Negative COVID test    [facility]: @ Posting 3 beds. No hx of aggression/assault. No lines, drains or tubes. Does not provide detox or CD treatment.   [place] -- [facility]: @ Posting 5 beds. Mixed unit/Low acuity/no medical devices - IV, CPAP etc. Negative Covid.).                                                                                                                               Pt remains on waitlist pending appropriate placement availability.

## 2024-09-01 NOTE — TELEPHONE ENCOUNTER
9:41 AM: Intake called [place]. [facility]ne [facility] has low acuity beds only.      R: [place]  Access Inpatient Bed Call Log 9/1/2024 7:47 AM   Intake has called facilities that have not updated the bed status within the last 12 hours.                               [facility] is at capacity            [facility] is posting 0 beds. [phone]    [facility] is posting 0 beds. Negative covid required   [facility] is posting 0 beds. Neg covid. No high school/Megan-psych. [phone] Per call at 7:54a to [facility] at capacity.   [facility] is posting 0 beds. [phone]   [facility] is posting 0 beds. [phone]    [facility] is posting 3 beds. Negative covid. [phone] Per call at 7:55a [facility] for cb for YA, adol, and child availability.   [facility] is posting 0 beds [phone]       [facility] is posting 6 beds. LOW acuity ONLY. Mixed unit 12+. Negative covid- [phone]   [facility] has 0 beds posted. No aggression. Negative Covid. Low acuity.   [facility] is posting 0 bed. Low acuity only. Neg covid.  [phone]    [facility] is posting 2 beds. Low acuity. No current aggression.     [facility] is posting 0 beds. Negative covid. [phone]    [facility] ([place]) is posting 6 beds available. Negative covid.  [phone].       [facility] is posting 0 beds. Low acuity. 72 HH hold preferred. Negative covid required. [phone] Per call at 7:58am to [place] at capacity.    [facility] ([facility]) is posting 6 beds. Low acuity only. Neg covid.  [phone]    [facility] [place] is posting 2 beds.  Negative covid required.   Vol only, No history of aggression, violence, or assault. No sexual offenders. No 72 HH holds. [phone]        [facility] is posting 5 beds. Negative covid required.  (Must have the cognitive ability to do programming. No  aggressive or violent behavior or recent HX in the last 2 yrs. MH must be primary.) Always low acuity. [phone] Per call at 7:59a to Nan unit is full.   [facility] has 3 beds posted. Negative covid required.  Low acuity only. Violence and aggression capped.  [phone] Per call at 8:00a to [place] for low acuity female.   [facility] is posting 2 beds. Low acuity, Negative covid required. [phone] Per call at 8:01a to [place] no beds for review.   [place] [facility] posting 4 beds Negative covid required.  [phone]   [facility] [place] is posting 3 beds. Negative covid. LOW acuity. (No lines, drains, or tubes, oxygen, CPAP, IV, etc.) Must Have a Ride Home. [phone]    [facility] is posting 5 beds. Negative covid. (No. lines, drains, or tubes, oxygen, CPAP, IV, etc.) [phone] Per call at 8:03a to [facility] beds only.        Pt remains on the work list pending appropriate bed availability.

## 2024-09-01 NOTE — TELEPHONE ENCOUNTER
R: [facility] Access Inpatient Bed Call Log  9/1/24 @ 1:00am   Intake has called facilities that have not updated their bed status within the last 12 hours.     *METRO:  [place] -- [facility]: @ capacity.  [facility]: @ cap per website. Reporting no reviews overnight.  [place] -- [facility]: @ cap per website. Low acuity  [place] -- [facility]: @ cap per website. Low acuity only.  [place] -- [facility]: @ cap per website.  [facility]: @ cap per website.   [facility]/ beds: POSTING 3 BEDS. Ages 18-35, Voluntary only, NO aggression/physical/sexual assault, violence hx or drug abuse, or psychosis. Negative Covid  [place] -- [facility]: @ cap per website.  [place] -- [facility]: @ cap per website.  [place] -- [facility]: POSTING 2 BEDS. Do not review overnight.     *STATEWIDE (by distance):  [facility]: POSTING 6 BEDS. Mixed unit. Ages 12 and up/Low acuity only.   [facility] - @ cap per website. Low acuity, No aggression.  [facility] -- @ cap per website.   [facility] - POSTING 2 BEDS. Low acuity only. No current aggression.  [facility] - @ cap per website. Negative Covid. Lower acuity only.  [facility] - POSTING 3 BEDS. Low acuity only. Prefer med-adjustment placements.  [facility] - POSTING 6 BEDS. No aggression. - Only Low Acuity reviews.   [place] - [facility]: @ cap per website. No aggressive behaviors. Do not review overnight.  [place] -- [facility]: POSTING 2 BEDS.  No hx of aggression. No sexual offenders. Voluntary patients only.  [place] -- [facility]: POSTING 5 BEDS. Low acuity only. Must be able to do programming. No aggression/violent behavior in 2 years. No CD treatment.  [place] -- [facility]: POSTING 3 BEDS. Negative Covid test. Must be low acuity ONLY.  [place] -- [facility]: POSTING 2 BEDS. Low acuity.  Negative Covid.    [place] -- [facility] [place] [facility]: POSTING 4 BEDS. Low acuity only currently.   [facility]: POSTING 3 BEDS. No hx of aggression/assault. No lines, drains or tubes. Does not provide detox or CD treatment. Require a confirmed ride upon discharge.  [place] -- [facility]: POSTING 5 BEDS. Negative COVID. No medical devices.     Pt remains on waitlist pending appropriate placement availability

## 2024-09-02 ENCOUNTER — TELEPHONE (OUTPATIENT)
Dept: BEHAVIORAL HEALTH | Facility: CLINIC | Age: 24
End: 2024-09-02
Payer: COMMERCIAL

## 2024-09-02 LAB — SARS-COV-2 RNA RESP QL NAA+PROBE: NEGATIVE

## 2024-09-02 PROCEDURE — 87635 SARS-COV-2 COVID-19 AMP PRB: CPT | Performed by: NURSE PRACTITIONER

## 2024-09-02 PROCEDURE — 250N000013 HC RX MED GY IP 250 OP 250 PS 637: Performed by: EMERGENCY MEDICINE

## 2024-09-02 PROCEDURE — 250N000013 HC RX MED GY IP 250 OP 250 PS 637: Performed by: NURSE PRACTITIONER

## 2024-09-02 PROCEDURE — 99285 EMERGENCY DEPT VISIT HI MDM: CPT | Performed by: NURSE PRACTITIONER

## 2024-09-02 PROCEDURE — 250N000012 HC RX MED GY IP 250 OP 636 PS 637: Performed by: EMERGENCY MEDICINE

## 2024-09-02 RX ADMIN — QUETIAPINE 800 MG: 200 TABLET, FILM COATED, EXTENDED RELEASE ORAL at 21:50

## 2024-09-02 RX ADMIN — METFORMIN HYDROCHLORIDE 500 MG: 500 TABLET ORAL at 19:10

## 2024-09-02 RX ADMIN — NICOTINE POLACRILEX 2 MG: 2 LOZENGE ORAL at 14:09

## 2024-09-02 RX ADMIN — NICOTINE POLACRILEX 4 MG: 4 LOZENGE ORAL at 19:10

## 2024-09-02 RX ADMIN — LORAZEPAM 2 MG: 1 TABLET ORAL at 07:48

## 2024-09-02 RX ADMIN — NICOTINE POLACRILEX 2 MG: 2 LOZENGE ORAL at 09:28

## 2024-09-02 RX ADMIN — NICOTINE POLACRILEX 4 MG: 2 GUM, CHEWING ORAL at 16:27

## 2024-09-02 RX ADMIN — BUPRENORPHINE AND NALOXONE 1 FILM: 2; .5 FILM, SOLUBLE BUCCAL; SUBLINGUAL at 08:21

## 2024-09-02 RX ADMIN — NICOTINE POLACRILEX 4 MG: 4 LOZENGE ORAL at 21:50

## 2024-09-02 RX ADMIN — NICOTINE POLACRILEX 4 MG: 2 GUM, CHEWING ORAL at 12:37

## 2024-09-02 RX ADMIN — NICOTINE POLACRILEX 4 MG: 4 LOZENGE ORAL at 20:43

## 2024-09-02 RX ADMIN — NICOTINE POLACRILEX 2 MG: 2 LOZENGE ORAL at 11:07

## 2024-09-02 RX ADMIN — LORAZEPAM 2 MG: 1 TABLET ORAL at 16:27

## 2024-09-02 RX ADMIN — METFORMIN HYDROCHLORIDE 500 MG: 500 TABLET ORAL at 08:21

## 2024-09-02 RX ADMIN — HYDROXYZINE HYDROCHLORIDE 25 MG: 25 TABLET, FILM COATED ORAL at 12:37

## 2024-09-02 RX ADMIN — NICOTINE POLACRILEX 2 MG: 2 LOZENGE ORAL at 17:36

## 2024-09-02 RX ADMIN — FLUVOXAMINE MALEATE 50 MG: 50 TABLET ORAL at 21:50

## 2024-09-02 RX ADMIN — NICOTINE POLACRILEX 2 MG: 2 LOZENGE ORAL at 07:52

## 2024-09-02 RX ADMIN — NICOTINE 1 PATCH: 21 PATCH, EXTENDED RELEASE TRANSDERMAL at 07:48

## 2024-09-02 NOTE — TELEPHONE ENCOUNTER
ADULTS:     *[facility] -- [facility]: @ Cap per website.  [place] -- [facility]:  @ Cap per website.    [place] -- [facility]: @ Cap per website.  [place] -- [facility]: @ Cap per website. - 3:09 PM Per Kyra, they are capped.   [place] -- [facility]: @ Cap per website.  [facility]: @ Cap per website.   [place] -- [facility]/YA beds @ posting 3 beds. Ages 18-35, Voluntary only, COVID test req'd, NO aggression, physical or sexual assault, violence hx or drug abuse, or psychosis  NOT APPROPRIATE D/T AGGRESSION   [place] -- [facility]: @ Cap per website.  [place] -- [facility]: @ cap per website.  [place] -- [facility]:  @ Cap per website.      *[facility]: @ Posting 5 beds.  Mixed unit/Low acuity only.    [facility]: @Cap per website. Low acuity, No aggression   [facility]: @ cap per website.  [facility]:  @ Posting 1 beds. Low acuity only. No current aggression. NOT APPROPRIATE D/T AGGRESSION   [facility]:  @ Cap per website. COVID negative test req. Lower acuity only. No Aggression.  [facility]: @ Posting 1 beds. Low acuity only. Prefer med adjustments placement.  . No aggression NOT APPROPRIATE D/T AGGRESSION   [facility]:  @ Posting 3 beds. COVID negative test req. Lower acuity only. No Aggression. NOT APPROPRIATE D/T AGGRESSION   [place] -- [facility]: @ Cap per website. NO reviews after 10PM. Low acuity only.    [place] -- [facility]: @ Posting 2 bed. No hx of aggression. No sexual offenders. Voluntary patients only   [place] -- [facility]:  @ Posting 4 beds. Low acuity only. Must have the cognitive ability to do programming. No aggressive or violent behavior or recent HX in the last 2 yrs. MH must be primary.  NOT APPROPRIATE D/T AGGRESSION   [place] -- [facility]: @ Zpohosn65 beds. COVID negative test. Must be low acuity ONLY. NOT APPROPRIATE D/T AGGRESSION   [place]  -- [facility]: @ Posting 2 bed. Low acuity. Negative Covid. -3:12 PM Per Charanjit, they are capped.  [place] -- [place]: @Posting 1 beds. Negative COVID test  NOT APPROPRIATE D/T AGGRESSION   [facility]: @ Posting 3 beds. No hx of aggression/assault. No lines, drains or tubes. Does not provide detox or CD treatment. NOT APPROPRIATE D/T AGGRESSION   [place] -- [facility]: @ Posting 4 beds. Mixed unit/Low acuity/no medical devices - IV, CPAP etc. Negative Covid.). . - 3:08 PM Per Hodan, they are able to review.  9/1 DECLINED D/T AGGRESSION        Pt remains on waitlist pending appropriate placement availability

## 2024-09-02 NOTE — TELEPHONE ENCOUNTER
R: [facility] Access Inpatient Bed Call Log  9/2/24 @ 1:00am   Intake has called facilities that have not updated their bed status within the last 12 hours.     *METRO:  [place] -- [facility]: @ capacity.  [facility]: @ cap per website. Reporting no reviews overnight.  [place] -- [facility]: @ cap per website. Low acuity  [place] -- [facility]: @ cap per website. Low acuity only.  [place] -- [facility]: @ cap per website.  [facility]: @ cap per website.   [facility]/ beds: POSTING 4 BEDS. Ages 18-35, Voluntary only, NO aggression/physical/sexual assault, violence hx or drug abuse, or psychosis. Negative Covid -   [place] -- [facility]: @ cap per website.  [place] -- [facility]: @ cap per website.  [place] -- [facility]: @ cap per website. Do not review overnight.     *STATEWIDE (by distance):  [facility]: POSTING 4 BEDS. Mixed unit. Ages 12 and up/Low acuity only.   [facility] - @ cap per website. Low acuity, No aggression.  [facility] -- @ cap per website.   [facility] - POSTING 2 BEDS. Low acuity only. No current aggression.  [facility] - @ cap per website. Negative Covid. Lower acuity only.  [facility] - POSTING 1 BED. Low acuity only. Prefer med-adjustment placements.  [facility] - POSTING 3 BEDS. No aggression. - Only Low Acuity reviews.   [place] - [facility]: @ cap per website. No aggressive behaviors. Do not review overnight.  [place] -- [facility]: POSTING 2 BEDS.  No hx of aggression. No sexual offenders. Voluntary patients only.  [place] -- [facility]: POSTING 5 BEDS. Low acuity only. Must be able to do programming. No aggression/violent behavior in 2 years. No CD treatment.  [place] [facility]: POSTING 1 BED. Negative Covid test. Must be low acuity ONLY.  [place] -- [facility]: POSTING 2 BEDS. Low acuity.  Negative Covid.    [place] -- [facility]: POSTING 4 BEDS. Low acuity only currently.   [facility]: POSTING 3 BEDS. No hx of aggression/assault. No lines, drains or tubes. Does not provide detox or CD treatment. Require a confirmed ride upon discharge.  [place] -- [facility]: POSTING 4 BEDS. Negative COVID. No medical devices.     Pt remains on waitlist pending appropriate placement availability

## 2024-09-02 NOTE — ED NOTES
"Patient requested extended release Inderal instead of immediate release. Would like to discuss with provider this evening.    Patient would like to talk with provider today about low dose Dextromethorphan for pain.    Patient would like to ask provider about increasing bedtime dose of Luvox. Currently prescribed at 50mg.     Patient feels he should be back on Prozac so he can taper off slowly. Patient is concerned he stopped Prozac too abruptly. Patient is aware Luvox was started in place of Prozac.    Patient would like to ask provider to increase Suboxone dose to twice each day. First day at [facility] patient did receive Suboxone twice in one day. However, the next day this was cut down to once each day.    Patient aware he will be transferring to inpatient unit. Patient will ask the same question to multiple staff after drawing a conclusion with assigned staff. Patient ate dinner and took all medications except 5mg Zyprexa. Patient said he did not want to take an \"anti-psychotic medication\". Patient will try to look at staff computers and will also try to  staff-designated areas. Patient is redirectable.  "

## 2024-09-02 NOTE — ED NOTES
Patient calm and pleasant with staff interactions and appropriate with making requests. Requested ativan which was provided.

## 2024-09-02 NOTE — PROGRESS NOTES
"Pt has been restless throughout the day. He is pleasant and cooperative, denies any specific complaints. \"I usually vape all day, I want some gabapentin, and talk to my Dr about meds.\" He does have a nicotine patch in place, and gum/lozenges have been given, and hydroxyzine. He declines Zyprexa. He requests gabapentin or Ativan, he received a PRN dose of Ativan this AM in the ED. He paces chair to chair, and comes to staff for a range of requests (clothing changes, phone, Music and music stations). Activities have been offered, he watches TV off and on. He has been writing on his hands and arms with marker. He is eating and drinking fluids. Waiting for reassessment. He remains somewhat disorganized, and restless. Denies SI/HI/SIB, or hallucinations. He does not appear to be paranoid, he comes to staff and visits with peers out on the unit.   "

## 2024-09-02 NOTE — PROGRESS NOTES
Pt comes to EMPATH at request of ED while he waits for an Inpt bed .Per notes, pt has shown improvement -and needs reassessed for possible removal of IP MH list. Pt remains on a 72 hr hold, and has been petionied for commitment. The pt agreed to come to EMPATH and has been cooperative. He is disorganized and frequently seeks out staff. He denies SI/HI or hallucinations. Reports opoid use, pills 2 weeks ago. He is requesting an increase in his suboxone and his Luvox. He will meet with the Provider and can address that then. Search was done, his belongings were placed in locker. Tour and routine of unit was given.

## 2024-09-02 NOTE — ED NOTES
[facility]  ED to EMPATH Checklist:      Goal for EMPATH: Boarding for inpatient    Current Behavior: Calm and Cooperative    Safety Concerns: None    Legal Hold Status: 72 Hrs    Medically Cleared by ED provider: Yes    Patient Therapeutically Searched: Therapeutic search by ED staff (strings, belts, shoes, pockets, electronics, etc.)    Belongings: Sealed and put in locker per unit protocol    Independent Ambulation at Baseline: Yes/No: Yes    Participates in Care/Conversation: Yes/No: Yes    Patient Informed about EMPATH: Yes/No: Yes    DEC: Ordered and completed    Patient Ready to be Transferred to EMPATH? Yes/No: Yes

## 2024-09-02 NOTE — PROGRESS NOTES
Pt again asked for Ativan, offered a dose of propanolol, he asked what the dose was, and then declined.

## 2024-09-02 NOTE — PROGRESS NOTES
"Triage & Transition Services, Extended Care     Therapy Progress Note    Patient: Eloy goes by \"Eloy,\" uses he/him pronouns  Date of Service: September 1, 2024  Site of Service: [facility] EMERGENCY DEPT                             BH3  Patient was seen yes  Mode of Assessment: In person    Presentation Summary: Pt was calm, cooperative and engaged during check in. He wanted to talk about medications and was redirected to speak with psychiatry regarding this. He wanted to increase his suboxone and luvox as have gabapentin added. He reported that he was feeling much better and that he feels he will be ready to discharge in 1-2 days. When asked where he was living and answered \"here, at the group home\". Pt was reminded that this was the ED and he accepted that. Pt shared that he felt he was ignored by staff a few days ago but he no longer feels this way. We talked about a possible transfer to [facility] if he remains appropriate for [facility] tomorrow. Pt reported that his mood is always up and down but that he feels euthymic at this time. He denied AH and VH. He endorsed SI two days ago but denied current SI. We explored treatment options and pt is not interested in dual diagnosis treatment.    Therapeutic Intervention(s) Provided: Engaged in safety planning, Explored motivation for discharge., Explored barriers to discharge.    Current Symptoms: anxious difficulty concentrating, negativistic, impaired decision making, irritable anxious forgetful, impulsive, inattentive, agitation, high risk behavior loss of appetite    Mental Status Exam   Affect: Appropriate  Appearance: Appropriate  Attention Span/Concentration: Attentive  Eye Contact: Avoidant    Fund of Knowledge: Appropriate   Language /Speech Content: Fluent  Language /Speech Volume: Normal  Language /Speech Rate/Productions: Normal  Recent Memory: Variable  Remote Memory: Variable  Mood: Normal  Orientation to Person: Yes   Orientation to Place: " "Yes  Orientation to Time of Day: Yes  Orientation to Date: Yes     Situation (Do they understand why they are here?): No  Psychomotor Behavior: Normal  Thought Content: Clear  Thought Form: Intact    Treatment Objective(s) Addressed: rapport building, processing feelings, safety planning, identifying an appropriate aftercare plan, assessing safety, identifying treatment goals    Patient Response to Interventions: acceptance expressed, verbalizes understanding    Progress Towards Goals: Patient Reports Symptoms Are: improving  Patient Progress Toward Goals: is making progress  Comment: Pt has not had an episodes of agitation or escalation in the ED today.  Next Step to Work Toward Discharge: symptom stabilization, patient ability to engage in safety planning, awaiting acceptance at [facility] level of care  Symptom Stabilization Comment: Pt continues to present with manic like sx and agitation  Ability to Engage Comment: Pt will need to engage in and complete an aftercare/safety plan.  Awaiting Acceptance at [facility] Level of Care Comment: Pt is awaiting IP  bed, but will be reassessed tomorrow for possible removal from list.    Case Management: Case Management Included: collaborating with patient's support system  Details on participating in a care conference on patient's behalf: Coordinated with Justo Francis NP  Summary of Interaction: No case management today.    Plan: inpatient mental health  yes provider, RN Dr. Pinedo  no    Clinical Substantiation: Pt was calm, cooperative and engaged during check in. He wanted to talk about medications and was redirected to speak with psychiatry regarding this. He wanted to increase his suboxone and luvox as have gabapentin added. He reported that he was feeling much better and that he feels he will be ready to discharge in 1-2 days. When asked where he was living and answered \"here, at the group home\". Pt was reminded that this was the ED and he accepted that. Pt shared " that he felt he was ignored by staff a few days ago but he no longer feels this way. We talked about a possible transfer to [facility] if he remains appropriate for [facility] tomorrow. Pt reported that his mood is always up and down but that he feels euthymic at this time. He denied AH and VH. He endorsed SI two days ago but denied current SI. We explored treatment options and pt is not interested in dual diagnosis treatment.            Pt does appear to be at higher risk of death by suicide accidental or intentional due to mental health hx and substance use hx. . Pt was placed on a 72 hour hold on 8/29/24 at 1214. Hospital staff have decided to petition for MICD commitment through [facility] and hospital staff are awaiting court documentation. If Pt is able to effectively safety plan and/or Pts sx improve it would be beneficial to pursue a less restrictive alternative. Pt remains on the IP list at this time, however his symptoms continue to improve. Pt should be reassessed tomorrow for possible removal from IP MH list.    Legal Status: Legal Status at Admission: 72 Hour Hold (Hospital staff has petionied for commitment and is awaiting court documentation.)  72 Hour Hold - Date/Time Initiated: 08/29/2024 1214  72 Hour Hold - Date/Time Ends: 09/04/2024 1214    Session Status: Time session started: 2030  Time session ended: 2055  Session Duration (minutes): 25 minutes  Session Number: 4  Anticipated number of sessions or this episode of care: 6    Time Spent: 25 minutes    CPT Code: CPT Codes: 79235 - Psychotherapy (with patient) - 30 (16-37*) min    Diagnosis:   Patient Active Problem List   Diagnosis Code    Suicidal ideation R45.851    Psychosis (H) F29    Acute pulmonary embolism without acute cor pulmonale (H) I26.99    Alcohol use disorder, moderate, in sustained remission, dependence (H) F10.21    Anxiety F41.9    Cannabis use disorder, severe, dependence (H) F12.20    Class 1 drug-induced obesity without serious  comorbidity with body mass index (BMI) of 33.0 to 33.9 in adult E66.1, Z68.33    Depression, major, single episode, moderate (H) F32.1    Episodic mood disorder (H24) F39    KATHE (generalized anxiety disorder) F41.1    History of marijuana use F12.91    Obesity (BMI 30-39.9) E66.9    Obesity, Class I, BMI 30-34.9 E66.9    Tobacco use disorder, moderate, in sustained remission F17.201    Schizoaffective disorder, bipolar type (H) F25.0    Psychosis, unspecified psychosis type (H) F29    Tardive dyskinesia G24.01       Primary Problem This Admission: Active Hospital Problems    Schizoaffective disorder, bipolar type (H)      Anxiety      *Depression, major, single episode, moderate (H)        Sendy Michel   Licensed Mental Health Professional (LMHP), [facility]  [phone]

## 2024-09-02 NOTE — TELEPHONE ENCOUNTER
R: [place]  Access Inpatient Bed Call Log 9/2/24 8:05 AM    Intake has called facilities that have not updated the bed status within the last 12 hours.                             [facility] is at capacity.           [facility] is posting 0 beds. [phone]   [facility] is posting 0 beds. Negative covid required.  [facility] is posting 0 beds. Neg covid. No high school/Megan-psych.   [facility] is posting 0 beds. [phone]  [facility] is posting 0 beds. [phone]   [facility] is posting 5 beds. Negative covid.   [facility] ([facility] is posting 2 beds [phone]    [facility] is posting 4 beds. LOW acuity ONLY. Mixed unit 12+. Negative covid- [phone]  [facility] has 0 beds posted. No aggression. Negative Covid. Low acuity.  [facility] ([place]) is posting 0 beds. Low acuity only. Neg covid.  [phone]   [facility] is posting 1 bed. Low acuity. No current aggression.    [facility] is posting 0 beds. Negative covid. [phone]   [facility] ([place]) is posting 1 bed available. Negative covid.  [phone].      [facility] is posting 0 beds. Low acuity. 72 HH hold preferred. Negative covid required. [phone] Per call @8:28am, no beds available.  [facility] ([place]) is posting 3 beds. Low acuity only. Neg covid.  [phone]   [facility] in [place] is posting 2 beds.  Negative covid required.   Vol only, No history of aggression, violence, or assault. No sexual offenders. No 72 HH holds. [phone]     [facility] is posting 5 beds. Negative covid required.  (Must have the cognitive ability to do programming. No aggressive or violent behavior or recent HX in the last 2 yrs. MH must be primary.) Always low acuity. [phone]    has 2 beds posted. Negative covid required.  Low acuity only. Violence and aggression  capped.  [phone] Per call @8:24am, can review low acuity only.  [facility] is posting 2 beds. Low acuity, Negative covid required. [phone]   [facility] [place] posting 2 beds Negative covid required.  [phone]   [facility] [place] is posting 3 beds. Negative covid. LOW acuity. (No lines, drains, or tubes, oxygen, CPAP, IV, etc.) Must Have a Ride Home. [phone]   [facility] is posting 5 beds. Negative covid. (No. lines, drains, or tubes, oxygen, CPAP, IV, etc.) [phone]     Pt remains on the work list pending appropriate bed availability.

## 2024-09-02 NOTE — PROGRESS NOTES
"Triage & Transition Services, Extended Care     Therapy Progress Note    Patient: Eloy goes by \"Eloy,\" uses he/him pronouns  Date of Service: September 2, 2024  Site of Service: [facility] EMERGENCY DEPT                             EMP12  Patient was seen yes  Mode of Assessment: In person    Presentation Summary: Pt was calm, cooperative and engaged during check in. He said that he is feeling bored on the EmPATH unit and was hoping to have something like an electronic device that he could listen to music on, such as an iPad or iPod. Writer informed pt that unfortunately we don't have any of these devices available but asked if there is anything else writer could provide him with such as Sudoku, word finds, etc. to occupy him. Pt said he could not think of any but that he might read a book on the unit. Pt said he is wanting to discuss his Suboxone and was directed by writer to talk about the psychiatric provider about this. Pt said he is feeling okay today apart from feeling bored and denied having any suicidal ideation. Pt said he is hoping that he can return to his group home upon discharge. Pt appears to have limited insight into his current mental health symptoms and severity of symptoms. Pt continues to minimize or deny his aggressive behaviors/ high risk behaviors in the ED or driving before he was brought to the ED. When asked about his goals, pt said that he is hoping to become more independent from his mother financially and that he can become self-employed. He said that his dream is to someday return to living in [place], but that he would need to gain financial independence before doing so.    Therapeutic Intervention(s) Provided: Taught the link between thoughts, feelings, and behaviors., Engaged in guided discovery, explored patient's perspectives and helped expand them through socratic dialogue., Explored motivation for treatment engagement.    Current Symptoms: anxious difficulty " concentrating, negativistic, impaired decision making, irritable anxious forgetful, impulsive, inattentive, agitation, high risk behavior loss of appetite    Mental Status Exam   Affect: Appropriate  Appearance: Appropriate  Attention Span/Concentration: Attentive  Eye Contact: Engaged    Fund of Knowledge: Appropriate   Language /Speech Content: Fluent  Language /Speech Volume: Normal  Language /Speech Rate/Productions: Normal  Recent Memory: Intact  Remote Memory: Intact  Mood: Normal  Orientation to Person: Yes   Orientation to Place: Yes  Orientation to Time of Day: Yes  Orientation to Date: Yes     Situation (Do they understand why they are here?): Yes  Psychomotor Behavior: Normal  Thought Content: Clear  Thought Form: Intact    Treatment Objective(s) Addressed: rapport building, processing feelings, safety planning, identifying an appropriate aftercare plan, assessing safety, identifying treatment goals    Patient Response to Interventions: acceptance expressed, verbalizes understanding    Progress Towards Goals: Patient Reports Symptoms Are: improving  Patient Progress Toward Goals: is making progress  Comment: Pt has not had an episode of agitation or escalation at [facility] today as of the time of this writing.  Next Step to Work Toward Discharge: symptom stabilization, patient ability to engage in safety planning, awaiting acceptance at [facility] level of care  Symptom Stabilization Comment: Pt continues to present with manic like sx and agitation  Ability to Engage Comment: Pt will need to engage in and complete an aftercare/safety plan.  Awaiting Acceptance at [facility] Level of Care Comment: Pt is awaiting IP  bed, but will be reassessed tomorrow for possibel removal from list.    Case Management: Case Management Included: collaborating with patient's support system  Details on participating in a care conference on patient's behalf: Coordinated with Justo Francis NP  Summary of Interaction: No case  management today.    Plan: inpatient mental health  Provider: MELIDA Chapa, CNP  Pt agrees with plan: no    Clinical Substantiation: Pt was calm, cooperative and engaged during check in. He said that he is feeling bored on the EmPATH unit and was hoping to have something like an electronic device that he could listen to music on, such as an iPad or iPod. Writer informed pt that unfortunately we don't have any of these devices available but asked if there is anything else writer could provide him with such as Sudoku, word finds, etc. to occupy him. Pt said he could not think of any but that he might read a book on the unit. Pt said he is wanting to discuss his Suboxone and was directed by writer to talk about the psychiatric provider about this. Pt said he is feeling okay today apart from feeling bored and denied having any suicidal ideation. Pt said he is hoping that he can return to his group home upon discharge. Pt appears to have limited insight into his current mental health symptoms and severity of symptoms. Pt continues to minimize or deny his aggressive behaviors/ high risk behaviors in the ED or driving before he was brought to the ED. When asked about his goals, pt said that he is hoping to become more independent from his mother financially and that he can become self-employed. He said that his dream is to someday return to living in [place], but that he would need to gain financial independence before doing so.    Legal Status: Legal Status at Admission: 72 Hour Hold  72 Hour Hold - Date/Time Initiated: 08/29/2024 1214  72 Hour Hold - Date/Time Ends: 09/04/2024 1214    Session Status: Time session started: 1730  Time session ended: 1747  Session Duration (minutes): 17 minutes  Session Number: 5  Anticipated number of sessions or this episode of care: 6    Time Spent: 17 minutes    CPT Code: CPT Codes: 29175 - Psychotherapy (with patient) - 30 (16-37*) min    Diagnosis:   Patient Active Problem List    Diagnosis Code    Suicidal ideation R45.851    Psychosis (H) F29    Acute pulmonary embolism without acute cor pulmonale (H) I26.99    Alcohol use disorder, moderate, in sustained remission, dependence (H) F10.21    Anxiety F41.9    Cannabis use disorder, severe, dependence (H) F12.20    Class 1 drug-induced obesity without serious comorbidity with body mass index (BMI) of 33.0 to 33.9 in adult E66.1, Z68.33    Depression, major, single episode, moderate (H) F32.1    Episodic mood disorder (H24) F39    KATHE (generalized anxiety disorder) F41.1    History of marijuana use F12.91    Obesity (BMI 30-39.9) E66.9    Obesity, Class I, BMI 30-34.9 E66.9    Tobacco use disorder, moderate, in sustained remission F17.201    Schizoaffective disorder, bipolar type (H) F25.0    Psychosis, unspecified psychosis type (H) F29    Tardive dyskinesia G24.01       Primary Problem This Admission: Active Hospital Problems    Schizoaffective disorder, bipolar type (H) F25.0      Anxiety F41.9      *Depression, major, single episode, moderate (H) F32.1        ROHITH Collins   Licensed Mental Health Professional (LMHP), [facility]  [phone]

## 2024-09-02 NOTE — ED PROVIDER NOTES
"ED Observation Psychiatric [facility] Emergency Department - [facility] Unit  Observation Initiation Date: Aug 28, 2024    Eloy Storm MRN: [id number]   Age: 24 year old YOB: 2000     History     Chief Complaint   Patient presents with    Psychiatric Evaluation     HPI  Eloy Storm is a 24 year old male with PMH notable for anxiety, depression, and history of aggressive behaviors, admitted to the ED Observation Unit with elevated mood and aggressive behaviors. He was placed on a 72 hour hold with recommendation for inpatient admission.  Patient requested increase in Suboxone from daily to twice a day, change from Seroquel extended release to immediate release and at the same time told me that he discontinued Prozac due to feeling sedated.  Requested to switch to extended release propranolol but could not provide the rationale.  He also could not tell me when he started taking Luvox but wanted me to increase the dose today.  I explained the rationale for not increasing an antidepressant.  I explained that antidepressants can sometimes cause mood activation and with his recent manic episode increasing the dose of an antidepressant is not advisable.  He was requesting information about benzodiazepines and told me that he does not know the difference between \"long-acting and short-acting benzodiazepines\" and is not familiar with benzodiazepines in general. Of note, upon admission he tested positive for benzodiazepines.  Apparently, he was also requesting prescription of gabapentin, again, without providing clear rationale.  Today the patient said that he is feeling better and is ready to transfer to inpatient psychiatry.    No evidence of jamila or psychosis.  No evidence of delusional or paranoid ideation.  He denied safety concerns.  He strongly denied thoughts of harming himself or others.  He reported taking medications as prescribed.  I informed the patient that most likely he will be " admitted to inpatient psychiatry [facility].  He was in agreement with the plan.  I also recommended considering chemical dependency treatment.    Past Medical History  Past Medical History:   Diagnosis Date    Depressive disorder     Major depression     Pulmonary embolism (H)     Substance abuse (H)     marijuana    Tardive dyskinesia 04/08/2024     Past Surgical History:   Procedure Laterality Date    NO HISTORY OF SURGERY       FLUoxetine (PROZAC) 20 MG capsule  metFORMIN (GLUCOPHAGE) 500 MG tablet  OLANZapine (ZYPREXA) 5 MG tablet  QUEtiapine ER (SEROQUEL XR) 400 MG 24 hr tablet      Allergies   Allergen Reactions    Cefuroxime Unknown     PN: LW Reaction: Rash, Generalized    Other reaction(s): Unknown   PN: LW Reaction: Rash, Generalized   PN: LW Reaction: Rash, Generalized    No Clinical Screening - See Comments Other (See Comments)     Patient had a reaction to some medication when he went to the dentist as a toddler    Other Allergy (See Comments) [External Allergen Needs Reconciliation - See Comment] Unknown     Other reaction(s): *Unknown - Childhood Rxn, Patient had a reaction to some medication when he went to the dentist as a toddler    Other Drug Allergy (See Comments)      Other reaction(s): *Unknown - Childhood Rxn   Patient had a reaction to some medication when he went to the dentist as a toddler     Family History  Family History   Problem Relation Age of Onset    Hypertension Mother     Hypertension Father     Dementia Paternal Grandmother     Clotting Disorder Paternal Grandfather      Social History   Social History     Tobacco Use    Smoking status: Former     Types: Cigarettes     Start date: 1/1/2017    Smokeless tobacco: Never    Tobacco comments:     vaping   Substance Use Topics    Alcohol use: Never    Drug use: Never     Types: Marijuana     Comment: Past THC 5/26/18      Past medical history, past surgical history, medications, allergies, family history, and social history were reviewed  "with the patient. No additional pertinent items.       Review of Systems  A medically appropriate review of systems was performed with pertinent positives and negatives noted in the HPI, and all other systems negative. and A complete review of systems was performed with pertinent positives and negatives noted in the HPI, and all other systems negative.    Physical Examination   BP: 129/85  Pulse: (!) 121  Temp: 98.4  F (36.9  C)  Resp: 22  Height: 185.4 cm (6' 1\")  Weight: 90.7 kg (200 lb)  SpO2: 96 %    Physical Exam  General:. Appears stated age.   Neuro: alert and fully oriented. CN II-XII grossly intact. Grossly normal strength and sensation in all extremities.   Integumentary/Skin: no rash visualized, normal color    Psychiatric Examination   Appearance: awake, alert  Attitude:  cooperative  Eye Contact:  good  Mood:  good  Affect:  appropriate and in normal range  Speech:  clear, coherent  Psychomotor Behavior:  no evidence of tardive dyskinesia, dystonia, or tics  Throught Process:  logical  Associations:  no loose associations  Thought Content:  no evidence of suicidal ideation or homicidal ideation  Insight:  limited  Judgement:  limited  Oriented to:  time, person, and place  Attention Span and Concentration:  intact  Recent and Remote Memory:  intact    ED Course     ED Course as of 09/02/24 1800   Wed Aug 28, 2024   1850 I obtained history and examined the patient as noted above.        Labs Ordered and Resulted from Time of ED Arrival to Time of ED Departure   URINE DRUG SCREEN PANEL - Abnormal       Result Value    Amphetamines Urine Screen Negative      Barbituates Urine Screen Negative      Benzodiazepine Urine Screen Positive (*)     Cannabinoids Urine Screen Positive (*)     Cocaine Urine Screen Negative      Fentanyl Qual Urine Screen Negative      Opiates Urine Screen Negative      PCP Urine Screen Negative     COVID-19 VIRUS (CORONAVIRUS) BY PCR - Normal    SARS CoV2 PCR Negative   "       Assessments & Plan (with Medical Decision Making)   Patient presenting with stable mood, but seeking controlled substances, benzodiazepines and Suboxone. Nursing notes reviewed.     During the observation period, the patient did not require medications for agitation, and did not require restraints/seclusion for patient and/or provider safety.     The patient was found to have a psychiatric condition that would benefit from an observation stay in the emergency department for further psychiatric stabilization and/or coordination of a safe disposition. The observation plan includes serial assessments of psychiatric condition, potential administration of medications if indicated, further disposition pending the patient's psychiatric course during the monitoring period.     Preliminary diagnosis:  Bipolar disorder  Cannabis use disorder    Patient is scheduled for inpatient psychiatric admission.   -Place on observation status for further crisis stabilization and medication management.  -Start nicotine lozenge 4 mg as directed   -Continue home medications including Seroquel  mg PO at HS, olanzapine 5 mg PO at HS, Luvox 50 mg PO daily, metformin 500 mg PO BID, nicotine patch 21 mg daily as directed, nicotine lozenge 4 mg as directed, Suboxone 2-0.5 mg daily  -EmPATH standing order medications for comfort, anxiety and agitation.    -72 hour placed on 8/29/24 at 12:14  -Reassess in the AM   -Anticipating the patient will require more time to gain benefit from their treatment plan as what we are allowed to provide on observation status, they will be transferred to the inpatient setting.  -LMHP working on community resources as patient lacks sufficient interpersonal support.  -Medication change was considered today. Current medications are providing clinical benefit with good tolerability. Thus, no changes were made.  -They report gaining benefit from the therapeutic milieu of the unit.  -Problem focused,  supportive therapy provided around patients stressors and symptoms related to their diagnosis.  Validated patients emotions and problems solved with patient around stress management and self care strategies. Patient was receptive.   -Medication education provided this visit includes, rationale for medication, importance of compliance, medication interactions, and common side effects. Patient agreeable.  -The patient was educated regarding their differential diagnoses and the importance of adhering to their treatment plan and outpatient follow up appointments to aid with diagnostic clarity.  -At this time, diagnosis remains complicated by ongoing illicit substance usage.  Diagnostic clarity would be more likely following a period of sobriety combined with appropriate follow through with outpatient providers for continued examination.    --  MAGALIS Haji CNP   [facility] EMERGENCY DEPT  EmPATH Unit  8/28/2024          Sakshi Moser APRN CNP  09/02/24 [id number]

## 2024-09-02 NOTE — PLAN OF CARE
Eloy Storm  September 2, 2024  Plan of Care Hand-off Note     Patient Care Path: inpatient mental health    Plan for Care:   Pt was calm, cooperative and engaged during check in. He said that he is feeling bored on the EmPATH unit and was hoping to have something like an electronic device that he could listen to music on, such as an iPad or iPod. Writer informed pt that unfortunately we don't have any of these devices available but asked if there is anything else writer could provide him with such as Sudoku, word finds, etc. to occupy him. Pt said he could not think of any but that he might read a book on the unit. Pt said he is wanting to discuss his Suboxone and was directed by writer to talk about the psychiatric provider about this. Pt said he is feeling okay today apart from feeling bored and denied having any suicidal ideation. Pt said he is hoping that he can return to his group home upon discharge. Pt appears to have limited insight into his current mental health symptoms and severity of symptoms. Pt continues to minimize or deny his aggressive behaviors/ high risk behaviors in the ED or driving before he was brought to the ED. When asked about his goals, pt said that he is hoping to become more independent from his mother financially and that he can become self-employed. He said that his dream is to someday return to living in [place], but that he would need to gain financial independence before doing so.       Identified Goals and Safety Issues:    It was reported pt will be kept on newly placed 72 hour hold due to presentation and reported paranoia and possibly responding to internal stimuli as reported by attending psychiatric provider. It was reported pt will be started on medication and if presentation/symptoms do not improve, petition for commitment may need to be pursued. Pt will be placed on observation status will plan for therapeutic check-in/reassessment on 09/02/2024 to determine route of  care. Pt met with provider Kalli Chapa after check-in with writer.       Overview:   (Megan) [phone]            Legal Status: Legal Status at Admission: 72 Hour Hold  72 Hour Hold - Date/Time Initiated: 08/29/2024 1214  72 Hour Hold - Date/Time Ends: 09/04/2024 1214    Psychiatry Consult: no       Updated RN regarding plan of care.           AJ CollinsSW

## 2024-09-03 ENCOUNTER — TELEPHONE (OUTPATIENT)
Dept: BEHAVIORAL HEALTH | Facility: CLINIC | Age: 24
End: 2024-09-03
Payer: COMMERCIAL

## 2024-09-03 ENCOUNTER — HOSPITAL ENCOUNTER (INPATIENT)
Facility: CLINIC | Age: 24
DRG: 885 | End: 2024-09-03
Attending: PSYCHIATRY & NEUROLOGY | Admitting: PSYCHIATRY & NEUROLOGY
Payer: COMMERCIAL

## 2024-09-03 ENCOUNTER — MEDICAL CORRESPONDENCE (OUTPATIENT)
Dept: HEALTH INFORMATION MANAGEMENT | Facility: CLINIC | Age: 24
End: 2024-09-03

## 2024-09-03 VITALS
OXYGEN SATURATION: 98 % | HEART RATE: 82 BPM | DIASTOLIC BLOOD PRESSURE: 66 MMHG | HEIGHT: 73 IN | TEMPERATURE: 98 F | BODY MASS INDEX: 27.7 KG/M2 | RESPIRATION RATE: 16 BRPM | SYSTOLIC BLOOD PRESSURE: 106 MMHG | WEIGHT: 209 LBS

## 2024-09-03 DIAGNOSIS — F41.1 GAD (GENERALIZED ANXIETY DISORDER): Primary | ICD-10-CM

## 2024-09-03 DIAGNOSIS — E66.9 OBESITY (BMI 30-39.9): ICD-10-CM

## 2024-09-03 DIAGNOSIS — F11.90 OPIOID USE DISORDER: ICD-10-CM

## 2024-09-03 DIAGNOSIS — Z86.711 HISTORY OF PULMONARY EMBOLISM: ICD-10-CM

## 2024-09-03 DIAGNOSIS — R79.89 TSH ELEVATION: ICD-10-CM

## 2024-09-03 DIAGNOSIS — G89.29 OTHER CHRONIC PAIN: ICD-10-CM

## 2024-09-03 DIAGNOSIS — F29 PSYCHOSIS, UNSPECIFIED PSYCHOSIS TYPE (H): ICD-10-CM

## 2024-09-03 DIAGNOSIS — F17.201 TOBACCO USE DISORDER, MODERATE, IN SUSTAINED REMISSION: ICD-10-CM

## 2024-09-03 DIAGNOSIS — F42.9 OBSESSIVE-COMPULSIVE DISORDER, UNSPECIFIED TYPE: ICD-10-CM

## 2024-09-03 DIAGNOSIS — F25.0 SCHIZOAFFECTIVE DISORDER, BIPOLAR TYPE (H): Primary | ICD-10-CM

## 2024-09-03 DIAGNOSIS — N39.8 DYSFUNCTIONAL VOIDING OF URINE: ICD-10-CM

## 2024-09-03 PROCEDURE — 250N000013 HC RX MED GY IP 250 OP 250 PS 637: Performed by: EMERGENCY MEDICINE

## 2024-09-03 PROCEDURE — 250N000013 HC RX MED GY IP 250 OP 250 PS 637: Performed by: NURSE PRACTITIONER

## 2024-09-03 PROCEDURE — 124N000002 HC R&B MH UMMC

## 2024-09-03 PROCEDURE — 99285 EMERGENCY DEPT VISIT HI MDM: CPT | Performed by: NURSE PRACTITIONER

## 2024-09-03 PROCEDURE — 250N000012 HC RX MED GY IP 250 OP 636 PS 637: Performed by: EMERGENCY MEDICINE

## 2024-09-03 RX ORDER — OLANZAPINE 10 MG/2ML
10 INJECTION, POWDER, FOR SOLUTION INTRAMUSCULAR 3 TIMES DAILY PRN
Status: DISPENSED | OUTPATIENT
Start: 2024-09-03

## 2024-09-03 RX ORDER — AMOXICILLIN 250 MG
1 CAPSULE ORAL 2 TIMES DAILY PRN
Status: DISCONTINUED | OUTPATIENT
Start: 2024-09-03 | End: 2024-09-08

## 2024-09-03 RX ORDER — OLANZAPINE 10 MG/1
10 TABLET ORAL 3 TIMES DAILY PRN
Status: DISPENSED | OUTPATIENT
Start: 2024-09-03

## 2024-09-03 RX ORDER — MAGNESIUM HYDROXIDE/ALUMINUM HYDROXICE/SIMETHICONE 120; 1200; 1200 MG/30ML; MG/30ML; MG/30ML
30 SUSPENSION ORAL EVERY 4 HOURS PRN
Status: DISPENSED | OUTPATIENT
Start: 2024-09-03

## 2024-09-03 RX ORDER — OLANZAPINE 5 MG/1
5 TABLET ORAL AT BEDTIME
Status: DISCONTINUED | OUTPATIENT
Start: 2024-09-04 | End: 2024-09-12

## 2024-09-03 RX ORDER — TRAZODONE HYDROCHLORIDE 50 MG/1
50 TABLET, FILM COATED ORAL
Status: DISCONTINUED | OUTPATIENT
Start: 2024-09-03 | End: 2024-09-11

## 2024-09-03 RX ORDER — HYDROXYZINE HYDROCHLORIDE 25 MG/1
25 TABLET, FILM COATED ORAL EVERY 4 HOURS PRN
Status: DISCONTINUED | OUTPATIENT
Start: 2024-09-03 | End: 2024-09-11

## 2024-09-03 RX ORDER — ACETAMINOPHEN 325 MG/1
650 TABLET ORAL EVERY 4 HOURS PRN
Status: DISPENSED | OUTPATIENT
Start: 2024-09-03

## 2024-09-03 RX ORDER — QUETIAPINE 400 MG/1
800 TABLET, FILM COATED, EXTENDED RELEASE ORAL AT BEDTIME
Status: DISCONTINUED | OUTPATIENT
Start: 2024-09-04 | End: 2024-09-12

## 2024-09-03 RX ADMIN — HYDROXYZINE HYDROCHLORIDE 25 MG: 25 TABLET, FILM COATED ORAL at 16:54

## 2024-09-03 RX ADMIN — BUPRENORPHINE AND NALOXONE 1 FILM: 2; .5 FILM, SOLUBLE BUCCAL; SUBLINGUAL at 07:59

## 2024-09-03 RX ADMIN — NICOTINE POLACRILEX 4 MG: 4 LOZENGE ORAL at 16:01

## 2024-09-03 RX ADMIN — HALOPERIDOL 5 MG: 5 TABLET ORAL at 01:47

## 2024-09-03 RX ADMIN — FLUVOXAMINE MALEATE 50 MG: 50 TABLET ORAL at 21:17

## 2024-09-03 RX ADMIN — LORAZEPAM 2 MG: 1 TABLET ORAL at 14:42

## 2024-09-03 RX ADMIN — NICOTINE 1 PATCH: 21 PATCH, EXTENDED RELEASE TRANSDERMAL at 07:59

## 2024-09-03 RX ADMIN — DIPHENHYDRAMINE HYDROCHLORIDE 50 MG: 25 CAPSULE ORAL at 14:42

## 2024-09-03 RX ADMIN — METFORMIN HYDROCHLORIDE 500 MG: 500 TABLET ORAL at 07:59

## 2024-09-03 RX ADMIN — NICOTINE POLACRILEX 4 MG: 4 LOZENGE ORAL at 20:41

## 2024-09-03 RX ADMIN — LORAZEPAM 2 MG: 1 TABLET ORAL at 01:47

## 2024-09-03 RX ADMIN — HYDROXYZINE HYDROCHLORIDE 25 MG: 25 TABLET, FILM COATED ORAL at 12:04

## 2024-09-03 RX ADMIN — NICOTINE POLACRILEX 4 MG: 4 LOZENGE ORAL at 18:57

## 2024-09-03 RX ADMIN — NICOTINE POLACRILEX 4 MG: 4 LOZENGE ORAL at 00:33

## 2024-09-03 RX ADMIN — NICOTINE POLACRILEX 4 MG: 2 GUM, CHEWING ORAL at 02:52

## 2024-09-03 RX ADMIN — HYDROXYZINE HYDROCHLORIDE 25 MG: 25 TABLET, FILM COATED ORAL at 00:30

## 2024-09-03 RX ADMIN — NICOTINE POLACRILEX 4 MG: 4 LOZENGE ORAL at 10:32

## 2024-09-03 RX ADMIN — NICOTINE POLACRILEX 4 MG: 4 LOZENGE ORAL at 09:23

## 2024-09-03 RX ADMIN — NICOTINE POLACRILEX 4 MG: 4 LOZENGE ORAL at 14:03

## 2024-09-03 RX ADMIN — DIPHENHYDRAMINE HYDROCHLORIDE 50 MG: 25 CAPSULE ORAL at 01:47

## 2024-09-03 RX ADMIN — HALOPERIDOL 5 MG: 5 TABLET ORAL at 14:42

## 2024-09-03 RX ADMIN — QUETIAPINE 800 MG: 200 TABLET, FILM COATED, EXTENDED RELEASE ORAL at 21:49

## 2024-09-03 RX ADMIN — NICOTINE POLACRILEX 4 MG: 4 LOZENGE ORAL at 12:56

## 2024-09-03 RX ADMIN — METFORMIN HYDROCHLORIDE 500 MG: 500 TABLET ORAL at 17:16

## 2024-09-03 RX ADMIN — OLANZAPINE 5 MG: 5 TABLET, FILM COATED ORAL at 21:17

## 2024-09-03 RX ADMIN — NICOTINE POLACRILEX 4 MG: 4 LOZENGE ORAL at 08:10

## 2024-09-03 ASSESSMENT — ACTIVITIES OF DAILY LIVING (ADL)
ADLS_ACUITY_SCORE: 35

## 2024-09-03 NOTE — TELEPHONE ENCOUNTER
00:30 - Per 6A SHIKHA Gutierrez, unit had a behavioral code, which resulted in pod doors needing to be closed - unit is now not staffed to take admission tonight    Per notes, Dr. Meneses accepted pt 9/2 - pt has to admit to unit by 7AM d/t 72 HH expiring on 9/4/24    00:33 - Paged ANS  00:49 - Discussed w/ ANS Dru, who reports that staffing is down across multiple units and there is not enough staff to send to 6A tonight. Dru reports that unit CRN can assess if able to take pt on NOCs when Staffing office is open at 4AM  01:03 - Discussed w/ 6A SHIKHA Gutierrez, who will assess unit acuity overnight to determine if able to take pt before 7AM, despite short staffing   01:09 - Called [facility] to provide update on POC. RN unavailable at moment but will call Intake back  01:18 - Updated [facility] MELIDA Graham  04:09 - [facility]A SHIKHA Gutierrez called back and is unable to accommodate pt before 7AM d/t unit acuity and being down staff  04:21 - Discussed w/ ANS Dru, who is in support of unit d/t short staffing; 72HH expiring on 9/4/24 and pt would need to be on unit by 7AM. Intake to reach out to Intake supervisor on best POC whether to remove or keep in queue pending commitment paperwork  04:33 - Per Intake Supervisor Mariana, remove pt from queue at this time and pt can be re-presented later d/t legal concerns re: 72HH expiring   04:37 - Notified BRETT Gutierrez that admission canceled at this time. Removed pt from queue  04:39 - Updated [facility] MELIDA Graham that admission to 6A canceled and pt remains on [facility]

## 2024-09-03 NOTE — ED NOTES
Pt up at the desk with frequent requests. Pt needs redirection from staff computers and entryways. Pt is intrusive at the nursing station, peering over and looking at staff screens and items behind the desk. Pt appears restless and disorganized. Pt observed responding to internal stimuli at times. Pt needs frequent redirection but is redirectable.

## 2024-09-03 NOTE — PROGRESS NOTES
Patient has been up most of the night. Being very intrusive, continuously eating and spending most of the time at the nurses desk despite redirection. Moved from chair to chair and at one point started climbing on chairs. Hydroxyzine and B52 given. He just started sleeping this morning at 0545.   NB: Patient could not be transferred. Station [facility] called a couple of times. Initially they had a code. The second time they indicated that there was some staffing concerns and they could not take patient.

## 2024-09-03 NOTE — TELEPHONE ENCOUNTER
8:18 PM Intake called [facility] to review for 6A - Zaira accepted, pt has to admit to unit by 7AM d/t 72HH expiring on 9/4/24.     8:30 PM Intake called unit 6A - CRN is unavailable - Informed RN pt is in queue     8:43 PM Intake called [facility] spoke with RN - informed pt is accepted to 6A . Provider would like pt transported by 7AM     9:49 PM Intake called unit 6A- CRN is unavailable     9:52 PM CRN called intake stating he will pass along to nightshift CRN

## 2024-09-03 NOTE — ED NOTES
"Pt. confused and disorganized this AM. Requesting more medication despite appearing sedated. No additional medication given. Talks about being very stressed about finances and interactions with other group home residents prior to admission. Reports that he was attempting to drive to [facility] as he felt safe there when he was on a trip as a child. Limited memory of events in ER. No identified plans or goals other than \"straighten my mental health out\". Denies SI. Frequent contacts with writer for reality orientation and multiple requests.    "

## 2024-09-03 NOTE — ED NOTES
"Pt. continues to hover at the nurses station. Repeatedly asking for more ativan and suboxone. Wants to discharge so he can \"get high\" and listen to \"his music\".  Disorganized and mumbling random thoughts almost continuously. C/O of boredom but declined any activities suggested by writer.      "

## 2024-09-03 NOTE — PROGRESS NOTES
Triage & Transition Services, Extended Care     Client Name: Eloy Storm    Date: September 3, 2024    Patient was seen yes  Mode of Assessment: In person    Service Type: refused to attend group session  Session Start Time:  1000    Session End Time: 1000  Session Length: 0  Site Location: [facility] EMERGENCY DEPT                             EMP01  Total Number ofAttendees: 0  Topic:   coping skills/lifestyle management   Response: other (see comments) (Pt declined to participate in group.)     [place], [facility]   Licensed Mental Health Professional (LMHP), Extended Care  [phone]
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
## 2024-09-03 NOTE — PROGRESS NOTES
"Triage & Transition Services, Extended Care     Therapy Progress Note    Patient: Eloy goes by \"Eloy,\" uses he/him pronouns  Date of Service: September 3, 2024  Site of Service: [facility] EMERGENCY DEPT                             EMP01  Patient was seen yes  Mode of Assessment: In person    Presentation Summary: Writer approached Pt to engage in therapeutic check-in. When prompted, Pt denies having any mental health concerns, stating his mental health is fine except for his constant panic attacks when he does not get his medications at a consistent time while at [facility]. Pt also voiced wanting his Suboxone and needing to take the Ativan he is getting while here. Pt also identified how he wants a bed and the ability to use his cell phone and listen to the music of his choice. Writer validated Pt's requests, yet identified how these were not available at this time at [facility]. Writer did identify a bed at inpatient level of care, yet Pt stated he does not need that and continued to voice desire for medication adjustments. When Writer asked if there was anything to do for him, Pt declined. Writer ended the interaction. Pt continues to present with limited insight into mental health symptoms and the recent severity of them.    Therapeutic Intervention(s) Provided: Engaged in social skills training.    Current Symptoms:  (Pt identifies having panic attacks when he does not get his medication on time.) impaired decision making anxious  (impaired decision making) loss of appetite    Mental Status Exam   Affect: Appropriate  Appearance: Disheveled  Attention Span/Concentration: Attentive  Eye Contact: Engaged    Fund of Knowledge: Appropriate   Language /Speech Content: Fluent  Language /Speech Volume: Soft  Language /Speech Rate/Productions: Normal  Recent Memory: Poor  Remote Memory: Variable  Mood: Irritable  Orientation to Person: Yes   Orientation to Place: Yes  Orientation to Time of Day: Answer " (please comment)  Orientation to Date: Answer (please comment) (did not assess)     Situation (Do they understand why they are here?): Answer (please comment), No (Pt denies having an mental health symptoms)  Psychomotor Behavior: Normal  Thought Content: Clear  Thought Form: Obsessive/Perseverative (focus of conversation surrounding medication)    Treatment Objective(s) Addressed: rapport building, identifying treatment goals    Patient Response to Interventions: verbalizes understanding    Progress Towards Goals: Patient Reports Symptoms Are: ongoing  Patient Progress Toward Goals: is not making progress  Comment: While Pt has not had an episode of agitation or escalation at [facility], Pt continues to present with limited insight into mental health symptoms.  Next Step to Work Toward Discharge: symptom stabilization  Symptom Stabilization Comment: Pt continues to present with manic like symptoms and poor insight.  Ability to Engage Comment: Pt will need to engage in and complete an aftercare/safety plan.  Awaiting Acceptance at Community Level of Care Comment: Pt is awaiting IP MH bed, but will be reassessed tomorrow for possibel removal from list.    Case Management: Case Management Included: participating in a care conference on patient's behalf  Details on participating in a care conference on patient's behalf: Coordinated with Justo Francis NP and [facility]PS  Summary of Interaction: NP continues to agree with recommendation of IP. At time of note completion, [facility] PPS met with Pt in person for screening regarding petition for commitment. Response from [facility] regarding supporting commitment has not be relayed. Writer has left voicemail with instruction to call back to provide update.    Plan: inpatient mental health  yes provider, MELIDA Francis NP  no    Clinical Substantiation:     When prompted, Pt denies having any mental health concerns, stating his mental health is fine except for  his constant panic attacks when he does not get his medications at a consistent time while at [facility]. Pt also voiced wanting his Suboxone and needing to take the Ativan he is getting while here. Pt stated he does not need IP level of care and continued to voice desire for medication adjustments. Pt continues to present with limited insight into mental health symptoms and the recent severity of them.    At this time IP MH admission is being recommended due to continued presence of limited insight into mental health symptoms. Pt was not able to fully safety plan with Writer. Pt does appear to be at higher risk of death by suicide accidental or intentional due to mental health hx and substance use sx. If Pt is able to effectively safety plan and/or Pts sx improve it would be beneficial to pursue a less restrictive alternative.     At time of note completion, [facility] met with Pt in person for screening regarding petition for commitment. Response from [place] regarding supporting commitment has not be relayed. Writer has left voicemail with instruction to call back to provide update regarding status.      Legal Status: Legal Status at Admission: 72 Hour Hold  72 Hour Hold - Date/Time Initiated: 08/29/2024 1214  72 Hour Hold - Date/Time Ends: 09/04/2024 1214    Session Status: Time session started: 1442  Time session ended: 1447  Session Duration (minutes): 5 minutes  Session Number: 6  Anticipated number of sessions or this episode of care: 7    Time Spent: 5 minutes    CPT Code: CPT Codes: Non-Billable    Diagnosis:   Patient Active Problem List   Diagnosis Code    Suicidal ideation R45.851    Psychosis (H) F29    Acute pulmonary embolism without acute cor pulmonale (H) I26.99    Alcohol use disorder, moderate, in sustained remission, dependence (H) F10.21    Anxiety F41.9    Cannabis use disorder, severe, dependence (H) F12.20    Class 1 drug-induced obesity without serious comorbidity with body mass index  (BMI) of 33.0 to 33.9 in adult E66.1, Z68.33    Depression, major, single episode, moderate (H) F32.1    Episodic mood disorder (H24) F39    KATHE (generalized anxiety disorder) F41.1    History of marijuana use F12.91    Obesity (BMI 30-39.9) E66.9    Obesity, Class I, BMI 30-34.9 E66.9    Tobacco use disorder, moderate, in sustained remission F17.201    Schizoaffective disorder, bipolar type (H) F25.0    Psychosis, unspecified psychosis type (H) F29    Tardive dyskinesia G24.01       Primary Problem This Admission: Active Hospital Problems    Schizoaffective disorder, bipolar type (H)      Anxiety      *Depression, major, single episode, moderate (H)        [place] [facility]   Licensed Mental Health Professional (LMHP), [facility]  [phone]

## 2024-09-03 NOTE — ED PROVIDER NOTES
Patient walked over to chair 12 and stood on chair to look out top of window. Writer directed pt to get down.

## 2024-09-03 NOTE — ED NOTES
Per intake note transfer to 6A will take place overnight. Nights to call Charge nurse around 11:30pm. [phone]

## 2024-09-03 NOTE — ED NOTES
"Pt. starting to escalate after meeting with provider. Asking to leave. Reports his mental health is completely recovered. Wants to have his phone and listen to \"own music\" declined headphones with radio and pandora stations.  Standing  at the nurses station. Repeatedly asking for Suboxone and Ativan. Restless. Given haldol, ativan and benadryl (combo) as ordered.    "

## 2024-09-03 NOTE — CONSULTS
Received call from pre-petition screener. They are supporting the petition for MI commitment. We continue to await court hold, which screener says is sometimes issued up to 5 minutes prior to 72 hour hold expiration. If there are questions, we may contact the  at [phone].

## 2024-09-04 PROCEDURE — 250N000012 HC RX MED GY IP 250 OP 636 PS 637: Performed by: PSYCHIATRY & NEUROLOGY

## 2024-09-04 PROCEDURE — 250N000013 HC RX MED GY IP 250 OP 250 PS 637

## 2024-09-04 PROCEDURE — 124N000002 HC R&B MH UMMC

## 2024-09-04 PROCEDURE — 250N000013 HC RX MED GY IP 250 OP 250 PS 637: Performed by: PSYCHIATRY & NEUROLOGY

## 2024-09-04 PROCEDURE — 97150 GROUP THERAPEUTIC PROCEDURES: CPT | Mod: GO

## 2024-09-04 PROCEDURE — 99223 1ST HOSP IP/OBS HIGH 75: CPT | Mod: GC | Performed by: PSYCHIATRY & NEUROLOGY

## 2024-09-04 PROCEDURE — 250N000013 HC RX MED GY IP 250 OP 250 PS 637: Performed by: STUDENT IN AN ORGANIZED HEALTH CARE EDUCATION/TRAINING PROGRAM

## 2024-09-04 RX ORDER — NICOTINE 21 MG/24HR
1 PATCH, TRANSDERMAL 24 HOURS TRANSDERMAL DAILY
Status: DISPENSED | OUTPATIENT
Start: 2024-09-04

## 2024-09-04 RX ORDER — ASPIRIN 81 MG/1
81 TABLET, CHEWABLE ORAL DAILY
Status: DISPENSED | OUTPATIENT
Start: 2024-09-04

## 2024-09-04 RX ORDER — FLUVOXAMINE MALEATE 50 MG
50 TABLET ORAL AT BEDTIME
Status: DISCONTINUED | OUTPATIENT
Start: 2024-09-04 | End: 2024-09-11

## 2024-09-04 RX ORDER — POLYETHYLENE GLYCOL 3350 17 G
4 POWDER IN PACKET (EA) ORAL
Status: DISCONTINUED | OUTPATIENT
Start: 2024-09-04 | End: 2024-09-05

## 2024-09-04 RX ORDER — MULTIVITAMIN,THERAPEUTIC
1 TABLET ORAL DAILY
Status: DISPENSED | OUTPATIENT
Start: 2024-09-04

## 2024-09-04 RX ORDER — BUPRENORPHINE HYDROCHLORIDE AND NALOXONE HYDROCHLORIDE DIHYDRATE 2; .5 MG/1; MG/1
1 TABLET SUBLINGUAL DAILY
Status: DISCONTINUED | OUTPATIENT
Start: 2024-09-04 | End: 2024-09-04

## 2024-09-04 RX ORDER — BUPRENORPHINE AND NALOXONE 2; .5 MG/1; MG/1
1 FILM, SOLUBLE BUCCAL; SUBLINGUAL DAILY
Status: DISCONTINUED | OUTPATIENT
Start: 2024-09-04 | End: 2024-09-05

## 2024-09-04 RX ADMIN — NICOTINE POLACRILEX 4 MG: 2 LOZENGE ORAL at 17:44

## 2024-09-04 RX ADMIN — NICOTINE POLACRILEX 4 MG: 2 LOZENGE ORAL at 13:12

## 2024-09-04 RX ADMIN — METFORMIN HYDROCHLORIDE 500 MG: 500 TABLET, FILM COATED ORAL at 18:08

## 2024-09-04 RX ADMIN — TRAZODONE HYDROCHLORIDE 50 MG: 50 TABLET ORAL at 00:20

## 2024-09-04 RX ADMIN — QUETIAPINE 800 MG: 400 TABLET, FILM COATED, EXTENDED RELEASE ORAL at 20:07

## 2024-09-04 RX ADMIN — TRAZODONE HYDROCHLORIDE 50 MG: 50 TABLET ORAL at 01:22

## 2024-09-04 RX ADMIN — NICOTINE POLACRILEX 4 MG: 2 LOZENGE ORAL at 15:47

## 2024-09-04 RX ADMIN — NICOTINE POLACRILEX 4 MG: 2 LOZENGE ORAL at 22:08

## 2024-09-04 RX ADMIN — HYDROXYZINE HYDROCHLORIDE 25 MG: 25 TABLET, FILM COATED ORAL at 18:23

## 2024-09-04 RX ADMIN — HYDROXYZINE HYDROCHLORIDE 25 MG: 25 TABLET, FILM COATED ORAL at 22:10

## 2024-09-04 RX ADMIN — FLUVOXAMINE MALEATE 50 MG: 50 TABLET ORAL at 20:07

## 2024-09-04 RX ADMIN — HYDROXYZINE HYDROCHLORIDE 25 MG: 25 TABLET, FILM COATED ORAL at 01:10

## 2024-09-04 RX ADMIN — NICOTINE POLACRILEX 4 MG: 2 LOZENGE ORAL at 20:45

## 2024-09-04 RX ADMIN — TRAZODONE HYDROCHLORIDE 50 MG: 50 TABLET ORAL at 23:33

## 2024-09-04 RX ADMIN — NICOTINE 1 PATCH: 21 PATCH, EXTENDED RELEASE TRANSDERMAL at 10:10

## 2024-09-04 RX ADMIN — OLANZAPINE 5 MG: 5 TABLET, FILM COATED ORAL at 20:07

## 2024-09-04 RX ADMIN — BUPRENORPHINE AND NALOXONE 1 FILM: 2; .5 FILM, SOLUBLE BUCCAL; SUBLINGUAL at 10:10

## 2024-09-04 RX ADMIN — HYDROXYZINE HYDROCHLORIDE 25 MG: 25 TABLET, FILM COATED ORAL at 14:07

## 2024-09-04 ASSESSMENT — ACTIVITIES OF DAILY LIVING (ADL)
ADLS_ACUITY_SCORE: 28
ORAL_HYGIENE: INDEPENDENT
ADLS_ACUITY_SCORE: 28
WEAR_GLASSES_OR_BLIND: NO
ADLS_ACUITY_SCORE: 28
DOING_ERRANDS_INDEPENDENTLY_DIFFICULTY: NO
ADLS_ACUITY_SCORE: 28
HYGIENE/GROOMING: INDEPENDENT
ADLS_ACUITY_SCORE: 28
ADLS_ACUITY_SCORE: 28
HEARING_DIFFICULTY_OR_DEAF: NO
DRESSING/BATHING_DIFFICULTY: NO
ADLS_ACUITY_SCORE: 28
ADLS_ACUITY_SCORE: 28
DRESS: INDEPENDENT
ADLS_ACUITY_SCORE: 28
ADLS_ACUITY_SCORE: 28
ADLS_ACUITY_SCORE: 45
WALKING_OR_CLIMBING_STAIRS_DIFFICULTY: NO
ADLS_ACUITY_SCORE: 28
ADLS_ACUITY_SCORE: 28
DIFFICULTY_COMMUNICATING: NO
DIFFICULTY_EATING/SWALLOWING: NO
ADLS_ACUITY_SCORE: 28
CONCENTRATING,_REMEMBERING_OR_MAKING_DECISIONS_DIFFICULTY: NO
ADLS_ACUITY_SCORE: 28
ADLS_ACUITY_SCORE: 28
LAUNDRY: UNABLE TO COMPLETE
TOILETING_ISSUES: NO
ADLS_ACUITY_SCORE: 28
HYGIENE/GROOMING: INDEPENDENT
ADLS_ACUITY_SCORE: 45
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
## 2024-09-04 NOTE — PLAN OF CARE
"   09/03/24 2329   Patient Belongings   Patient Belongings locker   Patient Belongings Put in Hospital Secure Location (Security or Locker, etc.) bracelet;cell phone/electronics;clothing;jewelry;necklace;ring   Belongings Search Yes   Clothing Search Yes   Second Staff Klever Myles Belt, t-shirt x2, sweatshirt, jeans, hoodie x2, note book & papers, box of tea, toiletries, grey sox, tan  sox, cell phone, ear buds, vape, smart watch, metal  necklace, metal bracelet, metal necklace w/ crucifix and \"LETICIA\", metal rings x2, metal \"daisy key\" x2 have been placed into locked storage on [address]. Pt has been given his underwear and sandals.     Goal Outcome Evaluation:               A               Admission:  I am responsible for any personal items that are not sent to the safe or pharmacy.  [facility] is not responsible for loss, theft or damage of any property in my possession.    Signature:  _________________________________ Date: _______  Time: _____                                              Staff Signature:  ____________________________ Date: ________  Time: _____      2nd Staff person, if patient is unable/unwilling to sign:    Signature: ________________________________ Date: ________  Time: _____     Discharge:  [facility] has returned all of my personal belongings:    Signature: _________________________________ Date: ________  Time: _____                                          Staff Signature:  ____________________________ Date: ________  Time: _____                  "

## 2024-09-04 NOTE — PLAN OF CARE
"  Rehab Group    Start time: 1115  End time: 120  Patient time total: 35 minutes    attended partial group    #2 attended   Group Type: OT Clinic   Group Topic Covered: coping skills     Group Session Detail:    Intervention: Pt participated in a OT Clinic group to facilitate coping skills exploration and creative expression through personally meaningful activities, and to encourage utilization of these healthy coping skills to promote overall health and wellness. Group included clinical observation of social, cognitive and kinesthetic performance skills to inform treatment and safe discharge planning.    Mood/Affect: Pleasant overall, frustrated on and off towards the end of group. Was after he appeared to be responding to internal stimuli.     Plan: Patient encouraged to maintain attendance for continued ongoing support in working towards occupational therapy goals to support overall treatment/care.        Patient Detail:    Was in and out of lounge at start of group, declining to participate. Later on however requesting to see what supplies were available to work on and selected a project. Worked on this off and on, getting distracted at tomes and towards end appeared to be responding more and becoming agitated with these voices stating at one point \"yeah I hate that bitch\" but looking ahead at no one in particular. As writer attempted to re-orient to the task by offering a compliment on their project, stated \"I didn't ask for your opinion\".       [id number] OT Group (2 or more in attendance)    Patient Active Problem List   Diagnosis    Suicidal ideation    Psychosis (H)    Acute pulmonary embolism without acute cor pulmonale (H)    Alcohol use disorder, moderate, in sustained remission, dependence (H)    Anxiety    Cannabis use disorder, severe, dependence (H)    Class 1 drug-induced obesity without serious comorbidity with body mass index (BMI) of 33.0 to 33.9 in adult    Depression, major, single episode, moderate " (H)    Episodic mood disorder (H24)    KATHE (generalized anxiety disorder)    History of marijuana use    Obesity (BMI 30-39.9)    Obesity, Class I, BMI 30-34.9    Tobacco use disorder, moderate, in sustained remission    Schizoaffective disorder, bipolar type (H)    Psychosis, unspecified psychosis type (H)    Tardive dyskinesia

## 2024-09-04 NOTE — PLAN OF CARE
"Nursing Assessment    Recent Vitals: refused vitals    Mental Status Exam:  Appearance:  Poorly groomed and Disheveled  Behavior/relationship to examiner/demeanor:  Needy and Interruptive  Affect (objective appearance):  Blunted/Flat  Mood (subjective report):  flat and impatient  Gait:  Normal  Speech rate, volume, coherence:  Normal, Normal, Normal    Thought Process (Associations):  poverty of content  Thought process (Rate):  Slowed  Abnormal Perception: Derealization  Attention/Concentration:  Poor and Easily distracted,  Oriented to person, Oriented to place, and Oriented to date/time  Insight:  Limited  Judgment:  Limited    General Shift Summary  Patient has been present in the unge, frequently at the nurses station, and has multiple demands. Patient has asked the same question even after giving him a response. In the morning he wanted his Suboxone. Writer informed him that currently there was not an order and that he would see the provider some time this morning to discuss the Suboxone. Patient said \"But I already have an order\". Writer said that orders get renewed when patient's get admitted from another hospital and that the covering provider from last night didn't put the order in for Suboxone. Patient continued to state \"But why? I've already taken it. I have an order\". Writer has attempted to explain the response in different ways for him to understand better but he continues to ask why. Patient remained at the nurses station. When attempting to redirect him away for privacy of other patient's he remained at the nurses station and said \"When I get my Suboxone I will leave\".    Once Suboxone was ordered and provided at 1010, patient was much more pleasant. He continued to frequently seek out staff with various small requests but no longer presented as irritable. Affect presents as blunted. He denied experiencing AVH but writer noticed him talking to himself. When asking what he said he would always " "say \"Nothing\".    When checking in with patient he stated that he wants more friends \"Every time I make them, they no longer want to hand out with me. I just can't think of what to say.\". Patient said his mind is clearer after smoking Marijuana or after ingesting Kraton. Patient said that he plans to continue to do drugs outpatient. He requested for his Suboxone to be BID and said this is how he was taking the medication prior,    Tenisha Barboza RN MSN  "

## 2024-09-04 NOTE — H&P
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 family over past couple months although his mother is still involved in his care. Has strained relationship with family. Worked briefly this summer but has been recently off. Protective factors include mother and step sister,  system, .     In summary, the patient's reported symptoms of erratic behavior, passive SI, poor insight with lability and irritability, increased paranoia over past several months in the context of substance use, Kratom and Cannabis, are consistent with polysubstance use disorder and co-occurring mixed mood and psychotic episode of schizoaffective disorder. Patient's definitive diagnosis is still in evolution and will require further observation and assessment this admission. Pt does not exhibit clear manic sxs at this time nor does he exhibit clear OCD sxs although these have been documented in his chart and will require further assessment. He will likely benefit from medication optimization and CD referral if open to this during this admission. Pt is currently in court hold for petition for MH commitment but may require CD commitment.     Given that he currently has SI, out of control behaviors, and mixed mood episode with paranoia,  patient warrants inpatient psychiatric hospitalization to maintain his safety.      Psychiatric Plan by Diagnosis      # mixed mood episode  1. Medications:  - Luvox 50mg at bedtime  -Zyprexa 5mg at bedtime  - Seroquel ER 800mg qhs     2. Pertinent Labs/Monitoring:   - CBC, CMP, A1C, lipids     3. Additional Plans:  - Patient will be treated in therapeutic milieu with appropriate individual and group therapies as described    # Polysubstance use disorder  #OUD  -Restart Suboxone 2mg -0.5mg film daily  - Would benefit from CD tx but currently refusing        Psychiatric Hospital Course:      Eloy Strom was admitted to [facility] [address] on [address] hold.   Medications:  PTA Luvox 50mg, Zyprexa 5mg, Seroquel ER 800mg were continued.   PTA prozac was held due to pt already having been tapered off of it.    New medications started at the time of admission include Suboxone started in the ED.     The risks, benefits, alternatives, and side effects were discussed and understood by the patient and other caregivers.     Medical Assessment and Plan     Medical diagnoses to be addressed this admission:    # none  CTM for new developments and side effects  Metformin PTA dose for weight gain continued and baby aspirin for past hx of blood clot prevention continued    Medical course: Patient was physically examined by the ED prior to being transferred to the unit and was found to be medically stable and appropriate for admission.     Consults:  none     Checklist     Legal Status: Orders Placed This Encounter      Court Hold      Safety Assessment:   Behavioral Orders   Procedures    Assault precautions    Code 1 - Restrict to Unit    Routine Programming     As clinically indicated    Status 15     Every 15 minutes.       Risk Assessment:  Risk for harm is moderate-high.  Risk factors: SI, maladaptive coping, substance use, family dynamics, impulsive, and past behaviors  Protective factors: family and outpt care team      SIO: not  indicated at this time    Dispo: TBD. Disposition pending clinical stabilization, medication optimization and development of an appropriate discharge plan.     Attestations:     Pt staffed with attending physician, Dr. Vincenzo Arreola MD  Psychiatry Resident Physician

## 2024-09-04 NOTE — PLAN OF CARE
"Individual Therapy Note      Date of Service: September 4, 2024    Patient: Eloy goes by \"Eloy,\" uses he/him pronouns    Individuals Present: Eloy & Lianne [facility]    Session start: 1055  Session end: 1116  Session duration in minutes: 21      Modality Used: Rapport Building, Brief Therapy, and Solution Focused    Goals: [place] to psychotherapy.     Patient Description of current symptoms: Eloy's main concern was lack of connection, stating, \"You know what the opposite of addiction is? Connection. I lost my connections.\"      Mental Status Exam:   Attitude: evasive and slightly uncooperative  Eye Contact: fair  Mood: angry  Affect: intensity is heightened  Speech: mumbling  Psychomotor Behavior: no evidence of tardive dyskinesia, dystonia, or tics  Thought Process:  linear  Associations: no loose associations  Thought Content: paranoid thinking  Insight: fair  Judgment: limited  Attention Span and Concentration: fair    Pt progress: First meeting.    Treatment Objective(s) Addressed:   The focus of this session was on orienting the patient to therapy     Progress Towards Goals and Assessment of Patient:   Writer introduced self to Eloy as unit therapist and he was interested in psychotherapy. We met immediately after introduction. He stated his main concern is lack of connection as his two cousins recently moved to [place] and [place] to attend [employer]. We explored his history of stable and meaningful relationships. He stated that in the fifth grade a girl didn't call him back, and in middle school a girl ended their relationship, and that \"both of those affected [him] a lot.\" He stated he wants a girlfriend and he doesn't want brenda friends because men \"can't be trusted.\" He then said, \"I'm not mauricio so I don't need brenda friends.\" He then looked at writer and stated, \"I think you're disgusting.\" Writer responded, \"Okay\" and Eloy continued on with his previous thought. No change in affect throughout that " "interaction. He brought up a belief that everybody abandons him. He stated he had good friends but then they stopped reaching out. He described a similar pattern with multiple friend groups, he had no insight into why this pattern continues. He then stated that writer intends to betray him, when asked how writer will betray him he stated, \"You're going to talk about me.\" His tone became intense. Writer assured him that it's not writer's intention to betray him. Writer asked about his artwork and stated that it appears he's creative, he became upset about this, stating that writer is lying to him. Writer attempted to redirect the conversation by asking what might be helpful to work on during hospitalization. He stated, \"I want to be your friend. If you want to hang out that would be helpful. I don't want therapy.\" Writer reiterated the purpose of psychotherapy while inpatient. He asked if his mom could bring in an iPad, writer educated him on the unit policy and informed him that he cannot have an iPad on the unit. He replied, \"I guess I'll stay bored then.\" Writer encouraged him to attend groups if he's looking for community and activities while he's here. He stated he had no interest in groups, his tone became more intense, writer ended the session as it was no longer productive.     Therapeutic Intervention(s):   Provided active listening, unconditional positive regard, and validation. Engaged in guided discovery, explored patient's perspectives and helped expand them through socratic dialogue. Explored and identified early warning signs and triggers to substance use. Explored theme of abandonment.     Plan/next step: Writer will remain available for psychotherapy.    50871 - Psychotherapy (with patient) - 30 (16-37*) min    Patient Active Problem List   Diagnosis    Suicidal ideation    Psychosis (H)    Acute pulmonary embolism without acute cor pulmonale (H)    Alcohol use disorder, moderate, in sustained " remission, dependence (H)    Anxiety    Cannabis use disorder, severe, dependence (H)    Class 1 drug-induced obesity without serious comorbidity with body mass index (BMI) of 33.0 to 33.9 in adult    Depression, major, single episode, moderate (H)    Episodic mood disorder (H24)    KATHE (generalized anxiety disorder)    History of marijuana use    Obesity (BMI 30-39.9)    Obesity, Class I, BMI 30-34.9    Tobacco use disorder, moderate, in sustained remission    Schizoaffective disorder, bipolar type (H)    Psychosis, unspecified psychosis type (H)    Tardive dyskinesia

## 2024-09-04 NOTE — ED NOTES
Bed has been secured for patient on station 12 @ [facility] under the care of Dr. Hanson. Report called to Francisco MONTEZ. Transportation arranged.
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
## 2024-09-04 NOTE — PLAN OF CARE
"NOC Shift Report    Pt slept 4.5 hours overnight.     Eloy was awake at the start of shift. He had recently been admitted during the evening shift.  The pt alert and oriented x3 He was up and walking about his room and into the hallway. His affect was blunted. Upon this RN writer leaving the report room and walking onto the unit, the pt presented as polite, calm, and concerned. He asked this RN writer if he could be given \"my HS dose of Suboxone medication and my other night time meds.\" This RN writer informed the pt his chart and orders would first need to be looked over and then this RN writer would get back to him as soon as possible. The pt waited at the med room doorway, and was asked to wait in the lounge or his room so his peers could access the med window, instead, the pt moved to the nursing station doorway window and was not redirectable.    After speaking with the evening RN and looking over the pt's chart, the pt's MAR indicated he was administered Suboxone at 0759 on 09/03/24 and his HS medication at 2117 on 09/03/24 at [facility], prior to arriving at [facility]. Upon informing the pt, he became tense and adament that he had not taken his HS medication and that he \"always takes Suboxone in the morning and at bedtime.\" After further education did not reassure the pt, the pt asked that [facility] be called for clarification. The pt stood at the doorway of the nursing station and stared at staff and again was not verbally redirectable. This RN writer called [facility] and spoke with MELIDA Guaman. [facility] confirmed the pt was given his HS medication by his RN, Jaz, prior to leaving [facility] and that all of his medication orders at [facility] were then all discontinued at 2326. The RN also stated that while at [facility], the pt was often forgetful about medications he took and was persistent in seeking medications.    The pt was informed the medication administration of his HS meds at " "[facility] was confirmed. The pt continued to insist \"They made a mistake!\" This RN attempted to reassure the pt could speak with his provider during the daytime. The pt was offered his ordered prn medications and he declined. The pt then asked for a list of all his medications. A print out from the MAR was given to the pt, and this RN pointed out the dates and times he received his medications, and that an HS dose of Suboxone was discontinued a few days ago. The pt continued to dispute the medication information given to him. The pt then stated that a prn of ativan was still available to him, even after this RN pointed out on the MAR that ativan had been discontinued. The pt then insisted this RN \"call the on-call doctor to order Suboxone to be given to me tonight\". This RN writer explained an on-call provider would not add back an order of Suboxone that was discontinued 4-5 days ago. This RN then listened and conveyed empathy that he is feeling frustrated and reminded him that he would be speaking with his provider during the upcoming day. At this time, the pt was agreeable to take prns of trazodone for sleep, and hydroxyzine for anxiety. Pt declined a prn of Zyprexa 10 mg. Pt continued to deny pain or discomfort.     Of note, from the time the pt's was admitted to St [facility], the pt had not exhibited any indications or sxs of withdrawal, and his VS were WNL.    PRNs given: 0020 trazodone 50 mg for sleep, 0110 a prn of hydroxyzine 25 mg for anxiety, and a repeat dose of trazodone 50 mg at 0122.    Pt refused to answer any admission questions to complete his admission process. He may be more willing tomorrow.    When the pt was sleeping, no apparent respiratory distress was observed.    Pt did not present with any acute medical or behavioral concerns overnight.    Will continue to monitor.    Pt's court paperwork is in the pt's chart. The pt may need a copy of this paperwork.    Goal Outcome Evaluation:  Problem: Sleep " Disturbance  Goal: Adequate Sleep/Rest  9/4/2024 0352 by Karrie iMller, RN  Outcome: Not Progressing

## 2024-09-04 NOTE — PHARMACY-ADMISSION MEDICATION HISTORY
"Admission Medication History Completed by Pharmacy    Please refer to pharmacy progress note on 8/28, \"Pharmacy-Admission Medication History\" under previous encounter at 8/28/2024 in [facility] Emergency Dept for information regarding prior to admission medications.    Janelle Chong, Pharm.D., [facility]  Behavioral Health Inpatient Pharmacist  [facility] ([facility]) Emergency Department  Contact via [employer] or Epic Messaging       "

## 2024-09-04 NOTE — PLAN OF CARE
Team Note Due:  Wednesday    Assessment/Intervention/Current Symtoms and Care Coordination:  Chart review and met with team, discussed pt progress, symptomology, and response to treatment.  Discussed the discharge plan and any potential impediments to discharge.    Per team, pt is tense, had his arms crossed, had a circular conversation with nursing about his meds. He did a CD assessment at [facility] but would not complete it/they did not complete it due to pt refusing to attend tx and wants to keep using drugs. Pt presented as erratic, disorganized, delusional, paranoid. They petitioned for MI, no Prado. He was coloring on his walls. During DEC assessment, they noted no delusions. Suspected cannabis, benzo use, Kratom.     Pt met with unit therapist and made in appropriate comments such as wanting to be friends, and randomly stating therapist was disgusting incongruent to context.     Colleague to complete initial psychosocial assessment. I did introduce myself to patient and shared my role.     Saad at [facility] court called in verbal court hold at 11:51AM and will fax it over. Received fax. Placed copy in binder and copy given to patient. I reviewed this with him and he asked if he could get his own  after I stated he will be assigned one, I stated he could ask for this. Then he stated he wanted me to talk to his mom. I said I can get an KELBY then he said no he wants me to speak to his grandfather. I stated I can get an KELBY for him. When I went to give patient the court hold he was responding outloud to something in his room. He has large words written all over papers in markers, and words all over his arms in marker.     Discharge Plan or Goal:  TBD     Barriers to Discharge:  Symptoms - jamila, agitation     Referral Status:  TBD     Legal Status:  Court hold as of 11:51AM today 9/4.   County: [place]  File Number: tbd  Start and expiration date of commitment:     Contacts (include KELBY  status):  Declined KELBY for mom   (Megan) Ph: [phone]   Psych: Dr. Khan, [facility], Ph:      Upcoming Meetings and Dates/Important Information and next steps:  Coordinate care

## 2024-09-04 NOTE — PLAN OF CARE
BEH IP Unit Acuity Rating Score (UARS)  Patient is given one point for every criteria they meet.    CRITERIA SCORING   On a 72 hour hold, court hold, committed, stay of commitment, or revocation. 1    Patient LOS on BEH unit exceeds 20 days. 0  LOS: 1   Patient under guardianship, 55+, otherwise medically complex, or under age 11. 0   Suicide ideation without relief of precipitating factors. 0   Current plan for suicide. 0   Current plan for homicide. 0   Imminent risk or actual attempt to seriously harm another without relief of factors precipitating the attempt. 0   Severe dysfunction in daily living (ex: complete neglect for self care, extreme disruption in vegetative function, extreme deterioration in social interactions). 1   Recent (last 7 days) or current physical aggression in the ED or on unit. 1 - 8/30   Restraints or seclusion episode in past 72 hours. 1   - 8/30   Recent (last 7 days) or current verbal aggression, agitation, yelling, etc., while in the ED or unit. 1 - 8/30   Active psychosis. 1   Need for constant or near constant redirection (from leaving, from others, etc).  0   Intrusive or disruptive behaviors. 1   Patient requires 3 or more hours of individualized nursing care per 8-hour shift (i.e. for ADLs, meds, therapeutic interventions). 0   TOTAL 7

## 2024-09-05 LAB
ALBUMIN SERPL BCG-MCNC: 3.9 G/DL (ref 3.5–5.2)
ALP SERPL-CCNC: 75 U/L (ref 40–150)
ALT SERPL W P-5'-P-CCNC: 20 U/L (ref 0–70)
ANION GAP SERPL CALCULATED.3IONS-SCNC: 8 MMOL/L (ref 7–15)
AST SERPL W P-5'-P-CCNC: 22 U/L (ref 0–45)
BASOPHILS # BLD AUTO: 0 10E3/UL (ref 0–0.2)
BASOPHILS NFR BLD AUTO: 0 %
BILIRUB SERPL-MCNC: 0.5 MG/DL
BUN SERPL-MCNC: 8.7 MG/DL (ref 6–20)
CALCIUM SERPL-MCNC: 9.2 MG/DL (ref 8.8–10.4)
CHLORIDE SERPL-SCNC: 102 MMOL/L (ref 98–107)
CHOLEST SERPL-MCNC: 110 MG/DL
CREAT SERPL-MCNC: 0.95 MG/DL (ref 0.67–1.17)
EGFRCR SERPLBLD CKD-EPI 2021: >90 ML/MIN/1.73M2
EOSINOPHIL # BLD AUTO: 0.2 10E3/UL (ref 0–0.7)
EOSINOPHIL NFR BLD AUTO: 2 %
ERYTHROCYTE [DISTWIDTH] IN BLOOD BY AUTOMATED COUNT: 12.1 % (ref 10–15)
GLUCOSE SERPL-MCNC: 94 MG/DL (ref 70–99)
HBA1C MFR BLD: 4.8 %
HCO3 SERPL-SCNC: 28 MMOL/L (ref 22–29)
HCT VFR BLD AUTO: 39.4 % (ref 40–53)
HDLC SERPL-MCNC: 26 MG/DL
HGB BLD-MCNC: 14 G/DL (ref 13.3–17.7)
IMM GRANULOCYTES # BLD: 0 10E3/UL
IMM GRANULOCYTES NFR BLD: 0 %
LDLC SERPL CALC-MCNC: 39 MG/DL
LYMPHOCYTES # BLD AUTO: 2.3 10E3/UL (ref 0.8–5.3)
LYMPHOCYTES NFR BLD AUTO: 30 %
MCH RBC QN AUTO: 32.2 PG (ref 26.5–33)
MCHC RBC AUTO-ENTMCNC: 35.5 G/DL (ref 31.5–36.5)
MCV RBC AUTO: 91 FL (ref 78–100)
MONOCYTES # BLD AUTO: 0.9 10E3/UL (ref 0–1.3)
MONOCYTES NFR BLD AUTO: 12 %
NEUTROPHILS # BLD AUTO: 4.2 10E3/UL (ref 1.6–8.3)
NEUTROPHILS NFR BLD AUTO: 56 %
NONHDLC SERPL-MCNC: 84 MG/DL
NRBC # BLD AUTO: 0 10E3/UL
NRBC BLD AUTO-RTO: 0 /100
PLATELET # BLD AUTO: 232 10E3/UL (ref 150–450)
POTASSIUM SERPL-SCNC: 3.9 MMOL/L (ref 3.4–5.3)
PROT SERPL-MCNC: 6.1 G/DL (ref 6.4–8.3)
RBC # BLD AUTO: 4.35 10E6/UL (ref 4.4–5.9)
SODIUM SERPL-SCNC: 138 MMOL/L (ref 135–145)
TRIGL SERPL-MCNC: 226 MG/DL
WBC # BLD AUTO: 7.6 10E3/UL (ref 4–11)

## 2024-09-05 PROCEDURE — 85004 AUTOMATED DIFF WBC COUNT: CPT | Performed by: CLINICAL NURSE SPECIALIST

## 2024-09-05 PROCEDURE — 250N000013 HC RX MED GY IP 250 OP 250 PS 637: Performed by: STUDENT IN AN ORGANIZED HEALTH CARE EDUCATION/TRAINING PROGRAM

## 2024-09-05 PROCEDURE — 36415 COLL VENOUS BLD VENIPUNCTURE: CPT | Performed by: CLINICAL NURSE SPECIALIST

## 2024-09-05 PROCEDURE — 97150 GROUP THERAPEUTIC PROCEDURES: CPT | Mod: GO

## 2024-09-05 PROCEDURE — 250N000013 HC RX MED GY IP 250 OP 250 PS 637

## 2024-09-05 PROCEDURE — 83036 HEMOGLOBIN GLYCOSYLATED A1C: CPT | Performed by: CLINICAL NURSE SPECIALIST

## 2024-09-05 PROCEDURE — 250N000012 HC RX MED GY IP 250 OP 636 PS 637: Performed by: PSYCHIATRY & NEUROLOGY

## 2024-09-05 PROCEDURE — 80061 LIPID PANEL: CPT | Performed by: CLINICAL NURSE SPECIALIST

## 2024-09-05 PROCEDURE — 124N000002 HC R&B MH UMMC

## 2024-09-05 PROCEDURE — 250N000013 HC RX MED GY IP 250 OP 250 PS 637: Performed by: PSYCHIATRY & NEUROLOGY

## 2024-09-05 PROCEDURE — 99232 SBSQ HOSP IP/OBS MODERATE 35: CPT | Performed by: PSYCHIATRY & NEUROLOGY

## 2024-09-05 PROCEDURE — 82040 ASSAY OF SERUM ALBUMIN: CPT | Performed by: CLINICAL NURSE SPECIALIST

## 2024-09-05 RX ORDER — POLYETHYLENE GLYCOL 3350 17 G
4 POWDER IN PACKET (EA) ORAL
Status: DISPENSED | OUTPATIENT
Start: 2024-09-05

## 2024-09-05 RX ORDER — BUPRENORPHINE AND NALOXONE 2; .5 MG/1; MG/1
1 FILM, SOLUBLE BUCCAL; SUBLINGUAL 2 TIMES DAILY
Status: DISCONTINUED | OUTPATIENT
Start: 2024-09-05 | End: 2024-09-11

## 2024-09-05 RX ADMIN — NICOTINE POLACRILEX 4 MG: 2 GUM, CHEWING BUCCAL at 19:58

## 2024-09-05 RX ADMIN — NICOTINE POLACRILEX 4 MG: 2 LOZENGE ORAL at 22:11

## 2024-09-05 RX ADMIN — NICOTINE POLACRILEX 4 MG: 2 GUM, CHEWING BUCCAL at 13:34

## 2024-09-05 RX ADMIN — BUPRENORPHINE AND NALOXONE 1 FILM: 2; .5 FILM, SOLUBLE BUCCAL; SUBLINGUAL at 19:58

## 2024-09-05 RX ADMIN — THERA TABS 1 TABLET: TAB at 10:06

## 2024-09-05 RX ADMIN — NICOTINE POLACRILEX 4 MG: 2 GUM, CHEWING BUCCAL at 17:54

## 2024-09-05 RX ADMIN — NICOTINE POLACRILEX 4 MG: 2 LOZENGE ORAL at 11:15

## 2024-09-05 RX ADMIN — METFORMIN HYDROCHLORIDE 500 MG: 500 TABLET, FILM COATED ORAL at 17:54

## 2024-09-05 RX ADMIN — TRAZODONE HYDROCHLORIDE 50 MG: 50 TABLET ORAL at 02:10

## 2024-09-05 RX ADMIN — QUETIAPINE 800 MG: 400 TABLET, FILM COATED, EXTENDED RELEASE ORAL at 21:48

## 2024-09-05 RX ADMIN — ASPIRIN 81 MG CHEWABLE TABLET 81 MG: 81 TABLET CHEWABLE at 10:06

## 2024-09-05 RX ADMIN — HYDROXYZINE HYDROCHLORIDE 25 MG: 25 TABLET, FILM COATED ORAL at 17:53

## 2024-09-05 RX ADMIN — HYDROXYZINE HYDROCHLORIDE 25 MG: 25 TABLET, FILM COATED ORAL at 13:47

## 2024-09-05 RX ADMIN — METFORMIN HYDROCHLORIDE 500 MG: 500 TABLET, FILM COATED ORAL at 10:06

## 2024-09-05 RX ADMIN — NICOTINE 1 PATCH: 21 PATCH, EXTENDED RELEASE TRANSDERMAL at 10:14

## 2024-09-05 RX ADMIN — BUPRENORPHINE AND NALOXONE 1 FILM: 2; .5 FILM, SOLUBLE BUCCAL; SUBLINGUAL at 10:06

## 2024-09-05 RX ADMIN — FLUVOXAMINE MALEATE 50 MG: 50 TABLET ORAL at 21:48

## 2024-09-05 RX ADMIN — NICOTINE POLACRILEX 4 MG: 2 GUM, CHEWING BUCCAL at 16:05

## 2024-09-05 ASSESSMENT — ACTIVITIES OF DAILY LIVING (ADL)
ORAL_HYGIENE: INDEPENDENT
ADLS_ACUITY_SCORE: 28
DRESS: INDEPENDENT
ADLS_ACUITY_SCORE: 28
ORAL_HYGIENE: INDEPENDENT
ADLS_ACUITY_SCORE: 28
ADLS_ACUITY_SCORE: 28
LAUNDRY: UNABLE TO COMPLETE
ADLS_ACUITY_SCORE: 28
HYGIENE/GROOMING: INDEPENDENT
ADLS_ACUITY_SCORE: 28
DRESS: INDEPENDENT
ADLS_ACUITY_SCORE: 28
HYGIENE/GROOMING: INDEPENDENT
ADLS_ACUITY_SCORE: 28
LAUNDRY: UNABLE TO COMPLETE

## 2024-09-05 NOTE — PLAN OF CARE
BEH IP Unit Acuity Rating Score (UARS)  Patient is given one point for every criteria they meet.    CRITERIA SCORING   On a 72 hour hold, court hold, committed, stay of commitment, or revocation. 1    Patient LOS on BEH unit exceeds 20 days. 0  LOS: 2   Patient under guardianship, 55+, otherwise medically complex, or under age 11. 0   Suicide ideation without relief of precipitating factors. 0   Current plan for suicide. 0   Current plan for homicide. 0   Imminent risk or actual attempt to seriously harm another without relief of factors precipitating the attempt. 1   Severe dysfunction in daily living (ex: complete neglect for self care, extreme disruption in vegetative function, extreme deterioration in social interactions). 1   Recent (last 7 days) or current physical aggression in the ED or on unit. 1 - 8/30   Restraints or seclusion episode in past 72 hours. 1   - 8/30   Recent (last 7 days) or current verbal aggression, agitation, yelling, etc., while in the ED or unit. 1 - 8/30   Active psychosis. 1   Need for constant or near constant redirection (from leaving, from others, etc).  0   Intrusive or disruptive behaviors. 1   Patient requires 3 or more hours of individualized nursing care per 8-hour shift (i.e. for ADLs, meds, therapeutic interventions). 0   TOTAL 8

## 2024-09-05 NOTE — PLAN OF CARE
Problem: Adult Inpatient Plan of Care  Goal: Plan of Care Review  Description: The Plan of Care Review/Shift note should be completed every shift.  The Outcome Evaluation is a brief statement about your assessment that the patient is improving, declining, or no change.  This information will be displayed automatically on your shift  note.  Outcome: Not Progressing   Goal Outcome Evaluation:    Plan of Care Reviewed With: patient      No significant change in pt's status, he was out in the milieu. Disorganized. In and out of his room. Constantly seeking staff out with requests and questions. Preoccupied with increasing his suboxone. At first insisted on getting a dose believing the dose was increased to twice daily. Pt was told many times the dose did not change from once a day. Not sure he understands this, he continues to need reminders.  Pt ate dinner, watched TV, made phone calls, mood irritable and tense. No angry out burst.
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
## 2024-09-05 NOTE — PLAN OF CARE
Pt had an uneventful shift this day. Pt denies SI, SIB, HI and thoughts of harming others. Pt denies depression/ and anxiety and A/VH.     Pt mood is calm/slightly irritated with flat affect. Pt was present on the fringes of the milieu. Pt would often stand by during RN to pt conversation and insert nonsensical comments and walk away. RN writer was entering the hallway from the staff breakroom, pt was standing in corner right by the door. Pt complied with request to move across the villarreal. At that time, the provider opened the door to the staff breakroom, pt attempted to follow provider through the door. RN writer redirected pt w/o issue.    No behavioral outbursts, no physical concerns this shift.

## 2024-09-05 NOTE — PLAN OF CARE
Team Note Due:  Wednesday    Assessment/Intervention/Current Symtoms and Care Coordination:  Chart review and met with team, discussed pt progress, symptomology, and response to treatment.  Discussed the discharge plan and any potential impediments to discharge.    Per team, pt is tense, irritable, many requests, requested meds to 'make him high'. Disorganized. Pt was observed responding to internal stimuli.     Behavioral Team note complete.     I met with patient to check in. He was calm, flat affect, minimal emotional expression, guarded and suspicious, declining KELBY's when asked again and given the reasoning (wanting to speak to psychiatry about meds, family and group home). He agreed to sign an KELBY for group home only to see if he can come back and or to check on status of his belongings, and not discuss other information with them. He has an image on his bed with marker that he scotty on his sheets. He appeared guarded about peer yelling in milieu and asked writer to shut his door. When signing the KELBY's he was responding to something with garbled speech. He declined needing anything else.     Discharge Plan or Goal:  Back to group home?     Barriers to Discharge:  Symptoms - jamila, agitation     Referral Status:  TBD     Legal Status:  Court hold  County: [place]  File Number: tbd  Start and expiration date of commitment:   [place] and [place] petition - [place] meds requested are: Zyprexa, Seroquel, Invega and Abilify     Contacts (include KELBY status):   (Megan) Ph: [phone]   Psych: Dr. Khan, [facility]  Michelle Barraza/Mother: [phone]      Upcoming Meetings and Dates/Important Information and next steps:  Coordinate care
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
## 2024-09-05 NOTE — PROGRESS NOTES
Rehab Group    Start time: 0915  End time: [id number]  Patient time total: 25 minutes    came in and out of group session     #3 attended   Group Type: dance movement   Group Topic Covered: relationship skills/support systems and social skills       Group Session Detail:  Group provided support from peers by joining shared movement metaphors and expressing desires, hopes and fears.      Patient Response/Contribution:  disorganized, needed prompts to redirect, unable to comprehend information, interrupted others/ frequently, and disruptive to the group       Patient Detail:    Pt initially moved in and out of the session making demands while not participating in group process.  He was fixated on tangential and personal desires but repeated them even when some were fulfilled.  He repeated questions despite receiving the answer multiple times.  This was true no matter what the answer was.  He didn't acknowledge some of his requests were fulfilled and complained either directly or indirectly mumbling in an audible way.  The more his requests were accommodated or fulfilled, the more stuck he became in his pursuit of more.  He presented with a bound and tense body with flat affect until he heard one of his songs, then he bounced and gestured with enthusiasm for a short time, then asked for a new song even before that one had completed.  Then he sat down angrily for not getting to choose a song.  Peers and this therapist offered support which patient ignored while undermining the group therapeutic processes and the emotional needs of peers.        No Charge      Patient Active Problem List   Diagnosis    Suicidal ideation    Psychosis (H)    Acute pulmonary embolism without acute cor pulmonale (H)    Alcohol use disorder, moderate, in sustained remission, dependence (H)    Anxiety    Cannabis use disorder, severe, dependence (H)    Class 1 drug-induced obesity without serious comorbidity with body mass index (BMI)  of 33.0 to 33.9 in adult    Depression, major, single episode, moderate (H)    Episodic mood disorder (H24)    KATHE (generalized anxiety disorder)    History of marijuana use    Obesity (BMI 30-39.9)    Obesity, Class I, BMI 30-34.9    Tobacco use disorder, moderate, in sustained remission    Schizoaffective disorder, bipolar type (H)    Psychosis, unspecified psychosis type (H)    Tardive dyskinesia

## 2024-09-05 NOTE — PLAN OF CARE
Pt was awake and irritable most of the shift. Approached writer multiple times requesting for something to make him high. Writer informed him that she has nothing to make him high yet pt continue to ask. Multiple redirection needed this shift as pt continue to stay at the med room window demanding for  medication to make him high.  Then changed from something to make him high to  frequently seeking out staff for a nicotine patch. Pt was informed that it is schedule for the morning but he continue to ask and at some point asked writer if she has ever been addicted to Nicotine.  Denies pain, SI/HI, delusions,  and hallucination.   Utilized PRN Trazodone  x 2 and finally went to his room for the night around 0315-noted to have slept for 3.25 hours without respiratory distress.         No discomfort or pain verbalized   Pt is calm and behaviorally controlled this shift.  No precautionary behavior noted or reported  Pt is on suicide, assault,elopement, seizure, withdrawal, SIB, sexual, fall, cheeking precautions.   No harm/sexual  assault  to self and others noted or reported this shift.   No noted or reported this shift  No aggressive behavior exhibited this shift.         Problem: Sleep Disturbance  Goal: Adequate Sleep/Rest  Outcome: Not Progressing     Problem: Adult Behavioral Health Plan of Care  Goal: Plan of Care Review  Outcome: Not Progressing  Goal: Adheres to Safety Considerations for Self and Others  Intervention: Develop and Maintain Individualized Safety Plan  Recent Flowsheet Documentation  Taken 9/5/2024 0516 by Disha Finn RN  Safety Measures: safety rounds completed  Goal: Absence of New-Onset Illness or Injury  Intervention: Identify and Manage Fall Risk  Recent Flowsheet Documentation  Taken 9/5/2024 0516 by Disha Finn RN  Safety Measures: safety rounds completed   Goal Outcome Evaluation:

## 2024-09-06 PROCEDURE — 250N000013 HC RX MED GY IP 250 OP 250 PS 637: Performed by: PSYCHIATRY & NEUROLOGY

## 2024-09-06 PROCEDURE — 250N000013 HC RX MED GY IP 250 OP 250 PS 637: Performed by: STUDENT IN AN ORGANIZED HEALTH CARE EDUCATION/TRAINING PROGRAM

## 2024-09-06 PROCEDURE — 90853 GROUP PSYCHOTHERAPY: CPT | Performed by: MARRIAGE & FAMILY THERAPIST

## 2024-09-06 PROCEDURE — 99232 SBSQ HOSP IP/OBS MODERATE 35: CPT | Performed by: PSYCHIATRY & NEUROLOGY

## 2024-09-06 PROCEDURE — 250N000013 HC RX MED GY IP 250 OP 250 PS 637

## 2024-09-06 PROCEDURE — 124N000002 HC R&B MH UMMC

## 2024-09-06 PROCEDURE — 90853 GROUP PSYCHOTHERAPY: CPT

## 2024-09-06 PROCEDURE — 250N000012 HC RX MED GY IP 250 OP 636 PS 637: Performed by: PSYCHIATRY & NEUROLOGY

## 2024-09-06 RX ADMIN — QUETIAPINE 800 MG: 400 TABLET, FILM COATED, EXTENDED RELEASE ORAL at 21:01

## 2024-09-06 RX ADMIN — NICOTINE POLACRILEX 4 MG: 2 GUM, CHEWING BUCCAL at 09:10

## 2024-09-06 RX ADMIN — BUPRENORPHINE AND NALOXONE 1 FILM: 2; .5 FILM, SOLUBLE BUCCAL; SUBLINGUAL at 20:12

## 2024-09-06 RX ADMIN — METFORMIN HYDROCHLORIDE 500 MG: 500 TABLET, FILM COATED ORAL at 17:33

## 2024-09-06 RX ADMIN — NICOTINE POLACRILEX 4 MG: 2 GUM, CHEWING BUCCAL at 21:00

## 2024-09-06 RX ADMIN — NICOTINE 1 PATCH: 21 PATCH, EXTENDED RELEASE TRANSDERMAL at 09:08

## 2024-09-06 RX ADMIN — HYDROXYZINE HYDROCHLORIDE 25 MG: 25 TABLET, FILM COATED ORAL at 22:13

## 2024-09-06 RX ADMIN — ASPIRIN 81 MG CHEWABLE TABLET 81 MG: 81 TABLET CHEWABLE at 09:07

## 2024-09-06 RX ADMIN — METFORMIN HYDROCHLORIDE 500 MG: 500 TABLET, FILM COATED ORAL at 09:07

## 2024-09-06 RX ADMIN — NICOTINE POLACRILEX 4 MG: 2 GUM, CHEWING BUCCAL at 13:29

## 2024-09-06 RX ADMIN — FLUVOXAMINE MALEATE 50 MG: 50 TABLET ORAL at 21:01

## 2024-09-06 RX ADMIN — BUPRENORPHINE AND NALOXONE 1 FILM: 2; .5 FILM, SOLUBLE BUCCAL; SUBLINGUAL at 09:07

## 2024-09-06 RX ADMIN — NICOTINE POLACRILEX 4 MG: 2 LOZENGE ORAL at 18:47

## 2024-09-06 RX ADMIN — THERA TABS 1 TABLET: TAB at 09:07

## 2024-09-06 RX ADMIN — OLANZAPINE 5 MG: 5 TABLET, FILM COATED ORAL at 21:01

## 2024-09-06 RX ADMIN — NICOTINE POLACRILEX 4 MG: 2 GUM, CHEWING BUCCAL at 16:18

## 2024-09-06 RX ADMIN — HYDROXYZINE HYDROCHLORIDE 25 MG: 25 TABLET, FILM COATED ORAL at 16:17

## 2024-09-06 ASSESSMENT — ACTIVITIES OF DAILY LIVING (ADL)
HYGIENE/GROOMING: INDEPENDENT
ADLS_ACUITY_SCORE: 28
DRESS: INDEPENDENT
HYGIENE/GROOMING: INDEPENDENT
ADLS_ACUITY_SCORE: 28
ORAL_HYGIENE: INDEPENDENT
ADLS_ACUITY_SCORE: 28
ORAL_HYGIENE: INDEPENDENT
ADLS_ACUITY_SCORE: 28
DRESS: INDEPENDENT
ADLS_ACUITY_SCORE: 28
LAUNDRY: UNABLE TO COMPLETE
ADLS_ACUITY_SCORE: 28
LAUNDRY: UNABLE TO COMPLETE

## 2024-09-06 NOTE — PLAN OF CARE
Group Attendance:  attended full group    Time session began: 1420  Time session ended: 1500  Patient's total time in group: 40    Total # Attendees   4   Group Type psychotherapeutic     Group Topic Covered emotional regulation, symptom management, and healthy coping skills     Group Session Detail Participants engaged in a structured task to promote the use of creativity to cope with stressors and manage symptoms. Participants were asked to think about current emotional states before and after the creative intervention and share their experience with group members to promote community.       Patient's response to the group topic/interactions:  cooperative with task, listened actively, expressed understanding of topic, attentive, and actively engaged     Patient Details: Eloy was engaged throughout the group. He participated in all three prompts. He was excited to share his creative choices with other group members. He reported that creative interventions are a helpful and effective coping strategy.          14740 - Group psychotherapy - 1 Session  Patient Active Problem List   Diagnosis    Suicidal ideation    Psychosis (H)    Acute pulmonary embolism without acute cor pulmonale (H)    Alcohol use disorder, moderate, in sustained remission, dependence (H)    Anxiety    Cannabis use disorder, severe, dependence (H)    Class 1 drug-induced obesity without serious comorbidity with body mass index (BMI) of 33.0 to 33.9 in adult    Depression, major, single episode, moderate (H)    Episodic mood disorder (H24)    KATHE (generalized anxiety disorder)    History of marijuana use    Obesity (BMI 30-39.9)    Obesity, Class I, BMI 30-34.9    Tobacco use disorder, moderate, in sustained remission    Schizoaffective disorder, bipolar type (H)    Psychosis, unspecified psychosis type (H)    Tardive dyskinesia

## 2024-09-06 NOTE — PLAN OF CARE
"  Problem: Adult Inpatient Plan of Care  Goal: Readiness for Transition of Care  Outcome: Not Progressing   Goal Outcome Evaluation:    Plan of Care Reviewed With: patient      Patient frequently seeks out staff, and appears with flat affect. He comes to the nursing station and puts his head against the door and just stares into the medication room. He is frequently asks for medication despite being told that its not appropriate time to administer for the medication that he was requesting for. He was given hydroxyzine to help with anxiety. He does express being depressed and states\" my suboxone is decreased and the provider did that with out telling me,  can you tell them to increase it for me, because that makes me depressed\". Patient denies having any auditory hallucinations/visual hallucinations, any thoughts of wanting to harm himself or others. He was observed having internal stimuli and when writer asked the patient if he was hearing voices , he states \" I talk to myself, but I am not hearing voices and I do that so I could practice how to rap\".  No other behavioral issues noted this shift.     Blood pressure 124/76, pulse 104, temperature (!) 96.7  F (35.9  C), temperature source Oral, resp. rate 16, height 1.854 m (6' 1\"), weight 94.8 kg (209 lb), SpO2 94%.        "

## 2024-09-06 NOTE — PLAN OF CARE
"  Group Attendance:  attended partial group    Time session began: 1015  Time session ended: 1057  Patient's total time in group: 42    Total # Attendees   3   Group Type psychotherapeutic     Group Topic Covered incorporating skill sets and positive psychology       Group Session Detail Participants engaged in a structured activity to explore and promote insight into their strengths across several domains including abilities, positive effects on others, values, and resilience. Participants were encouraged to share their strengths with group members and discuss common experiences to promote community.       Patient's response to the group topic/interactions:  worked intermittently and left group on several occasions     Patient Details: Eloy attended most of the group but left with various requests for nursing staff throughout.     Instead of writing his answers in the provided space, he scotty the same symbol in each space. He shared his strengths verbally. At times he was drawing other art on other pieces of paper.     Most of his strengths were relationship-oriented. He also named creativity and art as things he feels he is skilled in.     At times he appeared to be engaging with internal stimuli as he was talking to himself under his breath.     When writer asked if he'd like to share accomplishes that he is proud of, he replied, \"Why? So you can take them from me?\"          00944 - Group psychotherapy - 1 Session  Patient Active Problem List   Diagnosis    Suicidal ideation    Psychosis (H)    Acute pulmonary embolism without acute cor pulmonale (H)    Alcohol use disorder, moderate, in sustained remission, dependence (H)    Anxiety    Cannabis use disorder, severe, dependence (H)    Class 1 drug-induced obesity without serious comorbidity with body mass index (BMI) of 33.0 to 33.9 in adult    Depression, major, single episode, moderate (H)    Episodic mood disorder (H24)    KATHE (generalized anxiety disorder)    " History of marijuana use    Obesity (BMI 30-39.9)    Obesity, Class I, BMI 30-34.9    Tobacco use disorder, moderate, in sustained remission    Schizoaffective disorder, bipolar type (H)    Psychosis, unspecified psychosis type (H)    Tardive dyskinesia
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
## 2024-09-06 NOTE — PLAN OF CARE
Team Note Due:  Wednesday    Assessment/Intervention/Current Symtoms and Care Coordination:  Chart review and met with team, discussed pt progress, symptomology, and response to treatment.  Discussed the discharge plan and any potential impediments to discharge.    Per team, pt is tense, less irritable, many requests, Disorganized. Pt was observed responding to internal stimuli. Pt agreed to completing a CD assessment; discussed waiting until Monday.         Discharge Plan or Goal:  Back to group home?     Barriers to Discharge:  Symptoms - jamila, agitation     Referral Status:  TBD     Legal Status:  Court hold  County: [place]  File Number: tbd  Start and expiration date of commitment:   [place] and [place] petition - [place] meds requested are: Zyprexa, Seroquel, Invega and Abilify     Contacts (include KELBY status):   (Megan) Ph: [phone]   Psych: Dr. Khan[facility]  Michelle Barraza/Mother: [phone]      Upcoming Meetings and Dates/Important Information and next steps:  Coordinate care

## 2024-09-06 NOTE — PLAN OF CARE
BEH IP Unit Acuity Rating Score (UARS)  Patient is given one point for every criteria they meet.    CRITERIA SCORING   On a 72 hour hold, court hold, committed, stay of commitment, or revocation. 1    Patient LOS on BEH unit exceeds 20 days. 0  LOS: 3   Patient under guardianship, 55+, otherwise medically complex, or under age 11. 0   Suicide ideation without relief of precipitating factors. 0   Current plan for suicide. 0   Current plan for homicide. 0   Imminent risk or actual attempt to seriously harm another without relief of factors precipitating the attempt. 1   Severe dysfunction in daily living (ex: complete neglect for self care, extreme disruption in vegetative function, extreme deterioration in social interactions). 1   Recent (last 7 days) or current physical aggression in the ED or on unit. 0   Restraints or seclusion episode in past 72 hours. 0   Recent (last 7 days) or current verbal aggression, agitation, yelling, etc., while in the ED or unit. 0   Active psychosis. 1   Need for constant or near constant redirection (from leaving, from others, etc).  0   Intrusive or disruptive behaviors. 1   Patient requires 3 or more hours of individualized nursing care per 8-hour shift (i.e. for ADLs, meds, therapeutic interventions). 0   TOTAL 5
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
## 2024-09-06 NOTE — PLAN OF CARE
"Nursing Assessment    Recent Vitals: B/P: 124/76, T: 96.7, P: 104, R: 16     Mental Status Exam:  Appearance:  Poorly groomed  Behavior/relationship to examiner/demeanor:  Needy and Interruptive  Affect (objective appearance):  Blunted/Flat  Mood (subjective report):  flat  Gait:  Normal  Speech rate, volume, coherence:  Slowed, Normal, Normal  Thought process (Rate):  Slowed  Abnormal Perception:  Derealization  Insight:  Poor  Judgment:  Poor    General Shift Summary  Patient frequently has been standing at the nurses station staring into the medication room. When asking him how he can be helped he will ask the same question that was answered only minutes ago. Patient asked for an XXL sweater without writing however only the small sweaters didn't have Station 12 written on them. Patient was informed this and wanted writer to look further. Upon looking in different locations and updating him, he continued to request for an XXL sweater that didn't have writing on it about every 5-10 minutes. Writer told him about 6 times that we don't have any. Writer eventually told him that he would have to come back on top of the hour at 1300 for any questions or requests.    Although patient denied AVH he was noted to be talking himself. Patient stated that \"I just think out loud\".    Patient was found looking through the peep hole going into the conference room as a meeting was going on. Patient was informed that he couldn't be looking through there and he said \"Why not?\". He was told it was for patient confidentiality. He said \"Then why is there a hole?\". He was informed it was for staff to look out but he continued to argue.    Appetite is good, he is eating well.    Plan: continue to stabilize.    eTnisha Barboza RN MSN  "

## 2024-09-06 NOTE — CARE PLAN
"  Rehab Group    Start time: 1600  End time: 1645  Patient time total: 5 minutes    came in and out of group session    #4 attended   Group Type: occupational therapy   Group Topic Covered: balanced lifestyle, cognitive activities, coping skills, healthy leisure time, and social skills       Group Session Detail:   Healthy leisure exploration and coping practice with hands on Garbage card activity for concentration, tracking, cognitive challenge, attention to detail, mood stabilization, reality-based activity, follow through, and socialization.       Patient Response/Contribution:  distracted        Patient Detail:  Pt joined group only briefly.  He approached therapist upon her entrance to the unit and asked odd questions.  When asked to clarify them he replied, \"Oh, nothing.\"  Pt shared during the check in question that he likes people who listen to him.  Pt was able to accept positive feedback, though he appeared to be suspicious of others who were in the group.  Pt left group early stating he felt like the activity would be a waste of his time.          No Charge      Patient Active Problem List   Diagnosis    Suicidal ideation    Psychosis (H)    Acute pulmonary embolism without acute cor pulmonale (H)    Alcohol use disorder, moderate, in sustained remission, dependence (H)    Anxiety    Cannabis use disorder, severe, dependence (H)    Class 1 drug-induced obesity without serious comorbidity with body mass index (BMI) of 33.0 to 33.9 in adult    Depression, major, single episode, moderate (H)    Episodic mood disorder (H24)    KATHE (generalized anxiety disorder)    History of marijuana use    Obesity (BMI 30-39.9)    Obesity, Class I, BMI 30-34.9    Tobacco use disorder, moderate, in sustained remission    Schizoaffective disorder, bipolar type (H)    Psychosis, unspecified psychosis type (H)    Tardive dyskinesia       "

## 2024-09-07 PROCEDURE — 124N000002 HC R&B MH UMMC

## 2024-09-07 PROCEDURE — 250N000013 HC RX MED GY IP 250 OP 250 PS 637

## 2024-09-07 PROCEDURE — 250N000013 HC RX MED GY IP 250 OP 250 PS 637: Performed by: PSYCHIATRY & NEUROLOGY

## 2024-09-07 PROCEDURE — 250N000013 HC RX MED GY IP 250 OP 250 PS 637: Performed by: STUDENT IN AN ORGANIZED HEALTH CARE EDUCATION/TRAINING PROGRAM

## 2024-09-07 PROCEDURE — 250N000012 HC RX MED GY IP 250 OP 636 PS 637: Performed by: PSYCHIATRY & NEUROLOGY

## 2024-09-07 PROCEDURE — H2032 ACTIVITY THERAPY, PER 15 MIN: HCPCS | Performed by: MARRIAGE & FAMILY THERAPIST

## 2024-09-07 RX ADMIN — HYDROXYZINE HYDROCHLORIDE 25 MG: 25 TABLET, FILM COATED ORAL at 18:19

## 2024-09-07 RX ADMIN — NICOTINE 1 PATCH: 21 PATCH, EXTENDED RELEASE TRANSDERMAL at 09:05

## 2024-09-07 RX ADMIN — FLUVOXAMINE MALEATE 50 MG: 50 TABLET ORAL at 20:11

## 2024-09-07 RX ADMIN — NICOTINE POLACRILEX 4 MG: 2 GUM, CHEWING BUCCAL at 14:44

## 2024-09-07 RX ADMIN — NICOTINE POLACRILEX 4 MG: 2 GUM, CHEWING BUCCAL at 12:21

## 2024-09-07 RX ADMIN — HYDROXYZINE HYDROCHLORIDE 25 MG: 25 TABLET, FILM COATED ORAL at 12:21

## 2024-09-07 RX ADMIN — METFORMIN HYDROCHLORIDE 500 MG: 500 TABLET, FILM COATED ORAL at 08:25

## 2024-09-07 RX ADMIN — NICOTINE POLACRILEX 4 MG: 2 LOZENGE ORAL at 18:20

## 2024-09-07 RX ADMIN — METFORMIN HYDROCHLORIDE 500 MG: 500 TABLET, FILM COATED ORAL at 17:49

## 2024-09-07 RX ADMIN — BUPRENORPHINE AND NALOXONE 1 FILM: 2; .5 FILM, SOLUBLE BUCCAL; SUBLINGUAL at 19:17

## 2024-09-07 RX ADMIN — NICOTINE POLACRILEX 4 MG: 2 GUM, CHEWING BUCCAL at 09:34

## 2024-09-07 RX ADMIN — OLANZAPINE 5 MG: 5 TABLET, FILM COATED ORAL at 20:11

## 2024-09-07 RX ADMIN — QUETIAPINE 800 MG: 400 TABLET, FILM COATED, EXTENDED RELEASE ORAL at 20:11

## 2024-09-07 RX ADMIN — THERA TABS 1 TABLET: TAB at 08:25

## 2024-09-07 RX ADMIN — BUPRENORPHINE AND NALOXONE 1 FILM: 2; .5 FILM, SOLUBLE BUCCAL; SUBLINGUAL at 08:25

## 2024-09-07 RX ADMIN — ASPIRIN 81 MG CHEWABLE TABLET 81 MG: 81 TABLET CHEWABLE at 08:25

## 2024-09-07 ASSESSMENT — ACTIVITIES OF DAILY LIVING (ADL)
ADLS_ACUITY_SCORE: 28
LAUNDRY: WITH SUPERVISION
ADLS_ACUITY_SCORE: 28
ORAL_HYGIENE: INDEPENDENT
ADLS_ACUITY_SCORE: 28
HYGIENE/GROOMING: INDEPENDENT
ADLS_ACUITY_SCORE: 28
DRESS: SCRUBS (BEHAVIORAL HEALTH);INDEPENDENT
ADLS_ACUITY_SCORE: 28
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
## 2024-09-07 NOTE — PROGRESS NOTES
Art Therapy NOT Respiratory Therapy note  Rehab Group    Start time: 1115  End time: 1215  Patient time total: 60 minutes    attended full group    #4 attended   Group Type: art   Group Topic Covered: activity therapy       Group Session Detail:  Art Therapy directive was to answer questions on a handout related to the five senses and pts relationship with the natural world. Pts were then encouraged to create artwork in response to one or more than one of their answers on handout.  Goals of directive: to create art that expresses aspects of self, self metaphor, emotional expression.     Patient Response/Contribution:  cooperative with task       Patient Detail:    Pt answered questions on sensory handout and created several drawings after finishing handout-some related to answers on handout and some abstract, experimental drawings. Pt was observed as anxious, pressured speech at times.  Pt was experimental with art materials and enjoyed using tempera paint sticks.       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

## 2024-09-07 NOTE — PLAN OF CARE
Eloy appeared sleeping for  5.75 hours with uneventful night.  No distress noted or reported this shift  No pain or discomfort endorsed  No  aggressive  behaviors demonstrated.     Problem: Sleep Disturbance  Goal: Adequate Sleep/Rest  Outcome: Progressing   Goal Outcome Evaluation:

## 2024-09-07 NOTE — PLAN OF CARE
"  Problem: Adult Behavioral Health Plan of Care  Goal: Develops/Participates in [place] [place] to Support Successful Transition  Outcome: Not Progressing  Intervention: Foster Therapeutic [place]  Recent Flowsheet Documentation  Taken 9/6/2024 2100 by Pérez García RN  Trust Relationship/Rapport:   care explained   choices provided   emotional support provided   empathic listening provided   reassurance provided   questions encouraged   questions answered   thoughts/feelings acknowledged   Goal Outcome Evaluation:    Plan of Care Reviewed With: patient      Patient comes to the nursing station frequently and request for medication. He gets augmentative with staff about not getting his mediation despite being told it's not time for the administration of the medication . He denies having any thoughts of wanting to harm himself or others, denies having hallucinations, but he was observed responding to internal stimuli. He does endorse feeling anxious and being depressed. He comes to the nursing station and just stares into the medication room. He did spent majority of the shift out in the lounge area watching movie. His vitals are stable.     Blood pressure 109/68, pulse 89, temperature 98.1  F (36.7  C), temperature source Oral, resp. rate 16, height 1.854 m (6' 1\"), weight 94.8 kg (209 lb), SpO2 97%.             "

## 2024-09-08 PROCEDURE — 250N000013 HC RX MED GY IP 250 OP 250 PS 637

## 2024-09-08 PROCEDURE — 124N000002 HC R&B MH UMMC

## 2024-09-08 PROCEDURE — 250N000013 HC RX MED GY IP 250 OP 250 PS 637: Performed by: PSYCHIATRY & NEUROLOGY

## 2024-09-08 PROCEDURE — 250N000012 HC RX MED GY IP 250 OP 636 PS 637: Performed by: PSYCHIATRY & NEUROLOGY

## 2024-09-08 PROCEDURE — 99222 1ST HOSP IP/OBS MODERATE 55: CPT

## 2024-09-08 PROCEDURE — 250N000013 HC RX MED GY IP 250 OP 250 PS 637: Performed by: STUDENT IN AN ORGANIZED HEALTH CARE EDUCATION/TRAINING PROGRAM

## 2024-09-08 RX ORDER — AMOXICILLIN 250 MG
1 CAPSULE ORAL 2 TIMES DAILY
Status: DISPENSED | OUTPATIENT
Start: 2024-09-08

## 2024-09-08 RX ORDER — QUETIAPINE FUMARATE 25 MG/1
25 TABLET, FILM COATED ORAL EVERY 6 HOURS PRN
Status: DISPENSED | OUTPATIENT
Start: 2024-09-08

## 2024-09-08 RX ORDER — POLYETHYLENE GLYCOL 3350 17 G/17G
17 POWDER, FOR SOLUTION ORAL DAILY
Status: DISPENSED | OUTPATIENT
Start: 2024-09-08

## 2024-09-08 RX ADMIN — NICOTINE POLACRILEX 4 MG: 2 GUM, CHEWING BUCCAL at 13:23

## 2024-09-08 RX ADMIN — POLYETHYLENE GLYCOL 3350 17 G: 17 POWDER, FOR SOLUTION ORAL at 13:23

## 2024-09-08 RX ADMIN — BUPRENORPHINE AND NALOXONE 1 FILM: 2; .5 FILM, SOLUBLE BUCCAL; SUBLINGUAL at 19:42

## 2024-09-08 RX ADMIN — METFORMIN HYDROCHLORIDE 500 MG: 500 TABLET, FILM COATED ORAL at 18:09

## 2024-09-08 RX ADMIN — TRAZODONE HYDROCHLORIDE 50 MG: 50 TABLET ORAL at 00:40

## 2024-09-08 RX ADMIN — FLUVOXAMINE MALEATE 50 MG: 50 TABLET ORAL at 19:41

## 2024-09-08 RX ADMIN — SENNOSIDES AND DOCUSATE SODIUM 1 TABLET: 8.6; 5 TABLET ORAL at 19:42

## 2024-09-08 RX ADMIN — BUPRENORPHINE AND NALOXONE 1 FILM: 2; .5 FILM, SOLUBLE BUCCAL; SUBLINGUAL at 09:01

## 2024-09-08 RX ADMIN — ASPIRIN 81 MG CHEWABLE TABLET 81 MG: 81 TABLET CHEWABLE at 09:01

## 2024-09-08 RX ADMIN — THERA TABS 1 TABLET: TAB at 09:01

## 2024-09-08 RX ADMIN — NICOTINE POLACRILEX 4 MG: 2 GUM, CHEWING BUCCAL at 09:01

## 2024-09-08 RX ADMIN — HYDROXYZINE HYDROCHLORIDE 25 MG: 25 TABLET, FILM COATED ORAL at 00:40

## 2024-09-08 RX ADMIN — NICOTINE 1 PATCH: 21 PATCH, EXTENDED RELEASE TRANSDERMAL at 09:01

## 2024-09-08 RX ADMIN — NICOTINE POLACRILEX 4 MG: 2 GUM, CHEWING BUCCAL at 10:36

## 2024-09-08 RX ADMIN — QUETIAPINE 800 MG: 400 TABLET, FILM COATED, EXTENDED RELEASE ORAL at 19:41

## 2024-09-08 RX ADMIN — OLANZAPINE 5 MG: 5 TABLET, FILM COATED ORAL at 19:42

## 2024-09-08 RX ADMIN — NICOTINE POLACRILEX 4 MG: 2 GUM, CHEWING BUCCAL at 15:52

## 2024-09-08 RX ADMIN — METFORMIN HYDROCHLORIDE 500 MG: 500 TABLET, FILM COATED ORAL at 09:01

## 2024-09-08 ASSESSMENT — ACTIVITIES OF DAILY LIVING (ADL)
ADLS_ACUITY_SCORE: 28
ADLS_ACUITY_SCORE: 28
DRESS: INDEPENDENT
ADLS_ACUITY_SCORE: 28
HYGIENE/GROOMING: INDEPENDENT
ORAL_HYGIENE: INDEPENDENT
ADLS_ACUITY_SCORE: 28
DRESS: INDEPENDENT
ADLS_ACUITY_SCORE: 28
LAUNDRY: UNABLE TO COMPLETE
ADLS_ACUITY_SCORE: 28
HYGIENE/GROOMING: INDEPENDENT
ADLS_ACUITY_SCORE: 28
ORAL_HYGIENE: INDEPENDENT
ADLS_ACUITY_SCORE: 28
ADLS_ACUITY_SCORE: 28
LAUNDRY: UNABLE TO COMPLETE
ADLS_ACUITY_SCORE: 28

## 2024-09-08 NOTE — CONSULTS
[facility]  Consult Note - Hospitalist Service  Date of Admission:  9/3/2024  Consult Requested by: Neto Bolanos MD  Reason for Consult: Urinary REtention    Assessment & Plan   Eloy Storm is a 24 year old male admitted on 9/3/2024. He has a past medical history of PE (in 2020, provoked), schizoaffective disorder, anxiety, polysubstance use disorder, who was admitted after presenting to the [facility] ED out of concern for jamila in the context of substance use. Ultimately, transferred to station 12N here at [facility] for further psychiatric monitoring and management. Medicine consulted for urinary retention.    Urinary Retention  Constipation  Medicine consulted to address urinary retention today. RN notes 1090cc in bladder, and on my interview with patient he denies significant symptoms. Despite attempting to void, explains he has been unable to do so. Requests a diuretic and feels that drinking more water will help, but explained to patient these will only exacerbate bladder distention. Review of chart shows he has followed w/ Urology in the past, but mostly for STI-related issues. No documented hx of urinary retention, but pt notes frequently occurs when he withdrawing from Kratom (which he feels he is at the moment, was using PTA). At this time, certainly could be withdrawal-related, but he is also on multiple anticholinergic meds, so his burden there is high which could be also playing a role. Low suspicion of primary neurogenic cause (cauda equina) as denies back pain, bowel movements otherwise unaffected (he feels his constipation is unrelated as this has been an issue in the past), and no BLE symptoms.  - Pharmacy consult to address med burden  - Strongly recommended a straight cath today, but pt declined multiple times despite education on risks of bladder distention/urinary retention  - Encouraged him to continue to attempt to volitionally void  - If  "unable to do so, would consider Flomax at the very least  - Continue to monitor and notify Medicine if he allows for a straight cath  - Notify if fevers, worsening abdominal pain, flank pain  - Pt does note a few days of constipation which can exacerbate urinary retention   - Scheduled Miralax daily + Senokot BID for now (hold for loose stools)  - If unable to void by this afternoon, would advocate for at least an ASAP Urology consult to address     The patient's care was discussed with the Bedside Nurse and Patient.    Medicine will continue to follow along for urinary retention. Recommendations relayed to primary team via this progress note.  Thank you for the opportunity to be involved in this patient's care.    Clinically Significant Risk Factors                            # Overweight: Estimated body mass index is 27.57 kg/m  as calculated from the following:    Height as of this encounter: 1.854 m (6' 1\").    Weight as of this encounter: 94.8 kg (209 lb).      # Financial/Environmental Concerns:    # Support System: poor social support noted in nursing assessment               Bo Valle PA-C  Hospitalist Service  Securely message with [employer] (more info)  Text page via [facility] Paging/Directory   ______________________________________________________________________    Chief Complaint   \"I can't pee\"    History is obtained from the patient    History of Present Illness   Eloy Storm is a 24 year old male who is seen in the hallway this morning.  Patient reports being unable to urinate this morning, but does have a \"mild\" sensation of needing to void.  Despite attempting to do so, he has not been able to \"push any urine out\".  Reports that he has experienced these problems in the past, attributing it to kratom withdrawal.  Outside of kratom withdrawal, he has never otherwise experienced urinary retention.  As far as his bowel movements ago, he does feel he is constipated, but feels this is independent of " his urinary retention.  Last bowel movement was reportedly 3 days ago.  He otherwise denies back pain or bilateral lower extremity paresthesias/weakness.    Patient repeatedly declines straight cathing, despite initial offer.  Perseverates that he will not allow this, and is instead requesting diuretic as he feels this will help him.  He inquires if the medications he is otherwise on may be contributing to his urinary retention.    Past Medical History    Past Medical History:   Diagnosis Date    Depressive disorder     Major depression     Pulmonary embolism (H)     Substance abuse (H)     marijuana    Tardive dyskinesia 04/08/2024       Past Surgical History   Past Surgical History:   Procedure Laterality Date    NO HISTORY OF SURGERY         Medications   I have reviewed this patient's current medications       Review of Systems    The 10 point Review of Systems is negative other than noted in the HPI or here.      Physical Exam   Vital Signs: Temp: 98.7  F (37.1  C) Temp src: Oral BP: 122/79 Pulse: 57     SpO2: 92 %      Weight: 209 lbs 0 oz    Constitutional: awake, alert, cooperative, no apparent distress, and appears stated age  Eyes: lids and lashes normal, sclera clear, and conjunctiva normal  ENT: normocephalic, without obvious abnormality  Respiratory: non-labored breathing on RA. Auscultation deferred given pt's irritability.  Cardiovascular: no obvious cardiac distress. Appears well-perfused on visualized skin. Auscultation deferred given pt's irritability.  GI: Abdomen unable to be assessed d/t pt positioning and remainder of abdominal exam deferred given pt's irritability.  Skin: no bruising or bleeding, no redness, warmth, or swelling, no rashes, and no lesions on visualized skin.   Musculoskeletal: Sitting cross-legged on chair in hallway. No obvious deformities. No obvious BLE edema.  Neurologic: Moving all extremities equally and spontaneously. No obvious focal neuro deficits.  Neuropsychiatric:  General: poor eye contact and initially calm, but has periods of irritability throughout exam, talking over this writer and frequently interrupts.   Level of consciousness: alert / normal  Affect: irritable    Medical Decision Making       60 MINUTES SPENT BY ME on the date of service doing chart review, history, exam, documentation & further activities per the note.      Data         Imaging results reviewed over the past 24 hrs:   No results found for this or any previous visit (from the past 24 hour(s)).  Recent Labs   Lab 09/05/24 0927 09/05/24  0926   WBC  --  7.6   HGB  --  14.0   MCV  --  91   PLT  --  232     --    POTASSIUM 3.9  --    CHLORIDE 102  --    CO2 28  --    BUN 8.7  --    CR 0.95  --    ANIONGAP 8  --    SAMIRA 9.2  --    GLC 94  --    ALBUMIN 3.9  --    PROTTOTAL 6.1*  --    BILITOTAL 0.5  --    ALKPHOS 75  --    ALT 20  --    AST 22  --

## 2024-09-08 NOTE — PLAN OF CARE
"Pt was awake at the start of shift until around 0115 . He appeared tense and agitated  was verbally aggressive to a PA in the hallway. Stated to the PA  unprovoked \" what if I punch you in the face.\"  Pt went to his room when verbally redirected by the PA that it is not appropriate to say that to anyone.  Writer met with the pt in his room after the incident and asked if he was in pain and he stated no.  Pt was offered relaxation aromatherapy oil and medication to help him relax or sleep.  Pt initially refused all intervention and later came to the med room window to ask for  Trazodone and hydroxyzine.   Both medications were administered at 0040 and was effective.  he was noted to have slept for approximately 4.75  hours without respiratory distress       Problem: Behavioral Disturbance  Goal: Behavioral Disturbance  Description: Signs and symptoms of listed problems will be absent or manageable by discharge or transition of care.  Outcome: Not Progressing     Problem: Adult Behavioral Health Plan of Care  Goal: Plan of Care Review  Outcome: Not Progressing  Goal: Adheres to Safety Considerations for Self and Others  Intervention: Develop and Maintain Individualized Safety Plan  Recent Flowsheet Documentation  Taken 9/8/2024 0213 by Disha Finn RN  Safety Measures: safety rounds completed  Goal: Absence of New-Onset Illness or Injury  Intervention: Identify and Manage Fall Risk  Recent Flowsheet Documentation  Taken 9/8/2024 0213 by Disha Finn, RN  Safety Measures: safety rounds completed   Goal Outcome Evaluation:                        " S/P appendectomy    S/P  section  1993  S/P rotator cuff repair  Right shoulder ()  S/P thoracentesis    S/P tonsillectomy    S/P tubal ligation  10/1995

## 2024-09-08 NOTE — PLAN OF CARE
"  Problem: Behavioral Disturbance  Goal: Behavioral Disturbance  Description: Signs and symptoms of listed problems will be absent or manageable by discharge or transition of care.  Outcome: Not Progressing     Pt was visible and active on the unit. Patient is on the clock request. Patient frequented the nursing station making requests and asking questions. He is argumentative, irritable with a labile effect. Patient denies pain,endorsed anxiety and depression rated 10/10. Patient was heard talking to self, swearing and using profanities.  Patient denies all other psych symptoms. Patient had a good appetite; finished his meal. Patient is compliant with schedule medication and utilized PRN Lozenges and hydroxyzine for anxiety. Patient denies any acute physical concern. No adverse effect of medication observed or reported.     Patient continues to participate in unsafe behavior; Patient went into the refrigerator and ripped apart the wire connected to the thermostat. He was redirected to his room. Patient made request to retrieve contact from his cell phone. It was granted and the patient accessed his phone. Instead of retrieving contacts, patient engaged in text messaging and attempted to make call according to the PA that was supervising the retrieval. Patient later asked staff to give him access to his cell phone  so he can change the passcode. Patient also asked to use his cell phone on the unit so he can play his favorite music. Patient was educated on policies regarding cell phone usage. Patient was asked to relay requests to provider and the treatment team for review. Nursing supervisor was call in when patient complained to the authority. The supervisor came and talked with him. Patient was further agitated because the supervisor could not namrata him  all his requests. Patient states, \" I want to leave this unit. I feel unsafe here. They won't let me use my phone and I need that for my L[employer]. Music is " "important to my [employer] and I should be allowed to listen to music from my phone\".  Will continue to monitor and support.    "

## 2024-09-08 NOTE — PHARMACY-CONSULT NOTE
Pharmacy Consult Note    Pharmacist was consulted by internal medicine provider, Bo Valle PA-C, to review patient's current medication regimen to assess which may be contributing to his urinary retention.  Patient does have a history of urinary retention during kratom withdrawal.     Medications most likely to contribute to urinary retention are anticholinergic, antihistamines, antispasmodics and opioids, to name a few.  These medications are bolded in his current medication list below.  High doses of kratom can have an opioid-like effect and can also result in urinary retention, which patient reports experiencing in the past.  Suspect current symptoms most likely due to recent kratom use.  Suboxone may also be contributing since that was started 9/5/2024.  The only other recent medication change was addition of fluvoxamine on 8/29 at a low dose, so wouldn't expect that to be the primary cause. Quetiapine can also contribute to urinary retention, but patient has been on this high dose for several months, so not likely the cause.     Recommend continuing to assess urinary retention symptoms.  If due to kratom, would expect symptoms to start improving within 7 days of discontinuation.     Current Facility-Administered Medications   Medication Dose Route Frequency Last Admin    acetaminophen (TYLENOL) tablet 650 mg    650 mg Oral Q4H PRN      alum & mag hydroxide-simethicone (MAALOX) suspension 30 mL    30 mL Oral Q4H PRN      aspirin (ASA) chewable tablet 81 mg    81 mg Oral Daily 81 mg at 09/08/24 0901    benzocaine-menthol (CHLORASEPTIC) 6-10 MG lozenge 1 lozenge    1 lozenge Buccal Q1H PRN      buprenorphine HCl-naloxone HCl (SUBOXONE) 2-0.5 MG per film 1 Film    1 Film Sublingual BID 1 Film at 09/08/24 0901    fluvoxaMINE (LUVOX) tablet 50 mg    50 mg Oral At Bedtime 50 mg at 09/07/24 2011    hydrOXYzine HCl (ATARAX) tablet 25 mg    25 mg Oral Q4H PRN 25 mg at 09/08/24 0040    metFORMIN (GLUCOPHAGE) tablet  500 mg    500 mg Oral BID w/meals 500 mg at 09/08/24 0901    multivitamin, therapeutic (THERA-VIT) tablet 1 tablet    1 tablet Oral Daily 1 tablet at 09/08/24 0901    nicotine (COMMIT) lozenge 4 mg  4 mg Buccal Q1H PRN 4 mg at 09/07/24 1820    Or    nicotine polacrilex (NICORETTE) gum 4 mg    4 mg Oral Q1H PRN 4 mg at 09/08/24 1323    nicotine (NICODERM CQ) 21 MG/24HR 24 hr patch 1 patch    1 patch Transdermal Daily 1 patch at 09/08/24 0901    OLANZapine (zyPREXA) tablet 10 mg  10 mg Oral TID PRN      Or    OLANZapine (zyPREXA) injection 10 mg    10 mg Intramuscular TID PRN      OLANZapine (zyPREXA) tablet 5 mg    5 mg Oral At Bedtime 5 mg at 09/07/24 2011    polyethylene glycol (MIRALAX) Packet 17 g    17 g Oral Daily 17 g at 09/08/24 1323    QUEtiapine ER (SEROquel XR) 24 hr tablet 800 mg    800 mg Oral At Bedtime 800 mg at 09/07/24 2011    senna-docusate (SENOKOT-S/PERICOLACE) 8.6-50 MG per tablet 1 tablet    1 tablet Oral BID      traZODone (DESYREL) tablet 50 mg  50 mg Oral At Bedtime PRN 50 mg at 09/08/24 0040     Janelle Chong, Pharm.D., [facility]  Behavioral Health Inpatient Pharmacist  [facility] ([facility]) Emergency Department  Contact via [employer] or [employer] Messaging

## 2024-09-08 NOTE — PLAN OF CARE
"Nursing Assessment    Recent Vitals: refused    Patient has been present in the lounge. Interactions haven't been appropriate with others. Behaviors is intrusive, hostile, needy, grandiose, self important, and in denial of his symptoms. He frequently interrupts when taking care of other patient's or when in conversation with others. Patient has been told that he will have to wait but he become agitated and occasionally will act out.    Patient was in the lounge talking down to staff and attempting to grab wires from the fridge. Staff redirected him. He then went to his room and roselialey slammed his door loudly. Staff informed him that he couldn't be doing this since it was disturbing the milieu and alarming to others. He didn't say anything to staff when redirected.    When handing out lunch trays he was very close, behind writer and said \"I should pour my water on you. I should pour this on you. Yep I think I will.\" Writer turned to him and stated he wouldn't be pouring water on anyone. He then made a comment not heard by writer. When writer provided him his tray he stated that he didn't want \"Any of it since it's all wrong.\" Writer obtained his menu and the only item missing was the cantaloupe. Patient said that he was given a sandwich when this wasn't select. He was informed that if he doesn't select any main course then dietary sends him the default option. \"Why would I not pick an option? Someone tampered with this.\" Writer informed him that this morning his menu for tomorrow also had no main course options and we discussed that he doesn't select one a default option is sent. Patient stated \"Oh\". He then discussed how he still didn't have cantaloupe and writer said I would call the kitchen for this item. Patient then said \"So good going. You forgot the one thing I wanted.\". He was informed that it's on it's way. He said \"Why must you do such a shitty job?\" \"I don't know if it's everyone or just you, but " "you could care less.\" He was informed that staff is doing our best and wouldn't be helping if we didn't care. While writer was talking patient was interrupting the whole time stating \"Etcetera, etcetera, etcetera\".    A few minutes later patient was seen pushing ceiling tiles out of placement and up into the false ceiling. Staff redirected and stopped him from pushing more tiles. Writer informed him that this was not aloud. He asked why and writer said it was a safety hazard, there are wires in the ceiling, and a tile could break hitting him or someone else. Patient said \"Okay, that makes sense.\".    Patient voiced having urinary retention in the morning. He was bladder scanned and 1090 ml was found. Provider was updated and a straight cath was recommended. Patient said that he would not be doing a straight cath and wanted a diuretic \"It fucking hurts the penis! Would you cath your vagina?! No!\". The provider educated patient on diuretics and how it would only fill his bladder more. Patient said that retention happens when he does Kratom and that he \"I will just wait it out. I just need caffeine\". Provider continued to attempt to educate patient but patient interrupted and refused. Patient later was seen in his bathroom. He stated that he urinated. Provider was updated and wanted a second bladder scan. Writer attempted but he refused.    Tenisha Barboza RN MSN  "

## 2024-09-09 PROCEDURE — 99232 SBSQ HOSP IP/OBS MODERATE 35: CPT | Mod: GC | Performed by: PSYCHIATRY & NEUROLOGY

## 2024-09-09 PROCEDURE — 250N000013 HC RX MED GY IP 250 OP 250 PS 637

## 2024-09-09 PROCEDURE — 250N000013 HC RX MED GY IP 250 OP 250 PS 637: Performed by: PSYCHIATRY & NEUROLOGY

## 2024-09-09 PROCEDURE — 124N000002 HC R&B MH UMMC

## 2024-09-09 PROCEDURE — 250N000012 HC RX MED GY IP 250 OP 636 PS 637: Performed by: PSYCHIATRY & NEUROLOGY

## 2024-09-09 PROCEDURE — 97150 GROUP THERAPEUTIC PROCEDURES: CPT | Mod: GO

## 2024-09-09 PROCEDURE — 250N000013 HC RX MED GY IP 250 OP 250 PS 637: Performed by: STUDENT IN AN ORGANIZED HEALTH CARE EDUCATION/TRAINING PROGRAM

## 2024-09-09 RX ORDER — OLANZAPINE 10 MG/2ML
5 INJECTION, POWDER, FOR SOLUTION INTRAMUSCULAR DAILY
Status: DISCONTINUED | OUTPATIENT
Start: 2024-09-10 | End: 2024-09-09

## 2024-09-09 RX ORDER — PALIPERIDONE 3 MG/1
3 TABLET, EXTENDED RELEASE ORAL DAILY
Status: DISCONTINUED | OUTPATIENT
Start: 2024-09-10 | End: 2024-09-09

## 2024-09-09 RX ADMIN — HYDROXYZINE HYDROCHLORIDE 25 MG: 25 TABLET, FILM COATED ORAL at 20:17

## 2024-09-09 RX ADMIN — BUPRENORPHINE AND NALOXONE 1 FILM: 2; .5 FILM, SOLUBLE BUCCAL; SUBLINGUAL at 19:22

## 2024-09-09 RX ADMIN — METFORMIN HYDROCHLORIDE 500 MG: 500 TABLET, FILM COATED ORAL at 19:21

## 2024-09-09 RX ADMIN — NICOTINE POLACRILEX 4 MG: 2 GUM, CHEWING BUCCAL at 13:21

## 2024-09-09 RX ADMIN — FLUVOXAMINE MALEATE 50 MG: 50 TABLET ORAL at 19:21

## 2024-09-09 RX ADMIN — SENNOSIDES AND DOCUSATE SODIUM 1 TABLET: 8.6; 5 TABLET ORAL at 19:21

## 2024-09-09 RX ADMIN — POLYETHYLENE GLYCOL 3350 17 G: 17 POWDER, FOR SOLUTION ORAL at 09:03

## 2024-09-09 RX ADMIN — HYDROXYZINE HYDROCHLORIDE 25 MG: 25 TABLET, FILM COATED ORAL at 15:56

## 2024-09-09 RX ADMIN — NICOTINE POLACRILEX 4 MG: 2 GUM, CHEWING BUCCAL at 12:24

## 2024-09-09 RX ADMIN — THERA TABS 1 TABLET: TAB at 09:03

## 2024-09-09 RX ADMIN — NICOTINE POLACRILEX 4 MG: 2 GUM, CHEWING BUCCAL at 15:56

## 2024-09-09 RX ADMIN — NICOTINE POLACRILEX 4 MG: 2 GUM, CHEWING BUCCAL at 14:36

## 2024-09-09 RX ADMIN — OLANZAPINE 5 MG: 5 TABLET, FILM COATED ORAL at 19:21

## 2024-09-09 RX ADMIN — ASPIRIN 81 MG CHEWABLE TABLET 81 MG: 81 TABLET CHEWABLE at 09:03

## 2024-09-09 RX ADMIN — QUETIAPINE 800 MG: 400 TABLET, FILM COATED, EXTENDED RELEASE ORAL at 19:21

## 2024-09-09 RX ADMIN — NICOTINE 1 PATCH: 21 PATCH, EXTENDED RELEASE TRANSDERMAL at 09:03

## 2024-09-09 RX ADMIN — BUPRENORPHINE AND NALOXONE 1 FILM: 2; .5 FILM, SOLUBLE BUCCAL; SUBLINGUAL at 09:03

## 2024-09-09 RX ADMIN — SENNOSIDES AND DOCUSATE SODIUM 1 TABLET: 8.6; 5 TABLET ORAL at 09:03

## 2024-09-09 RX ADMIN — NICOTINE POLACRILEX 4 MG: 2 GUM, CHEWING BUCCAL at 18:38

## 2024-09-09 RX ADMIN — NICOTINE POLACRILEX 4 MG: 2 GUM, CHEWING BUCCAL at 20:17

## 2024-09-09 RX ADMIN — METFORMIN HYDROCHLORIDE 500 MG: 500 TABLET, FILM COATED ORAL at 09:03

## 2024-09-09 ASSESSMENT — ACTIVITIES OF DAILY LIVING (ADL)
ADLS_ACUITY_SCORE: 28
ADLS_ACUITY_SCORE: 28
DRESS: INDEPENDENT;SCRUBS (BEHAVIORAL HEALTH)
ADLS_ACUITY_SCORE: 28
ORAL_HYGIENE: INDEPENDENT
HYGIENE/GROOMING: HANDWASHING;INDEPENDENT
DRESS: SCRUBS (BEHAVIORAL HEALTH)
HYGIENE/GROOMING: INDEPENDENT
ADLS_ACUITY_SCORE: 28
LAUNDRY: UNABLE TO COMPLETE
ADLS_ACUITY_SCORE: 28
LAUNDRY: WITH SUPERVISION
ORAL_HYGIENE: INDEPENDENT

## 2024-09-09 NOTE — PLAN OF CARE
"Team Note Due:  Wednesday    Assessment/Intervention/Current Symtoms and Care Coordination:  Chart review and met with team, discussed pt progress, symptomology, and response to treatment.  Discussed the discharge plan and any potential impediments to discharge.    Per team, pt is tense, less irritable, many requests, Disorganized. Pt was observed responding to internal stimuli. Pt agreed to completing a CD assessment; discussed waiting until Monday.     I sent updated records to Bridget Witt at [facility].     Eloy was given the laptop for his preliminary hearing and he refused to attend. I updated Bridget about this.     I attempted to meet with Pt and he's like \"she's the \" and pointed to Dr. Arreola. I shared she is the doctor. I said I would come back. A short time later I returned and wrote down his dr's names as well as social work name as he appeared confused. He then said he wants 'all the doctors he's had here'. There were two other providers at [facility] so I wrote their names for him as well. He presents as tense, irritated, overheard him stating to a staff \"do I have to mary jo?\".      (Megan) Ph: [phone]: He moved in June 2024. But she knows from his history, he has never been on suboxone before, mom said he's never been on it. Megan said the ER called her from [facility] asking her about it because he was requesting suboxone and she told them this as well. She said he is probably requesting this to 'get high'. She said his thing is marijuana via a vape pen. [facility] said he is absolutely welcome to return once he is stable. I shared I would keep her updated. I shared the release of info is only to see if he can return or not at this time. I updated provider.     Later Pt became more tense, walked in between  and another peer while they were talking, allegedly bumped a peer, entered a peers room when a therapist was speaking to peer and then " slammed the door very loud, tense walking and pacing, arms folded. Pt appears paranoid and suspicious as well as agitated making vague threats.     Discharge Plan or Goal:  Back to group home (he is able to return) or [facility] treatment     Barriers to Discharge:  Symptoms - jamila, agitation  Commitment process    Referral Status:  TBD     Legal Status:  Court hold  County: [place]  File Number: [id number]  Start and expiration date of commitment:   [place] and [place] petition - [place] meds requested are: Zyprexa, Seroquel, Invega and Abilify     Commitment and [place] Hearing 9/12 at 2:15PM    Contacts (include KELBY status):   (Megan) Ph: [phone]  - KELBY only for specific thing  Psych: Dr. Khan, [place] [facility] - Declined KELBY  Michelle Barraza/Mother: [phone] - Declined KELBY x2     Upcoming Meetings and Dates/Important Information and next steps:  Commitment and [place] Hearing 9/12 at 2:15PM

## 2024-09-09 NOTE — PLAN OF CARE
BEH IP Unit Acuity Rating Score (UARS)  Patient is given one point for every criteria they meet.    CRITERIA SCORING   On a 72 hour hold, court hold, committed, stay of commitment, or revocation. 1    Patient LOS on BEH unit exceeds 20 days. 0  LOS: 6   Patient under guardianship, 55+, otherwise medically complex, or under age 11. 0   Suicide ideation without relief of precipitating factors. 0   Current plan for suicide. 0   Current plan for homicide. 0   Imminent risk or actual attempt to seriously harm another without relief of factors precipitating the attempt. 1   Severe dysfunction in daily living (ex: complete neglect for self care, extreme disruption in vegetative function, extreme deterioration in social interactions). 1   Recent (last 7 days) or current physical aggression in the ED or on unit. 0   Restraints or seclusion episode in past 72 hours. 0   Recent (last 7 days) or current verbal aggression, agitation, yelling, etc., while in the ED or unit. 1   Active psychosis. 1   Need for constant or near constant redirection (from leaving, from others, etc).  1   Intrusive or disruptive behaviors. 1   Patient requires 3 or more hours of individualized nursing care per 8-hour shift (i.e. for ADLs, meds, therapeutic interventions). 0   TOTAL 7

## 2024-09-09 NOTE — PROGRESS NOTES
Rehab Group    Start time: 1020  End time: 1200  Patient time total: 75 minutes    attended full group    #5 attended   Group Type: occupational therapy   Group Topic Covered: coping skills     Group Session Detail:  OT clinic     Patient Response/Contribution:  cooperative with task, attentive, and actively engaged     Patient Detail:    Pt actively participated in occupational therapy clinic to facilitate coping skill exploration, creative expression within personally meaningful activities, and clinical observation of social, cognitive, and kinesthetic performance skills. Pt response: Minimal assistance needed to initiate, gather materials, sequence, and adjust to workspace demands as needed. Demonstrated fair focus, planning, and attention to detail for selected self-directed, creative expression task. Able to ask for assistance as needed, and occasionally socialized with peers and staff. Asked if he could use task materials outside of group time, which writer declined. Writer later noticed that he placed task materials he wanted to use inside one of his finished projects, which writer returned to the cabinet. Observed mumbling to himself x1 for a brief duration, appearing possibly internally preoccupied. Calm for a majority of group, though occasionally appeared irritable in interactions.      [id number] OT Group (2 or more in attendance)      Patient Active Problem List   Diagnosis    Suicidal ideation    Psychosis (H)    Acute pulmonary embolism without acute cor pulmonale (H)    Alcohol use disorder, moderate, in sustained remission, dependence (H)    Anxiety    Cannabis use disorder, severe, dependence (H)    Class 1 drug-induced obesity without serious comorbidity with body mass index (BMI) of 33.0 to 33.9 in adult    Depression, major, single episode, moderate (H)    Episodic mood disorder (H24)    KATHE (generalized anxiety disorder)    History of marijuana use    Obesity (BMI 30-39.9)    Obesity, Class I, BMI  30-34.9    Tobacco use disorder, moderate, in sustained remission    Schizoaffective disorder, bipolar type (H)    Psychosis, unspecified psychosis type (H)    Tardive dyskinesia

## 2024-09-09 NOTE — PROGRESS NOTES
"Brief Medicine Follow Up Note    Following up regarding urinary retention.     Today's vital signs, medications, and nursing notes were reviewed. Labs reviewed most recent serum and urine laboratories.     /78 (BP Location: Left arm, Patient Position: Sitting, Cuff Size: Adult Regular)   Pulse 87   Temp 97.8  F (36.6  C) (Temporal)   Resp 18   Ht 1.854 m (6' 1\")   Wt 94.8 kg (209 lb)   SpO2 98%   BMI 27.57 kg/m      A/P:  Urinary Retention, resolved  Please see Medicine consult note dated 9/8/24 for further details. Highly suspect urinary retention r/t Kratom withdrawal, exacerbated by Suboxone, Luvox, Hydroxyzine, Zyprexa, and Trazodone. Pt vehemently declined straight cathing. Per d/w RN later in the day after the consult, pt had confirmed voiding, but has been irritable and had labile mood, so not necessarily agreeable to bladder rescan to confirm, but no e/o ongoing urinary retention. Pt again noted an episode of urinary retention per RN notes last night, but then reports spontaneously voiding. Urinary retention mgmt is c/b his aggression and confrontational disposition.   - Please see PharmD note from yesterday for further details, medication adjustments per Psychiatry  - Continue to monitor for urinary retention; if e/o recurrence, please bladder scan. Offer pt straight cath, and if not agreeable, would need frequent check-ins by nursing staff to ensure voiding  - No indication for a diuretic as I d/w him yesterday this will only exacerbate the retention as it will simply fill his bladder up more, not ease in voiding   - Could trial Flomax if this is an ongoing issue (but would recommend reaching out to Medicine first before trialing)  - Encourage pt to hold urine in toilet w/o flushing to get confirmation he is voiding  - Notify Medicine if worsening abd pain, nausea, vomiting, urinary retention, fevers, flank pain    Medicine will sign off. No further recommendations at this time.  Please feel " free to reconsult if any new medical issues or concerns.  Thank you for the opportunity to care for this patient.     ADALBERTO [facility]  Contact information available via [facility] Paging/Directory
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
## 2024-09-09 NOTE — PLAN OF CARE
"Problem: Behavioral Disturbance  Goal: Behavioral Disturbance  Description: Signs and symptoms of listed problems will be absent or manageable by discharge or transition of care.  Outcome: Progressing  Patient was sleeping at the start of shift. He had court this morning and it was reported by  that patient refused to attend his preliminary court hearing. During this shift, his affect is labile and mood is irritable. He is restless, mistrustful and suspicious. When writer administered his morning medications, he told writer that he believe the miralax is laced with something else. Writer assured him that his medication are not lace, but patient did not believe writer. He denies hearing voices, but writer observed him talking and laughing to himself while he was in the [facility] [place]. He was visible in lounge area, social with peers and attended group. He is unkempt, but writer was able to get him to take a shower. He did not complain of pain and no prn was given for that. He ate breakfast and lunch without issues. Writer did observe sign of TD this shift.     It was reported by  and another nurse that patient was trying to go into other patient's rooms, making threatening comments to other peers and overall being verbally abusive. He frequently seeks out writer and appears to be medication seeking. He told writer that he would like to start taking dieretics, and to tell the doctor to increase his suboxone. He is restless and agitated this shift, going into other patient's rooms and slamming doors. He told writer that he would like to have access to his phone for music. His insight his poor and does not follow redirections.     /86 (BP Location: Right arm, Patient Position: Sitting, Cuff Size: Adult Regular)   Pulse 95   Temp 97.8  F (36.6  C) (Temporal)   Resp 16   Ht 1.854 m (6' 1\")   Wt 94.8 kg (209 lb)   SpO2 98%   BMI 27.57 kg/m        "
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
## 2024-09-09 NOTE — PLAN OF CARE
Problem: Adult Behavioral Health Plan of Care  Goal: Plan of Care Review  Outcome: Progressing     Problem: Psychotic Signs/Symptoms  Goal: Improved Behavioral Control (Psychotic Signs/Symptoms)  Outcome: Progressing   Goal Outcome Evaluation:    Plan of Care Reviewed With: patient        Pt endorsed anxiety and rated at 5/10, depression 10/10, but denies pain, SI/SIB/HI/AVH, and contracted for safety. PRN hydroxyzine offered, but pt declined, stating that this medication does not work for him. MD notified. Please see MAR for a new order. Pt complained of difficulty voiding and requested for diuretics. Pt was advised to drink plenty of fluid. Pt later stated that he voided. Pt appears to be responding to internal stimuli as evidenced by talking under his breath, but he continues to deny experiencing hallucinations. Pt presents with a flat and blunted affect. Mood presents as irritable, impulsive and labile. He continues to interrupt staff and peer interaction and turns to get irritated when advised to wait for his turn. Judgment and insight not appropriate to situation. Thought content presents as perseveration, thought process presents as disorganized. Pt is medication compliant. No medication adverse effects noted or verbalized. Will continue to monitor and treat as ordered.

## 2024-09-10 ENCOUNTER — TELEPHONE (OUTPATIENT)
Dept: BEHAVIORAL HEALTH | Facility: CLINIC | Age: 24
End: 2024-09-10

## 2024-09-10 PROCEDURE — 97150 GROUP THERAPEUTIC PROCEDURES: CPT | Mod: GO

## 2024-09-10 PROCEDURE — 124N000002 HC R&B MH UMMC

## 2024-09-10 PROCEDURE — 250N000013 HC RX MED GY IP 250 OP 250 PS 637

## 2024-09-10 PROCEDURE — 250N000012 HC RX MED GY IP 250 OP 636 PS 637: Performed by: PSYCHIATRY & NEUROLOGY

## 2024-09-10 PROCEDURE — 250N000013 HC RX MED GY IP 250 OP 250 PS 637: Performed by: STUDENT IN AN ORGANIZED HEALTH CARE EDUCATION/TRAINING PROGRAM

## 2024-09-10 PROCEDURE — 250N000013 HC RX MED GY IP 250 OP 250 PS 637: Performed by: PSYCHIATRY & NEUROLOGY

## 2024-09-10 RX ORDER — IBUPROFEN 200 MG
400 TABLET ORAL EVERY 6 HOURS PRN
Status: DISCONTINUED | OUTPATIENT
Start: 2024-09-10 | End: 2024-09-11

## 2024-09-10 RX ORDER — TAMSULOSIN HYDROCHLORIDE 0.4 MG/1
0.4 CAPSULE ORAL DAILY
Status: DISPENSED | OUTPATIENT
Start: 2024-09-10

## 2024-09-10 RX ADMIN — SENNOSIDES AND DOCUSATE SODIUM 1 TABLET: 8.6; 5 TABLET ORAL at 08:29

## 2024-09-10 RX ADMIN — TRAZODONE HYDROCHLORIDE 50 MG: 50 TABLET ORAL at 00:45

## 2024-09-10 RX ADMIN — OLANZAPINE 10 MG: 10 TABLET, FILM COATED ORAL at 00:45

## 2024-09-10 RX ADMIN — POLYETHYLENE GLYCOL 3350 17 G: 17 POWDER, FOR SOLUTION ORAL at 08:29

## 2024-09-10 RX ADMIN — SENNOSIDES AND DOCUSATE SODIUM 1 TABLET: 8.6; 5 TABLET ORAL at 19:18

## 2024-09-10 RX ADMIN — HYDROXYZINE HYDROCHLORIDE 25 MG: 25 TABLET, FILM COATED ORAL at 21:50

## 2024-09-10 RX ADMIN — ACETAMINOPHEN 650 MG: 325 TABLET, FILM COATED ORAL at 21:49

## 2024-09-10 RX ADMIN — NICOTINE POLACRILEX 4 MG: 2 GUM, CHEWING BUCCAL at 09:08

## 2024-09-10 RX ADMIN — QUETIAPINE FUMARATE 25 MG: 25 TABLET ORAL at 18:32

## 2024-09-10 RX ADMIN — TAMSULOSIN HYDROCHLORIDE 0.4 MG: 0.4 CAPSULE ORAL at 14:09

## 2024-09-10 RX ADMIN — METFORMIN HYDROCHLORIDE 500 MG: 500 TABLET, FILM COATED ORAL at 18:02

## 2024-09-10 RX ADMIN — NICOTINE POLACRILEX 4 MG: 2 GUM, CHEWING BUCCAL at 10:47

## 2024-09-10 RX ADMIN — NICOTINE 1 PATCH: 21 PATCH, EXTENDED RELEASE TRANSDERMAL at 08:36

## 2024-09-10 RX ADMIN — NICOTINE POLACRILEX 4 MG: 2 GUM, CHEWING BUCCAL at 15:51

## 2024-09-10 RX ADMIN — THERA TABS 1 TABLET: TAB at 08:29

## 2024-09-10 RX ADMIN — OLANZAPINE 10 MG: 10 TABLET, FILM COATED ORAL at 21:50

## 2024-09-10 RX ADMIN — NICOTINE POLACRILEX 4 MG: 2 GUM, CHEWING BUCCAL at 18:32

## 2024-09-10 RX ADMIN — NICOTINE POLACRILEX 4 MG: 2 GUM, CHEWING BUCCAL at 16:36

## 2024-09-10 RX ADMIN — QUETIAPINE 800 MG: 400 TABLET, FILM COATED, EXTENDED RELEASE ORAL at 19:18

## 2024-09-10 RX ADMIN — ASPIRIN 81 MG CHEWABLE TABLET 81 MG: 81 TABLET CHEWABLE at 08:29

## 2024-09-10 RX ADMIN — BUPRENORPHINE AND NALOXONE 1 FILM: 2; .5 FILM, SOLUBLE BUCCAL; SUBLINGUAL at 19:18

## 2024-09-10 RX ADMIN — FLUVOXAMINE MALEATE 50 MG: 50 TABLET ORAL at 19:18

## 2024-09-10 RX ADMIN — METFORMIN HYDROCHLORIDE 500 MG: 500 TABLET, FILM COATED ORAL at 08:29

## 2024-09-10 RX ADMIN — OLANZAPINE 5 MG: 5 TABLET, FILM COATED ORAL at 19:18

## 2024-09-10 RX ADMIN — BUPRENORPHINE AND NALOXONE 1 FILM: 2; .5 FILM, SOLUBLE BUCCAL; SUBLINGUAL at 08:29

## 2024-09-10 ASSESSMENT — ACTIVITIES OF DAILY LIVING (ADL)
ADLS_ACUITY_SCORE: 28
LAUNDRY: UNABLE TO COMPLETE
DRESS: INDEPENDENT;SCRUBS (BEHAVIORAL HEALTH)
ADLS_ACUITY_SCORE: 28
DRESS: INDEPENDENT
HYGIENE/GROOMING: INDEPENDENT
ADLS_ACUITY_SCORE: 28
HYGIENE/GROOMING: INDEPENDENT
ORAL_HYGIENE: INDEPENDENT
ADLS_ACUITY_SCORE: 28
ADLS_ACUITY_SCORE: 28
ORAL_HYGIENE: INDEPENDENT
ADLS_ACUITY_SCORE: 28
LAUNDRY: UNABLE TO COMPLETE
ADLS_ACUITY_SCORE: 28
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
## 2024-09-10 NOTE — PROGRESS NOTES
"At 1040 approached nursing station requesting \" a diuretic\" for urinary retention. The patient was observed using the restroom today during the SIO monitoring. IM note from 9/8/2024 reviewed. Attempted at a bladder scan which he refused and stated that will be refusing any intervention unless given a diuretic.   "

## 2024-09-10 NOTE — PLAN OF CARE
"  Problem: Psychotic Signs/Symptoms  Goal: Improved Behavioral Control (Psychotic Signs/Symptoms)  Outcome: Progressing   Patient is restless and constantly seeks out writer. He is mistrustful, suspicious, and would call writer a liar when he is told something he does not want to hear. His affect this shift is tense, labile and mood is irritable. He is verbally abusive towards staff calling them names, and he gets frustrated easily. He was visible in the [facility] area, attended group, but was not social with peers. He is unkempt and refused to take a shower this shift. He is compliant with medication administration. He told writer that he is urinating fine and that \"I usually don't have to go, but when I do, its like I have to go now. The diuretic I believe will help get the fluid out.\" Writer ask when his last void was, and he replied \"I am urinating without issues. I went last night and again this morning, so stop asking me that and just give me the diuretic\".  Patient did not complain of TD, but writer and another RN notice some involuntary movements while patient was in the hallway.    The provider contacted IM and they order 0.4 mg of Flomax daily. The provider (Elba) ask for a baseline bladder scan before the administration, but stated if the patient refuse the scan, it is ok to give the medication without a scan. Writer told patient of the order, but he refused to have his bladder scan and demanded to be given his medication.     Patient sio was changed from 5 ft to 10 ft due to patient getting agitated towards sio staff. He was put on suicidal precaution due to suicidal comments made by patient last evening. During this shift patient contracted for safety and denied suicidal ideations.     /78 (BP Location: Left arm)   Pulse 82   Temp 98  F (36.7  C) (Temporal)   Resp 16   Ht 1.854 m (6' 1\")   Wt 94.8 kg (209 lb)   SpO2 98%   BMI 27.57 kg/m     "

## 2024-09-10 NOTE — PLAN OF CARE
"  Problem: Sleep Disturbance  Goal: Adequate Sleep/Rest  Outcome: Not Progressing     Problem: Psychotic Signs/Symptoms  Goal: Improved Behavioral Control (Psychotic Signs/Symptoms)  Outcome: Not Progressing   Goal Outcome Evaluation:    Plan of Care Reviewed With: patient    Patient awake, sitting at the edge of the bed in his room at the start of the shift. On approach, patient declined to engage with writer\" Get out of my room now and close my door\". Patient hostile towards SIO staff and asked staff to leave his room.Re- approached patient after 30 minutes, more approachable at this time. Mood labile, irritable with a blunted, yet tensed affect. Appears restless, periodically rapidly blinking his eyes, mild grimacing. Refused to respond to assessment questions, perseverating and focusing on sobuxone, ativan and nicotin gum. Thought process very disorganized with poor judgment and insight to situation. Increasingly agitated with any rationale or explanation on not able to administer requested meds.Slammed the door, asked writer to leave. Writer offered Zyprexa for agiation/ trazodone for insomnia, initially declined, accepted after several attempts.Going back and forth between his room and nurses station despite re-education of his hourly request. Very demanding this shift, requested and received tea x2. Fell asleep at 0400 and currently still sleeping in no apparent distress. Remains on SIO for SIB/SI precautions without any related behavior this shift. Safety maintained.  "
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
## 2024-09-10 NOTE — PLAN OF CARE
BEH IP Unit Acuity Rating Score (UARS)  Patient is given one point for every criteria they meet.    CRITERIA SCORING   On a 72 hour hold, court hold, committed, stay of commitment, or revocation. 1    Patient LOS on BEH unit exceeds 20 days. 0  LOS: 7   Patient under guardianship, 55+, otherwise medically complex, or under age 11. 0   Suicide ideation without relief of precipitating factors. 1   Current plan for suicide. 0   Current plan for homicide. 0   Imminent risk or actual attempt to seriously harm another without relief of factors precipitating the attempt. 1   Severe dysfunction in daily living (ex: complete neglect for self care, extreme disruption in vegetative function, extreme deterioration in social interactions). 1   Recent (last 7 days) or current physical aggression in the ED or on unit. 0   Restraints or seclusion episode in past 72 hours. 0   Recent (last 7 days) or current verbal aggression, agitation, yelling, etc., while in the ED or unit. 1   Active psychosis. 1   Need for constant or near constant redirection (from leaving, from others, etc).  1   Intrusive or disruptive behaviors. 1   Patient requires 3 or more hours of individualized nursing care per 8-hour shift (i.e. for ADLs, meds, therapeutic interventions). 1   TOTAL 9

## 2024-09-10 NOTE — PLAN OF CARE
Rehab Group    Start time: 1030  End time: 1200  Patient time total: 20 minutes    attended partial group    #4 attended   Group Type: OT Clinic   Group Topic Covered: coping skills     Group Session Detail:    Intervention: Pt participated in a OT Clinic group to facilitate coping skills exploration and creative expression through personally meaningful activities, and to encourage utilization of these healthy coping skills to promote overall health and wellness. Group included clinical observation of social, cognitive and kinesthetic performance skills to inform treatment and safe discharge planning.    Mood/Affect:  Pleasant     Plan: Patient encouraged to maintain attendance for continued ongoing support in working towards occupational therapy goals to support overall treatment/care.        Patient Detail:    Initially declined writer's invitation to join group d/t wanting to be able to make music request and this not being an option during this particular group. Later on took interest in projects that others were working on and requested to start one. Required multiple redirections from coloring on his skin with markers, ultimately writer removing the sharpies from the table d/t being unable to follow this. Did appear to be responding to internal stimuli during this time on and off.       [id number] OT Group (2 or more in attendance)    Patient Active Problem List   Diagnosis    Suicidal ideation    Psychosis (H)    Acute pulmonary embolism without acute cor pulmonale (H)    Alcohol use disorder, moderate, in sustained remission, dependence (H)    Anxiety    Cannabis use disorder, severe, dependence (H)    Class 1 drug-induced obesity without serious comorbidity with body mass index (BMI) of 33.0 to 33.9 in adult    Depression, major, single episode, moderate (H)    Episodic mood disorder (H24)    KATHE (generalized anxiety disorder)    History of marijuana use    Obesity (BMI 30-39.9)    Obesity, Class I, BMI  30-34.9    Tobacco use disorder, moderate, in sustained remission    Schizoaffective disorder, bipolar type (H)    Psychosis, unspecified psychosis type (H)    Tardive dyskinesia

## 2024-09-11 ENCOUNTER — TELEPHONE (OUTPATIENT)
Dept: BEHAVIORAL HEALTH | Facility: CLINIC | Age: 24
End: 2024-09-11

## 2024-09-11 ENCOUNTER — APPOINTMENT (OUTPATIENT)
Dept: GENERAL RADIOLOGY | Facility: CLINIC | Age: 24
End: 2024-09-11
Attending: PHYSICIAN ASSISTANT
Payer: COMMERCIAL

## 2024-09-11 LAB
ALBUMIN SERPL BCG-MCNC: 4.4 G/DL (ref 3.5–5.2)
ALBUMIN UR-MCNC: NEGATIVE MG/DL
ALP SERPL-CCNC: 101 U/L (ref 40–150)
ALT SERPL W P-5'-P-CCNC: 35 U/L (ref 0–70)
ANION GAP SERPL CALCULATED.3IONS-SCNC: 13 MMOL/L (ref 7–15)
APPEARANCE UR: CLEAR
AST SERPL W P-5'-P-CCNC: 38 U/L (ref 0–45)
BASOPHILS # BLD AUTO: 0 10E3/UL (ref 0–0.2)
BASOPHILS NFR BLD AUTO: 0 %
BILIRUB SERPL-MCNC: 0.4 MG/DL
BILIRUB UR QL STRIP: NEGATIVE
BUN SERPL-MCNC: 15.6 MG/DL (ref 6–20)
CALCIUM SERPL-MCNC: 9.5 MG/DL (ref 8.8–10.4)
CHLORIDE SERPL-SCNC: 101 MMOL/L (ref 98–107)
COLOR UR AUTO: ABNORMAL
CREAT SERPL-MCNC: 0.93 MG/DL (ref 0.67–1.17)
EGFRCR SERPLBLD CKD-EPI 2021: >90 ML/MIN/1.73M2
EOSINOPHIL # BLD AUTO: 0.2 10E3/UL (ref 0–0.7)
EOSINOPHIL NFR BLD AUTO: 2 %
ERYTHROCYTE [DISTWIDTH] IN BLOOD BY AUTOMATED COUNT: 12.1 % (ref 10–15)
GLUCOSE SERPL-MCNC: 112 MG/DL (ref 70–99)
GLUCOSE UR STRIP-MCNC: NEGATIVE MG/DL
HCO3 SERPL-SCNC: 26 MMOL/L (ref 22–29)
HCT VFR BLD AUTO: 39.8 % (ref 40–53)
HGB BLD-MCNC: 14 G/DL (ref 13.3–17.7)
HGB UR QL STRIP: NEGATIVE
IMM GRANULOCYTES # BLD: 0 10E3/UL
IMM GRANULOCYTES NFR BLD: 0 %
KETONES UR STRIP-MCNC: NEGATIVE MG/DL
LEUKOCYTE ESTERASE UR QL STRIP: NEGATIVE
LYMPHOCYTES # BLD AUTO: 2.3 10E3/UL (ref 0.8–5.3)
LYMPHOCYTES NFR BLD AUTO: 25 %
MCH RBC QN AUTO: 31 PG (ref 26.5–33)
MCHC RBC AUTO-ENTMCNC: 35.2 G/DL (ref 31.5–36.5)
MCV RBC AUTO: 88 FL (ref 78–100)
MONOCYTES # BLD AUTO: 0.7 10E3/UL (ref 0–1.3)
MONOCYTES NFR BLD AUTO: 8 %
NEUTROPHILS # BLD AUTO: 5.9 10E3/UL (ref 1.6–8.3)
NEUTROPHILS NFR BLD AUTO: 65 %
NITRATE UR QL: POSITIVE
NRBC # BLD AUTO: 0 10E3/UL
NRBC BLD AUTO-RTO: 0 /100
PH UR STRIP: 6 [PH] (ref 5–7)
PLATELET # BLD AUTO: 259 10E3/UL (ref 150–450)
POTASSIUM SERPL-SCNC: 4.1 MMOL/L (ref 3.4–5.3)
PROT SERPL-MCNC: 7.3 G/DL (ref 6.4–8.3)
RBC # BLD AUTO: 4.51 10E6/UL (ref 4.4–5.9)
RBC URINE: 0 /HPF
SODIUM SERPL-SCNC: 140 MMOL/L (ref 135–145)
SP GR UR STRIP: 1 (ref 1–1.03)
UROBILINOGEN UR STRIP-MCNC: NORMAL MG/DL
WBC # BLD AUTO: 9.2 10E3/UL (ref 4–11)
WBC URINE: 0 /HPF

## 2024-09-11 PROCEDURE — 124N000002 HC R&B MH UMMC

## 2024-09-11 PROCEDURE — 250N000013 HC RX MED GY IP 250 OP 250 PS 637

## 2024-09-11 PROCEDURE — 99207 PR NO CHARGE LOS: CPT | Performed by: UROLOGY

## 2024-09-11 PROCEDURE — 250N000013 HC RX MED GY IP 250 OP 250 PS 637: Performed by: STUDENT IN AN ORGANIZED HEALTH CARE EDUCATION/TRAINING PROGRAM

## 2024-09-11 PROCEDURE — 87086 URINE CULTURE/COLONY COUNT: CPT

## 2024-09-11 PROCEDURE — 85004 AUTOMATED DIFF WBC COUNT: CPT

## 2024-09-11 PROCEDURE — 80053 COMPREHEN METABOLIC PANEL: CPT

## 2024-09-11 PROCEDURE — 84146 ASSAY OF PROLACTIN: CPT

## 2024-09-11 PROCEDURE — 250N000012 HC RX MED GY IP 250 OP 636 PS 637: Performed by: PSYCHIATRY & NEUROLOGY

## 2024-09-11 PROCEDURE — 250N000013 HC RX MED GY IP 250 OP 250 PS 637: Performed by: PHYSICIAN ASSISTANT

## 2024-09-11 PROCEDURE — 99418 PROLNG IP/OBS E/M EA 15 MIN: CPT | Performed by: PSYCHIATRY & NEUROLOGY

## 2024-09-11 PROCEDURE — 250N000013 HC RX MED GY IP 250 OP 250 PS 637: Performed by: PSYCHIATRY & NEUROLOGY

## 2024-09-11 PROCEDURE — 73130 X-RAY EXAM OF HAND: CPT | Mod: RT

## 2024-09-11 PROCEDURE — 81001 URINALYSIS AUTO W/SCOPE: CPT

## 2024-09-11 PROCEDURE — 99233 SBSQ HOSP IP/OBS HIGH 50: CPT | Mod: GC | Performed by: PSYCHIATRY & NEUROLOGY

## 2024-09-11 PROCEDURE — 80307 DRUG TEST PRSMV CHEM ANLYZR: CPT

## 2024-09-11 PROCEDURE — 36415 COLL VENOUS BLD VENIPUNCTURE: CPT

## 2024-09-11 RX ORDER — DIVALPROEX SODIUM 500 MG/1
1000 TABLET, EXTENDED RELEASE ORAL AT BEDTIME
Status: DISCONTINUED | OUTPATIENT
Start: 2024-09-11 | End: 2024-09-13

## 2024-09-11 RX ADMIN — SENNOSIDES AND DOCUSATE SODIUM 1 TABLET: 8.6; 5 TABLET ORAL at 08:18

## 2024-09-11 RX ADMIN — NICOTINE POLACRILEX 4 MG: 2 GUM, CHEWING BUCCAL at 16:23

## 2024-09-11 RX ADMIN — TAMSULOSIN HYDROCHLORIDE 0.4 MG: 0.4 CAPSULE ORAL at 08:19

## 2024-09-11 RX ADMIN — THERA TABS 1 TABLET: TAB at 08:18

## 2024-09-11 RX ADMIN — NICOTINE POLACRILEX 4 MG: 2 GUM, CHEWING BUCCAL at 08:18

## 2024-09-11 RX ADMIN — BUPRENORPHINE AND NALOXONE 1 FILM: 2; .5 FILM, SOLUBLE BUCCAL; SUBLINGUAL at 08:18

## 2024-09-11 RX ADMIN — NICOTINE POLACRILEX 4 MG: 2 GUM, CHEWING BUCCAL at 10:15

## 2024-09-11 RX ADMIN — METFORMIN HYDROCHLORIDE 500 MG: 500 TABLET, FILM COATED ORAL at 08:18

## 2024-09-11 RX ADMIN — NICOTINE POLACRILEX 4 MG: 2 GUM, CHEWING BUCCAL at 12:16

## 2024-09-11 RX ADMIN — NICOTINE POLACRILEX 4 MG: 2 GUM, CHEWING BUCCAL at 13:31

## 2024-09-11 RX ADMIN — IBUPROFEN 400 MG: 200 TABLET, FILM COATED ORAL at 13:31

## 2024-09-11 RX ADMIN — METFORMIN HYDROCHLORIDE 500 MG: 500 TABLET, FILM COATED ORAL at 18:08

## 2024-09-11 RX ADMIN — ASPIRIN 81 MG CHEWABLE TABLET 81 MG: 81 TABLET CHEWABLE at 08:18

## 2024-09-11 RX ADMIN — NICOTINE POLACRILEX 4 MG: 2 GUM, CHEWING BUCCAL at 18:11

## 2024-09-11 RX ADMIN — POLYETHYLENE GLYCOL 3350 17 G: 17 POWDER, FOR SOLUTION ORAL at 08:18

## 2024-09-11 RX ADMIN — NICOTINE 1 PATCH: 21 PATCH, EXTENDED RELEASE TRANSDERMAL at 08:18

## 2024-09-11 ASSESSMENT — ACTIVITIES OF DAILY LIVING (ADL)
ADLS_ACUITY_SCORE: 28
DRESS: INDEPENDENT
ADLS_ACUITY_SCORE: 28
HYGIENE/GROOMING: INDEPENDENT
HYGIENE/GROOMING: INDEPENDENT
DRESS: INDEPENDENT
ADLS_ACUITY_SCORE: 28
ORAL_HYGIENE: INDEPENDENT
ADLS_ACUITY_SCORE: 28
LAUNDRY: UNABLE TO COMPLETE
ORAL_HYGIENE: INDEPENDENT
LAUNDRY: UNABLE TO COMPLETE
ADLS_ACUITY_SCORE: 28

## 2024-09-11 NOTE — CONSULTS
"Urology Consult History and Physical    Name: Eloy Storm    MRN: [id number]   YOB: 2000       We were asked to see Eloy Storm at the request of Dr. Arreola for evaluation and treatment of the following chief complaint.        Chief Complaint:   Urinary retention          History of Present Illness:   Eloy Storm is a 24 year old male with schizoaffective disorder, polysubstance use, and KATHE who is admitted after driving erratically and concerns for Kratom withdrawal who is currently in urinary retention for which urology is consulted. This has been a problem since his admission so medicine was consulted and recommended tamsulosin. Since that time he has reported that he has urinated, but he did this reportedly into the toilet which he flushed with no witnesses. Most recently, his last bladder scan was 1600mL and he continues to refuse catheter placement despite a discussion by both psychiatry and medicine team regarding the risks of not being catheterized.     The patient has had a similar episode of retention in the past and required a catheter for a short time which hurt so he would not like a catheter again.     The frequency of his bowel movements are unknown as he declines to answer.     In regards to medications he's on numerous that can cause urinary retention: buprenorphine, olanzapine, quetiapine.    Attempted to gather a history for Eloy who tells me \"I just peed dude\". Of which no one was able to witness this. I then asked if I could obtain a bladder scan to make sure he emptied all the way. He declined saying \"Dude, I emptied all the way. I know because I don't have to pee anymore.\" I asked if I could obtain a bladder scan, stating that I would like to make sure he is emptying his bladder to a safe level. He declined stating \"They already did it two times. You don't need to do it again\".           Past Medical History:     Past Medical History:   Diagnosis Date    Depressive " disorder     Major depression     Pulmonary embolism (H)     Substance abuse (H)     marijuana    Tardive dyskinesia 04/08/2024            Past Surgical History:     Past Surgical History:   Procedure Laterality Date    NO HISTORY OF SURGERY              Social History:     Social History     Tobacco Use    Smoking status: Former     Types: Cigarettes     Start date: 1/1/2017    Smokeless tobacco: Never    Tobacco comments:     vaping   Substance Use Topics    Alcohol use: Never            Family History:     Family History   Problem Relation Age of Onset    Hypertension Mother     Hypertension Father     Dementia Paternal Grandmother     Clotting Disorder Paternal Grandfather             Allergies:     Allergies   Allergen Reactions    Cefuroxime Unknown     PN: LW Reaction: Rash, Generalized    Other reaction(s): Unknown   PN: LW Reaction: Rash, Generalized   PN: LW Reaction: Rash, Generalized    No Clinical Screening - See Comments Other (See Comments)     Patient had a reaction to some medication when he went to the dentist as a toddler    Other Allergy (See Comments) [External Allergen Needs Reconciliation - See Comment] Unknown     Other reaction(s): *Unknown - Childhood Rxn, Patient had a reaction to some medication when he went to the dentist as a toddler    Other Drug Allergy (See Comments)      Other reaction(s): *Unknown - Childhood Rxn   Patient had a reaction to some medication when he went to the dentist as a toddler            Medications:     Current Facility-Administered Medications   Medication Dose Route Frequency Provider Last Rate Last Admin    acetaminophen (TYLENOL) tablet 650 mg  650 mg Oral Q4H PRN Neto Bolanos MD   650 mg at 09/10/24 2149    alum & mag hydroxide-simethicone (MAALOX) suspension 30 mL  30 mL Oral Q4H PRN Neto Bolanos MD        aspirin (ASA) chewable tablet 81 mg  81 mg Oral Daily Berkley Arreola MD   81 mg at 09/11/24 0818    benzocaine-menthol  (CHLORASEPTIC) 6-10 MG lozenge 1 lozenge  1 lozenge Buccal Q1H PRN Luh Tsai MD        buprenorphine HCl-naloxone HCl (SUBOXONE) 2-0.5 MG per film 1 Film  1 Film Sublingual BID Nan Loya MD   1 Film at 09/11/24 0818    divalproex sodium extended-release (DEPAKOTE ER) 24 hr tablet 1,000 mg  1,000 mg Oral At Bedtime Berkley Arreola MD        hydrOXYzine HCl (ATARAX) tablet 25 mg  25 mg Oral Q4H PRN Neto Bolanos MD   25 mg at 09/10/24 2150    ibuprofen (ADVIL/MOTRIN) tablet 400 mg  400 mg Oral Q6H PRN Ralph Grewal PA-C   400 mg at 09/11/24 1331    metFORMIN (GLUCOPHAGE) tablet 500 mg  500 mg Oral BID w/meals Neto Bolanos MD   500 mg at 09/11/24 0818    multivitamin, therapeutic (THERA-VIT) tablet 1 tablet  1 tablet Oral Daily Berkley Arreola MD   1 tablet at 09/11/24 0818    nicotine (COMMIT) lozenge 4 mg  4 mg Buccal Q1H PRN Foster Batista MD   4 mg at 09/07/24 1820    Or    nicotine polacrilex (NICORETTE) gum 4 mg  4 mg Oral Q1H PRN Foster Batista MD   4 mg at 09/11/24 1331    nicotine (NICODERM CQ) 21 MG/24HR 24 hr patch 1 patch  1 patch Transdermal Daily Luh Tsai MD   1 patch at 09/11/24 0818    OLANZapine (zyPREXA) tablet 10 mg  10 mg Oral TID PRN Neto Bolanos MD   10 mg at 09/10/24 2150    Or    OLANZapine (zyPREXA) injection 10 mg  10 mg Intramuscular TID PRN Neto Bolanos MD        OLANZapine (zyPREXA) tablet 5 mg  5 mg Oral At Bedtime Neto Bolanos MD   5 mg at 09/10/24 1918    polyethylene glycol (MIRALAX) Packet 17 g  17 g Oral Daily Bo Valle PA-C   17 g at 09/11/24 0818    QUEtiapine (SEROquel) tablet 25 mg  25 mg Oral Q6H PRN Neto Bolanos MD   25 mg at 09/10/24 1832    QUEtiapine ER (SEROquel XR) 24 hr tablet 800 mg  800 mg Oral At Bedtime Neto Bolanos MD   800 mg at 09/10/24 1918    senna-docusate (SENOKOT-S/PERICOLACE) 8.6-50 MG per tablet 1 tablet  1 tablet Oral BID Bo Valle,  "PA-C   1 tablet at 09/11/24 0818    tamsulosin (FLOMAX) capsule 0.4 mg  0.4 mg Oral Daily Berkley Arreola MD   0.4 mg at 09/11/24 0819             Review of Systems:    ROS: 10 point ROS neg other than the symptoms noted above in the HPI           Physical Exam:   VS:  T: 96.8    HR: 85    BP: 114/78    RR: 16   GEN:  sitting on his bed coloring a pair of pants and sweat shirt with markers, irritable   CV:  RRR  LUNGS: Non-labored breathing.   BACK:  declined   ABD:  declined  :  declined          Data:   All laboratory data reviewed:    Recent Labs   Lab 09/05/24  0926   WBC 7.6   HGB 14.0          Recent Labs   Lab 09/05/24  0927      POTASSIUM 3.9   CHLORIDE 102   CO2 28   BUN 8.7   CR 0.95   GLC 94   SAMIRA 9.2     No lab results found in last 7 days.    Invalid input(s): \"URINEBLOOD\"             Impression and Plan:   Impression:   Eloy Storm is a 24 year old male with schizoaffective disorder, polysubstance use, and KATHE who is admitted after driving erratically and concerns for Kratom withdrawal who is currently in urinary retention. Discussed with the patient that given his significant urinary retention I would recommend either a straight or indwelling catheter placement acutely. Spoke to the patient about the risks of urinary retention including bladder rupture and renal failure that may require dialysis or renal transplant. During the conversation the patient was extremely irritable stating \"you can tell me the risks, but I'm not going to listen to you because I don't care.\" I discussed that I am worried about his health and would 1) like to confirm that he urinated with a bladder scan and if not 2) have a catheter placed. He yelled that he had urinated and demanded that I leave the room as he was never going to agree to a catheter being placed.     I discussed this to the primary team and my concern that if he truly is not urinating he would be at risk of acute renal failure or " bladder rupture as a bladder scan of >1600mL is much too high for a normal bladder capacity. Discussed that for acute retention the management option is to drain his urine with a urinary catheter, however, the patient is declining placement despite the risks discussed with him. I asked if the patient had capacity and was told by his primary team that it was difficult to assess so at this point it is assumed he does.     Discussed that I would continue to recommend bladder scans to see if he would be agreeable to assess if he is emptying his bladder. Additionally, if his bladder scans continue to be elevated I recommend that they continue to attempt to catheter placement if the patient is agreeable. If the psychiatry deems that he does not have capacity and his bladder scans are elevated >300mL despite attempts to urinate then either CIC or an indwelling catheter should be placed. If an indwelling catheter be placed I recommend evaluating for risk of traumatic self removal before opting for and indwelling hardy catheter    Discussed concerning signs for bladder rupture including signs/symptoms of peritonitis (rebound tenderness, severe abdominal pain), fever, etc. If that is a concern then a CT should be obtained STAT to confirm bladder rupture. If bladder rupture is confirmed then Urology should be contacted.     In regards the etiology of his retention it is most likely 2/2 medication/recreational substances. However, recommend getting a UA/Ucx to rule out UTI, aggressively management constipation and continuing tamsulosin. Would recommend continuing to have the patient attempt to urinate prior to straight catheterization. Ultimately, if he continues to retain the acute ways to drain his bladder are 1) straight intermittent catheterization 2) indwelling hardy catheter. I would  however.       Plan:   - Avoid anticholinergic, antihistamine, and alpha-agonist medications as these will exacerbate urinary retention  -  Minimize narcotic pain medication regimen as tolerated  - Send UA/UC to rule out UTI as etiology of urinary retention  - Increased ambulation/mobility will also usually help with improvement in the degree of urinary retention  - Continue tamsulosin 0.4 mg qday  - recommend bladder scans (especially post void) to assess urinary retention  - if bladder scans continue to be >300mL would recommend catheterization if patient is amenable   - as of now it is assumed the patient has capacity and he is refusing catheter placement   - please notify urology is there are any of the concerning symptoms mentioned above; you do not need to notify urology about the patient declining catheterization as our recommendations remain unchanged         This patient's exam findings, labs, and imaging discussed with urology staff surgeon  Dr. Napoles, who developed the treatment plan.    Magdy Ramirez MD PGY4  Urology Resident

## 2024-09-11 NOTE — PLAN OF CARE
"Nursing Assessment    Recent Vitals: refused    Patient has frequently been standing at the medication window either requesting writer or listening by placing his ear near the window as staff are attempting to discuss patient information. Eloy has been redirected and will step about a foot away from the door. Other patients have been blocked from the medication window due to him standing at it. He has been cooperative with moving farther away when other patients are attempting to get medications.    He requested to be on Depakote stating \"I'm having a lot of mood swings and I'd like to be on Depakote.\" Writer informed provider and he was placed on Depakote 1000mg at bedtime.    He frequently seeks out staff with multiple requests. Often he will have the same question that writer answered about 10 mins ago. He was fixated on receiving a duiretic and stated that Invega helps him. Writer educated him that this is not a diuretic but patient insisted it was. After a few attempts, writer was able to convince patient to have a bladder scan. 1604 ml was noted in his bladder. IM was updated and informed writer to have a urology consult placed. Writer notified attending and consult was placed. Patient continues to refuse being straight cath stating that it hurts.    He denies SI/HI/AVH. Writer has heard patient talking to himself but is unable to make out what he is saying. He voiced having anxiety. He presents with grandiose and paranoid thinking. Mild irritability. Incite and judgment are poor. His SIO was discontiued at 1030. Upon assessing at 2 hours and 4 hours he continues to deny any SI. Hygiene is fair. Medication compliant. He received Ibuprofen for right hand pain at 1330.    Patients right hand continues to show swelling and bruising, primarily around the pinky area. He went to X-ray at 1230. Prior to leaving he had writer lock his door stating \"People take my things\". He was cooperative with transportation to and " from. Upon coming back to the unit, writer was attempting to push the pod door open to station 12 and push patient in the wheelchair at the same time. Patient stood up, held the door open, and assisted with the wheelchair. He was thanked for his assistance. Awaiting on results.    Tenisha Barboza, RN MSN

## 2024-09-11 NOTE — PLAN OF CARE
"Team Note Due:  Wednesday    Assessment/Intervention/Current Symtoms and Care Coordination:  Chart review and met with team, discussed pt progress, symptomology, and response to treatment.  Discussed the discharge plan and any potential impediments to discharge.    Per team, Pt is tense, requesting again to increase suboxone which he was informed no. Requesting diuretics.     I met with patient. He appears to have avoidant eye contact, arms crossed, declines to shower, continues to wear same scrubs. Did not want to talk. He at first was hesitant to go to his room when asked for a peer acting out, then he did and said to peer \"they said you are a threat so I have to go to my room\" to attempt to escalate patient.     Awaiting final commitment tomorrow.   Behavioral team note completed.     Discharge Plan or Goal:  He is able to return back to group home but would benefit most from MI/JACOB treatment     Barriers to Discharge:  Symptoms - jamila, agitation  Commitment process    Referral Status:  TBD     Legal Status:  Court hold  County: [place]  File Number: [id number]  Start and expiration date of commitment:   MI and [facility] petition - [place] meds requested are: Zyprexa, Seroquel, Invega and Abilify     Commitment and [place] Hearing 9/12 at 2:15PM    Contacts (include KELBY status):   (Megan) Ph: [phone]  - KELBY only for specific thing  Psych: Dr. Kahn, [facility] - Declined KELBY  Michelle Barraza/Mother: [phone] - Declined KELBY x2     Upcoming Meetings and Dates/Important Information and next steps:  Commitment and [place] Hearing 9/12 at 2:15PM  "

## 2024-09-11 NOTE — TELEPHONE ENCOUNTER
R: [place]  Access Inpatient Bed Call Log 9/11/24 8:15 AM    Intake has called facilities that have not updated the bed status within the last 12 hours. -TRANSFER                            [facility] is at capacity.             Pt remains on the work list pending appropriate bed availability.

## 2024-09-11 NOTE — PLAN OF CARE
"  Problem: Suicide Risk  Goal: Absence of Self-Harm  Outcome: Progressing   Goal Outcome Evaluation:    Patient     Writer received a call from Dr. Gooden, and ordered a bladder scan for the patient. First attempt the RN approached the patient in his room to perform a bladder scan. The patient stated \"they scanned me 3 time today, so, I am good\" and refused the scan. Writer made second attempt which was also refused. The provider notified. Urologist Dr Curran visited the patient.The patient reported that he has urinated and refused both bladder scan and straight catheterization. For further instructions,Dr. Loya discussed with writer and another RN about the patient. The plan was to monitor the patient another hour to check if he urinated. The Provider instructed that if the patient did not urinate the on call urologist Should be contacted to address the issues.      The on call urologist called to consult the issued     He stated that he can not do anything for the pt currently as the pt is refusing care and he recommended that the psychiatrists have to determine his ongoing issues to see if needs to be restrained and for a catheter, The urologist on called stated that us nurses can only call if there is a report of abdominal pain and or a bladder rupture.      Writer and another nurse talked to Dr. Loya and the recommendation is that pt will be monitored for the rest of the evening and night and he is physically okay he denies any physical concerns, he also denies pain and abdominal discomfort. His lab levels look good and there isn't any concern for any kidney failure. Will continue to monitor pt.  Pt also has stated that he has urinated today. But continues to refuse bladder scan. He also states, \"why don't you guys leave me alone, I am fine why do I have to do a scan\"       The patient approached the nursing window requesting bedtime medications. The following medications were offered: Zyprexa 5 mg " "tablet, Seroquel 800 mg tablet, and Senna-Docusate 1 tablet. The patient inquired about Suboxone, which was not provided due to its discontinuation over concerns about urinary retention. The patient reacted aggressively, prompting the involvement of the FLY team. The team visited the patient s room to explain the situation regarding Suboxone. The patient refused the offered medications and stated he would prefer to wait until the following morning to discuss the issue with their provider. The patient urinate in his room bathroom and brought the urine sample to staff. Staff sent urine sample to clinical labs.    Patient was pacing on the villarreal, going from one end to other end. Staff reported he was saying \"I am going to do some bad things in next hours\" .Patient was  also observed pressing exit door key pads.      Behavioral//    Patient was in the villarreal with other pts and he called out another in rm 135 , he stated, \"If you're name is Corey I am going to kill you\".  was confused was walked back to his room. Eloy has been disorganized, paranoid and also lacks insight. He was put on a 1:1 threatening to assault another pt and staff.     /84   Pulse 95   Temp 97.7  F (36.5  C)   Resp 16   Ht 1.854 m (6' 1\")   Wt 94.8 kg (209 lb)   SpO2 98%   BMI 27.57 kg/m              "

## 2024-09-11 NOTE — PLAN OF CARE
"\"Somebody stole a note from my room.\"  \"See, my hand is swollen, can you do something for that?\"  \"I get agitated because of the way I'm treated, because of the voices.\"  \"I'm anxious and have OCD thoughts.\"    Polite and calm in interaction, at times dismissive. Frequently responding to inner stimuli by talking to himself. Denies imperatives. Also denies suicidal ideation. No aggression. No provocation or intrusiveness this evening.    Knew his medications and their purpose. Stated Luvox is effective for his OCD thoughts and anxiety and he would like an increase in dose. But then also request extra senna for urination.    Drawing and writing on walls, sheets, and his skin with washable markers.    No abnormal body movements.    Showed R hand with swelling and bruising. Demonstrated unimpeded movement. C/o pain rated 4.  He reported having hit a mirror during the day today. On call Internal medicine notified. Refused ice pack.  Requested and received Tylenol 650 mg, Atarax 25 mg, Zyprexa 10 mg at 21:50 for anxiety, agitation, and pain. Reported some relief.      Plan: Monitor and document mood and behaviour, thought process and content. Establish and maintain therapeutic relationship. Educate about diagnoses, medications, treatment, legal status, plan of care. Address preexisting and concurrent medical concerns.     P:Impaired coping  G:Adequate coping  O:Not progressing                 "

## 2024-09-11 NOTE — PLAN OF CARE
BEH IP Unit Acuity Rating Score (UARS)  Patient is given one point for every criteria they meet.    CRITERIA SCORING   On a 72 hour hold, court hold, committed, stay of commitment, or revocation. 1    Patient LOS on BEH unit exceeds 20 days. 0  LOS: 8   Patient under guardianship, 55+, otherwise medically complex, or under age 11. 0   Suicide ideation without relief of precipitating factors. 1   Current plan for suicide. 0   Current plan for homicide. 0   Imminent risk or actual attempt to seriously harm another without relief of factors precipitating the attempt. 1   Severe dysfunction in daily living (ex: complete neglect for self care, extreme disruption in vegetative function, extreme deterioration in social interactions). 1   Recent (last 7 days) or current physical aggression in the ED or on unit. 0   Restraints or seclusion episode in past 72 hours. 0   Recent (last 7 days) or current verbal aggression, agitation, yelling, etc., while in the ED or unit. 1   Active psychosis. 1   Need for constant or near constant redirection (from leaving, from others, etc).  1   Intrusive or disruptive behaviors. 1   Patient requires 3 or more hours of individualized nursing care per 8-hour shift (i.e. for ADLs, meds, therapeutic interventions). 1   TOTAL 9

## 2024-09-11 NOTE — PROVIDER NOTIFICATION
09/11/24 0700   Sleep/Rest   Sleep/Rest/Relaxation appears asleep;no problem identified   Sleep Hygiene Promotion noise level reduced;regular sleep pattern promoted;relaxation techniques promoted;room lighting adjusted;other (see comments)   Night Time # Hours 5.75 hours     Patient came to the nurses station at the start of the shift requesting for his flomax. Became upset and walked back to his room when this writer provided education on flomax administration. Offered bladder scan, declined. Refused to respond to respond to questions regarding urinary retention.Stayed in room/ bed the rest of the night without any behavioral concerns. Slept for 5.75 hours this night. Safety maintained and remains on SIO for SIB/SI precautions.

## 2024-09-11 NOTE — TELEPHONE ENCOUNTER
R: [place]  Access Inpatient Bed Call Log  9/11/24 @ 1:00am   Intake has called facilities that have not updated their bed status within the last 12 hours.     *[facility] Only:  [place] [facility]: @ [facility].     Pt remains on waitlist pending appropriate placement availability.

## 2024-09-11 NOTE — TELEPHONE ENCOUNTER
"R: [place]  Access Inpatient Bed Call Log 9/11/24  @3:15 PM:    Intake has called facilities that have not updated the bed status within the last 12 hours.    -Transfer                         [facility] is at capacity.      4:44 PM MRN provided to unit CRN for review, awaiting callback.  8:21 PM Writer received message from another  via teams \" Venancio said both our pt's look appropriate for admit to unit 10.\"  8:32 PM Paged Pino.  9:05 PM \"I just placed this patient on SIO for threats to harm/kill other patients.  I talked to charge RN who said he is not appropriate for transfer to lower acuity.  Advise revisiting during day shift\" Pt added to admit board.      Pt remains on the work list pending appropriate bed availability.               "
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
## 2024-09-12 ENCOUNTER — TELEPHONE (OUTPATIENT)
Dept: BEHAVIORAL HEALTH | Facility: CLINIC | Age: 24
End: 2024-09-12

## 2024-09-12 LAB
AMPHETAMINES UR QL SCN: NORMAL
BACTERIA UR CULT: NO GROWTH
BARBITURATES UR QL SCN: NORMAL
BENZODIAZ UR QL SCN: NORMAL
BZE UR QL SCN: NORMAL
CANNABINOIDS UR QL SCN: NORMAL
FENTANYL UR QL: NORMAL
OPIATES UR QL SCN: NORMAL
PCP QUAL URINE (ROCHE): NORMAL
PROLACTIN SERPL 3RD IS-MCNC: 16 NG/ML (ref 4–15)

## 2024-09-12 PROCEDURE — 99418 PROLNG IP/OBS E/M EA 15 MIN: CPT | Performed by: PSYCHIATRY & NEUROLOGY

## 2024-09-12 PROCEDURE — 250N000013 HC RX MED GY IP 250 OP 250 PS 637

## 2024-09-12 PROCEDURE — 124N000002 HC R&B MH UMMC

## 2024-09-12 PROCEDURE — 97150 GROUP THERAPEUTIC PROCEDURES: CPT | Mod: GO

## 2024-09-12 PROCEDURE — 250N000013 HC RX MED GY IP 250 OP 250 PS 637: Performed by: STUDENT IN AN ORGANIZED HEALTH CARE EDUCATION/TRAINING PROGRAM

## 2024-09-12 PROCEDURE — 250N000012 HC RX MED GY IP 250 OP 636 PS 637

## 2024-09-12 PROCEDURE — 250N000013 HC RX MED GY IP 250 OP 250 PS 637: Performed by: PSYCHIATRY & NEUROLOGY

## 2024-09-12 PROCEDURE — 99233 SBSQ HOSP IP/OBS HIGH 50: CPT | Mod: GC | Performed by: PSYCHIATRY & NEUROLOGY

## 2024-09-12 PROCEDURE — 250N000011 HC RX IP 250 OP 636: Mod: JZ | Performed by: PSYCHIATRY & NEUROLOGY

## 2024-09-12 RX ORDER — NALOXONE HYDROCHLORIDE 0.4 MG/ML
0.4 INJECTION, SOLUTION INTRAMUSCULAR; INTRAVENOUS; SUBCUTANEOUS
Status: ACTIVE | OUTPATIENT
Start: 2024-09-12

## 2024-09-12 RX ORDER — BUPRENORPHINE AND NALOXONE 2; .5 MG/1; MG/1
1 FILM, SOLUBLE BUCCAL; SUBLINGUAL 2 TIMES DAILY PRN
Status: DISCONTINUED | OUTPATIENT
Start: 2024-09-12 | End: 2024-09-12

## 2024-09-12 RX ORDER — BUPRENORPHINE AND NALOXONE 2; .5 MG/1; MG/1
1 FILM, SOLUBLE BUCCAL; SUBLINGUAL 2 TIMES DAILY
Status: DISCONTINUED | OUTPATIENT
Start: 2024-09-12 | End: 2024-09-13

## 2024-09-12 RX ORDER — OLANZAPINE 10 MG/2ML
10 INJECTION, POWDER, FOR SOLUTION INTRAMUSCULAR AT BEDTIME
Status: DISCONTINUED | OUTPATIENT
Start: 2024-09-12 | End: 2024-09-12

## 2024-09-12 RX ORDER — LOPERAMIDE HCL 2 MG
2 CAPSULE ORAL EVERY 4 HOURS PRN
Status: DISCONTINUED | OUTPATIENT
Start: 2024-09-12 | End: 2024-09-12

## 2024-09-12 RX ORDER — BUPRENORPHINE 2 MG/1
2 TABLET SUBLINGUAL 2 TIMES DAILY PRN
Status: DISCONTINUED | OUTPATIENT
Start: 2024-09-12 | End: 2024-09-12

## 2024-09-12 RX ORDER — OLANZAPINE 10 MG/1
10 TABLET, ORALLY DISINTEGRATING ORAL 2 TIMES DAILY
Status: DISCONTINUED | OUTPATIENT
Start: 2024-09-12 | End: 2024-09-18

## 2024-09-12 RX ORDER — ONDANSETRON 4 MG/1
4 TABLET, ORALLY DISINTEGRATING ORAL EVERY 6 HOURS PRN
Status: DISCONTINUED | OUTPATIENT
Start: 2024-09-12 | End: 2024-09-12

## 2024-09-12 RX ORDER — OLANZAPINE 10 MG/1
10 TABLET, ORALLY DISINTEGRATING ORAL AT BEDTIME
Status: DISCONTINUED | OUTPATIENT
Start: 2024-09-12 | End: 2024-09-12

## 2024-09-12 RX ORDER — NALOXONE HYDROCHLORIDE 0.4 MG/ML
0.2 INJECTION, SOLUTION INTRAMUSCULAR; INTRAVENOUS; SUBCUTANEOUS
Status: ACTIVE | OUTPATIENT
Start: 2024-09-12

## 2024-09-12 RX ORDER — OLANZAPINE 10 MG/1
10 TABLET ORAL 3 TIMES DAILY PRN
Status: CANCELLED | OUTPATIENT
Start: 2024-09-12

## 2024-09-12 RX ORDER — OLANZAPINE 10 MG/2ML
10 INJECTION, POWDER, FOR SOLUTION INTRAMUSCULAR 2 TIMES DAILY
Status: DISCONTINUED | OUTPATIENT
Start: 2024-09-12 | End: 2024-09-18

## 2024-09-12 RX ORDER — BUPRENORPHINE 2 MG/1
2 TABLET SUBLINGUAL 4 TIMES DAILY
Status: DISCONTINUED | OUTPATIENT
Start: 2024-09-13 | End: 2024-09-12 | Stop reason: ALTCHOICE

## 2024-09-12 RX ORDER — IBUPROFEN 600 MG/1
600 TABLET, FILM COATED ORAL EVERY 6 HOURS PRN
Status: DISCONTINUED | OUTPATIENT
Start: 2024-09-12 | End: 2024-09-12

## 2024-09-12 RX ORDER — GABAPENTIN 100 MG/1
200 CAPSULE ORAL 3 TIMES DAILY PRN
Status: DISCONTINUED | OUTPATIENT
Start: 2024-09-12 | End: 2024-09-12

## 2024-09-12 RX ORDER — OLANZAPINE 10 MG/2ML
10 INJECTION, POWDER, FOR SOLUTION INTRAMUSCULAR 3 TIMES DAILY PRN
Status: CANCELLED | OUTPATIENT
Start: 2024-09-12

## 2024-09-12 RX ORDER — CLONIDINE HYDROCHLORIDE 0.1 MG/1
0.1 TABLET ORAL EVERY 6 HOURS PRN
Status: DISCONTINUED | OUTPATIENT
Start: 2024-09-12 | End: 2024-09-12

## 2024-09-12 RX ORDER — QUETIAPINE 400 MG/1
400 TABLET, FILM COATED, EXTENDED RELEASE ORAL AT BEDTIME
Status: DISCONTINUED | OUTPATIENT
Start: 2024-09-12 | End: 2024-09-26

## 2024-09-12 RX ADMIN — ALUMINUM HYDROXIDE, MAGNESIUM HYDROXIDE, AND DIMETHICONE 30 ML: 200; 20; 200 SUSPENSION ORAL at 21:52

## 2024-09-12 RX ADMIN — METFORMIN HYDROCHLORIDE 500 MG: 500 TABLET, FILM COATED ORAL at 08:41

## 2024-09-12 RX ADMIN — OLANZAPINE 10 MG: 10 INJECTION, POWDER, FOR SOLUTION INTRAMUSCULAR at 01:21

## 2024-09-12 RX ADMIN — BUPRENORPHINE AND NALOXONE 1 FILM: 2; .5 FILM, SOLUBLE BUCCAL; SUBLINGUAL at 12:42

## 2024-09-12 RX ADMIN — DIVALPROEX SODIUM 1000 MG: 500 TABLET, FILM COATED, EXTENDED RELEASE ORAL at 21:52

## 2024-09-12 RX ADMIN — NICOTINE 1 PATCH: 21 PATCH, EXTENDED RELEASE TRANSDERMAL at 08:41

## 2024-09-12 RX ADMIN — QUETIAPINE FUMARATE 25 MG: 25 TABLET ORAL at 00:17

## 2024-09-12 RX ADMIN — OLANZAPINE 10 MG: 10 TABLET, ORALLY DISINTEGRATING ORAL at 19:25

## 2024-09-12 RX ADMIN — NICOTINE POLACRILEX 4 MG: 2 GUM, CHEWING BUCCAL at 19:38

## 2024-09-12 RX ADMIN — METFORMIN HYDROCHLORIDE 500 MG: 500 TABLET, FILM COATED ORAL at 18:00

## 2024-09-12 RX ADMIN — NICOTINE POLACRILEX 4 MG: 2 GUM, CHEWING BUCCAL at 11:15

## 2024-09-12 RX ADMIN — OLANZAPINE 10 MG: 10 TABLET, ORALLY DISINTEGRATING ORAL at 09:59

## 2024-09-12 RX ADMIN — NICOTINE POLACRILEX 4 MG: 2 GUM, CHEWING BUCCAL at 17:10

## 2024-09-12 RX ADMIN — SENNOSIDES AND DOCUSATE SODIUM 1 TABLET: 8.6; 5 TABLET ORAL at 08:41

## 2024-09-12 RX ADMIN — NICOTINE POLACRILEX 4 MG: 2 LOZENGE ORAL at 18:05

## 2024-09-12 RX ADMIN — THERA TABS 1 TABLET: TAB at 08:41

## 2024-09-12 RX ADMIN — BUPRENORPHINE AND NALOXONE 1 FILM: 2; .5 FILM, SOLUBLE BUCCAL; SUBLINGUAL at 19:38

## 2024-09-12 RX ADMIN — ASPIRIN 81 MG CHEWABLE TABLET 81 MG: 81 TABLET CHEWABLE at 08:41

## 2024-09-12 RX ADMIN — QUETIAPINE 400 MG: 400 TABLET, FILM COATED, EXTENDED RELEASE ORAL at 19:25

## 2024-09-12 RX ADMIN — SENNOSIDES AND DOCUSATE SODIUM 1 TABLET: 8.6; 5 TABLET ORAL at 19:26

## 2024-09-12 RX ADMIN — QUETIAPINE FUMARATE 25 MG: 25 TABLET ORAL at 17:10

## 2024-09-12 ASSESSMENT — ACTIVITIES OF DAILY LIVING (ADL)
ADLS_ACUITY_SCORE: 28
ORAL_HYGIENE: INDEPENDENT
ADLS_ACUITY_SCORE: 28
ADLS_ACUITY_SCORE: 28
LAUNDRY: UNABLE TO COMPLETE
ADLS_ACUITY_SCORE: 28
ORAL_HYGIENE: INDEPENDENT
ADLS_ACUITY_SCORE: 28
HYGIENE/GROOMING: INDEPENDENT
LAUNDRY: UNABLE TO COMPLETE
ADLS_ACUITY_SCORE: 28
DRESS: INDEPENDENT
ADLS_ACUITY_SCORE: 28
ADLS_ACUITY_SCORE: 28
HYGIENE/GROOMING: INDEPENDENT;SHOWER
DRESS: INDEPENDENT;SCRUBS (BEHAVIORAL HEALTH)
ADLS_ACUITY_SCORE: 28

## 2024-09-12 NOTE — CARE PLAN
09/12/24 0122   Seclusion or Restraint Order Upon Initiation and With Every Order   In Person Face to Face Assessment Conducted Yes-Eval of pt's immediate situation, reaction to intervention, complete review of systems assessment, behavioral assessment & review/assessment of hx, drugs & meds, recent labs, etc, behavioral condition, need to continue/terminate restraint/seclusion   RN Notified Provider of Result of Face to Face Yes   Describe adverse physical outcome none   Describe actions taken Physical hold for IM Zyprexa   Describe follow-up needed Continue to observe and assist as needed

## 2024-09-12 NOTE — PLAN OF CARE
"NOC Shift Report    Pt on1:1 SIO for assault risk.    At the start of shift Bella appeared tense, agitated, and his bx was erratic. The pt was aggressively pacing the hallway and tearing down paper signs from the walls, and periodically hitting the wall. Pt denied pain. Pt was offered prns for anxiety and agitation several times, however, pt declined and frequently responded by asking for \"my Suboxone\", Pt was reminded this medication was discontinued due to experiencing the side effect of retention. Staff attempted to redirect and distract him, pt refused all redirection. Pt's behavior continued to be erratic as he tried to pull down cameras and and damage the ceiling lights, he poured water over different areas of the pod floor pod, with little success in redirecting him. The FLY team was called and team worked to deescalate the pt and build rapport. The pt was informed several times of safe bx expectations on the unit.    At 0017, the pt aggressively walked towards a staff member while repeated saying four times, \"Have you ever killed someone before?\" The staff member pressed their duress beeper and removed themselves from the area. Pt changed direction and went back to his room. Pt was then encouraged to take a prn of Zyprexa for agitation. Pt declined, but agreed to take a prn of Seroquel instead, which was given to the pt for anxiety.    Around 0100, the pt's behavior escalated, and a code 21 was called. Pt put on cheeking precautions. See physical hold documentation for details and prns given.     The pt fell asleep around 0215. While sleeping, no apparent respiratory distress was observed.    I&O:  INPUT: 720 ml.     OUTPUT: 1 occurrence of urination at 0245. Pt denied abdominal pain or pain during urination. Pt declined bladder scan.    Pt slept 4 hours overnight. Will continue to monitor.    Goal Outcome Evaluation:    Problem: Behavioral Disturbance  Goal: Behavioral Disturbance  Description: Signs and " symptoms of listed problems will be absent or manageable by discharge or transition of care.  Outcome: Not Progressing    Problem: Suicide Risk  Goal: Absence of Self-Harm  Outcome: Not Progressing     Problem: Psychotic Signs/Symptoms  Goal: Improved Mood Symptoms (Psychotic Signs/Symptoms)  Outcome: Not Progressing     Problem: Adult Inpatient Plan of Care  Goal: Optimal Comfort and Wellbeing  Outcome: Not Progressing     Problem: Sleep Disturbance  Goal: Adequate Sleep/Rest  Outcome: Not Progressing

## 2024-09-12 NOTE — TELEPHONE ENCOUNTER
[place]: [place]  Access Inpatient Bed Call Log 9/12/24  @ 7:05 am:     Intake has called facilities that have not updated the bed status within the last 12 hours.   -TRANSFER                            [facility] is at capacity.                  Pt remains on the work list pending appropriate bed availability.

## 2024-09-12 NOTE — CARE PLAN
09/12/24 0121   Seclusion or Restraint Order Upon Initiation and With Every Order   Attending Provider Notified No (comment)  (Not at this time, will be notied in the Team meeting)   Attending Provider's Name Dr. Loya   Ordering Provider's Name Dr. Neeru Alex   In Person Face to Face Assessment Conducted Yes-Eval of pt's immediate situation, reaction to intervention, complete review of systems assessment, behavioral assessment & review/assessment of hx, drugs & meds, recent labs, etc, behavioral condition, need to continue/terminate restraint/seclusion   RN Notified Provider of Result of Face to Face Yes   Describe adverse physical outcome none   Describe actions taken Several descalation tecniques of active listening, redirection, and distraction were attempted during pt's erratic bx. The FLY team was called and attempted to descalate the pt. Then, due to the pt's unsafe bx of pulling down a celing tile, verbally threatening to kill staff, and being physically threatening (posturing) towards staff. A code 21 called. After pt education, the pt agreed to take prn of oral Zyprexa 10 mg for pt's agitation and unsafe bx towards self and others. At a prn of oral Zyprexa 10 mg was administered, pt then swallowed the med-cup of water, and this RN performed a mouth check with no visible medication in his mouth or under the pt's tongue. The code 21 staff did not engage with the pt at this time and the staff were excused and left. As the code 21 staff were leaving, the SIO staff observed the pt walk into his restroom, pull the Zyprexa tablet out from under his lip, and drop the medication into the toilet. At 0115, 2 RNs observed the medication in the toilet and it was flushed and documented as wasted. A code 21 was called again. After pt education, the pt agreed to allow the code 21 staff to engage in a brief physical hold of the pt for the duration of the administration of the prn IM of Zyprexa 10 mg. The Physical  Hold was placed at 0121 and ended at 0122. This RN debriefed pt, however pt declined to answer questions. No injury of the pt observed or reported. Pt declined family notification. The pt remained in his room with SIO staff observing the pt. The on-call provider was immediately paged. Dr. Neeru Alex responded and was briefed on the pt unsafe bx, interventions attempted, Face-to-Face assessment findings. Dr. Alex placed an order for a brief physical hold within an hour of the physical hold. No side effects were observed by staff or reported from pt.   Describe follow-up needed monitor pt for SE. Monitor bx

## 2024-09-12 NOTE — CARE PLAN
Rehab Group    Start time: 1115  End time: 1200  Patient time total: 25 minutes    attended partial group     #3 attended   Group Type: OT Clinic   Group Topic Covered: activity therapy     Group Session Detail:  OT clinic group provides an opportunity for individual-based goal directed activity within a group setting - allowing for interaction and socialization.  Hands-on task work help to build/restore/or maintain skills such as: focus, concentration, decision making, and problem solving.  Patients are encouraged to carry over potential new interests/hobbies learned in group following discharge.       Patient Response/Contribution:  actively engaged     Patient Detail:    Pt was in his room at the beginning of group writing neat letters on a small notepad with a marker, and asked writer for a sharpie.  Pt was told he could use one in group, though only on the paper, and he agreed. Pt was fairly quiet in group, though focused on writing task.        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

## 2024-09-12 NOTE — TELEPHONE ENCOUNTER
[facility] ONLY:  [place]: [place] Access Inpatient Bed Call Log  9/12/24 @ 1:00am    [facility]: @ capacity for Adult MH beds.     Pt remains on waitlist pending appropriate placement availability.

## 2024-09-12 NOTE — PROVIDER NOTIFICATION
09/12/24 0122   Seclusion or Restraint Order Upon Initiation and With Every Order   In Person Face to Face Assessment Conducted Yes-Eval of pt's immediate situation, reaction to intervention, complete review of systems assessment, behavioral assessment & review/assessment of hx, drugs & meds, recent labs, etc, behavioral condition, need to continue/terminate restraint/seclusion   RN Notified Provider of Result of Face to Face Yes   Describe adverse physical outcome none   Describe actions taken Physical hold for IM Zyprexa   Describe follow-up needed Continue to observe and assist as needed      Within  an hour RN Face-to-Face evaluation completed at 0122. This included an evaluation of pt's immediate situation, reaction to intervention, complete review of systems assessment, behavioral assessment & review/assessment of hx, drugs & meds, recent labs, etc, behavioral condition, need to  terminate physical hold     Pt did show resistance during restraint process . No injury or adverse effects stated or observed. CMS intact. Pt is disorganized, intrusive and paranoid over getting suboxone.  Pt has no insight to his mental health. Need for a physical hold discontinue at this time  due to completion of medication and pt continue on 1:1 SIO   provider, Neeru Alex notified of the results of face to face.

## 2024-09-12 NOTE — CONSULTS
The purpose of this note, including any accompanying recommendation, is advisory only concerning ethical principles and considerations related to this case. Determination of appropriate patient care decisions is the responsibility of the clinical team in consultation with patients and their decision makers and relevant institutional policy. Legal and policy questions should be addressed with Risk Management.     This ethics note is to document ethics involvement and general guidance regarding potential involuntary and non-consented treatment, specifically a straight or hardy catheter for urinary retention.     It's my understanding in following guidance from the care team the Mr. Storm does not have effective decision making capacity and the process for a commitment with ora has been initiated, court hearing was earlier today.     The specific issue concerns the potential for involuntarily placing a catheter to resolve urinary retention.     The patient is refusing intervention and to some extent evaluation of the urinary retention, due to (in my understanding) some prior negative experience or trauma with have a catheter placed.     The placement of a catheter to prevent avoidable injury and harm, e.g. in the extreme cases bladder rupture or kidney injury, would be ethically defensible.     The applicable ethical principle here is to use the least intrusive or burdensome means to achieve the desired medical outcome, here the avoiding of injury or harm due to urinary retention. Generally this means gradually scaling the degree of intrusion or coercion, as needed to achieve a desired medical goal that remain proportionate to the intrusion.     Specifically this e.g. means initially trying to negotiate with this patient to assent to the cath, initially with e.g. bargaining for privileges or benefits, and only escalating to e.g. use of sedation or restraints if necessary to avoid imminent and avoidable harms (like  "bladder rupture.)     The intermediate and potentially ethically defensible compromises include, as discussed with the care team, use of ativan and a hardy to minimize distress and discomfort, or potentially daily catheterization and removal if there's concern for the patient to pull out an indwelling hardy.     It's worth emphasizing that the balance of clinical benefit and burden remain a clinical judgment at the discretion of the care team and treating physicians.     To the extent that the least restrictive means are used to prevent serious/avoidable harms, unconsented and potentially involuntary treatment is more likely to be ethically defensible.     Ethics remains available to support as specific circumstances may arise.     Álvaro Asher JD, MPH, MA, LUCIE-C                                   Clinical Ethics Lead, [facility]: \"Clinical Ethics Consultation Service\" or [facility] \"Ethics\" consult pager [phone].       "

## 2024-09-12 NOTE — TELEPHONE ENCOUNTER
[place]: [place]  Access Inpatient Bed Call Log 9/12/24  @ 7:05 am:     Intake has called facilities that have not updated the bed status within the last 12 hours.                               [facility] is at capacity.                5:07 PM MRN provided to unit 10 CRN for review, awaiting callback.  5:24 PM Unit 10 CRN declined due to needing SIO. Pt added to admit board.    Pt remains on the work list pending appropriate bed availability.

## 2024-09-12 NOTE — PLAN OF CARE
"Problem: Psychotic Signs/Symptoms  Goal: Improved Behavioral Control (Psychotic Signs/Symptoms)  Outcome: Not Progressing  Patient was put on psych emergency due to escalating verbal threat of harm towards staff members, and some peers. He is disruptive on the unit and rude towards provider, staff and peers. He is anxious, agitated and tense. He lacks insight into his mental health and he is suspicious of writer calling writer a liar on multiple occasions. He is not compliant with medication administration or nursing assessment. When writer offered him his morning mediations, he refused to take them stating \"is my Suboxone in there? Cause if it is not, I am refusing everything, and leave my room.\" Writer tried to explain to him why the medication was discontinued, but patient got verbally agitated and told writer to get the fuck out of his room because writer is a liar and writer is refusing to give him his medication that he has been taking for a while now. A code 21 was called and during that time, the provider met with him and attempted to explain his plan of care and the reason why the Suboxone was discontinued. During the meeting with provider, patient was argumentative, demanding and agitated. He was offered the oral form of Zyprexa which refused, and at that time he was informed that IM will be administered because he is on a psych emergency. He agreed to take the oral zyprexa along with his morning mediation at that time and code team did not go hands on. He agreed to a bladder scan and he had 148 ml of urine in his bladder. His Suboxone was added to his medication list after obtaining his bladder scan, and he received it soon after that. He believes the provider is incompetent and is making medication changes that will hurt is care and mental health. He did not complain of pain and no prn was given. He did not eat breakfast, but ate lunch without issues. He endorse anxiety, but denies all other psych " "symptoms at this time. Patient is unkempt and refused to take a shower. He had court this afternoon and while in court he was observed browsing on the internet and playing music. When write informed him that web browsing while in court is not allowed, he got agitated and told writer to leave him alone and that he can do whatever he wants. Writer closed out the web page and he was travis to continue with his hearing without issues. After receiving his Suboxone, patient was compliant with vitals check, but continued to ask writer about his SSIR medications.     /74 (BP Location: Right arm, Patient Position: Sitting, Cuff Size: Adult Regular)   Pulse 89   Temp 97.8  F (36.6  C) (Temporal)   Resp 14   Ht 1.854 m (6' 1\")   Wt 94.8 kg (209 lb)   SpO2 95%   BMI 27.57 kg/m     Patient is on psych emergency, and he continues to be on 1:1 sio.          "

## 2024-09-12 NOTE — PROVIDER NOTIFICATION
09/12/24 0121   Upon Initiation   Order Obtained Yes   Seclusion or Restraint Order Upon Initiation and With Every Order   Length of Order 4   Attending Provider Notified No (comment)  (Not at this time, will be notied in the Team meeting)   Attending Provider's Name Dr. Loya   Is the Attending Provider the Ordering Provider? No   Ordering Provider's Name Dr. Neeru Alex   In Person Face to Face Assessment Conducted Yes-Eval of pt's immediate situation, reaction to intervention, complete review of systems assessment, behavioral assessment & review/assessment of hx, drugs & meds, recent labs, etc, behavioral condition, need to continue/terminate restraint/seclusion   RN Notified Provider of Result of Face to Face Yes   Describe adverse physical outcome none   Describe actions taken Several descalation tecniques of active listening, redirection, and distraction were attempted during pt's erratic bx. The FLY team was called and attempted to descalate the pt. Then, due to the pt's unsafe bx of pulling down a celing tile, verbally threatening to kill staff, and being physically threatening (posturing) towards staff. A code 21 called. After pt education, the pt agreed to take prn of oral Zyprexa 10 mg for pt's agitation and unsafe bx towards self and others. At a prn of oral Zyprexa 10 mg was administered, pt then swallowed the med-cup of water, and this RN performed a mouth check with no visible medication in his mouth or under the pt's tongue. The code 21 staff did not engage with the pt at this time and the staff were excused and left. As the code 21 staff were leaving, the SIO staff observed the pt walk into his restroom, pull the Zyprexa tablet out from under his lip, and drop the medication into the toilet. At 0115, 2 RNs observed the medication in the toilet and it was flushed and documented as wasted. A code 21 was called again. After pt education, the pt agreed to allow the code 21 staff to engage in a  brief physical hold of the pt for the duration of the administration of the prn IM of Zyprexa 10 mg. The Physical Hold was placed at 0121 and ended at 0122. This RN debriefed pt, however pt declined to answer questions. No injury of the pt observed or reported. Pt declined family notification. The pt remained in his room with SIO staff observing the pt. The on-call provider was immediately paged. Dr. Neeru Alex responded and was briefed on the pt unsafe bx, interventions attempted, Face-to-Face assessment findings. Dr. Alex placed an order for a brief physical hold within an hour of the physical hold. No side effects were observed by staff or reported from pt.   Describe follow-up needed monitor pt for SE. Monitor bx   Assessment at the Start of Every New Order   Less Restrictive Alternative Decreased stimulation;Verbal de-escalation;Reassurance / Support;Medication administration;Immediate action required, no least restrictive measures could be attempted;Reality orientation  (Pt observed spittiing the oral Zyprexa into the toilet.)   Risk Factors CI   Justification at the Start of Every New Order   Clinical Justification All     Around 0100, the pt aggressively pulled down a celing tile in the middle of the hallway, almost injuring himself and staff. Pt became verbally threatening to kill staff, and was physically threatening (posturing) towards staff. A code 21 called. After pt education, the pt agreed to take prn of oral Zyprexa 10 mg for pt's agitation and unsafe bx towards self and others.     A prn of oral Zyprexa 10 mg was administered, pt swallowed the medication with water. This RN then performed a mouth check with no visible medication in his mouth or under the pt's tongue. The code 21 staff did not engage with the pt at this time and the staff were excused and left. As the code 21 staff were leaving, the SIO staff observed the pt walk into his restroom, pull the Zyprexa tablet out from under his lip, and  drop the medication into the toilet. At 0115, 2 RNs observed the medication in the toilet and it was flushed and documented as wasted. A code 21 was called again. After pt education, the pt agreed to allow the code 21 staff to engage in a brief physical hold of the pt for the duration of the administration of the prn IM of Zyprexa 10 mg. The Physical Hold was placed at 0121 and ended at 0122. This RN debriefed pt, however pt declined to answer questions. No injury of the pt observed or reported. Pt declined family notification. The pt remained in his room with SIO staff observing the pt. The on-call provider was immediately paged. Dr. Neeru Alex responded and was briefed on the pt unsafe bx, interventions attempted, Face-to-Face assessment findings. Dr. Alex placed an order for a brief physical hold within an hour of the physical hold. No side effects were observed by staff or reported from pt. Pt put on cheeking precautions.

## 2024-09-12 NOTE — PROGRESS NOTES
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 decreased to 400mg daily from 800mg   - scheduled Zydis 10mg BID PO with IM Zyprexa 10mg back up if he refuses oral under psychiatric emergency     #Elevated prolactin   Had elevated prolactin on Risperdal a few months ago per Dr. Khan. Since starting Seroquel, was not able to recheck prolactin over recent months since stopping Risperdal. Pt has continued on Seroquel during this hospitalization with decreased dose on 9/12. Prolactin level obtained on 9/11 which was 16 and borderline high.   -will need repeat prolactin before discharge for trending     #Hx of TD  Dr. Khan is concerned for TD with CALLAHAN. Pt had TD while on Risperdal a few months ago. Less risk while on Zyprexa this hospitalization but will ctm and decrease Seroquel on 9/12 as we increase Zyprexa dose for psychiatric emergency.     2. Pertinent Labs/Monitoring: none at this time     3. Additional Plans:  - Patient will be treated in therapeutic milieu with appropriate individual and group therapies as described     # Polysubstance use disorder  #OUD  8/30/24: UDS positive for benzodiazepines (obtained following administration of benzos in ED) and cannabinoids   Per outpt provider and pt's mother, pt was never started on Suboxone although he had an intake appt with a clinic for induction this month. They noted past opioid abuse  "and recent Kratom use before admission. Eloy had requested suboxone while in the ED saying he had been on it and has been repeatedly asking for higher dose. Would benefit from CD tx but currently refusing. MICD commitment was attempted this hospital stay. However, pre-petition screen deemed that there was not enough information to support it in the records and patient currently not interested in CD treatment/wishes to return to using. Mental health civil commitment was pursued instead.   Started Suboxone 2mg -0.5mg film BID on admissions - stopped on 9/11 due to severe urinary retention seen on bladder scan.  UDS on 9/11 given staff concern for patient seeming more agitated, sweating, was negative.  -- restarted Suboxone 2-0.5mg film bid on 9/12 with understanding that patient must get bladder scans before and after otherwise it would be held. Holding parameters also outlined for if urine volume on scan is greater than 500ml.      Risks, benefits, and alternatives discussed at length with patient.       Acute nonpsychiatric concerns:    Prior blood clot in leg  Intake labs showing mildly low hematocrit with normal hgb, repeat cbc on 9/11 showed mild improvement  - PTA baby aspirin continued     Weight gain  Metformin PTA dose for weight gain and pre-metabolic syndrome continued. BG on 9/11 was 112.   - monitor weight    Urinary Retention  Pt noted urinary retention sxs on 9/8 and medicine was consulted. Noted to have 1090cc in bladder at that time. Pt said he is unable to void. Requested a diuretic and feels that drinking more water will help, but medicine explained to patient these will only exacerbate bladder distention.     Per medicine: \"Review of chart shows he has followed w/ Urology in the past, but mostly for STI-related issues. No documented hx of urinary retention, but pt notes frequently occurs when he withdrawing from Kratom (which he feels he is at the moment, was using PTA). At this time, certainly " "could be withdrawal-related, but he is also on multiple anticholinergic meds, so his burden there is high which could be also playing a role. Low suspicion of primary neurogenic cause (cauda equina) as denies back pain, bowel movements otherwise unaffected (he feels his constipation is unrelated as this has been an issue in the past), and no BLE symptoms\".     Medicine strongly recommended a straight cath by medicine on 9/9, but pt declined multiple times despite education on risks of bladder distention/urinary retention. Encouraged him to continue to attempt to volitionally void. medicine signed off on 9/9 due to patient voiding without worsening sxs, will CTM.     Per Pharmacy consult re urinary retention on 9/8:  \"High doses of kratom can have an opioid-like effect and can also result in urinary retention, which patient reports experiencing in the past.  Suspect current symptoms most likely due to recent kratom use.  Suboxone may also be contributing since that was started 9/5/2024.  The only other recent medication change was addition of fluvoxamine on 8/29 at a low dose, so wouldn't expect that to be the primary cause. Quetiapine can also contribute to urinary retention, but patient has been on this high dose for several months, so not likely the cause. If due to kratom, would expect symptoms to start improving within 7 days of discontinuation.\" Given pt has been hospitalized for over a week now, Kratom induced causes are less likely.     On 9/11, Eloy agreed to bladder scan after endorsing continued sxs while being prescribe Flomax which was started on 9/10, and was noted by staff to have 1604 ml of urine. Staff notified medicine on call or recommended consult Urology. Urology consult placed and recommended labs and straight cath or hardy with monitoring electrolytes, kidney function, and UA. Pt refused all interventions at first, but later gave urine sample and labs. Cr was reassuring wnl, and UA showed " elevated nitrites but no WBCs. Of note, pt did say he urinated as well in the evening of 9/11 after last scan which was also reassuring. Bladder scan this AM was just over 100mls indicating that patient is voiding at this time.     Plan:  - Notify if fevers, worsening abdominal pain, flank pain  - notify medicine and urology if sxs worsen  - Pt does note a few days of constipation which can exacerbate urinary retention              - Scheduled Miralax daily + Senokot BID for now (hold for loose stools)  -repeat bladder scan once per shift. If pt refuses, hold Suboxone to prevent risk of urinary rentention worsening without adequate monitoring .   - parameters for straight cath in place (ok to use hardy catheter for comfort) as needed for high volume as outlined by Urology, see urology note 9/11   -ethics consult placed 9/12 for question of forced catheterization if needed    - monitor urine cx -pending, will not tx for infection at this time given lack of elevated wbcs or bacteria noted in urine     Capacity assessment regarding urinary catheterization:  Completed on 9/12.     Understanding: Eloy is able to understand his situation regarding his urinary retention. He was able to repeat back what was being asked of him during the interview and could name the medical recommendations, I.e. the need for catheterization or bladder scans to monitor and prevent bladder rupture, infection, and or kidney failure. He understood that these were recommendations and best practice for these symptoms. Although he could not understand why a diuretic was not recommended although multiple providers had explained to him that it could worsen symptoms. He has been requesting a diuretic over several days.      Appreciation: Eloy is not able to appreciate his situation in regards to when he would need a urinary catheter. On interview, he was unable to apply the information to his own case. This provider had explained risk of trauma to  "the kidneys and infection and he said, \"I don't have that, you don't know what you're talking about\". When team explained that 1600ml is too much for a bladder to handle and that he would need a catheter if it happens again, he said \"it won't happen again because I peed now\". He struggled to appreciate how using Suboxone in his case could worsen symptoms and that the true reason for his urinary retention is still unclear, \"just give me a diuretic\". He demonstrated paranoia towards his treating team today, referring to them as just wanting to touch him and could not engage beyond this in meaningful conversation regarding risks he may face if refusing, resorting to calling the team names.      Reasoning: Eloy's symptoms of paranoia, irritability and poor distress tolerance emerging from his psychotic and mixed mood disorder are impacting his ability to reason through the situation on when he would need a catheter to prevent bladder rupture, infection, and or kidney failure. Of note, he did tell the urology consult that he avoids catheters now since he had urinary rentention in the past for a short time which hurt so he would not like a catheter again. However, he was belligerent and difficult to redirect on interview today and said that those risks wont happen to him now because he \"just peed\" and did not seem to understand the risk of reoccurrence. He also refused team efforts to offer less invasive measures to assess need for catheter in the first place, such as bladder scan and labs, although he later agreed to labs on 9/11 and scan on 9/12. He refused measures to increase comfort such as small dose of ativan before bladder scan and cath if medically necessary or the use of a hardy catheter vs a straight cath.     Communicating a choice: Eloy is able to communicate a clear choice. \"I'm not letting you do that [place a catheter]\". However, he did agree to bladder scans today.     In conclusion, Eloy's mental " health symptoms of irritability, low distress tolerance, and paranoia secondary to a primary psychotic disorder with personality component are impacting his capacity to consent to urinary catheter placement in case of medical necessity. Team attempted to provide him with an informed choice and will continue to monitor his symptoms and offer less invasive measures first. Currently, patient is voiding and not needing an urgent cath.       Hand pain and swelling   s/p punching mirror in room, per patient on night of 9/10 Staff noted full ROM however. On call doctor notified who placed hand XR  - Hand XR 9/10 showing tissue swelling and NO displacement or fracture per radiologist read    Pertinent labs/imaging:  Pending urine cx  Covid negative on admission     Behavioral/Psychological/Social:  - Encourage unit programming    Safety:  - Continue precautions as noted above  - Status 15 minute checks    Legal Status: court hold    Disposition Plan   Reason for ongoing admission: poses an imminent risk to self, poses an imminent risk to others, and is unable to care for self due to severe psychosis or jamila  Discharge location:  TBD . Consider ACT team referral  Discharge Medications: not ordered  Follow-up Appointments: not scheduled    Entered by: Berkley Arreola MD on 09/12/2024  at 8:09 AM     Patient was staffed with Dr. Loya.     Berkley Arreola MD  Psychiatry Resident Physician

## 2024-09-12 NOTE — PLAN OF CARE
BEH IP Unit Acuity Rating Score (UARS)  Patient is given one point for every criteria they meet.    CRITERIA SCORING   On a 72 hour hold, court hold, committed, stay of commitment, or revocation. 1    Patient LOS on BEH unit exceeds 20 days. 0  LOS: 9   Patient under guardianship, 55+, otherwise medically complex, or under age 11. 0   Suicide ideation without relief of precipitating factors. 1   Current plan for suicide. 0   Current plan for homicide. 1   Imminent risk or actual attempt to seriously harm another without relief of factors precipitating the attempt. 1   Severe dysfunction in daily living (ex: complete neglect for self care, extreme disruption in vegetative function, extreme deterioration in social interactions). 1   Recent (last 7 days) or current physical aggression in the ED or on unit. 1 - 9/11/24   Restraints or seclusion episode in past 72 hours. 0   Recent (last 7 days) or current verbal aggression, agitation, yelling, etc., while in the ED or unit. 1   Active psychosis. 1   Need for constant or near constant redirection (from leaving, from others, etc).  1   Intrusive or disruptive behaviors. 1   Patient requires 3 or more hours of individualized nursing care per 8-hour shift (i.e. for ADLs, meds, therapeutic interventions). 1   TOTAL 11

## 2024-09-12 NOTE — PLAN OF CARE
"Team Note Due:  Wednesday    Assessment/Intervention/Current Symtoms and Care Coordination:  Chart review and met with team, discussed pt progress, symptomology, and response to treatment.  Discussed the discharge plan and any potential impediments to discharge.    Per team, Pt had a brief physical hold last night due to getting IM Zyprexa after finding the oral zyprexa in the toilet flushed. Pt was threatening to kill staff and posturing.  In team it was decided Pt will be placed on a psych emergency today.     Pt was refusing his oral zyprexa, and a code 21 was called for the providers to speak to him. I met with pt and asked if he could speak to the providers and he said \"are those 2 bitches going to give me my medications\". He declined to move to his room but then he did go to his room. He is tense, agitated.     Later Pt appeared de-escalated in terms of not threatening staff, but he still appeared tense and now requesting selective serotonin reuptake inhibitor saying he needs his Luvox or is in withdrawal. He asked during the middle of another code and I shared I would let the RN know, and they could review meds with him as soon as they were able. I suggested he go to his room but he remained in the milieu near peer who was escalating. He appears to have a flat affect, pale skin, clammy looking skin. He speaks slowly.     Pt did not want to attend court but then he was open to it after some encouragement. He has words in marker all over his walls, bathroom walls, back of his door. He was observed with an orange sharpie in his hand writing on his hands while awaiting court on the laptop. He asked me if I heard if he could have an selective serotonin reuptake inhibitor I said he would have to speak to his nurse. I said from what I read the answer is no from this morning and he said \"no the nurse said she would ask again\". (The answer is still no) but pt appears to perseverate on various things each day. "     Discharge Plan or Goal:  He is able to return back to group home but would benefit most from MI/JACOB treatment     Barriers to Discharge:  Symptoms - jamila, agitation  Managing his medications in order to stabilize   Commitment process    Referral Status:  TBD     Legal Status:  Court hold  County: [place]  File Number: [id number]  Start and expiration date of commitment:   MI and Prado petition - [place] meds requested are: Zyprexa, Seroquel, Invega and Abilify     Commitment and [place] Hearing 9/12 at 2:15PM    Contacts (include KELBY status):   (Megan) Ph: [phone]  - KELBY only for specific thing  Psych: Dr. Khan, [facility] - Declined KELBY  Michelle Barraza/Mother: [phone] - Declined KELBY x2     Upcoming Meetings and Dates/Important Information and next steps:  Commitment and [place] Hearing 9/12 at 2:15PM

## 2024-09-13 ENCOUNTER — TELEPHONE (OUTPATIENT)
Dept: BEHAVIORAL HEALTH | Facility: CLINIC | Age: 24
End: 2024-09-13

## 2024-09-13 PROCEDURE — 250N000013 HC RX MED GY IP 250 OP 250 PS 637

## 2024-09-13 PROCEDURE — 124N000002 HC R&B MH UMMC

## 2024-09-13 PROCEDURE — 250N000013 HC RX MED GY IP 250 OP 250 PS 637: Performed by: PSYCHIATRY & NEUROLOGY

## 2024-09-13 PROCEDURE — 99232 SBSQ HOSP IP/OBS MODERATE 35: CPT | Mod: GC | Performed by: PSYCHIATRY & NEUROLOGY

## 2024-09-13 PROCEDURE — 97150 GROUP THERAPEUTIC PROCEDURES: CPT | Mod: GO

## 2024-09-13 PROCEDURE — 250N000012 HC RX MED GY IP 250 OP 636 PS 637: Performed by: PSYCHIATRY & NEUROLOGY

## 2024-09-13 PROCEDURE — 250N000013 HC RX MED GY IP 250 OP 250 PS 637: Performed by: STUDENT IN AN ORGANIZED HEALTH CARE EDUCATION/TRAINING PROGRAM

## 2024-09-13 RX ORDER — BUPRENORPHINE AND NALOXONE 2; .5 MG/1; MG/1
1 FILM, SOLUBLE BUCCAL; SUBLINGUAL 2 TIMES DAILY
Status: DISCONTINUED | OUTPATIENT
Start: 2024-09-13 | End: 2024-10-07

## 2024-09-13 RX ADMIN — BUPRENORPHINE AND NALOXONE 1 FILM: 2; .5 FILM BUCCAL; SUBLINGUAL at 08:16

## 2024-09-13 RX ADMIN — NICOTINE 1 PATCH: 21 PATCH, EXTENDED RELEASE TRANSDERMAL at 08:17

## 2024-09-13 RX ADMIN — BUPRENORPHINE AND NALOXONE 1 FILM: 2; .5 FILM BUCCAL; SUBLINGUAL at 19:50

## 2024-09-13 RX ADMIN — OLANZAPINE 10 MG: 10 TABLET, ORALLY DISINTEGRATING ORAL at 19:50

## 2024-09-13 RX ADMIN — QUETIAPINE 400 MG: 400 TABLET, FILM COATED, EXTENDED RELEASE ORAL at 19:49

## 2024-09-13 RX ADMIN — THERA TABS 1 TABLET: TAB at 08:17

## 2024-09-13 RX ADMIN — TAMSULOSIN HYDROCHLORIDE 0.4 MG: 0.4 CAPSULE ORAL at 08:22

## 2024-09-13 RX ADMIN — SENNOSIDES AND DOCUSATE SODIUM 1 TABLET: 8.6; 5 TABLET ORAL at 19:49

## 2024-09-13 RX ADMIN — ALUMINUM HYDROXIDE, MAGNESIUM HYDROXIDE, AND DIMETHICONE 30 ML: 200; 20; 200 SUSPENSION ORAL at 12:54

## 2024-09-13 RX ADMIN — QUETIAPINE FUMARATE 25 MG: 25 TABLET ORAL at 18:03

## 2024-09-13 RX ADMIN — NICOTINE POLACRILEX 4 MG: 2 GUM, CHEWING BUCCAL at 16:21

## 2024-09-13 RX ADMIN — METFORMIN HYDROCHLORIDE 500 MG: 500 TABLET, FILM COATED ORAL at 08:17

## 2024-09-13 RX ADMIN — ASPIRIN 81 MG CHEWABLE TABLET 81 MG: 81 TABLET CHEWABLE at 08:17

## 2024-09-13 RX ADMIN — NICOTINE POLACRILEX 4 MG: 2 GUM, CHEWING BUCCAL at 12:21

## 2024-09-13 RX ADMIN — SENNOSIDES AND DOCUSATE SODIUM 1 TABLET: 8.6; 5 TABLET ORAL at 08:17

## 2024-09-13 RX ADMIN — OLANZAPINE 10 MG: 10 TABLET, ORALLY DISINTEGRATING ORAL at 08:17

## 2024-09-13 RX ADMIN — NICOTINE POLACRILEX 4 MG: 2 GUM, CHEWING BUCCAL at 14:06

## 2024-09-13 RX ADMIN — POLYETHYLENE GLYCOL 3350 17 G: 17 POWDER, FOR SOLUTION ORAL at 08:22

## 2024-09-13 RX ADMIN — METFORMIN HYDROCHLORIDE 500 MG: 500 TABLET, FILM COATED ORAL at 18:03

## 2024-09-13 RX ADMIN — NICOTINE POLACRILEX 4 MG: 2 GUM, CHEWING BUCCAL at 17:25

## 2024-09-13 ASSESSMENT — ACTIVITIES OF DAILY LIVING (ADL)
LAUNDRY: UNABLE TO COMPLETE
ADLS_ACUITY_SCORE: 28
HYGIENE/GROOMING: INDEPENDENT
DRESS: INDEPENDENT
ADLS_ACUITY_SCORE: 28
HYGIENE/GROOMING: INDEPENDENT
DRESS: INDEPENDENT
ADLS_ACUITY_SCORE: 28
ORAL_HYGIENE: INDEPENDENT
ADLS_ACUITY_SCORE: 28
ADLS_ACUITY_SCORE: 28
ORAL_HYGIENE: INDEPENDENT
ADLS_ACUITY_SCORE: 28
LAUNDRY: UNABLE TO COMPLETE
ADLS_ACUITY_SCORE: 28

## 2024-09-13 NOTE — PLAN OF CARE
"Team Note Due:  Wednesday    Assessment/Intervention/Current Symtoms and Care Coordination:  Chart review and met with team, discussed pt progress, symptomology, and response to treatment.  Discussed the discharge plan and any potential impediments to discharge.    Per team, Pt refused bladder scan last night. No aggressive outbursts.    I met with Pt and he appeared to look better physically - no clammy face. I asked if he feels better and he didn't really specify, started talking about how he wants a specific med. He appeared more coherent, able to have a conversation, had more expression is his tone. Less tense. He was asking about discharge options. I said that his group home said he could return however its not a \"real\" group home, there is only staff a few hours per day and I don't know if his CM will want him to do that. I reviewed what a commitment means and the conditions he will have to follow. He is adamantly denying JACOB treatment, and I explored the idea of an IRTS and he cut me off and said absolutely not but was not agitated about this just said that treatment Is overwhelming and he doesn't want to do it again, stating he has been to [facility]. He appears to minimize both MI and JACOB stating he hasn't used drugs \"in a while now\". I shared that the team will want him to stabilize on his meds first, and then we could have a care meeting with his CM to explore some options as I said his commitment CM will have to be in agreement on where he will discharge to. He stated ok. I asked if he had any other questions and he said he did not.     He later asked for a safe notebook and PA was going to get him this.     Discharge Plan or Goal:  He is able to return back to group home however this is not 24/7 staffed, would benefit most from MI/JACOB treatment     Barriers to Discharge:  Symptoms - jamila, agitation  Managing his medications in order to stabilize   Commitment process    Referral Status:  TBD   "   Legal Status:  Court hold  County: [place]  File Number: [id number]  Start and expiration date of commitment:   MI and [place] petition - [place] meds requested are: Zyprexa, Seroquel, Invega and Abilify     Commitment and [place] Hearing 9/12 at 2:15PM    Contacts (include KELBY status):   (Megan) Ph: [phone]  - KELBY only for specific thing  Psych: Dr. [facility] - Declined KELBY  Michellebhavna Barraza/Mother: [phone] - Declined KELBY x2     Upcoming Meetings and Dates/Important Information and next steps:  Update discharge referral form at discharge   PD and COS at discharge  Follow up psych and therapy

## 2024-09-13 NOTE — PROGRESS NOTES
Eloy up and about this morning. In the lounge, hallway, and his room. Strongly refused all VS this morning.    States his eyes feel dry and asking for eye drops.    A bit later, Eloy did agree to having his VS done. Charted.

## 2024-09-13 NOTE — CARE PLAN
Rehab Group    Start time: 1030   End time: 1200  Patient time total: 75 minutes    attended partial group     #3 attended   Group Type: OT Clinic   Group Topic Covered: activity therapy, coping skills, and healthy leisure time     Group Session Detail:  OT clinic group provides an opportunity for individual-based goal directed activity within a group setting - allowing for interaction and socialization.  Hands-on task work help to build/restore/or maintain skills such as: focus, concentration, decision making, and problem solving.  Patients are encouraged to carry over potential new interests/hobbies learned in group following discharge.       Patient Response/Contribution:  positive affect, cooperative with task, attentive, and actively engaged     Patient Detail:    Pt was pleasant in group, focused on task activity (asked to learn hemp knotting technique), and able to engage in back and forth conversation with writer.  Pt discussed how he likes his IRTS, and hopes to return, hobbies/interests - especially  art/music, and staying active - discussing hiking biking trails he's done alone and with family.      [id number] OT Group (2 or more in attendance)      Patient Active Problem List   Diagnosis    Suicidal ideation    Psychosis (H)    Acute pulmonary embolism without acute cor pulmonale (H)    Alcohol use disorder, moderate, in sustained remission, dependence (H)    Anxiety    Cannabis use disorder, severe, dependence (H)    Class 1 drug-induced obesity without serious comorbidity with body mass index (BMI) of 33.0 to 33.9 in adult    Depression, major, single episode, moderate (H)    Episodic mood disorder (H24)    KATHE (generalized anxiety disorder)    History of marijuana use    Obesity (BMI 30-39.9)    Obesity, Class I, BMI 30-34.9    Tobacco use disorder, moderate, in sustained remission    Schizoaffective disorder, bipolar type (H)    Psychosis, unspecified psychosis type (H)    Tardive dyskinesia

## 2024-09-13 NOTE — PROGRESS NOTES
"  .CLINICAL NUTRITION SERVICES  Reason for Assessment:  Nutrition education regarding hi protein options, menu options, weight loss goals  Diet History:  Pt eats vegetarian.  Prefers to avoid milk and eggs - meaning \"vegan\" but does not stick that completely.  Does like cheese.  At home makes himself smoothies, drinks protein shakes - like Ensure and eats fruits and vegetables.  We discussed other options for vegan protein - including legumes.  Pt likes beans in Mexican foods and does refried beans and bean based dishes.  Pt requesting Protein supplement and only one meal and expressing interest in losing weight, or at least avoiding weight gain which he has had happen when he goes on Depakote.  States he has lost 10 lbs in the last couple of months intentionally and does not want to gain it back.   Pt reports a weight of 215 lb here - states he stepped on the scale today.  Most recent recorded weight 209 lbs.    Pt in agreement to have two supplements per day (B and L) and skip his lunch.  This should provide adequate kcals and protein.  Also, once per week weight will help pt with his weight maintenance or healthy weight loss goals.    Nutrition Diagnosis:  Food- and nutrition-related knowledge deficit r/t vegetarian eating options and calorie needs as evidenced by questions and requests.  Food- and nutrition-related knowledge deficit r/t not ready for diet or lifestyle changes AEB  Food- and nutrition-related knowledge deficit r/t limited adherence to nutrition-related recommendations AEB  Interventions:  Provided instruction on healthy meal choices, supplement options.  Order Ensure Enlive BID.  Pt will choose not to order Lunch.  Goals:   Patient will verbalize understanding of healthy meal choices, appropriate use of supplements   Follow-up:    Patient to ask any further nutrition-related questions before discharge.  In addition, pt may request outpatient RD appointment.  "

## 2024-09-13 NOTE — PLAN OF CARE
"Problem: Psychotic Signs/Symptoms  Goal: Improved Behavioral Control (Psychotic Signs/Symptoms)  Outcome: Progressing   Goal Outcome Evaluation:  [place] [facility] [place] and he appears less manic and agitated. He did not seek out writer as much and overall had less requests. He is compliant with bladder scan, medication administration, and vitals check. His morning bladder scan was 382 ml and Suboxone was given at that time. He told writer that he is urinating and having bowel movements without. He stated that he voided a few times this shift, and had a bowel movement yesterday. His Depakote was discontinued because patient is concern about weight gain. He continues to ask for SSRI and was told again today that the provider does not think it is appropriate at this time. Patient I & O monitoring was discontinued because he is compliant with bladder scan and reporting when he urinate. Patient should be bladder scan before receiving Suboxone and if he refuse, the med should be held. Patient's affect this shift is flat and mood is calm. He is appropriate with no aggression or agitation. He is looking forwards to discharge and wants to know when he will be discharged. He is eating and drinking without issues. He did not complain of pain and he denies thought of harming self and others,  SI/SIB/HI. Patient complained of indigestion and received Maalox.     /58 (BP Location: Right arm, Patient Position: Sitting, Cuff Size: Adult Regular)   Pulse 113   Temp 98  F (36.7  C) (Temporal)   Resp 18   Ht 1.854 m (6' 1\")   Wt 94.8 kg (209 lb)   SpO2 98%   BMI 27.57 kg/m       "

## 2024-09-13 NOTE — PLAN OF CARE
Eloy shows improvement tonight. Appeared sleeping  5.75 hours with uneventful night  Refused bladder scan and verbalized that he urination in the toilet  Denies  abdominal pain or discomfort   Drank 2 cups of orange juice and I carton on milk for the total of 476 cc  Pt is calm and behavioral controlled this shift.    Problem: Sleep Disturbance  Goal: Adequate Sleep/Rest  Outcome: Progressing     Problem: Adult Behavioral Health Plan of Care  Goal: Adheres to Safety Considerations for Self and Others  Intervention: Develop and Maintain Individualized Safety Plan  Recent Flowsheet Documentation  Taken 9/13/2024 0609 by Disha Finn RN  Safety Measures: safety rounds completed  Goal: Absence of New-Onset Illness or Injury  Intervention: Identify and Manage Fall Risk  Recent Flowsheet Documentation  Taken 9/13/2024 0609 by Disha Finn RN  Safety Measures: safety rounds completed   Goal Outcome Evaluation:
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
## 2024-09-13 NOTE — PLAN OF CARE
BEH IP Unit Acuity Rating Score (UARS)  Patient is given one point for every criteria they meet.    CRITERIA SCORING   On a 72 hour hold, court hold, committed, stay of commitment, or revocation. 1    Patient LOS on BEH unit exceeds 20 days. 0  LOS: 10   Patient under guardianship, 55+, otherwise medically complex, or under age 11. 0   Suicide ideation without relief of precipitating factors. 1   Current plan for suicide. 0   Current plan for homicide. 1   Imminent risk or actual attempt to seriously harm another without relief of factors precipitating the attempt. 1   Severe dysfunction in daily living (ex: complete neglect for self care, extreme disruption in vegetative function, extreme deterioration in social interactions). 1   Recent (last 7 days) or current physical aggression in the ED or on unit. 1 - 9/11/24   Restraints or seclusion episode in past 72 hours. 0   Recent (last 7 days) or current verbal aggression, agitation, yelling, etc., while in the ED or unit. 1   Active psychosis. 1   Need for constant or near constant redirection (from leaving, from others, etc).  1   Intrusive or disruptive behaviors. 1   Patient requires 3 or more hours of individualized nursing care per 8-hour shift (i.e. for ADLs, meds, therapeutic interventions). 1   TOTAL 11

## 2024-09-13 NOTE — TELEPHONE ENCOUNTER
[facility] ONLY:  R: [place] [facility]patient Bed Call Log  9/13/24 @ 1:00am    [facility]: @ capacity for Adult MH beds.     Pt remains on waitlist pending appropriate placement availability.

## 2024-09-13 NOTE — PROGRESS NOTES
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 if needed, pt deemed to not have capacity to consent to urinary straight cath if medically necessary at this time.    - monitor urine cx -pending, will not tx for infection at this time given lack of elevated wbcs or bacteria noted in urine   - continue to monitor his symptoms and offer less invasive measures first. Currently, patient is voiding and not needing an urgent cath.     Hand pain and swelling   s/p punching mirror in room, per patient on night of 9/10 Staff noted full ROM however. On call doctor notified who placed hand XR  - Hand XR 9/10 showing tissue swelling and NO displacement or fracture per radiologist read    Pertinent labs/imaging:  Pending urine cx  Covid negative on admission     Behavioral/Psychological/Social:  - Encourage unit programming    Safety:  - Continue precautions as noted above  - Status 15 minute checks    Legal Status: court hold    Disposition Plan   Reason for ongoing admission: poses an imminent risk to self, poses an imminent risk to others, and is unable to care for self due to severe psychosis or jamila  Discharge location:  TBD . Consider ACT team referral  Discharge Medications: not ordered  Follow-up Appointments: not scheduled    Entered by: Berkley Arreola MD on  09/13/2024  at 9:08 AM     Patient was staffed with Dr. Loya.     Berkley Arreola MD  Psychiatry Resident Physician

## 2024-09-13 NOTE — TELEPHONE ENCOUNTER
R:  [place]  Access Inpatient Bed Call Log 9/13/2024 8:12:42 AM    [facility] is posting 0 beds.               Pt remains on the work list pending appropriate bed availability.      Per unit 12 Nurse Manager via Teams chat message Pt can be removed from [facility]   Samantha Hatfield    1:18 PM  Hi, we do not need to transfer WQ off of 12 so they can be removed from your worklist    Intake updated [facility] worklist and removed Pt.

## 2024-09-13 NOTE — PLAN OF CARE
"  Problem: Psychotic Signs/Symptoms  Goal: Improved Behavioral Control (Psychotic Signs/Symptoms)  Outcome: Progressing     Problem: Psychotic Signs/Symptoms  Goal: Improved Mood Symptoms (Psychotic Signs/Symptoms)  Outcome: Progressing   Goal Outcome Evaluation:    Patient was calm and cooperative during medication administration,assessment,vital checks and bladder scan.Patient presented a flat affect with no aggression or agitation. Patient visible in milieu watching television.Patient was appropriately socializing with staff. Patient approached to the nursing window and requested for  Seroquel 25mg for anxiety and was given. Writer asked if he having any anxiety and the patient responded no. Patient denies any SI/SIB.HI, anxiety and depression. Patient also denies any thought of auditory or visual hallucinations.Patient bedtime bladder scan was 405 and Suboxone was administered. Patient reported he was urinating and having bowel movement with out difficulties. Hold Suboxone if the patient refuses bladder scan and also hold if the bladder shows o volume of over 500ml.Patient eating and drinking without issues. Patient denies pain or discomfort. No medications side effect reported or observed.    No acute concern noted at this time.      /62 (BP Location: Right arm)   Pulse 89   Temp 97.9  F (36.6  C) (Temporal)   Resp 19   Ht 1.854 m (6' 1\")   Wt 94.8 kg (209 lb)   SpO2 97%   BMI 27.57 kg/m           " Urine  studies ordered, pts MAP  varies from low 70s to mid 80s . Will add midodrine, diuretic and low dose aldactone, will need follow up labs; pt may need repeated right pleural drainage. recommended MAP with advanced cirrhosis is > 80 for better survival. COUNSELING:    Face to face meeting to discuss Advanced Care Planning - Time Spent ___10___ Minutes.  Discussed MOLST left document for hime to read with   More than 50% time spent in counseling and coordinating care. __40____ Minutes.            his wife.    Thank you for the opportunity to assist with the care of this patient.   [facility] [phone].

## 2024-09-14 PROCEDURE — 250N000013 HC RX MED GY IP 250 OP 250 PS 637: Performed by: PSYCHIATRY & NEUROLOGY

## 2024-09-14 PROCEDURE — 250N000013 HC RX MED GY IP 250 OP 250 PS 637: Performed by: STUDENT IN AN ORGANIZED HEALTH CARE EDUCATION/TRAINING PROGRAM

## 2024-09-14 PROCEDURE — 250N000013 HC RX MED GY IP 250 OP 250 PS 637

## 2024-09-14 PROCEDURE — 124N000002 HC R&B MH UMMC

## 2024-09-14 PROCEDURE — 250N000012 HC RX MED GY IP 250 OP 636 PS 637: Performed by: PSYCHIATRY & NEUROLOGY

## 2024-09-14 RX ADMIN — NICOTINE 1 PATCH: 21 PATCH, EXTENDED RELEASE TRANSDERMAL at 08:02

## 2024-09-14 RX ADMIN — QUETIAPINE 400 MG: 400 TABLET, FILM COATED, EXTENDED RELEASE ORAL at 20:04

## 2024-09-14 RX ADMIN — NICOTINE POLACRILEX 4 MG: 2 GUM, CHEWING BUCCAL at 16:00

## 2024-09-14 RX ADMIN — OLANZAPINE 10 MG: 10 TABLET, ORALLY DISINTEGRATING ORAL at 20:04

## 2024-09-14 RX ADMIN — POLYETHYLENE GLYCOL 3350 17 G: 17 POWDER, FOR SOLUTION ORAL at 08:02

## 2024-09-14 RX ADMIN — QUETIAPINE FUMARATE 25 MG: 25 TABLET ORAL at 16:04

## 2024-09-14 RX ADMIN — TAMSULOSIN HYDROCHLORIDE 0.4 MG: 0.4 CAPSULE ORAL at 08:03

## 2024-09-14 RX ADMIN — METFORMIN HYDROCHLORIDE 500 MG: 500 TABLET, FILM COATED ORAL at 18:46

## 2024-09-14 RX ADMIN — NICOTINE POLACRILEX 4 MG: 2 LOZENGE ORAL at 11:06

## 2024-09-14 RX ADMIN — NICOTINE POLACRILEX 4 MG: 2 GUM, CHEWING BUCCAL at 21:48

## 2024-09-14 RX ADMIN — METFORMIN HYDROCHLORIDE 500 MG: 500 TABLET, FILM COATED ORAL at 08:03

## 2024-09-14 RX ADMIN — THERA TABS 1 TABLET: TAB at 08:03

## 2024-09-14 RX ADMIN — BUPRENORPHINE AND NALOXONE 1 FILM: 2; .5 FILM BUCCAL; SUBLINGUAL at 08:06

## 2024-09-14 RX ADMIN — ASPIRIN 81 MG CHEWABLE TABLET 81 MG: 81 TABLET CHEWABLE at 08:03

## 2024-09-14 RX ADMIN — SENNOSIDES AND DOCUSATE SODIUM 1 TABLET: 8.6; 5 TABLET ORAL at 20:04

## 2024-09-14 RX ADMIN — NICOTINE POLACRILEX 4 MG: 2 GUM, CHEWING BUCCAL at 14:26

## 2024-09-14 RX ADMIN — BUPRENORPHINE AND NALOXONE 1 FILM: 2; .5 FILM BUCCAL; SUBLINGUAL at 20:04

## 2024-09-14 RX ADMIN — NICOTINE POLACRILEX 4 MG: 2 GUM, CHEWING BUCCAL at 09:01

## 2024-09-14 RX ADMIN — OLANZAPINE 10 MG: 10 TABLET, ORALLY DISINTEGRATING ORAL at 08:03

## 2024-09-14 RX ADMIN — NICOTINE POLACRILEX 4 MG: 2 GUM, CHEWING BUCCAL at 10:04

## 2024-09-14 RX ADMIN — SENNOSIDES AND DOCUSATE SODIUM 1 TABLET: 8.6; 5 TABLET ORAL at 08:03

## 2024-09-14 ASSESSMENT — ACTIVITIES OF DAILY LIVING (ADL)
ADLS_ACUITY_SCORE: 28
DRESS: INDEPENDENT;SCRUBS (BEHAVIORAL HEALTH)
ADLS_ACUITY_SCORE: 28
HYGIENE/GROOMING: INDEPENDENT
ADLS_ACUITY_SCORE: 28
LAUNDRY: UNABLE TO COMPLETE
ADLS_ACUITY_SCORE: 28
ORAL_HYGIENE: INDEPENDENT
ADLS_ACUITY_SCORE: 28
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
## 2024-09-15 LAB
HOLD SPECIMEN: NORMAL
HOLD SPECIMEN: NORMAL
VALPROATE SERPL-MCNC: 14.2 UG/ML

## 2024-09-15 PROCEDURE — 250N000012 HC RX MED GY IP 250 OP 636 PS 637: Performed by: PSYCHIATRY & NEUROLOGY

## 2024-09-15 PROCEDURE — 250N000013 HC RX MED GY IP 250 OP 250 PS 637

## 2024-09-15 PROCEDURE — 80164 ASSAY DIPROPYLACETIC ACD TOT: CPT | Performed by: PSYCHIATRY & NEUROLOGY

## 2024-09-15 PROCEDURE — 36415 COLL VENOUS BLD VENIPUNCTURE: CPT | Performed by: PSYCHIATRY & NEUROLOGY

## 2024-09-15 PROCEDURE — 124N000002 HC R&B MH UMMC

## 2024-09-15 PROCEDURE — 250N000013 HC RX MED GY IP 250 OP 250 PS 637: Performed by: STUDENT IN AN ORGANIZED HEALTH CARE EDUCATION/TRAINING PROGRAM

## 2024-09-15 PROCEDURE — 250N000013 HC RX MED GY IP 250 OP 250 PS 637: Performed by: PSYCHIATRY & NEUROLOGY

## 2024-09-15 RX ADMIN — NICOTINE POLACRILEX 4 MG: 2 GUM, CHEWING BUCCAL at 14:37

## 2024-09-15 RX ADMIN — METFORMIN HYDROCHLORIDE 500 MG: 500 TABLET, FILM COATED ORAL at 07:51

## 2024-09-15 RX ADMIN — QUETIAPINE FUMARATE 25 MG: 25 TABLET ORAL at 11:07

## 2024-09-15 RX ADMIN — SENNOSIDES AND DOCUSATE SODIUM 1 TABLET: 8.6; 5 TABLET ORAL at 07:51

## 2024-09-15 RX ADMIN — QUETIAPINE 400 MG: 400 TABLET, FILM COATED, EXTENDED RELEASE ORAL at 20:20

## 2024-09-15 RX ADMIN — BUPRENORPHINE AND NALOXONE 1 FILM: 2; .5 FILM BUCCAL; SUBLINGUAL at 07:51

## 2024-09-15 RX ADMIN — OLANZAPINE 10 MG: 10 TABLET, ORALLY DISINTEGRATING ORAL at 20:20

## 2024-09-15 RX ADMIN — BUPRENORPHINE AND NALOXONE 1 FILM: 2; .5 FILM BUCCAL; SUBLINGUAL at 20:19

## 2024-09-15 RX ADMIN — METFORMIN HYDROCHLORIDE 500 MG: 500 TABLET, FILM COATED ORAL at 19:03

## 2024-09-15 RX ADMIN — ASPIRIN 81 MG CHEWABLE TABLET 81 MG: 81 TABLET CHEWABLE at 07:51

## 2024-09-15 RX ADMIN — THERA TABS 1 TABLET: TAB at 07:51

## 2024-09-15 RX ADMIN — OLANZAPINE 10 MG: 10 TABLET, ORALLY DISINTEGRATING ORAL at 07:51

## 2024-09-15 RX ADMIN — NICOTINE POLACRILEX 4 MG: 2 GUM, CHEWING BUCCAL at 10:41

## 2024-09-15 RX ADMIN — ALUMINUM HYDROXIDE, MAGNESIUM HYDROXIDE, AND DIMETHICONE 30 ML: 200; 20; 200 SUSPENSION ORAL at 11:07

## 2024-09-15 RX ADMIN — TAMSULOSIN HYDROCHLORIDE 0.4 MG: 0.4 CAPSULE ORAL at 07:51

## 2024-09-15 RX ADMIN — NICOTINE POLACRILEX 4 MG: 2 GUM, CHEWING BUCCAL at 12:46

## 2024-09-15 RX ADMIN — NICOTINE POLACRILEX 4 MG: 2 LOZENGE ORAL at 07:51

## 2024-09-15 RX ADMIN — SENNOSIDES AND DOCUSATE SODIUM 1 TABLET: 8.6; 5 TABLET ORAL at 20:20

## 2024-09-15 RX ADMIN — NICOTINE 1 PATCH: 21 PATCH, EXTENDED RELEASE TRANSDERMAL at 07:50

## 2024-09-15 RX ADMIN — NICOTINE POLACRILEX 4 MG: 2 GUM, CHEWING BUCCAL at 21:14

## 2024-09-15 RX ADMIN — POLYETHYLENE GLYCOL 3350 17 G: 17 POWDER, FOR SOLUTION ORAL at 07:51

## 2024-09-15 ASSESSMENT — ACTIVITIES OF DAILY LIVING (ADL)
ADLS_ACUITY_SCORE: 28

## 2024-09-15 NOTE — PLAN OF CARE
Pt awake  at start of shift.     Breathing quiet and unlabored when sleeping.     Pt had no c/o pain or discomfort during the HS.     Appears to have slept 4 hours.     Pt continues on 1:1 SIO/10 ft space distance.     Goal Outcome Evaluation:  Problem: Sleep Disturbance  Goal: Adequate Sleep/Rest  Outcome: Progressing

## 2024-09-15 NOTE — PLAN OF CARE
"RN Assessment   The patient has been mostly withdrawn to self and room but has been visible intermittently in the lounge during the game. He reported experiencing increased fatigue and somnolence, which he attributes to Depakote therapy. He requested that this be noted in the chart for the provider to review tomorrow. Was informed that Depakote was in his regiment. Medications reviewed.   Today, the patient was overall pleasant and polite upon approach and during the exam. He denies any psychiatric concerns, including mood instability, anxiety, hallucinations, delusions of paranoia, and has exhibited no aggressive behaviors or verbal aggression towards staff.   The patient denies any safety concerns, including active or passive suicidal ideations or thoughts of non suicidal self injury. Denies mood instability, anxiety, hallucinations , delusions of paranoia.   The provider is to re-evaluate the need for SIO, as patient had multiple days of continuous improvement.  A post-void residual measurement was 432 mL today. Suboxone was administered as per orders.  /80 (BP Location: Right arm, Patient Position: Sitting, Cuff Size: Adult Regular)   Pulse 101   Temp 97.8  F (36.6  C) (Temporal)   Resp 16   Ht 1.854 m (6' 1\")   Wt 94.8 kg (209 lb)   SpO2 100%   BMI 27.57 kg/m             "

## 2024-09-16 PROCEDURE — 250N000012 HC RX MED GY IP 250 OP 636 PS 637: Performed by: PSYCHIATRY & NEUROLOGY

## 2024-09-16 PROCEDURE — 250N000013 HC RX MED GY IP 250 OP 250 PS 637: Performed by: STUDENT IN AN ORGANIZED HEALTH CARE EDUCATION/TRAINING PROGRAM

## 2024-09-16 PROCEDURE — 99232 SBSQ HOSP IP/OBS MODERATE 35: CPT | Mod: GC | Performed by: PSYCHIATRY & NEUROLOGY

## 2024-09-16 PROCEDURE — 250N000013 HC RX MED GY IP 250 OP 250 PS 637

## 2024-09-16 PROCEDURE — 250N000013 HC RX MED GY IP 250 OP 250 PS 637: Performed by: PSYCHIATRY & NEUROLOGY

## 2024-09-16 PROCEDURE — 124N000002 HC R&B MH UMMC

## 2024-09-16 PROCEDURE — 97150 GROUP THERAPEUTIC PROCEDURES: CPT | Mod: GO

## 2024-09-16 RX ORDER — DIVALPROEX SODIUM 500 MG/1
1000 TABLET, EXTENDED RELEASE ORAL AT BEDTIME
Status: DISCONTINUED | OUTPATIENT
Start: 2024-09-16 | End: 2024-09-21

## 2024-09-16 RX ADMIN — NICOTINE POLACRILEX 4 MG: 2 GUM, CHEWING BUCCAL at 16:40

## 2024-09-16 RX ADMIN — OLANZAPINE 10 MG: 10 TABLET, ORALLY DISINTEGRATING ORAL at 09:04

## 2024-09-16 RX ADMIN — NICOTINE POLACRILEX 4 MG: 2 GUM, CHEWING BUCCAL at 20:43

## 2024-09-16 RX ADMIN — METFORMIN HYDROCHLORIDE 500 MG: 500 TABLET, FILM COATED ORAL at 09:04

## 2024-09-16 RX ADMIN — SENNOSIDES AND DOCUSATE SODIUM 1 TABLET: 8.6; 5 TABLET ORAL at 09:04

## 2024-09-16 RX ADMIN — ASPIRIN 81 MG CHEWABLE TABLET 81 MG: 81 TABLET CHEWABLE at 09:04

## 2024-09-16 RX ADMIN — TAMSULOSIN HYDROCHLORIDE 0.4 MG: 0.4 CAPSULE ORAL at 09:04

## 2024-09-16 RX ADMIN — METFORMIN HYDROCHLORIDE 500 MG: 500 TABLET, FILM COATED ORAL at 19:18

## 2024-09-16 RX ADMIN — NICOTINE POLACRILEX 4 MG: 2 GUM, CHEWING BUCCAL at 09:12

## 2024-09-16 RX ADMIN — THERA TABS 1 TABLET: TAB at 09:04

## 2024-09-16 RX ADMIN — QUETIAPINE 400 MG: 400 TABLET, FILM COATED, EXTENDED RELEASE ORAL at 19:17

## 2024-09-16 RX ADMIN — QUETIAPINE FUMARATE 25 MG: 25 TABLET ORAL at 13:16

## 2024-09-16 RX ADMIN — NICOTINE 1 PATCH: 21 PATCH, EXTENDED RELEASE TRANSDERMAL at 09:04

## 2024-09-16 RX ADMIN — NICOTINE POLACRILEX 4 MG: 2 GUM, CHEWING BUCCAL at 22:36

## 2024-09-16 RX ADMIN — NICOTINE POLACRILEX 4 MG: 2 GUM, CHEWING BUCCAL at 18:47

## 2024-09-16 RX ADMIN — QUETIAPINE FUMARATE 25 MG: 25 TABLET ORAL at 20:43

## 2024-09-16 RX ADMIN — OLANZAPINE 10 MG: 10 TABLET, ORALLY DISINTEGRATING ORAL at 19:17

## 2024-09-16 RX ADMIN — POLYETHYLENE GLYCOL 3350 17 G: 17 POWDER, FOR SOLUTION ORAL at 09:04

## 2024-09-16 RX ADMIN — DIVALPROEX SODIUM 1000 MG: 500 TABLET, FILM COATED, EXTENDED RELEASE ORAL at 19:17

## 2024-09-16 RX ADMIN — NICOTINE POLACRILEX 4 MG: 2 GUM, CHEWING BUCCAL at 11:40

## 2024-09-16 RX ADMIN — BUPRENORPHINE AND NALOXONE 1 FILM: 2; .5 FILM BUCCAL; SUBLINGUAL at 19:17

## 2024-09-16 RX ADMIN — NICOTINE POLACRILEX 2 MG: 2 LOZENGE ORAL at 22:33

## 2024-09-16 RX ADMIN — OLANZAPINE 10 MG: 10 TABLET, FILM COATED ORAL at 20:50

## 2024-09-16 RX ADMIN — BUPRENORPHINE AND NALOXONE 1 FILM: 2; .5 FILM BUCCAL; SUBLINGUAL at 09:03

## 2024-09-16 RX ADMIN — SENNOSIDES AND DOCUSATE SODIUM 1 TABLET: 8.6; 5 TABLET ORAL at 19:17

## 2024-09-16 ASSESSMENT — ACTIVITIES OF DAILY LIVING (ADL)
ADLS_ACUITY_SCORE: 28
ORAL_HYGIENE: INDEPENDENT
LAUNDRY: UNABLE TO COMPLETE
HYGIENE/GROOMING: INDEPENDENT
ADLS_ACUITY_SCORE: 28
HYGIENE/GROOMING: INDEPENDENT
ADLS_ACUITY_SCORE: 28
DRESS: INDEPENDENT
ADLS_ACUITY_SCORE: 28
LAUNDRY: UNABLE TO COMPLETE
ADLS_ACUITY_SCORE: 28
ORAL_HYGIENE: INDEPENDENT
DRESS: SCRUBS (BEHAVIORAL HEALTH);INDEPENDENT
ADLS_ACUITY_SCORE: 28
ADLS_ACUITY_SCORE: 28

## 2024-09-16 NOTE — PLAN OF CARE
BEH IP Unit Acuity Rating Score (UARS)  Patient is given one point for every criteria they meet.    CRITERIA SCORING   On a 72 hour hold, court hold, committed, stay of commitment, or revocation. 1    Patient LOS on BEH unit exceeds 20 days. 0  LOS: 13   Patient under guardianship, 55+, otherwise medically complex, or under age 11. 0   Suicide ideation without relief of precipitating factors. 0   Current plan for suicide. 0   Current plan for homicide. 0   Imminent risk or actual attempt to seriously harm another without relief of factors precipitating the attempt. 1   Severe dysfunction in daily living (ex: complete neglect for self care, extreme disruption in vegetative function, extreme deterioration in social interactions). 1   Recent (last 7 days) or current physical aggression in the ED or on unit. 1 - 9/11/24   Restraints or seclusion episode in past 72 hours. 0   Recent (last 7 days) or current verbal aggression, agitation, yelling, etc., while in the ED or unit. 1   Active psychosis. 1   Need for constant or near constant redirection (from leaving, from others, etc).  1   Intrusive or disruptive behaviors. 1   Patient requires 3 or more hours of individualized nursing care per 8-hour shift (i.e. for ADLs, meds, therapeutic interventions). 0   TOTAL 8

## 2024-09-16 NOTE — PLAN OF CARE
Pt  awake  at start of shift.     Breathing quiet and unlabored when sleeping.     Pt had no c/o pain or discomfort during the HS.     Appears to have slept 5.25 hours.     Pt continues on 1:1 SIO/5 ft space distance.     Goal Outcome Evaluation:  Problem: Sleep Disturbance  Goal: Adequate Sleep/Rest  Outcome: Progressing

## 2024-09-16 NOTE — PLAN OF CARE
Group Attendance:  attended full group    Time session began: 1415  Time session ended: 1446  Patient's total time in group: 31    Total # Attendees   3   Group Type psychotherapeutic     Group Topic Covered emotional regulation, healthy coping skills, mindfulness, and relaxation and grounding techniques/coping skills     Group Session Detail Participants engaged in a structured activity to promote the use of self-soothing via their five senses. Participants engaged in mindfulness and a body scan to name their current emotional state and rate the intensity. Participants were then tasked with choosing a tea that may evoke a wanted emotion based on the tea properties, which group facilitator guided. Participants were asked to again engage in mindfulness and a body scan to see if this intervention was effective. Next, participants were asked to choose an essential oil to evoke a wanted emotion, which group facilitator guided. Participants were again asked to engage in mindfulness and a body scan to see if this intervention was effective. Group members were asked to share their experience. We discussed other interventions with group members sharing their experiences with all five senses.       Patient's response to the group topic/interactions:  cooperative with task, socially appropriate, safe use of materials/supplies, expressed understanding of topic, attentive, and actively engaged     Patient Details: Eloy engaged meaningfully throughout the group. He chose a mixture of both an activating and a calming tea, and an activating oil. He named many activities that have been helpful to self-soothe in the past. He stated that he vapes on his balcony, but also indicated the risks of vaping. He named hiking and being near water as effective coping strategies and was able to indicate the associated senses. He was much more appropriate with his peers during this group than previous groups this hospitalization.           38117 - Group psychotherapy - 1 Session  Patient Active Problem List   Diagnosis    Suicidal ideation    Psychosis (H)    Acute pulmonary embolism without acute cor pulmonale (H)    Alcohol use disorder, moderate, in sustained remission, dependence (H)    Anxiety    Cannabis use disorder, severe, dependence (H)    Class 1 drug-induced obesity without serious comorbidity with body mass index (BMI) of 33.0 to 33.9 in adult    Depression, major, single episode, moderate (H)    Episodic mood disorder (H24)    KATHE (generalized anxiety disorder)    History of marijuana use    Obesity (BMI 30-39.9)    Obesity, Class I, BMI 30-34.9    Tobacco use disorder, moderate, in sustained remission    Schizoaffective disorder, bipolar type (H)    Psychosis, unspecified psychosis type (H)    Tardive dyskinesia

## 2024-09-16 NOTE — PLAN OF CARE
Team Note Due:  Wednesday    Assessment/Intervention/Current Symtoms and Care Coordination:  Chart review and met with team, discussed pt progress, symptomology, and response to treatment.  Discussed the discharge plan and any potential impediments to discharge.    Received a voicemail from his mother who stated he use to have issues with tags, sensory issues. Her uncle tried to do a neuro psych test with him recently but Pt didn't complete it. She states that he had difficulty playing sports as he would stop in the middle of something to fix a tag or something that was uncomfortable. She said that he hasn't had testing, maybe Asperger's.     Per team report, Pt was instigating peers, saying statements under his breath like 'devil' and then going into his room which escalated a peer. He denies any SI, HI. The plan is to remove SIO. Also, no crayons or markers allowed in his room.     Awaiting final commitment order and assignment of CM.  Plan to have a care meeting to explore discharge options once he has a CM.  There are no new updates.     Pt was observed at times in milieu, checked out group but didn't stay. Continues to have colored marker print all over his shirt from previous days.  He appears unkempt, doesn't appear to be showering. Last documented shower was 9/9/24.     Discharge Plan or Goal:  He is able to return back to group home however this is not 24/7 staffed, would benefit most from MI/JACOB treatment     Barriers to Discharge:  Symptoms - jamila, agitation  Managing his medications in order to stabilize   Commitment process    Referral Status:  TBD     Legal Status:  Court hold  County: [place]  File Number: [id number]  Start and expiration date of commitment:   MI and [place] petition - [place] meds requested are: Zyprexa, Seroquel, Invega and Abilify       Contacts (include KELBY status):   (Megan) Ph: [phone]  - KELBY only for specific thing to discuss discharge   Psych:   [facility] - Declined [facility]  Michelle Barraza/Mother: [phone] - Declined KELBY x2     Upcoming Meetings and Dates/Important Information and next steps:  Update discharge referral form at discharge   PD and COS at discharge  Follow up psych and therapy

## 2024-09-16 NOTE — PROGRESS NOTES
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 for question of forced catheterization if needed, pt deemed to not have capacity to consent to urinary straight cath if medically necessary at this time.   Eloy was asked to complete bladder scans once per shift before getting scheduled Suboxone and showed volumes consistently below 500ml. Thus 9/16, bladder scans made prn.     Plan:  - Notify if fevers, worsening abdominal pain, flank pain  - notify medicine and urology if sxs worsen  - Pt does note a few days of constipation which can exacerbate urinary retention              - Scheduled Miralax daily + Senokot BID for now (hold for loose stools)  -Scan only needed if pt endorses urinary retention and trouble urinating  - parameters for straight cath in place (ok to use hardy catheter for comfort) as needed for high volume as outlined by Urology, see urology note 9/11   - urine cx no growth   - continue to monitor his symptoms and offer less invasive measures first. Currently, patient is voiding and not needing an urgent cath.     Hand pain and swelling   s/p punching mirror in room, per patient on night of 9/10 Staff noted full ROM however. On call doctor notified who placed hand XR  - Hand XR 9/10 showing tissue swelling and NO displacement or fracture per radiologist  read  -prns for pain and swelling     Pertinent labs/imaging:   urine cx no growth   Covid negative on admission     Behavioral/Psychological/Social:  - Encourage unit programming    Safety:  - Continue precautions as noted above  - Status 15 minute checks    Legal Status: court hold    Disposition Plan   Reason for ongoing admission: poses an imminent risk to self, poses an imminent risk to others, and is unable to care for self due to severe psychosis or jamila  Discharge location:  TBD . Consider ACT team referral, will need discharge planning conversation when more stable  Discharge Medications: not ordered  Follow-up Appointments: not scheduled    Entered by: Berkley Arreola MD on 09/16/2024  at 9:19 AM     Patient was staffed with Dr. Loya.     Berkley Arreola MD  Psychiatry Resident Physician

## 2024-09-16 NOTE — PLAN OF CARE
"Problem: Behavioral Disturbance  Goal: Behavioral Disturbance  Patient's 1:1 sio is discontinued. He did not endorse thoughts of harming himself or others this shift. During this shift, patient was visible in the lounge area, attended group, and was social with peers. His affect is flat and mood is calm. He is compliant with vitals check, medication administration and bladder scan (2 ml at 9 am). He ate breakfast and lunch without issues, and he did not complain of pain. He endorse feeling restless, but decline to take prn to manage his symptoms. Patient is overall polite and appropriate with no agitation, aggression or safety concerns. He did not take a shower this shift and he is unkempt. His scheduled bladder scan order was changed to prn if patient complain of urine retention. Patient can NOT have coloring crayons or markers in his room. Patient approached the med window at 1:15 pm and requested prn to manage his anxiety, and stated that he just want to feel like himself. Patient denies all other psych symptoms.     /78 (BP Location: Left arm, Patient Position: Sitting, Cuff Size: Adult Regular)   Pulse 95   Temp 98.2  F (36.8  C) (Temporal)   Resp 16   Ht 1.854 m (6' 1\")   Wt 94.8 kg (209 lb)   SpO2 100%   BMI 27.57 kg/m                 "

## 2024-09-16 NOTE — PLAN OF CARE
"  Problem: Adult Behavioral Health Plan of Care  Goal: Adheres to Safety Considerations for Self and Others  Outcome: Progressing   Goal Outcome Evaluation:    Patient has been calm and pleasant this shift. He appears with flat affect upon approach and he responds to questions with minimal responses He requested to get his phone to write phone numbers down. He denies having any thoughts of wanting to harm himself or others, visual hallucinations/auditory hallucinations. He does endorse being depressed /sad and states \" it's because I can't have my phone to watch my favorite movies\".He also endorses feeling anxious. Bladder scan was completed prior to his suboxone administration and it was 296 ml. patient reported voiding urine three times today.His vitals are stable.    Blood pressure 113/75, pulse 82, temperature 97.5  F (36.4  C), resp. rate 16, height 1.854 m (6' 1\"), weight 94.8 kg (209 lb), SpO2 99%.                   "

## 2024-09-17 PROCEDURE — 124N000002 HC R&B MH UMMC

## 2024-09-17 PROCEDURE — 250N000012 HC RX MED GY IP 250 OP 636 PS 637: Performed by: PSYCHIATRY & NEUROLOGY

## 2024-09-17 PROCEDURE — 99232 SBSQ HOSP IP/OBS MODERATE 35: CPT | Mod: GC | Performed by: STUDENT IN AN ORGANIZED HEALTH CARE EDUCATION/TRAINING PROGRAM

## 2024-09-17 PROCEDURE — 250N000013 HC RX MED GY IP 250 OP 250 PS 637: Performed by: PSYCHIATRY & NEUROLOGY

## 2024-09-17 PROCEDURE — 250N000013 HC RX MED GY IP 250 OP 250 PS 637

## 2024-09-17 PROCEDURE — 250N000013 HC RX MED GY IP 250 OP 250 PS 637: Performed by: STUDENT IN AN ORGANIZED HEALTH CARE EDUCATION/TRAINING PROGRAM

## 2024-09-17 PROCEDURE — 97150 GROUP THERAPEUTIC PROCEDURES: CPT | Mod: GO

## 2024-09-17 RX ADMIN — NICOTINE 1 PATCH: 21 PATCH, EXTENDED RELEASE TRANSDERMAL at 09:36

## 2024-09-17 RX ADMIN — SENNOSIDES AND DOCUSATE SODIUM 1 TABLET: 8.6; 5 TABLET ORAL at 20:03

## 2024-09-17 RX ADMIN — QUETIAPINE FUMARATE 25 MG: 25 TABLET ORAL at 10:15

## 2024-09-17 RX ADMIN — QUETIAPINE 400 MG: 400 TABLET, FILM COATED, EXTENDED RELEASE ORAL at 20:03

## 2024-09-17 RX ADMIN — NICOTINE POLACRILEX 4 MG: 2 GUM, CHEWING BUCCAL at 12:20

## 2024-09-17 RX ADMIN — DIVALPROEX SODIUM 1000 MG: 500 TABLET, FILM COATED, EXTENDED RELEASE ORAL at 20:03

## 2024-09-17 RX ADMIN — ALUMINUM HYDROXIDE, MAGNESIUM HYDROXIDE, AND DIMETHICONE 30 ML: 200; 20; 200 SUSPENSION ORAL at 18:13

## 2024-09-17 RX ADMIN — BUPRENORPHINE AND NALOXONE 1 FILM: 2; .5 FILM BUCCAL; SUBLINGUAL at 20:03

## 2024-09-17 RX ADMIN — TAMSULOSIN HYDROCHLORIDE 0.4 MG: 0.4 CAPSULE ORAL at 09:35

## 2024-09-17 RX ADMIN — OLANZAPINE 10 MG: 10 TABLET, ORALLY DISINTEGRATING ORAL at 09:35

## 2024-09-17 RX ADMIN — NICOTINE POLACRILEX 4 MG: 2 GUM, CHEWING BUCCAL at 13:52

## 2024-09-17 RX ADMIN — METFORMIN HYDROCHLORIDE 500 MG: 500 TABLET, FILM COATED ORAL at 17:37

## 2024-09-17 RX ADMIN — THERA TABS 1 TABLET: TAB at 09:35

## 2024-09-17 RX ADMIN — NICOTINE POLACRILEX 4 MG: 2 GUM, CHEWING BUCCAL at 18:13

## 2024-09-17 RX ADMIN — OLANZAPINE 10 MG: 10 TABLET, ORALLY DISINTEGRATING ORAL at 20:03

## 2024-09-17 RX ADMIN — NICOTINE POLACRILEX 4 MG: 2 GUM, CHEWING BUCCAL at 16:34

## 2024-09-17 RX ADMIN — BUPRENORPHINE AND NALOXONE 1 FILM: 2; .5 FILM BUCCAL; SUBLINGUAL at 09:35

## 2024-09-17 RX ADMIN — ASPIRIN 81 MG CHEWABLE TABLET 81 MG: 81 TABLET CHEWABLE at 09:35

## 2024-09-17 RX ADMIN — NICOTINE POLACRILEX 4 MG: 2 GUM, CHEWING BUCCAL at 09:40

## 2024-09-17 RX ADMIN — METFORMIN HYDROCHLORIDE 500 MG: 500 TABLET, FILM COATED ORAL at 09:35

## 2024-09-17 RX ADMIN — SENNOSIDES AND DOCUSATE SODIUM 1 TABLET: 8.6; 5 TABLET ORAL at 09:35

## 2024-09-17 RX ADMIN — POLYETHYLENE GLYCOL 3350 17 G: 17 POWDER, FOR SOLUTION ORAL at 09:41

## 2024-09-17 ASSESSMENT — ACTIVITIES OF DAILY LIVING (ADL)
HYGIENE/GROOMING: INDEPENDENT
DRESS: INDEPENDENT
ADLS_ACUITY_SCORE: 28
HYGIENE/GROOMING: INDEPENDENT
ADLS_ACUITY_SCORE: 28
ORAL_HYGIENE: INDEPENDENT
ADLS_ACUITY_SCORE: 28
ORAL_HYGIENE: INDEPENDENT
LAUNDRY: UNABLE TO COMPLETE
ADLS_ACUITY_SCORE: 28
ADLS_ACUITY_SCORE: 28
LAUNDRY: UNABLE TO COMPLETE
ADLS_ACUITY_SCORE: 28
DRESS: SCRUBS (BEHAVIORAL HEALTH);INDEPENDENT

## 2024-09-17 NOTE — PLAN OF CARE
"Problem: Psychotic Signs/Symptoms  Goal: Improved Behavioral Control (Psychotic Signs/Symptoms)  Outcome: Progressing  Patient was visible in the [facility] area, social with peers and attended group. His affect is blunted and mood is clam. He is medication compliant and he did not report any medication side effect. He told writer that he voided this morning and had a bowel movement with no issues. He stated that he is not experiencing urine rentention and he is doing fine with that. He did not endorse any mental health symptoms and he denies pain. He is unkept and refused to take a shower. He is eating and drinking without issues. Prn nicotine gum was given.     Patient commitment was supported and he is now committed and [facility].  Patient took the news well and was not agitated or frustrated. [place] meds requested are: Zyprexa, Seroquel, Invega and Abilify     /69 (BP Location: Right arm, Patient Position: Sitting, Cuff Size: Adult Regular)   Pulse 72   Temp 97.7  F (36.5  C) (Temporal)   Resp 16   Ht 1.854 m (6' 1\")   Wt 94.8 kg (209 lb)   SpO2 96%   BMI 27.57 kg/m     "

## 2024-09-17 NOTE — PLAN OF CARE
"\"I don't hear any voices anymore.\"  \"It's just my OCD.\"     Stated mood:\"Okay.\" Affect: blunted. Speech: WNL rate and volume. Stated goal: trying to keep self occupied. Doubting diagnosis:repeatedly talking about OCD. Disinterested in learning about bipolar illness and voiced indifference to the possibility of CD treatment.   Stated he would not write on the walls again. (Sheets and skin and walls are still partially     Still has an irritable edge although polite. Much less intrusive of the personal space of others. No provocative behaviour towards peers observed or reported. Less dismissive and less requestful.     Knows name and purpose of his medications.    No abnormal body movements. No physical complaints.     Probate court hold but no court date found.       Plan: Monitor and document mood and behaviour, thought process and content. Establish and maintain therapeutic relationship. Educate about diagnoses, medications, treatment, legal status, plan of care. Address preexisting and concurrent medical concerns.      P:Impaired coping  G:Adequate coping  O:Progressing                       "

## 2024-09-17 NOTE — CARE PLAN
"  Rehab Group    Start time: 0945  End time: 1030  Patient time total: 2 minutes    attended partial group    #2 attended   Group Type: general health and coping   Group Topic Covered: coping skills     Group Session Detail:  Topic group, coping skills/check-in     Patient Response/Contribution:  positive affect, cooperative with task, and organized     Patient Detail:    Pt was organized and on topic throughout discussion.  Acknowledges he feels \"stircrazy\" on the unit, wishes he had better access to music, thinks he's been maintaining calm behaviors, and shared how he hopes to stay stable.  Pt shared a bit about when he was hospitalized for his mental health while traveling to [place] after high school.      [id number] OT Group (2 or more in attendance)      Patient Active Problem List   Diagnosis    Suicidal ideation    Psychosis (H)    Acute pulmonary embolism without acute cor pulmonale (H)    Alcohol use disorder, moderate, in sustained remission, dependence (H)    Anxiety    Cannabis use disorder, severe, dependence (H)    Class 1 drug-induced obesity without serious comorbidity with body mass index (BMI) of 33.0 to 33.9 in adult    Depression, major, single episode, moderate (H)    Episodic mood disorder (H24)    KATHE (generalized anxiety disorder)    History of marijuana use    Obesity (BMI 30-39.9)    Obesity, Class I, BMI 30-34.9    Tobacco use disorder, moderate, in sustained remission    Schizoaffective disorder, bipolar type (H)    Psychosis, unspecified psychosis type (H)    Tardive dyskinesia       "

## 2024-09-17 NOTE — CARE PLAN
Rehab Group    Start time: 1500  End time: 1545  Patient time total: 45 minutes    attended full group     #3 attended   Group Type: general health and coping   Group Topic Covered: coping skills     Group Session Detail:  Coping skills 'black out poetry activity' - abstract art/poetry, self awareness activity.   Patient Response/Contribution:  positive affect and organized       Patient Detail:     While peers struggled with the abstract nature of the activity, pt seemed to enjoy it and took time to find meaning in random words/phrases and make connections.  Focused on activity for full session without distraction.      [id number] OT Group (2 or more in attendance)      Patient Active Problem List   Diagnosis    Suicidal ideation    Psychosis (H)    Acute pulmonary embolism without acute cor pulmonale (H)    Alcohol use disorder, moderate, in sustained remission, dependence (H)    Anxiety    Cannabis use disorder, severe, dependence (H)    Class 1 drug-induced obesity without serious comorbidity with body mass index (BMI) of 33.0 to 33.9 in adult    Depression, major, single episode, moderate (H)    Episodic mood disorder (H24)    KATHE (generalized anxiety disorder)    History of marijuana use    Obesity (BMI 30-39.9)    Obesity, Class I, BMI 30-34.9    Tobacco use disorder, moderate, in sustained remission    Schizoaffective disorder, bipolar type (H)    Psychosis, unspecified psychosis type (H)    Tardive dyskinesia        Pounds Preamble Statement (Weight Entered In Details Tab): Reported Weight in pounds:

## 2024-09-17 NOTE — PLAN OF CARE
BEH IP Unit Acuity Rating Score (UARS)  Patient is given one point for every criteria they meet.    CRITERIA SCORING   On a 72 hour hold, court hold, committed, stay of commitment, or revocation. 1    Patient LOS on BEH unit exceeds 20 days. 0  LOS: 14   Patient under guardianship, 55+, otherwise medically complex, or under age 11. 0   Suicide ideation without relief of precipitating factors. 0   Current plan for suicide. 0   Current plan for homicide. 0   Imminent risk or actual attempt to seriously harm another without relief of factors precipitating the attempt. 1   Severe dysfunction in daily living (ex: complete neglect for self care, extreme disruption in vegetative function, extreme deterioration in social interactions). 1   Recent (last 7 days) or current physical aggression in the ED or on unit. 1 - 9/11/24   Restraints or seclusion episode in past 72 hours. 0   Recent (last 7 days) or current verbal aggression, agitation, yelling, etc., while in the ED or unit. 1   Active psychosis. 0   Need for constant or near constant redirection (from leaving, from others, etc).  0   Intrusive or disruptive behaviors. 0   Patient requires 3 or more hours of individualized nursing care per 8-hour shift (i.e. for ADLs, meds, therapeutic interventions). 0   TOTAL 5

## 2024-09-17 NOTE — CARE PLAN
Rehab Group    Start time: 1030  End time: 1200  Patient time total: 45 minutes    attended partial group    #4 attended   Group Type: OT Clinic   Group Topic Covered: activity therapy and coping skills     Group Session Detail:  OT clinic group provides an opportunity for individual-based goal directed activity within a group setting - allowing for interaction and socialization.  Hands-on task work help to build/restore/or maintain skills such as: focus, concentration, decision making, and problem solving.  Patients are encouraged to carry over potential new interests/hobbies learned in group following discharge.     Patient Response/Contribution:  positive affect, cooperative with task, organized, and socially appropriate     Patient Detail:    Pt attended OT clinic group, was able to initiate task and ask for help as needed. Pt demonstrated good planning, task focus, and problem solving. Appeared comfortable interacting with peers.  Enjoyed listening to music, expressed appreciation for this while painting.      [id number] OT Group (2 or more in attendance)      Patient Active Problem List   Diagnosis    Suicidal ideation    Psychosis (H)    Acute pulmonary embolism without acute cor pulmonale (H)    Alcohol use disorder, moderate, in sustained remission, dependence (H)    Anxiety    Cannabis use disorder, severe, dependence (H)    Class 1 drug-induced obesity without serious comorbidity with body mass index (BMI) of 33.0 to 33.9 in adult    Depression, major, single episode, moderate (H)    Episodic mood disorder (H24)    KATHE (generalized anxiety disorder)    History of marijuana use    Obesity (BMI 30-39.9)    Obesity, Class I, BMI 30-34.9    Tobacco use disorder, moderate, in sustained remission    Schizoaffective disorder, bipolar type (H)    Psychosis, unspecified psychosis type (H)    Tardive dyskinesia
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
## 2024-09-17 NOTE — PLAN OF CARE
Team Note Due:  Wednesday    Assessment/Intervention/Current Symtoms and Care Coordination:  Chart review and met with team, discussed pt progress, symptomology, and response to treatment.  Discussed the discharge plan and any potential impediments to discharge.    Received a voicemail again from Pt's mother saying she thinks a keto diet may be helpful for Eloy. She also says she is wondering if he needs a legal representative. (No KELBY I will not call her back, she is aware no KELBY).     Per team, pt denies voices, says it's his OCD, his goal is to keep himself occupied, said he would stop writing on the walls. He knows the name and purpose of all of his meds. Med compliant.    He is less tense and agitated with staff. He doesn't appear to have showered but this morning had wet hair, and new scrubs. He declined showering though when I asked him.     I emailed Chasitybhavna Hill and asked if she could come for a care coordination meeting with Pt to explore discharge options as he is declining JACOB or IRTS. He can go back to his group home but they only have about 5 hrs per day of staff there. I asked if she was ok with this option with OP supports. I asked her to provide some days and times she is available to come either in person or virtual.    I asked Bridget Witt for a copy of Pt's final commitment order.     I received final commitment order, met with Pt to give him this, he took it well. I informed him about his new assigned commitment CM is [email or web address] Ph: [phone], and gave Pt her contact info. I shared his mom has been calling leaving voicemails but I cannot call her back. I again asked if he wanted to sign an KELBY for me to be able to and he said no, he also did not want to call his mom. He asked about discharge and I explained I requested a care meeting with his CM to see what her thoughts are about his discharge options. He denied having any further questions. I asked how he was  "feeling and stated he appears to look better physically and mentally and he stated he feels \"a lot better\".    Discharge Plan or Goal:  He is able to return back to group home however this is not 24/7 staffed, would benefit most from MI/JACOB treatment     Barriers to Discharge:  Symptoms - jamila, agitation  Managing his medications in order to stabilize   Commitment process    Referral Status:  TBD     Legal Status:  Court hold  County: [place]  File Number: [id number]  Start and expiration date of commitment:   MI and [place] petition - [place] meds requested are: Zyprexa, Seroquel, Invega and Abilify       Contacts (include KELBY status):   (Megan) Ph: [phone]  - KELBY only for specific thing to discuss discharge   Psych: Dr. Khan, [facility] - Declined KELBY  Michelle Barraza/Mother: [phone] - Declined KELBY x2    New Commitment CM:  Chasity [email or web address] [phone] - No KELBY Needed      Upcoming Meetings and Dates/Important Information and next steps:  Update discharge referral form at discharge   PD and COS at discharge  Follow up psych and therapy  "

## 2024-09-17 NOTE — PLAN OF CARE
NOC Shift Report    Pt slept 5 hours overnight.     Pt was awake at the start of shift. He came out of his room frequently for requests. Pt denied pain and anxiety and declined a prn for sleep. He requested a prn of nicotine and was reminded that after 2230 nicotine products are not available until morning, to promote sleep. Pt said he thought it was available until 3 AM and then walked away. The pt has regularly been informed of the availability of nicotine on previous nights.     When the pt was sleeping, no apparent respiratory distress was observed.    PRNs given: None.    Pt did not present with any acute medical or behavioral concerns overnight.    Will continue to monitor.    Goal Outcome Evaluation:  Problem: Sleep Disturbance  Goal: Adequate Sleep/Rest  Outcome: Not Progressing

## 2024-09-17 NOTE — PLAN OF CARE
Rehab Group    Start time: 1030  End time: 1200  Patient time total: 75 minutes    attended partial group    #5 attended   Group Type: OT Clinic   Group Topic Covered: coping skills     Group Session Detail:    Intervention: Pt participated in a OT Clinic group to facilitate coping skills exploration and creative expression through personally meaningful activities, and to encourage utilization of these healthy coping skills to promote overall health and wellness. Group included clinical observation of social, cognitive and kinesthetic performance skills to inform treatment and safe discharge planning.    Mood/Affect: Pleasant       Plan: Patient encouraged to maintain attendance for continued ongoing support in working towards occupational therapy goals to support overall treatment/care.        Patient Detail:    Was an active participant for the majority of group. Was accepting that there would not be the option of pick songs during this group. In the past when told this, patient would return to his room and not attend. This date however was engaged in projects for the duration of time in group. Was intermittently social with other peers, mostly when they initiated conversation. Did not observe any responding to internal stimuli during this time.       [id number] OT Group (2 or more in attendance)    Patient Active Problem List   Diagnosis    Suicidal ideation    Psychosis (H)    Acute pulmonary embolism without acute cor pulmonale (H)    Alcohol use disorder, moderate, in sustained remission, dependence (H)    Anxiety    Cannabis use disorder, severe, dependence (H)    Class 1 drug-induced obesity without serious comorbidity with body mass index (BMI) of 33.0 to 33.9 in adult    Depression, major, single episode, moderate (H)    Episodic mood disorder (H24)    KATHE (generalized anxiety disorder)    History of marijuana use    Obesity (BMI 30-39.9)    Obesity, Class I, BMI 30-34.9    Tobacco use disorder, moderate, in  sustained remission    Schizoaffective disorder, bipolar type (H)    Psychosis, unspecified psychosis type (H)    Tardive dyskinesia

## 2024-09-18 PROCEDURE — 97150 GROUP THERAPEUTIC PROCEDURES: CPT | Mod: GO

## 2024-09-18 PROCEDURE — 250N000013 HC RX MED GY IP 250 OP 250 PS 637: Performed by: STUDENT IN AN ORGANIZED HEALTH CARE EDUCATION/TRAINING PROGRAM

## 2024-09-18 PROCEDURE — 250N000012 HC RX MED GY IP 250 OP 636 PS 637: Performed by: PSYCHIATRY & NEUROLOGY

## 2024-09-18 PROCEDURE — 124N000002 HC R&B MH UMMC

## 2024-09-18 PROCEDURE — 250N000013 HC RX MED GY IP 250 OP 250 PS 637

## 2024-09-18 PROCEDURE — 250N000012 HC RX MED GY IP 250 OP 636 PS 637

## 2024-09-18 PROCEDURE — 250N000013 HC RX MED GY IP 250 OP 250 PS 637: Performed by: PSYCHIATRY & NEUROLOGY

## 2024-09-18 PROCEDURE — 99232 SBSQ HOSP IP/OBS MODERATE 35: CPT | Mod: GC | Performed by: PSYCHIATRY & NEUROLOGY

## 2024-09-18 RX ORDER — OLANZAPINE 10 MG/2ML
10 INJECTION, POWDER, FOR SOLUTION INTRAMUSCULAR 2 TIMES DAILY
Status: COMPLETED | OUTPATIENT
Start: 2024-09-18 | End: 2024-09-18

## 2024-09-18 RX ORDER — OLANZAPINE 10 MG/2ML
10 INJECTION, POWDER, FOR SOLUTION INTRAMUSCULAR AT BEDTIME
Status: DISCONTINUED | OUTPATIENT
Start: 2024-09-19 | End: 2024-09-30

## 2024-09-18 RX ORDER — OLANZAPINE 10 MG/1
10 TABLET, ORALLY DISINTEGRATING ORAL 2 TIMES DAILY
Status: COMPLETED | OUTPATIENT
Start: 2024-09-18 | End: 2024-09-18

## 2024-09-18 RX ORDER — OLANZAPINE 10 MG/2ML
5 INJECTION, POWDER, FOR SOLUTION INTRAMUSCULAR EVERY MORNING
Status: DISCONTINUED | OUTPATIENT
Start: 2024-09-19 | End: 2024-09-30

## 2024-09-18 RX ORDER — OLANZAPINE 15 MG/1
15 TABLET, ORALLY DISINTEGRATING ORAL AT BEDTIME
Status: DISCONTINUED | OUTPATIENT
Start: 2024-09-19 | End: 2024-09-30

## 2024-09-18 RX ORDER — OLANZAPINE 5 MG/1
5 TABLET, ORALLY DISINTEGRATING ORAL EVERY MORNING
Status: DISCONTINUED | OUTPATIENT
Start: 2024-09-19 | End: 2024-09-30

## 2024-09-18 RX ADMIN — NICOTINE POLACRILEX 4 MG: 2 GUM, CHEWING BUCCAL at 18:26

## 2024-09-18 RX ADMIN — METFORMIN HYDROCHLORIDE 500 MG: 500 TABLET, FILM COATED ORAL at 07:53

## 2024-09-18 RX ADMIN — ASPIRIN 81 MG CHEWABLE TABLET 81 MG: 81 TABLET CHEWABLE at 07:53

## 2024-09-18 RX ADMIN — NICOTINE POLACRILEX 4 MG: 2 GUM, CHEWING BUCCAL at 19:51

## 2024-09-18 RX ADMIN — SENNOSIDES AND DOCUSATE SODIUM 1 TABLET: 8.6; 5 TABLET ORAL at 19:47

## 2024-09-18 RX ADMIN — OLANZAPINE 10 MG: 10 TABLET, ORALLY DISINTEGRATING ORAL at 07:53

## 2024-09-18 RX ADMIN — TAMSULOSIN HYDROCHLORIDE 0.4 MG: 0.4 CAPSULE ORAL at 08:18

## 2024-09-18 RX ADMIN — BUPRENORPHINE AND NALOXONE 1 FILM: 2; .5 FILM BUCCAL; SUBLINGUAL at 19:48

## 2024-09-18 RX ADMIN — OLANZAPINE 10 MG: 10 TABLET, ORALLY DISINTEGRATING ORAL at 19:47

## 2024-09-18 RX ADMIN — METFORMIN HYDROCHLORIDE 500 MG: 500 TABLET, FILM COATED ORAL at 18:26

## 2024-09-18 RX ADMIN — QUETIAPINE FUMARATE 25 MG: 25 TABLET ORAL at 13:14

## 2024-09-18 RX ADMIN — BUPRENORPHINE AND NALOXONE 1 FILM: 2; .5 FILM BUCCAL; SUBLINGUAL at 08:15

## 2024-09-18 RX ADMIN — DIVALPROEX SODIUM 1000 MG: 500 TABLET, FILM COATED, EXTENDED RELEASE ORAL at 19:47

## 2024-09-18 RX ADMIN — NICOTINE POLACRILEX 4 MG: 2 GUM, CHEWING BUCCAL at 11:41

## 2024-09-18 RX ADMIN — QUETIAPINE 400 MG: 400 TABLET, FILM COATED, EXTENDED RELEASE ORAL at 19:47

## 2024-09-18 RX ADMIN — POLYETHYLENE GLYCOL 3350 17 G: 17 POWDER, FOR SOLUTION ORAL at 08:16

## 2024-09-18 RX ADMIN — NICOTINE POLACRILEX 4 MG: 2 GUM, CHEWING BUCCAL at 16:20

## 2024-09-18 RX ADMIN — SENNOSIDES AND DOCUSATE SODIUM 1 TABLET: 8.6; 5 TABLET ORAL at 07:53

## 2024-09-18 RX ADMIN — NICOTINE POLACRILEX 4 MG: 2 GUM, CHEWING BUCCAL at 13:14

## 2024-09-18 RX ADMIN — OLANZAPINE 10 MG: 10 TABLET, FILM COATED ORAL at 16:53

## 2024-09-18 RX ADMIN — THERA TABS 1 TABLET: TAB at 07:53

## 2024-09-18 RX ADMIN — NICOTINE 1 PATCH: 21 PATCH, EXTENDED RELEASE TRANSDERMAL at 08:16

## 2024-09-18 ASSESSMENT — ACTIVITIES OF DAILY LIVING (ADL)
ADLS_ACUITY_SCORE: 28
ADLS_ACUITY_SCORE: 28
LAUNDRY: UNABLE TO COMPLETE
DRESS: INDEPENDENT;SCRUBS (BEHAVIORAL HEALTH)
ADLS_ACUITY_SCORE: 28
HYGIENE/GROOMING: INDEPENDENT
ADLS_ACUITY_SCORE: 28
ORAL_HYGIENE: INDEPENDENT
ADLS_ACUITY_SCORE: 28

## 2024-09-18 NOTE — PLAN OF CARE
"Nursing assessment completed. Patient requested a PRN at the beginning of the shift. He stated he thought he might have new PRN options available, which did not appear to be the case. He declined PRN zyprexa and was accepting that it was too early to take the PRN seroquel. He settled on PRN nicotiene gum. Stated he had anxiety. Was able to talk to writer appropriately for a bit. An hour latter, he again requested the PRN seroquel, and was accepting that it was too early. He did agree to take PRN zyprexa with latter stated relief \"temporarily\". Patient spent time in the lounge picking out books off the book cart, playing cards, and is social with select peers. He is medication compliant. Denies SI/SIB or thoughts to harm others. Continue to monitor and assess.                         "

## 2024-09-18 NOTE — PLAN OF CARE
BEH IP Unit Acuity Rating Score (UARS)  Patient is given one point for every criteria they meet.    CRITERIA SCORING   On a 72 hour hold, court hold, committed, stay of commitment, or revocation. 1    Patient LOS on BEH unit exceeds 20 days. 0  LOS: 15   Patient under guardianship, 55+, otherwise medically complex, or under age 11. 0   Suicide ideation without relief of precipitating factors. 0   Current plan for suicide. 0   Current plan for homicide. 0   Imminent risk or actual attempt to seriously harm another without relief of factors precipitating the attempt. 1   Severe dysfunction in daily living (ex: complete neglect for self care, extreme disruption in vegetative function, extreme deterioration in social interactions). 1   Recent (last 7 days) or current physical aggression in the ED or on unit. 1 - 9/11/24   Restraints or seclusion episode in past 72 hours. 0   Recent (last 7 days) or current verbal aggression, agitation, yelling, etc., while in the ED or unit. 1   Active psychosis. 0   Need for constant or near constant redirection (from leaving, from others, etc).  0   Intrusive or disruptive behaviors. 0   Patient requires 3 or more hours of individualized nursing care per 8-hour shift (i.e. for ADLs, meds, therapeutic interventions). 0   TOTAL 5

## 2024-09-18 NOTE — PLAN OF CARE
"\"I have more intrusive thoughts,. Just noise really; it's hard to describe.\"  \"My compulsions are getting worse. For example I have the write the same thing over and over and over.\"  \"I don't need CD treatment and I don't want an IRTS. They just take all your money and leave you like 40 dollars a month to spend. It's not for me.\"    Stable mood with congruent affect. No intrusive or aggressive behaviour. Clear, polite speech. No latency of responses. WNL eye contact. Able to stay focused in conversation. Describes his intrusive thoughts like hallucinations but denies hallucinations when asked. Again asked urgently that his Luvox be restarted to effectively deal with OCD symptoms. (Note placed in paper chart.)  Reiterated that he got bored there he was living, since his roommates also mostly preferred to stay to themselves.    Said he has attempted to wash the writing he did on his walls (noted last week) but some of it simply does not come off.     No abnormal movements observed. Improved hygiene.    Denied current cravings to use.        Plan: Monitor and document mood and behaviour, thought process and content. Establish and maintain therapeutic relationship. Educate about diagnoses, medications, treatment, legal status, plan of care. Address preexisting and concurrent medical concerns.      P:Impaired coping  G:Adequate coping  O:Not progressing          "

## 2024-09-18 NOTE — PLAN OF CARE
NOC Shift Report    Pt slept 4 hours overnight.     Pt was awake at the start of shift. Pt was was calm and quiet and stayed in his room for most of the shift. Pt encouraged to try to sleep.    Pt denied pain or discomfort.   Denied anxiety.  Declined prn for sleep.    PRNs given: None.    When the pt was sleeping, no apparent respiratory distress was observed.    Pt did not present with any acute medical or behavioral concerns overnight.    Will continue to monitor.    Goal Outcome Evaluation:  Problem: Sleep Disturbance  Goal: Adequate Sleep/Rest  Outcome: Not Progressing

## 2024-09-18 NOTE — PLAN OF CARE
Rehab Group    Start time: 1315  End time: 1400  Patient time total: 12 minutes    attended partial group     #3 attended   Group Type: occupational therapy   Group Topic Covered: healthy leisure time and movement     Margareth Bishop      Group Session Detail:    Patient Response: Pt partcipated in group focused on leisure participation and exploration, socialization and movement. Discussed how participation in leisure activities can be used as a healthy coping skill in symptom management and a strategy to reduce stress.    Mood/Affect: Pleasant       Plan: Patient encouraged to maintain attendance for continued ongoing support in working towards occupational therapy goals to support overall treatment/care.        Patient Detail:    Joined with encouragement from writer for activity. Stated that playing had brought up a memory of a trip he took to [place] a number of years ago with friends where he played in an outdoor space. Continued to be social and converse with writer during this time regarding different topics such as traveling.       [id number] OT Group (2 or more in attendance)    Patient Active Problem List   Diagnosis    Suicidal ideation    Psychosis (H)    Acute pulmonary embolism without acute cor pulmonale (H)    Alcohol use disorder, moderate, in sustained remission, dependence (H)    Anxiety    Cannabis use disorder, severe, dependence (H)    Class 1 drug-induced obesity without serious comorbidity with body mass index (BMI) of 33.0 to 33.9 in adult    Depression, major, single episode, moderate (H)    Episodic mood disorder (H24)    KATHE (generalized anxiety disorder)    History of marijuana use    Obesity (BMI 30-39.9)    Obesity, Class I, BMI 30-34.9    Tobacco use disorder, moderate, in sustained remission    Schizoaffective disorder, bipolar type (H)    Psychosis, unspecified psychosis type (H)    Tardive dyskinesia

## 2024-09-18 NOTE — PLAN OF CARE
Rehab Group    Start time: 1030  End time: 1200  Patient time total: 32 minutes    attended partial group    #5 attended   Group Type: occupational therapy   Group Topic Covered: coping skills    Sun catcher painting      Group Session Detail:    Intervention: Pt participated in a OT task group to facilitate coping skills exploration and creative expression through personally meaningful activities, and to encourage utilization of these healthy coping skills to promote overall health and wellness. Group included clinical observation of social, cognitive and kinesthetic performance skills to inform treatment and safe discharge planning.    Mood/Affect: Pleasant     Plan: Patient encouraged to maintain attendance for continued ongoing support in working towards occupational therapy goals to support overall treatment/care.        Patient Detail:    Initially declined to join group, stated that the new medications were making him very tired. Did later however take interest in the project and worked and completed one during this time. Was more intermittently social with writer off and on, making casual conversation. As yesterday, did not observe any response to internal stimuli during group time.       [id number] OT Group (2 or more in attendance)    Patient Active Problem List   Diagnosis    Suicidal ideation    Psychosis (H)    Acute pulmonary embolism without acute cor pulmonale (H)    Alcohol use disorder, moderate, in sustained remission, dependence (H)    Anxiety    Cannabis use disorder, severe, dependence (H)    Class 1 drug-induced obesity without serious comorbidity with body mass index (BMI) of 33.0 to 33.9 in adult    Depression, major, single episode, moderate (H)    Episodic mood disorder (H24)    KATHE (generalized anxiety disorder)    History of marijuana use    Obesity (BMI 30-39.9)    Obesity, Class I, BMI 30-34.9    Tobacco use disorder, moderate, in sustained remission    Schizoaffective disorder, bipolar  type (H)    Psychosis, unspecified psychosis type (H)    Tardive dyskinesia

## 2024-09-18 NOTE — PROGRESS NOTES
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 Miralax daily + Senokot BID for now (hold for loose stools)  -Scan only needed if pt endorses urinary retention and trouble urinating  - parameters for straight cath in place (ok to use hardy catheter for comfort) as needed for high volume as outlined by Urology, see urology note 9/11   - urine cx no growth   - continue to monitor his symptoms and offer less invasive measures first. Currently, patient is voiding and not needing an urgent cath.     Hand pain and swelling   s/p punching mirror in room, per patient on night of 9/10 Staff noted full ROM however. On call doctor notified who placed hand XR  - Hand XR 9/10 showing tissue swelling and NO displacement or fracture per radiologist read  -prns for pain and swelling     Pertinent labs/imaging:   urine cx no growth   Covid negative on admission     Behavioral/Psychological/Social:  - Encourage unit programming    Safety:  - Continue precautions as noted above  - Status 15 minute checks    Legal Status: court hold    Disposition Plan   Reason for ongoing admission: poses an imminent risk to self, poses an imminent risk to others, and is unable to care for self due to severe psychosis or jamila  Discharge location:  TBD . Consider ACT team referral, will need discharge planning  conversation when more stable  Discharge Medications: not ordered  Follow-up Appointments: not scheduled    Patient seen and discussed with attending physician, Dr. Loya, who is in agreement with my assessment and plan.    Foster Batista, PGY-4 (Psychiatry)  [facility]

## 2024-09-18 NOTE — CARE PLAN
"Pt was calm and cooperative this shift. Presented with a flat affect. Pt declined all mental health s/s including SI,HI,SIB, anxiety,depression,paranoia, delusions and AVC hallucinations. Endorsed anxiety. Denied pain. Was med complaint. Ate and drank adequately. He declined completing hygiene and a shower. Pt spent most of the shift in his room watching tv and was more isolative/withdrawn this shift. Followed hourly request adequately. No s/s of agitation or aggression.No medical concerns. No behavioral concerns. No med side effects observed and pt reported Depakote made him \"tired\".    /73 (BP Location: Right arm)   Pulse 78   Temp 97.2  F (36.2  C) (Temporal)   Resp 16   Ht 1.854 m (6' 1\")   Wt 94.8 kg (209 lb)   SpO2 95%   BMI 27.57 kg/m         Last 24H PRN:     alum & mag hydroxide-simethicone (MAALOX) suspension 30 mL, 30 mL at 09/17/24 1813    nicotine (COMMIT) lozenge 4 mg, 2 mg at 09/16/24 2233 **OR** nicotine polacrilex (NICORETTE) gum 4 mg, 4 mg at 09/18/24 1314    QUEtiapine (SEROquel) tablet 25 mg, 25 mg at 09/18/24 1314   "
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
## 2024-09-19 PROCEDURE — H2032 ACTIVITY THERAPY, PER 15 MIN: HCPCS

## 2024-09-19 PROCEDURE — 250N000013 HC RX MED GY IP 250 OP 250 PS 637

## 2024-09-19 PROCEDURE — 99232 SBSQ HOSP IP/OBS MODERATE 35: CPT | Mod: GC | Performed by: PSYCHIATRY & NEUROLOGY

## 2024-09-19 PROCEDURE — 250N000013 HC RX MED GY IP 250 OP 250 PS 637: Performed by: STUDENT IN AN ORGANIZED HEALTH CARE EDUCATION/TRAINING PROGRAM

## 2024-09-19 PROCEDURE — 124N000002 HC R&B MH UMMC

## 2024-09-19 PROCEDURE — 250N000013 HC RX MED GY IP 250 OP 250 PS 637: Performed by: PSYCHIATRY & NEUROLOGY

## 2024-09-19 PROCEDURE — 250N000012 HC RX MED GY IP 250 OP 636 PS 637

## 2024-09-19 PROCEDURE — 97150 GROUP THERAPEUTIC PROCEDURES: CPT | Mod: GO

## 2024-09-19 RX ADMIN — THERA TABS 1 TABLET: TAB at 08:56

## 2024-09-19 RX ADMIN — SENNOSIDES AND DOCUSATE SODIUM 1 TABLET: 8.6; 5 TABLET ORAL at 08:56

## 2024-09-19 RX ADMIN — BUPRENORPHINE AND NALOXONE 1 FILM: 2; .5 FILM BUCCAL; SUBLINGUAL at 19:08

## 2024-09-19 RX ADMIN — OLANZAPINE 15 MG: 15 TABLET, ORALLY DISINTEGRATING ORAL at 21:31

## 2024-09-19 RX ADMIN — NICOTINE POLACRILEX 4 MG: 2 GUM, CHEWING BUCCAL at 08:57

## 2024-09-19 RX ADMIN — ALUMINUM HYDROXIDE, MAGNESIUM HYDROXIDE, AND DIMETHICONE 30 ML: 200; 20; 200 SUSPENSION ORAL at 13:10

## 2024-09-19 RX ADMIN — METFORMIN HYDROCHLORIDE 500 MG: 500 TABLET, FILM COATED ORAL at 17:56

## 2024-09-19 RX ADMIN — NICOTINE POLACRILEX 4 MG: 2 GUM, CHEWING BUCCAL at 16:12

## 2024-09-19 RX ADMIN — TAMSULOSIN HYDROCHLORIDE 0.4 MG: 0.4 CAPSULE ORAL at 08:56

## 2024-09-19 RX ADMIN — NICOTINE POLACRILEX 4 MG: 2 GUM, CHEWING BUCCAL at 13:01

## 2024-09-19 RX ADMIN — POLYETHYLENE GLYCOL 3350 17 G: 17 POWDER, FOR SOLUTION ORAL at 08:57

## 2024-09-19 RX ADMIN — ASPIRIN 81 MG CHEWABLE TABLET 81 MG: 81 TABLET CHEWABLE at 08:56

## 2024-09-19 RX ADMIN — QUETIAPINE FUMARATE 25 MG: 25 TABLET ORAL at 13:10

## 2024-09-19 RX ADMIN — METFORMIN HYDROCHLORIDE 500 MG: 500 TABLET, FILM COATED ORAL at 08:56

## 2024-09-19 RX ADMIN — BUPRENORPHINE AND NALOXONE 1 FILM: 2; .5 FILM BUCCAL; SUBLINGUAL at 08:56

## 2024-09-19 RX ADMIN — QUETIAPINE 400 MG: 400 TABLET, FILM COATED, EXTENDED RELEASE ORAL at 19:08

## 2024-09-19 RX ADMIN — OLANZAPINE 5 MG: 10 TABLET, ORALLY DISINTEGRATING ORAL at 08:56

## 2024-09-19 RX ADMIN — SENNOSIDES AND DOCUSATE SODIUM 1 TABLET: 8.6; 5 TABLET ORAL at 19:08

## 2024-09-19 RX ADMIN — DIVALPROEX SODIUM 1000 MG: 500 TABLET, FILM COATED, EXTENDED RELEASE ORAL at 19:08

## 2024-09-19 RX ADMIN — NICOTINE POLACRILEX 4 MG: 2 GUM, CHEWING BUCCAL at 11:20

## 2024-09-19 RX ADMIN — NICOTINE 1 PATCH: 21 PATCH, EXTENDED RELEASE TRANSDERMAL at 08:57

## 2024-09-19 ASSESSMENT — ACTIVITIES OF DAILY LIVING (ADL)
ADLS_ACUITY_SCORE: 28

## 2024-09-19 NOTE — PLAN OF CARE
Team Note Due:  Wednesday    Assessment/Intervention/Current Symtoms and Care Coordination:  Chart review and met with team, discussed pt progress, symptomology, and response to treatment.  Discussed the discharge plan and any potential impediments to discharge.      Per team, pt doing better. Pt had a good shift yesterday. Pt slept 6 hours. Pt has been social with select peers. Pt reporting some shakiness. Pt has been engaging in some of the offered groups with encouragement.         Discharge Plan or Goal:  He is able to return back to group home however this is not 24/7 staffed, would benefit most from MI/JACOB treatment but is declining this option. Consider group home + IOP.      Barriers to Discharge:  Symptoms - jamila, agitation  Managing his medications in order to stabilize   Commitment process    Referral Status:  TBD     Legal Status:  Court hold  County: [place]  File Number: [id number]  Start and expiration date of commitment:   MI and Prado petition - Prado meds requested are: Zyprexa, Seroquel, Invega and Abilify       Contacts (include KELBY status):   (Megan) Ph: [phone]  - KELBY only for specific thing to discuss discharge   [facility] - Declined KELBY  Michelle Cortes/Mother: [phone] - Declined KELBY x2    New Commitment CM:  Chasity [email or web address] [phone] - No KELBY Needed      Upcoming Meetings and Dates/Important Information and next steps:  Update discharge referral form at discharge   PD and COS at discharge  Follow up psych appt

## 2024-09-19 NOTE — PLAN OF CARE
BEH IP Unit Acuity Rating Score (UARS)  Patient is given one point for every criteria they meet.    CRITERIA SCORING   On a 72 hour hold, court hold, committed, stay of commitment, or revocation. 1    Patient LOS on BEH unit exceeds 20 days. 0  LOS: 16   Patient under guardianship, 55+, otherwise medically complex, or under age 11. 0   Suicide ideation without relief of precipitating factors. 0   Current plan for suicide. 0   Current plan for homicide. 0   Imminent risk or actual attempt to seriously harm another without relief of factors precipitating the attempt. 1   Severe dysfunction in daily living (ex: complete neglect for self care, extreme disruption in vegetative function, extreme deterioration in social interactions). 1   Recent (last 7 days) or current physical aggression in the ED or on unit. 0   Restraints or seclusion episode in past 72 hours. 0   Recent (last 7 days) or current verbal aggression, agitation, yelling, etc., while in the ED or unit. 0   Active psychosis. 0   Need for constant or near constant redirection (from leaving, from others, etc).  0   Intrusive or disruptive behaviors. 0   Patient requires 3 or more hours of individualized nursing care per 8-hour shift (i.e. for ADLs, meds, therapeutic interventions). 0   TOTAL 3

## 2024-09-19 NOTE — PROGRESS NOTES
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   Eloy was asked to complete bladder scans once per shift before getting scheduled Suboxone and showed volumes consistently below 500ml. Thus 9/16, bladder scans made prn.     Plan:  - Notify if fevers, worsening abdominal pain, flank pain  - notify medicine and urology if sxs worsen  - Pt does note a few days of constipation which can exacerbate urinary retention              - Scheduled Miralax daily + Senokot BID for now (hold for loose stools)  -Scan only needed if pt endorses urinary retention and trouble urinating  - parameters for straight cath in place (ok to use hardy catheter for comfort) as needed for high volume as outlined by Urology, see urology note 9/11   - urine cx no growth   - continue to monitor his symptoms and offer less invasive measures first. Currently, patient is voiding and not needing an urgent cath.     Hand pain and swelling   s/p punching mirror in room, per patient on night of 9/10 Staff noted full ROM however. On call doctor notified who placed hand XR  - Hand XR 9/10 showing tissue swelling and NO displacement or fracture per radiologist read  -prns for pain and swelling     Pertinent labs/imaging:   urine cx no growth   Covid negative on admission      Behavioral/Psychological/Social:  - Encourage unit programming    Safety:  - Continue precautions as noted above  - Status 15 minute checks    Legal Status: court hold    Disposition Plan   Reason for ongoing admission: poses an imminent risk to self, poses an imminent risk to others, and is unable to care for self due to severe psychosis or jamila  Discharge location:  TBD . Consider ACT team referral, will need discharge planning conversation when more stable  Discharge Medications: not ordered  Follow-up Appointments: not scheduled    Patient seen and discussed with attending physician, Dr. Loya, who is in agreement with my assessment and plan.    Foster Batista, PGY-4 (Psychiatry)  [facility]

## 2024-09-19 NOTE — PLAN OF CARE
Eloy appeared sleeping for  6 hours without distress   15 minutes safety checks completed and no issues identified  Continue on assault and suicide precaution with absence of relative behavior this shift  No pain or discomfort verbalized  No outburst or paranoia behavior noted.         Problem: Behavioral Disturbance  Goal: Behavioral Disturbance  Description: Signs and symptoms of listed problems will be absent or manageable by discharge or transition of care.  Outcome: Progressing     Problem: Adult Behavioral Health Plan of Care  Goal: Adheres to Safety Considerations for Self and Others  Intervention: Develop and Maintain Individualized Safety Plan  Recent Flowsheet Documentation  Taken 9/19/2024 0519 by Disha Finn, RN  Safety Measures: safety rounds completed  Goal: Absence of New-Onset Illness or Injury  Intervention: Identify and Manage Fall Risk  Recent Flowsheet Documentation  Taken 9/19/2024 0519 by Disha Finn, RN  Safety Measures: safety rounds completed   Goal Outcome Evaluation:

## 2024-09-19 NOTE — PLAN OF CARE
Pt had an uneventful shift this day. Pt mood was calm, pleasant with congruent affect. Pt denies SI, SIB, HI and thoughts of hurting others. Pt denies depression and A/VH. 1310 Pt endorses anxiety 6/10, pt does not elaborate on reason. Pt affect appears anxious. Pt given PRN Seroquel with stated relief. Pt also asked for maalox at the same time for indigestion, with stated relief.    Pt spent most of the day isolated to room, present in the fringes of the milieu intermittently with little interaction with peers. Pt attended OT group with appropriate interactions. Pt slept later in the day than he normally does, was not observed to be sedated throughout the day.    Pt's hands observed to be tremoring this am, provider notified.

## 2024-09-19 NOTE — PROGRESS NOTES
Rehab Group    Start time: 1315  End time: 1415  Patient time total: 30 minutes    attended partial group    #4 attended   Group Type: art   Group Topic Covered: activity therapy       Group Session Detail:  Art Therapy directive was to create art in response to a chosen mindfulness based personal intention.     Patient Response/Contribution:  cooperative with task       Patient Detail:    Pt was an engaged participant, focused on art making for the time spent in group (30 minutes). Pt also organized the tempera paint sticks after finishing artwork. Pt did not verbally process art with author or group.  Pts mood was calm.      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

## 2024-09-19 NOTE — PLAN OF CARE
Rehab Group    Start time: 1030  End time: 1200  Patient time total: 52 minutes    attended partial group    #4 attended   Group Type: OT Clinic   Group Topic Covered: coping skills     Group Session Detail:    Intervention: Pt participated in a OT Clinic group to facilitate coping skills exploration and creative expression through personally meaningful activities, and to encourage utilization of these healthy coping skills to promote overall health and wellness. Group included clinical observation of social, cognitive and kinesthetic performance skills to inform treatment and safe discharge planning.    Mood/Affect: Pleasant, calm    Plan: Patient encouraged to maintain attendance for continued ongoing support in working towards occupational therapy goals to support overall treatment/care.        Patient Detail:    Joined group requesting to work on a project similar to another peer's. Was briefly social with writer and other peers during this time, typically in response to another peer getting their attention or asking a question. Made request of writer to be able to use sharpie outside of group, was easily accepting of writer declining this request d/t not using group materials outside of group and patient's history with writing on self and walls. Shared finished project with writer and explained rationale or why he chose what he did.       [id number] OT Group (2 or more in attendance)    Patient Active Problem List   Diagnosis    Suicidal ideation    Psychosis (H)    Acute pulmonary embolism without acute cor pulmonale (H)    Alcohol use disorder, moderate, in sustained remission, dependence (H)    Anxiety    Cannabis use disorder, severe, dependence (H)    Class 1 drug-induced obesity without serious comorbidity with body mass index (BMI) of 33.0 to 33.9 in adult    Depression, major, single episode, moderate (H)    Episodic mood disorder (H24)    KATHE (generalized anxiety disorder)    History of marijuana use     Obesity (BMI 30-39.9)    Obesity, Class I, BMI 30-34.9    Tobacco use disorder, moderate, in sustained remission    Schizoaffective disorder, bipolar type (H)    Psychosis, unspecified psychosis type (H)    Tardive dyskinesia

## 2024-09-20 PROCEDURE — 250N000013 HC RX MED GY IP 250 OP 250 PS 637

## 2024-09-20 PROCEDURE — 250N000013 HC RX MED GY IP 250 OP 250 PS 637: Performed by: STUDENT IN AN ORGANIZED HEALTH CARE EDUCATION/TRAINING PROGRAM

## 2024-09-20 PROCEDURE — 99232 SBSQ HOSP IP/OBS MODERATE 35: CPT | Performed by: STUDENT IN AN ORGANIZED HEALTH CARE EDUCATION/TRAINING PROGRAM

## 2024-09-20 PROCEDURE — 124N000002 HC R&B MH UMMC

## 2024-09-20 PROCEDURE — 250N000013 HC RX MED GY IP 250 OP 250 PS 637: Performed by: PSYCHIATRY & NEUROLOGY

## 2024-09-20 PROCEDURE — 250N000012 HC RX MED GY IP 250 OP 636 PS 637

## 2024-09-20 RX ADMIN — OLANZAPINE 15 MG: 15 TABLET, ORALLY DISINTEGRATING ORAL at 19:55

## 2024-09-20 RX ADMIN — SENNOSIDES AND DOCUSATE SODIUM 1 TABLET: 8.6; 5 TABLET ORAL at 19:56

## 2024-09-20 RX ADMIN — ASPIRIN 81 MG CHEWABLE TABLET 81 MG: 81 TABLET CHEWABLE at 07:48

## 2024-09-20 RX ADMIN — TAMSULOSIN HYDROCHLORIDE 0.4 MG: 0.4 CAPSULE ORAL at 07:48

## 2024-09-20 RX ADMIN — METFORMIN HYDROCHLORIDE 500 MG: 500 TABLET, FILM COATED ORAL at 07:48

## 2024-09-20 RX ADMIN — NICOTINE POLACRILEX 4 MG: 2 GUM, CHEWING BUCCAL at 07:49

## 2024-09-20 RX ADMIN — OLANZAPINE 5 MG: 10 TABLET, ORALLY DISINTEGRATING ORAL at 07:48

## 2024-09-20 RX ADMIN — NICOTINE POLACRILEX 4 MG: 2 GUM, CHEWING BUCCAL at 13:56

## 2024-09-20 RX ADMIN — THERA TABS 1 TABLET: TAB at 07:48

## 2024-09-20 RX ADMIN — ALUMINUM HYDROXIDE, MAGNESIUM HYDROXIDE, AND DIMETHICONE 30 ML: 200; 20; 200 SUSPENSION ORAL at 07:54

## 2024-09-20 RX ADMIN — BUPRENORPHINE AND NALOXONE 1 FILM: 2; .5 FILM BUCCAL; SUBLINGUAL at 07:48

## 2024-09-20 RX ADMIN — QUETIAPINE FUMARATE 25 MG: 25 TABLET ORAL at 09:57

## 2024-09-20 RX ADMIN — NICOTINE POLACRILEX 4 MG: 2 GUM, CHEWING BUCCAL at 16:16

## 2024-09-20 RX ADMIN — POLYETHYLENE GLYCOL 3350 17 G: 17 POWDER, FOR SOLUTION ORAL at 07:48

## 2024-09-20 RX ADMIN — NICOTINE 1 PATCH: 21 PATCH, EXTENDED RELEASE TRANSDERMAL at 07:49

## 2024-09-20 RX ADMIN — NICOTINE POLACRILEX 4 MG: 2 GUM, CHEWING BUCCAL at 19:55

## 2024-09-20 RX ADMIN — QUETIAPINE 400 MG: 400 TABLET, FILM COATED, EXTENDED RELEASE ORAL at 19:56

## 2024-09-20 RX ADMIN — NICOTINE POLACRILEX 4 MG: 2 GUM, CHEWING BUCCAL at 11:31

## 2024-09-20 RX ADMIN — BUPRENORPHINE AND NALOXONE 1 FILM: 2; .5 FILM BUCCAL; SUBLINGUAL at 19:56

## 2024-09-20 RX ADMIN — DIVALPROEX SODIUM 1000 MG: 500 TABLET, FILM COATED, EXTENDED RELEASE ORAL at 19:55

## 2024-09-20 RX ADMIN — METFORMIN HYDROCHLORIDE 500 MG: 500 TABLET, FILM COATED ORAL at 17:22

## 2024-09-20 RX ADMIN — SENNOSIDES AND DOCUSATE SODIUM 1 TABLET: 8.6; 5 TABLET ORAL at 07:48

## 2024-09-20 ASSESSMENT — ACTIVITIES OF DAILY LIVING (ADL)
ADLS_ACUITY_SCORE: 28
LAUNDRY: UNABLE TO COMPLETE
ORAL_HYGIENE: INDEPENDENT
ADLS_ACUITY_SCORE: 28
DRESS: INDEPENDENT
ADLS_ACUITY_SCORE: 28
ADLS_ACUITY_SCORE: 28
LAUNDRY: UNABLE TO COMPLETE
ADLS_ACUITY_SCORE: 28
HYGIENE/GROOMING: INDEPENDENT
ADLS_ACUITY_SCORE: 28
ADLS_ACUITY_SCORE: 28
DRESS: INDEPENDENT
ADLS_ACUITY_SCORE: 28
ORAL_HYGIENE: INDEPENDENT
ADLS_ACUITY_SCORE: 28
HYGIENE/GROOMING: INDEPENDENT
ADLS_ACUITY_SCORE: 28

## 2024-09-20 NOTE — PROGRESS NOTES
"[facility], [place]   Psychiatric Progress Note  Hospital Day: 17        Interim History:   Vital signs: /76 (BP Location: Left arm)   Pulse 84   Temp 98  F (36.7  C)   Resp 16   Ht 1.854 m (6' 1\")   Wt 94.8 kg (209 lb)   SpO2 97%   BMI 27.57 kg/m    Sleep: 6.5 hours (09/20/24 0600)  Scheduled Medications: took all scheduled medications as prescribed   PRN medications:      Last 24H PRN:     alum & mag hydroxide-simethicone (MAALOX) suspension 30 mL, 30 mL at 09/20/24 0754    nicotine (COMMIT) lozenge 4 mg, 2 mg at 09/16/24 9613 **OR** nicotine polacrilex (NICORETTE) gum 4 mg, 4 mg at 09/20/24 1356    QUEtiapine (SEROquel) tablet 25 mg, 25 mg at 09/20/24 0957    See staff notes for additional details  ------------------------------------------------------------------    Eloy was seen in his room as a part of team rounds. He says he is feeling \"bored\". He feels ready to go back to his group home. His medication regimen makes him feel sedated and with poor short-term memory. He thinks this may be why he stopped Depakote in the past but he isn't sure. I reminded him that we are going to check the VPA level tomorrow and that the level will help us determine next steps--if there is room to go down, we will do so in order to reduce side effects and we will plan to initiate fluvoxamine per Dr. Loya. He is agreeable to this plan. No SI/HI/AVH. No other questions or concerns.    Suicidal ideation: denies current or recent suicidal ideation or behaviors.  Homicidal ideation: denies current or recent homicidal ideation or behaviors.  Psychotic symptoms: Patient denies AH, VH, paranoia, delusions.     Medication side effects reported: sedation and tremor.  Acute medical concerns: none    Other issues reported by patient: Patient had no further questions or concerns.           Medications:     Current Facility-Administered Medications   Medication Dose Route Frequency Provider Last " Rate Last Admin    aspirin (ASA) chewable tablet 81 mg  81 mg Oral Daily Berkley Arreola MD   81 mg at 09/20/24 0748    buprenorphine HCl-naloxone HCl (SUBOXONE) 2-0.5 MG per film 1 Film  1 Film Sublingual BID Foster Batista MD   1 Film at 09/20/24 0748    divalproex sodium extended-release (DEPAKOTE ER) 24 hr tablet 1,000 mg  1,000 mg Oral At Bedtime Berkley Arreola MD   1,000 mg at 09/19/24 1908    metFORMIN (GLUCOPHAGE) tablet 500 mg  500 mg Oral BID w/meals Neto Bolanos MD   500 mg at 09/20/24 0748    multivitamin, therapeutic (THERA-VIT) tablet 1 tablet  1 tablet Oral Daily Berkley Arreola MD   1 tablet at 09/20/24 0748    nicotine (NICODERM CQ) 21 MG/24HR 24 hr patch 1 patch  1 patch Transdermal Daily Luh Tsai MD   1 patch at 09/20/24 0749    OLANZapine zydis (zyPREXA) ODT tab 15 mg  15 mg Oral At Bedtime Foster Batista MD   15 mg at 09/19/24 2131    Or    OLANZapine (zyPREXA) injection 10 mg  10 mg Intramuscular At Bedtime Foster Batista MD        OLANZapine zydis (zyPREXA) ODT tab 5 mg  5 mg Oral QAM Foster Batista MD   5 mg at 09/20/24 0748    Or    OLANZapine (zyPREXA) injection 5 mg  5 mg Intramuscular QAM Foster Batista MD        polyethylene glycol (MIRALAX) Packet 17 g  17 g Oral Daily Bo Valle PA-C   17 g at 09/20/24 0748    QUEtiapine ER (SEROquel XR) 24 hr tablet 400 mg  400 mg Oral At Bedtime Berkley Arreola MD   400 mg at 09/19/24 1908    senna-docusate (SENOKOT-S/PERICOLACE) 8.6-50 MG per tablet 1 tablet  1 tablet Oral BID Bo Valle PA-C   1 tablet at 09/20/24 0748    tamsulosin (FLOMAX) capsule 0.4 mg  0.4 mg Oral Daily Berkley Arreola MD   0.4 mg at 09/20/24 0748          Allergies:     Allergies   Allergen Reactions    Cefuroxime Unknown     PN: LW Reaction: Rash, Generalized    Other reaction(s): Unknown   PN: LW Reaction: Rash, Generalized   PN: LW Reaction: Rash, Generalized    No Clinical Screening - See Comments Other (See Comments)      "Patient had a reaction to some medication when he went to the dentist as a toddler    Other Allergy (See Comments) [External Allergen Needs Reconciliation - See Comment] Unknown     Other reaction(s): *Unknown - Childhood Rxn, Patient had a reaction to some medication when he went to the dentist as a toddler    Other Drug Allergy (See Comments)      Other reaction(s): *Unknown - Childhood Rxn   Patient had a reaction to some medication when he went to the dentist as a toddler          Labs:     No results found for this or any previous visit (from the past 24 hour(s)).         Psychiatric Examination:     /76 (BP Location: Left arm)   Pulse 84   Temp 98  F (36.7  C)   Resp 16   Ht 1.854 m (6' 1\")   Wt 94.8 kg (209 lb)   SpO2 97%   BMI 27.57 kg/m    Weight is 209 lbs 0 oz  Body mass index is 27.57 kg/m .    Weight over time:  Vitals:    09/03/24 2300   Weight: 94.8 kg (209 lb)     Cardiometabolic risk assessment. 09/05/24    Reviewed patient profile for cardiometabolic risk factors    Date taken /Value  REFERENCE RANGE   Abdominal Obesity  (Waist Circumference)   See nursing flowsheet Women ?35 in (88 cm)   Men ?40 in (102 cm)      Triglycerides  Triglycerides   Date Value Ref Range Status   09/05/2024 226 (H) <150 mg/dL Final   10/31/2018 82 <90 mg/dL Final       ?150 mg/dL (1.7 mmol/L) or current treatment for elevated triglycerides   HDL cholesterol  HDL Cholesterol   Date Value Ref Range Status   10/31/2018 38 (L) >45 mg/dL Final     Comment:     Low:             <40 mg/dl  Borderline low:   40-45 mg/dl       Direct Measure HDL   Date Value Ref Range Status   09/05/2024 26 (L) >=40 mg/dL Final   ]   Women <50 mg/dL (1.3 mmol/L) in women or current treatment for low HDL cholesterol  Men <40 mg/dL (1 mmol/L) in men or current treatment for low HDL cholesterol     Fasting plasma glucose (FPG) No results for input(s): \"GLC\", \"BGM\" in the last 168 hours.   FPG ?100 mg/dL (5.6 mmol/L) or treatment for " "elevated blood glucose   Blood pressure  BP Readings from Last 3 Encounters:   09/19/24 125/76   09/03/24 106/66   03/26/21 119/56    Blood pressure ?130/85 mmHg or treatment for elevated blood pressure   Family History  See family history     Mental Status Exam:  Oriented to:  Grossly Oriented  General:  Awake and Alert, seen in his room. Writing noted on his walls.   Appearance:  appears stated age, Tattoos on arms, pt had written and drawn on arms and shirt as well, and Grooming is inadequate due to not bathing for several days, somewhat tired appearing   Behavior/Attitude:  Calm, engaged  Eye Contact: intermittent   Psychomotor: No evidence of tics, dystonia, or tardive dyskinesia, no catatonia present  Speech: normal volume/tone, spontaneous, good articulation   Language: Fluent in English with appropriate syntax and vocabulary.  Mood: \"better\"  Affect:  neutral, blunted, somewhat tired  Thought Process:  Easily distracted but able to follow the conversation, more organized thinking but remains perseverative, although less so than previously  Thought Content:   overvalued preoccupations, focused on getting selective serotonin reuptake inhibitor for OCD symptoms but more redirectable than previous  Associations:  loosening somewhat improved  Insight:  limited due to not seeing the connection between the antipsychotics and his mental health symptoms of irritability and low distress tolerance, does not recognize diagnosis but is gaining some insight in this area. He strongly feels OCD is the main MH factor at this time and selective serotonin reuptake inhibitor medication is the main treatment for him despite evidence to the contrary   Judgment:  showing some signs of improvement today as patient engaged in conversation with team asking reasonable questions and showed more organized thinking   Impulse control: limited  Attention Span:  adequate  Concentration:  grossly intact  Recent and Remote Memory:  not " "formally assessed  Fund of Knowledge: average  Muscle Strength and Tone: normal  Gait and Station: Normal         Precautions:     Behavioral Orders   Procedures    Assault precautions    Cheeking Precautions (behavioral units)     Patient Observed swallowing PO medications; Patient asked to drink water after swallowing medication; Patient in Staff line of sight for 15 minutes after medication given; Mouth checks after PO administration (patient asked to open mouth and stick out their tongue).    Code 1 - Restrict to Unit    Routine Programming     As clinically indicated    Status 15     Every 15 minutes.    Suicide precautions: Suicide Risk: MODERATE; Clinical rationale to override score: Exhibiting Suicidal/self-harm behaviors or thoughts     Patients on Suicide Precautions should have a Combination Diet ordered that includes a Diet selection(s) AND a Behavioral Tray selection for Safe Tray - with utensils, or Safe Tray - NO utensils       Order Specific Question:   Suicide Risk     Answer:   MODERATE     Order Specific Question:   Clinical rationale to override score:     Answer:   Exhibiting Suicidal/self-harm behaviors or thoughts          Diagnoses:     Provisional diagnosis of Bipolar Disorder, Type I, currently mixed manic episode vs Schizoaffective Disorder, Bipolar Type  Opioid Use Disorder, severe, dependence  Alcohol Use Disorder, moderate, in early remission  Sedative hypnotic use disorder, in early remission  Cannabis Use Disorder, severe  KATHE  Hx of pulmonary embolism  Tardive Dyskinesia    Clinically Significant Risk Factors   # Overweight: Estimated body mass index is 27.57 kg/m  as calculated from the following:    Height as of this encounter: 1.854 m (6' 1\").    Weight as of this encounter: 94.8 kg (209 lb).        # Financial/Environmental Concerns:    # Support System: poor social support noted in nursing assessment             Assessment & Plan:     Assessment and hospital summary:  Eloy ECHOLS" "Emeterio is a 24 year old male previously diagnosed with schizoaffective disorder, polysubstance use, and KATHE who presented to the ED in [facility] by police after being found to be driving erratically up to 100 mph and allegedly was driving into oncoming traffic. There was concern for co-occurring substance use and pt endorsed withdrawal sxs in the ED from recent Kratom use.     Most recent psychiatric hospitalization was Oct 2021 at [facility]. Pt has not had CD treatment for several years and has been living in a .     Significant symptoms on admission include passive SI, \"I can't live this way\", but pt endorsing conflicting sxs of \"improved\" mood and \"normal energy levels\" although he \"never sleeps well\". He also has erratic behavior documented in his chart with increased paranoia regarding GH and his mother and was noted to be writing on the walls in the ED. Noted to have slept 4.5 hrs last night and was frequently at the nurses station, was irritable. The MSE on admission was pertinent for poor insight and judgment regarding his mental health and recent erratic driving. He also presented with labile affect, restricted with negative sxs, and irritability ending parts of the conversation early. He seemed suspicious of the treating team. Biological contributions to mental health presentation include previous diagnoses of JACOB and schizoaffective disorder. Psychological contributions to mental health presentation include poor insight and limited coping/poor stress tolerance as evidenced in interview. Social factors contributing to mental health presentation include pt has been isolating himself from family over past couple months although his mother is still involved in his care. Has strained relationship with family. Worked briefly this summer but has been recently off. Protective factors include mother and step sister,  system, .      In summary, the patient's reported symptoms of erratic behavior, passive SI, poor " insight with lability and irritability, increased paranoia over past several months in the context of substance use, Kratom and Cannabis, are consistent with polysubstance use disorder and co-occurring mixed mood and psychotic episode of schizoaffective disorder in conjunction with behavioral components. The substance use is lying triggering underlying Schizoaffective disorder given long hx of psychosis. He has had repeated targeted aggressive statements towards staff and peers which has increased while medication titrations have also increased. This is not common in pure psychosis and indicates probable behavioral component along with a diagnosis of primary psychosis. Patient's definitive diagnosis is still in evolution and will require further observation and assessment this admission. Pt does not exhibit clear manic sxs at this time nor does he exhibit clear OCD sxs although these have been documented in his chart and will require further assessment outpt. Given the risk of jamila with Luvox and continued agitated behavior, Luvox was discontinued on 9/11. He will likely benefit from medication optimization and CD referral if open to this during this admission if he is open to it. MICD commitment was attempted this hospital stay. However, pre-petition screen deemed that there was not enough information to support it in the records and patient currently not interested in CD treatment/wishes to return to using.      Given that he currently has SI, out of control behaviors, and mixed mood episode with paranoia, patient warrants inpatient psychiatric hospitalization to maintain his safety.     Hospital Psychiatric Course:  Eloy Storm was admitted to [address].   Medications:  PTA Luvox 50mg, Zyprexa 5mg, Seroquel ER 800mg were continued.   PTA Prozac was held due to pt already having been tapered off of it.    New medications started at the time of admission include Suboxone started in the ED.   On  "9/5, increased Suboxone to 2 mg BID to target ongoing cravings  On 9/11, stopped Luvox due to concern for jamila and aggravating underlying psychosis. Also stopped prn trazodone for sleep and scheduled Suboxone due to severe urinary retention seen on bladder scan.    On 9/12, restarted Suboxone 2-0.5mg film bid with understanding that patient must get bladder scans before and after otherwise it would be held. This catie be to prevent risk of urinary rentention worsening without adequate monitoring . Holding parameters also outlined for if urine volume on scan is greater than 500ml. A psychiatric emergency was declared as patient had made repeated verbally aggressive and threatening statements to hit staff and said \"I will kill you\" to another patient, and also aggressively took down ceiling tiles on the night of 9/11. In lieu of the psychiatric emergency, Seroquel was decreased to 400mg daily from 800mg as we started him on scheduled Zydis 10mg BID PO with IM Zyprexa 10mg back up if he refuses oral. Stopped Zyprexa 5mg at bedtime. Ethics consult was also placed on 9/12 to discuss if can force cath patient. Concluded that patient must have a capacity assessment and least restrictive means with bargaining sought first. See ethics notes from 9/12. Capacity assessment completed on 9/12 indicating pt does not have capacity to consent to urinary straight cath if medically needed at this time due to severity of mental illness impacting judgement. See note from 9/12 with full capacity assessment.   9/13, stopped Depakote as pt was refusing and cannot enforce under psychiatric emergency. Pt concerned about weight gain. Stopped lab trough level on Monday as pt no longer taking Depakote.   9/16 restarted Depakote 1000mg at bedtime for mood stability. Prior improvement in patient judgement, behavior likely due to mood stabilizer. Discontinued bladder scans per shift to as needed due to adequate voiding. Discontinued SIO due to " continued safe behaviors.  9/18: Zyprexa consolidated to 5mg QAM + 15mg QHS to address feeling of daytime sedation   9/19: Mild tremor in BUE (L>R) that varies in magnitude on assessment. Possible that it could be related to Depakote and will continue to monitor for changes going forward.     The risks, benefits, alternatives, and side effects were discussed and understood by the patient and other caregivers.      Today's Changes:  - None    Target psychiatric symptoms and interventions:  # mixed mood episode  #Schizoaffective disorder   1. Medications  - Seroquel 400mg daily  - scheduled Zydis 5mg QAM + 10mg QHS with IM Zyprexa 5mg/10mg back up if he refuses oral under [place]   - Depakote 1000mg at bedtime         - VPA level 9/21    #Elevated prolactin   Had elevated prolactin on Risperdal a few months ago per Dr. Khan. Since starting Seroquel, was not able to recheck prolactin over recent months since stopping Risperdal. Pt has continued on Seroquel during this hospitalization with decreased dose on 9/12. Prolactin level obtained on 9/11 which was 16 and borderline high.   -will need repeat prolactin before discharge for trending     #Hx of TD  #BUE tremor (hands) - mild  Outpatient provider Dr. Khan is concerned for TD with CALLAHAN. Pt had TD while on Risperdal a few months ago. Less risk while on Zyprexa this hospitalization but will continue to monitor and decrease Seroquel on 9/12 as we increase Zyprexa dose for psychiatric emergency.   - Continue to monitor for recurrence of TD and tremor    2. Pertinent Labs/Monitoring: none at this time,   - VPA level on 9/21     3. Additional Plans:  - Patient will be treated in therapeutic milieu with appropriate individual and group therapies as described     # Polysubstance use disorder  #OUD  8/30/24: UDS positive for benzodiazepines (obtained following administration of benzos in ED) and cannabinoids   Per outpt provider and pt's mother, pt was never started on  "Suboxone although he had an intake appt with a clinic for induction this month. They noted past opioid abuse and recent Kratom use before admission. Eloy had requested Suboxone while in the ED saying he had been on it and has been repeatedly asking for higher dose. Would benefit from CD tx but currently refusing. MICD commitment was attempted this hospital stay. However, pre-petition screen deemed that there was not enough information to support it in the records and patient currently not interested in CD treatment/wishes to return to using. Mental health civil commitment was pursued instead. Started Suboxone 2mg -0.5mg film BID on admissions - stopped on 9/11 due to severe urinary retention seen on bladder scan.UDS on 9/11 given staff concern for patient seeming more agitated, sweating, was negative.  Restarted Suboxone 2-0.5mg film bid on 9/12 with understanding that patient must get bladder scans before and after otherwise it would be held. Holding parameters also outlined for if urine volume on scan is greater than 500ml.   - 9/16 Scan only needed if pt endorses urinary retention and trouble urinating due to adequate volume      Risks, benefits, and alternatives discussed at length with patient.     Acute nonpsychiatric concerns:    Prior blood clot in leg  Intake labs showing mildly low hematocrit with normal hgb, repeat cbc on 9/11 showed mild improvement  - PTA baby aspirin continued     Weight gain  Metformin PTA dose for weight gain and pre-metabolic syndrome continued. BG on 9/11 was 112.   - monitor weight    Urinary Retention  Pt noted urinary retention sxs on 9/8 and medicine was consulted. Noted to have 1090cc in bladder at that time. Pt said he is unable to void. Requested a diuretic and feels that drinking more water will help, but medicine explained to patient these will only exacerbate bladder distention.     Per medicine: \"Review of chart shows he has followed w/ Urology in the past, but mostly " "for STI-related issues. No documented hx of urinary retention, but pt notes frequently occurs when he withdrawing from Kratom (which he feels he is at the moment, was using PTA). At this time, certainly could be withdrawal-related, but he is also on multiple anticholinergic meds, so his burden there is high which could be also playing a role. Low suspicion of primary neurogenic cause (cauda equina) as denies back pain, bowel movements otherwise unaffected (he feels his constipation is unrelated as this has been an issue in the past), and no BLE symptoms\".     Medicine strongly recommended a straight cath by medicine on 9/9, but pt declined multiple times despite education on risks of bladder distention/urinary retention. Encouraged him to continue to attempt to volitionally void. medicine signed off on 9/9 due to patient voiding without worsening sxs, will CTM.     Per Pharmacy consult re urinary retention on 9/8:  \"High doses of kratom can have an opioid-like effect and can also result in urinary retention, which patient reports experiencing in the past.  Suspect current symptoms most likely due to recent kratom use.  Suboxone may also be contributing since that was started 9/5/2024.  The only other recent medication change was addition of fluvoxamine on 8/29 at a low dose, so wouldn't expect that to be the primary cause. Quetiapine can also contribute to urinary retention, but patient has been on this high dose for several months, so not likely the cause. If due to kratom, would expect symptoms to start improving within 7 days of discontinuation.\" Given pt has been hospitalized for over a week now, Kratom induced causes are less likely.     On 9/11, Eloy agreed to bladder scan after endorsing continued sxs while being prescribe Flomax which was started on 9/10, and was noted by staff to have 1604 ml of urine. Staff notified medicine on call or recommended consult Urology. Urology consult placed and recommended " labs and straight cath or hardy with monitoring electrolytes, kidney function, and UA. Pt refused all interventions at first, but later gave urine sample and labs. Cr was reassuring wnl, and UA showed elevated nitrites but no WBCs. Of note, pt did say he urinated as well in the evening of 9/11 after last scan which was also reassuring. Bladder scan this AM was just over 100mls indicating that patient is voiding at this time.   Ethics consult placed 9/12 for question of forced catheterization if needed, pt deemed to not have capacity to consent to urinary straight cath if medically necessary at this time. See progress note from 9/12 for full capacity assessment.   Eloy was asked to complete bladder scans once per shift before getting scheduled Suboxone and showed volumes consistently below 500ml. Thus 9/16, bladder scans made prn.     Plan:  - Notify if fevers, worsening abdominal pain, flank pain  - notify medicine and urology if sxs worsen  - Pt does note a few days of constipation which can exacerbate urinary retention              - Scheduled Miralax daily + Senokot BID for now (hold for loose stools)  -Scan only needed if pt endorses urinary retention and trouble urinating  - parameters for straight cath in place (ok to use hardy catheter for comfort) as needed for high volume as outlined by Urology, see urology note 9/11   - urine cx no growth   - continue to monitor his symptoms and offer less invasive measures first. Currently, patient is voiding and not needing an urgent cath.     Hand pain and swelling   s/p punching mirror in room, per patient on night of 9/10 Staff noted full ROM however. On call doctor notified who placed hand XR  - Hand XR 9/10 showing tissue swelling and NO displacement or fracture per radiologist read  -prns for pain and swelling     Pertinent labs/imaging:   urine cx no growth   Covid negative on admission     Behavioral/Psychological/Social:  - Encourage unit  programming    Safety:  - Continue precautions as noted above  - Status 15 minute checks    Legal Status: court hold    Disposition Plan   Reason for ongoing admission: poses an imminent risk to self, poses an imminent risk to others, and is unable to care for self due to severe psychosis or jamila  Discharge location:  TBD . Consider ACT team referral, will need discharge planning conversation when more stable  Discharge Medications: not ordered  Follow-up Appointments: not scheduled     Cathy Carver MD, PhD  09/20/24

## 2024-09-20 NOTE — PLAN OF CARE
Problem: Sleep Disturbance  Goal: Adequate Sleep/Rest  Outcome: Progressing  Intervention: Promote Healthy Sleep Hygiene  Sleep Hygiene Promotion:   awakenings minimized   noise level reduced   regular sleep pattern promoted   room lighting adjusted   Goal Outcome Evaluation:ongoing       Patient appeared to be asleep at the start of shift. No s/s of pain nor discomfort noted, intermittent body movements and even unlabored respirations were observed during the 15 minute safety checks throughout the night. Patient slept for 6.5 hrs at night. No safety concerns noted.

## 2024-09-20 NOTE — PLAN OF CARE
"  Problem: Behavioral Disturbance  Goal: Behavioral Disturbance  Description: Signs and symptoms of listed problems will be absent or manageable by discharge or transition of care.  Outcome: Progressing   Goal Outcome Evaluation:      Patient is calm and cooperative. He is medication compliant. Pt has been keeping to himself in room watching television. He denies all mental health symptoms and states. \"I am good\". He is eating and hydrating well. He denies pain and also denies any urinary retention this shift.         /76 (BP Location: Left arm)   Pulse 84   Temp 98  F (36.7  C)   Resp 16   Ht 1.854 m (6' 1\")   Wt 94.8 kg (209 lb)   SpO2 97%   BMI 27.57 kg/m           "

## 2024-09-20 NOTE — PLAN OF CARE
Team Note Due:  Wednesday    Assessment/Intervention/Current Symtoms and Care Coordination:  Chart review and met with team, discussed pt progress, symptomology, and response to treatment.  Discussed the discharge plan and any potential impediments to discharge.      Per team, pt doing better. Pt has been pleasant calm and flat. Pt slept 6.5 hours. Pt has been social with select peers.  Experiencing some anxiety, requested PRN and reported improvement. Pt has been engaging in some of the offered groups with encouragement.       Discharge Plan or Goal:  He is able to return back to group home however this is not 24/7 staffed, would benefit most from MI/JACOB treatment but is declining this option. Consider group home + IOP.      Barriers to Discharge:  Symptoms - jamila, agitation  Managing his medications in order to stabilize   Commitment process    Referral Status:  TBD     Legal Status:  Court hold  County: [place]  File Number: [id number]  Start and expiration date of commitment:   MI and [place] petition - [place] meds requested are: Zyprexa, Seroquel, Invega and Abilify       Contacts (include KELBY status):   (Megan) Ph: [phone]  - KELBY only for specific thing to discuss discharge   Psych: Dr. Khan [place] [facility] - Declined KELBY  Michelle Croninsuleman/Mother: [phone] - Declined KELBY x2    New Commitment CM:  Chasity [email or web address] [phone] - No KELBY Needed      Upcoming Meetings and Dates/Important Information and next steps:  Update discharge referral form at discharge   PD and COS at discharge  Follow up psych appt

## 2024-09-20 NOTE — PLAN OF CARE
BEH IP Unit Acuity Rating Score (UARS)  Patient is given one point for every criteria they meet.    CRITERIA SCORING   On a 72 hour hold, court hold, committed, stay of commitment, or revocation. 1    Patient LOS on BEH unit exceeds 20 days. 0  LOS: 17   Patient under guardianship, 55+, otherwise medically complex, or under age 11. 0   Suicide ideation without relief of precipitating factors. 0   Current plan for suicide. 0   Current plan for homicide. 0   Imminent risk or actual attempt to seriously harm another without relief of factors precipitating the attempt. 1   Severe dysfunction in daily living (ex: complete neglect for self care, extreme disruption in vegetative function, extreme deterioration in social interactions). 1   Recent (last 7 days) or current physical aggression in the ED or on unit. 0   Restraints or seclusion episode in past 72 hours. 0   Recent (last 7 days) or current verbal aggression, agitation, yelling, etc., while in the ED or unit. 0   Active psychosis. 0   Need for constant or near constant redirection (from leaving, from others, etc).  0   Intrusive or disruptive behaviors. 0   Patient requires 3 or more hours of individualized nursing care per 8-hour shift (i.e. for ADLs, meds, therapeutic interventions). 0   TOTAL 3

## 2024-09-21 LAB — VALPROATE SERPL-MCNC: 46.9 UG/ML

## 2024-09-21 PROCEDURE — 124N000002 HC R&B MH UMMC

## 2024-09-21 PROCEDURE — 250N000013 HC RX MED GY IP 250 OP 250 PS 637: Performed by: STUDENT IN AN ORGANIZED HEALTH CARE EDUCATION/TRAINING PROGRAM

## 2024-09-21 PROCEDURE — 80164 ASSAY DIPROPYLACETIC ACD TOT: CPT

## 2024-09-21 PROCEDURE — 250N000013 HC RX MED GY IP 250 OP 250 PS 637: Performed by: PSYCHIATRY & NEUROLOGY

## 2024-09-21 PROCEDURE — 250N000012 HC RX MED GY IP 250 OP 636 PS 637

## 2024-09-21 PROCEDURE — 36415 COLL VENOUS BLD VENIPUNCTURE: CPT

## 2024-09-21 PROCEDURE — 250N000013 HC RX MED GY IP 250 OP 250 PS 637

## 2024-09-21 RX ORDER — DIVALPROEX SODIUM 500 MG/1
1500 TABLET, EXTENDED RELEASE ORAL AT BEDTIME
Status: DISCONTINUED | OUTPATIENT
Start: 2024-09-21 | End: 2024-09-25

## 2024-09-21 RX ADMIN — BUPRENORPHINE AND NALOXONE 1 FILM: 2; .5 FILM BUCCAL; SUBLINGUAL at 08:44

## 2024-09-21 RX ADMIN — NICOTINE POLACRILEX 4 MG: 2 GUM, CHEWING BUCCAL at 08:53

## 2024-09-21 RX ADMIN — POLYETHYLENE GLYCOL 3350 17 G: 17 POWDER, FOR SOLUTION ORAL at 08:44

## 2024-09-21 RX ADMIN — TAMSULOSIN HYDROCHLORIDE 0.4 MG: 0.4 CAPSULE ORAL at 08:44

## 2024-09-21 RX ADMIN — OLANZAPINE 15 MG: 15 TABLET, ORALLY DISINTEGRATING ORAL at 21:02

## 2024-09-21 RX ADMIN — SENNOSIDES AND DOCUSATE SODIUM 1 TABLET: 8.6; 5 TABLET ORAL at 21:02

## 2024-09-21 RX ADMIN — OLANZAPINE 10 MG: 10 TABLET, FILM COATED ORAL at 13:48

## 2024-09-21 RX ADMIN — METFORMIN HYDROCHLORIDE 500 MG: 500 TABLET, FILM COATED ORAL at 17:41

## 2024-09-21 RX ADMIN — QUETIAPINE FUMARATE 25 MG: 25 TABLET ORAL at 23:36

## 2024-09-21 RX ADMIN — NICOTINE POLACRILEX 4 MG: 2 GUM, CHEWING BUCCAL at 12:24

## 2024-09-21 RX ADMIN — NICOTINE POLACRILEX 4 MG: 2 GUM, CHEWING BUCCAL at 18:01

## 2024-09-21 RX ADMIN — DIVALPROEX SODIUM 1500 MG: 500 TABLET, FILM COATED, EXTENDED RELEASE ORAL at 21:02

## 2024-09-21 RX ADMIN — NICOTINE 1 PATCH: 21 PATCH, EXTENDED RELEASE TRANSDERMAL at 08:45

## 2024-09-21 RX ADMIN — QUETIAPINE 400 MG: 400 TABLET, FILM COATED, EXTENDED RELEASE ORAL at 22:13

## 2024-09-21 RX ADMIN — NICOTINE POLACRILEX 4 MG: 2 GUM, CHEWING BUCCAL at 22:07

## 2024-09-21 RX ADMIN — BUPRENORPHINE AND NALOXONE 1 FILM: 2; .5 FILM BUCCAL; SUBLINGUAL at 21:03

## 2024-09-21 RX ADMIN — THERA TABS 1 TABLET: TAB at 08:44

## 2024-09-21 RX ADMIN — OLANZAPINE 5 MG: 10 TABLET, ORALLY DISINTEGRATING ORAL at 08:44

## 2024-09-21 RX ADMIN — METFORMIN HYDROCHLORIDE 500 MG: 500 TABLET, FILM COATED ORAL at 08:44

## 2024-09-21 RX ADMIN — ASPIRIN 81 MG CHEWABLE TABLET 81 MG: 81 TABLET CHEWABLE at 08:44

## 2024-09-21 RX ADMIN — SENNOSIDES AND DOCUSATE SODIUM 1 TABLET: 8.6; 5 TABLET ORAL at 08:45

## 2024-09-21 ASSESSMENT — ACTIVITIES OF DAILY LIVING (ADL)
ORAL_HYGIENE: INDEPENDENT
LAUNDRY: UNABLE TO COMPLETE
ADLS_ACUITY_SCORE: 28
DRESS: INDEPENDENT;SCRUBS (BEHAVIORAL HEALTH)
ADLS_ACUITY_SCORE: 28
ORAL_HYGIENE: INDEPENDENT
ADLS_ACUITY_SCORE: 28
ADLS_ACUITY_SCORE: 28
DRESS: INDEPENDENT
ADLS_ACUITY_SCORE: 28
ADLS_ACUITY_SCORE: 28
HYGIENE/GROOMING: INDEPENDENT
ADLS_ACUITY_SCORE: 28
HYGIENE/GROOMING: HANDWASHING;INDEPENDENT

## 2024-09-21 NOTE — PLAN OF CARE
Goal Outcome Evaluation:  Problem: Psychotic Signs/Symptoms  Goal: Improved Sleep (Psychotic Signs/Symptoms)  Intervention: Promote Healthy Sleep Hygiene  Recent Flowsheet Documentation  Taken 9/21/2024 0000 by Alondra Garcia RN  Sleep Hygiene Promotion:   noise level reduced   room lighting adjusted       Pt in bed at beginning of shift, breathing quiet and unlabored. Patient slept without s/s of pain and other medical reported or noted. Pt slept 5.25 hours.      No pt complaints or concerns at this time.      No PRNs given. Will continue to monitor.     Patient has Valproic lab this morning
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
## 2024-09-21 NOTE — PLAN OF CARE
"Goal Outcome Evaluation:    Pt is calm, affect blunted, is mildly guarded, mood is \"alright, fine.\" He states he attends \"some\" grps if he likes them, and reports he is social w peers. Pt is talking on the phone and was sitting in the lounge, after bkfst. He came and asked for his am meds. Pt went back to his rm, is awake and resting in bed. He does not identify a goal for his day. Pt said he may have visitors, this afternoon. Denies needs.                      "

## 2024-09-21 NOTE — PLAN OF CARE
"\"I still feel that I need to write things over and over.\"  \"My thoughts are more disorganised and and I can't remember them, like when I try to remember what I just thought, I can't.\"   \"My knee jerked once earlier today.\"  Expressed concern that he would experience TD again as he had with Risperidone since he is taking multiple neuroleptics: Zyprexa and Seroquel. Also said he is experiencing some daytime sedation. Unchanged desire to resume Luvox.    Denied hallucinations and suicidal ideation. Polite, clear and coherent in interactions. Improved hygiene.    Wants ensure back at dinner although he has unmitigated concerns about weight gain. (BMI already over 27, some elevated Lipids, decreased HDL and elevated fasting glucose.).  VPA ~ was 14.2, unlikely to be therpeutic.    Mild left hand tremors observed once this evening.    Denied cravings to use.     Plan: Monitor and document mood and behaviour, thought process and content. Establish and maintain therapeutic relationship. Educate about diagnoses, medications, treatment, legal status, plan of care. Address preexisting and concurrent medical concerns.      P:Impaired coping  G:Adequate coping  O:Not progressing                     "

## 2024-09-22 PROCEDURE — 250N000013 HC RX MED GY IP 250 OP 250 PS 637: Performed by: PSYCHIATRY & NEUROLOGY

## 2024-09-22 PROCEDURE — 250N000013 HC RX MED GY IP 250 OP 250 PS 637

## 2024-09-22 PROCEDURE — 250N000012 HC RX MED GY IP 250 OP 636 PS 637

## 2024-09-22 PROCEDURE — 124N000002 HC R&B MH UMMC

## 2024-09-22 PROCEDURE — 250N000013 HC RX MED GY IP 250 OP 250 PS 637: Performed by: STUDENT IN AN ORGANIZED HEALTH CARE EDUCATION/TRAINING PROGRAM

## 2024-09-22 RX ADMIN — METFORMIN HYDROCHLORIDE 500 MG: 500 TABLET, FILM COATED ORAL at 09:40

## 2024-09-22 RX ADMIN — DIVALPROEX SODIUM 1500 MG: 500 TABLET, FILM COATED, EXTENDED RELEASE ORAL at 19:42

## 2024-09-22 RX ADMIN — POLYETHYLENE GLYCOL 3350 17 G: 17 POWDER, FOR SOLUTION ORAL at 09:41

## 2024-09-22 RX ADMIN — NICOTINE POLACRILEX 4 MG: 2 GUM, CHEWING BUCCAL at 20:12

## 2024-09-22 RX ADMIN — NICOTINE 1 PATCH: 21 PATCH, EXTENDED RELEASE TRANSDERMAL at 09:42

## 2024-09-22 RX ADMIN — METFORMIN HYDROCHLORIDE 500 MG: 500 TABLET, FILM COATED ORAL at 17:48

## 2024-09-22 RX ADMIN — BUPRENORPHINE AND NALOXONE 1 FILM: 2; .5 FILM BUCCAL; SUBLINGUAL at 19:42

## 2024-09-22 RX ADMIN — OLANZAPINE 5 MG: 10 TABLET, ORALLY DISINTEGRATING ORAL at 09:40

## 2024-09-22 RX ADMIN — TAMSULOSIN HYDROCHLORIDE 0.4 MG: 0.4 CAPSULE ORAL at 09:40

## 2024-09-22 RX ADMIN — SENNOSIDES AND DOCUSATE SODIUM 1 TABLET: 8.6; 5 TABLET ORAL at 19:42

## 2024-09-22 RX ADMIN — QUETIAPINE 400 MG: 400 TABLET, FILM COATED, EXTENDED RELEASE ORAL at 19:42

## 2024-09-22 RX ADMIN — NICOTINE POLACRILEX 4 MG: 2 GUM, CHEWING BUCCAL at 18:09

## 2024-09-22 RX ADMIN — QUETIAPINE FUMARATE 25 MG: 25 TABLET ORAL at 12:07

## 2024-09-22 RX ADMIN — OLANZAPINE 15 MG: 15 TABLET, ORALLY DISINTEGRATING ORAL at 19:42

## 2024-09-22 RX ADMIN — BUPRENORPHINE AND NALOXONE 1 FILM: 2; .5 FILM BUCCAL; SUBLINGUAL at 09:41

## 2024-09-22 RX ADMIN — NICOTINE POLACRILEX 4 MG: 2 GUM, CHEWING BUCCAL at 09:42

## 2024-09-22 RX ADMIN — SENNOSIDES AND DOCUSATE SODIUM 1 TABLET: 8.6; 5 TABLET ORAL at 09:40

## 2024-09-22 RX ADMIN — ASPIRIN 81 MG CHEWABLE TABLET 81 MG: 81 TABLET CHEWABLE at 09:40

## 2024-09-22 RX ADMIN — NICOTINE POLACRILEX 4 MG: 2 GUM, CHEWING BUCCAL at 16:01

## 2024-09-22 RX ADMIN — THERA TABS 1 TABLET: TAB at 09:40

## 2024-09-22 RX ADMIN — NICOTINE POLACRILEX 4 MG: 2 GUM, CHEWING BUCCAL at 12:07

## 2024-09-22 ASSESSMENT — ACTIVITIES OF DAILY LIVING (ADL)
DRESS: INDEPENDENT
ADLS_ACUITY_SCORE: 28
ORAL_HYGIENE: INDEPENDENT
ADLS_ACUITY_SCORE: 28
LAUNDRY: UNABLE TO COMPLETE
ADLS_ACUITY_SCORE: 28
DRESS: INDEPENDENT
ADLS_ACUITY_SCORE: 28
ADLS_ACUITY_SCORE: 28
HYGIENE/GROOMING: INDEPENDENT
ADLS_ACUITY_SCORE: 28
HYGIENE/GROOMING: INDEPENDENT
LAUNDRY: UNABLE TO COMPLETE
ADLS_ACUITY_SCORE: 28
ORAL_HYGIENE: INDEPENDENT
ADLS_ACUITY_SCORE: 28

## 2024-09-22 NOTE — PLAN OF CARE
Problem: Behavioral Disturbance  Goal: Behavioral Disturbance  Description: Signs and symptoms of listed problems will be absent or manageable by discharge or transition of care.  Outcome: Not Progressing  Flowsheets (Taken 9/22/2024 0630)  Behavioral Disturbance Assessed: other (see comment)   Goal Outcome Evaluation:    Eloy appeared to sleep a total of 5.75 hours this night shift.  He had a difficult time falling to sleep and was taking signs off walls, bothering a fellow patient, pacing rapidly in the hallway, demanding his cell phone be given to him.  This happened at change of shift.  Remains on 1:1 staffing due to this type of behavior.  No prns or snacks given or requested.
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
## 2024-09-23 PROCEDURE — 250N000013 HC RX MED GY IP 250 OP 250 PS 637

## 2024-09-23 PROCEDURE — 97150 GROUP THERAPEUTIC PROCEDURES: CPT | Mod: GO

## 2024-09-23 PROCEDURE — 250N000013 HC RX MED GY IP 250 OP 250 PS 637: Performed by: PSYCHIATRY & NEUROLOGY

## 2024-09-23 PROCEDURE — 250N000013 HC RX MED GY IP 250 OP 250 PS 637: Performed by: STUDENT IN AN ORGANIZED HEALTH CARE EDUCATION/TRAINING PROGRAM

## 2024-09-23 PROCEDURE — 124N000002 HC R&B MH UMMC

## 2024-09-23 PROCEDURE — 90853 GROUP PSYCHOTHERAPY: CPT | Performed by: MARRIAGE & FAMILY THERAPIST

## 2024-09-23 PROCEDURE — 250N000012 HC RX MED GY IP 250 OP 636 PS 637

## 2024-09-23 PROCEDURE — 99232 SBSQ HOSP IP/OBS MODERATE 35: CPT | Mod: GC | Performed by: PSYCHIATRY & NEUROLOGY

## 2024-09-23 PROCEDURE — 90853 GROUP PSYCHOTHERAPY: CPT

## 2024-09-23 RX ORDER — FLUVOXAMINE MALEATE 25 MG
25 TABLET ORAL AT BEDTIME
Status: DISCONTINUED | OUTPATIENT
Start: 2024-09-23 | End: 2024-09-25

## 2024-09-23 RX ADMIN — NICOTINE POLACRILEX 4 MG: 2 GUM, CHEWING BUCCAL at 10:23

## 2024-09-23 RX ADMIN — NICOTINE 1 PATCH: 21 PATCH, EXTENDED RELEASE TRANSDERMAL at 09:23

## 2024-09-23 RX ADMIN — ASPIRIN 81 MG CHEWABLE TABLET 81 MG: 81 TABLET CHEWABLE at 09:22

## 2024-09-23 RX ADMIN — FLUVOXAMINE MALEATE 25 MG: 25 TABLET, FILM COATED ORAL at 19:03

## 2024-09-23 RX ADMIN — QUETIAPINE 400 MG: 400 TABLET, FILM COATED, EXTENDED RELEASE ORAL at 19:03

## 2024-09-23 RX ADMIN — OLANZAPINE 5 MG: 10 TABLET, ORALLY DISINTEGRATING ORAL at 09:22

## 2024-09-23 RX ADMIN — POLYETHYLENE GLYCOL 3350 17 G: 17 POWDER, FOR SOLUTION ORAL at 09:22

## 2024-09-23 RX ADMIN — BUPRENORPHINE AND NALOXONE 1 FILM: 2; .5 FILM BUCCAL; SUBLINGUAL at 09:23

## 2024-09-23 RX ADMIN — QUETIAPINE FUMARATE 25 MG: 25 TABLET ORAL at 13:43

## 2024-09-23 RX ADMIN — BUPRENORPHINE AND NALOXONE 1 FILM: 2; .5 FILM BUCCAL; SUBLINGUAL at 19:03

## 2024-09-23 RX ADMIN — SENNOSIDES AND DOCUSATE SODIUM 1 TABLET: 8.6; 5 TABLET ORAL at 19:04

## 2024-09-23 RX ADMIN — SENNOSIDES AND DOCUSATE SODIUM 1 TABLET: 8.6; 5 TABLET ORAL at 09:22

## 2024-09-23 RX ADMIN — NICOTINE POLACRILEX 4 MG: 2 GUM, CHEWING BUCCAL at 13:11

## 2024-09-23 RX ADMIN — DIVALPROEX SODIUM 1500 MG: 500 TABLET, FILM COATED, EXTENDED RELEASE ORAL at 19:03

## 2024-09-23 RX ADMIN — METFORMIN HYDROCHLORIDE 500 MG: 500 TABLET, FILM COATED ORAL at 09:22

## 2024-09-23 RX ADMIN — OLANZAPINE 15 MG: 15 TABLET, ORALLY DISINTEGRATING ORAL at 19:04

## 2024-09-23 RX ADMIN — NICOTINE POLACRILEX 4 MG: 2 LOZENGE ORAL at 20:11

## 2024-09-23 RX ADMIN — NICOTINE POLACRILEX 4 MG: 2 GUM, CHEWING BUCCAL at 11:49

## 2024-09-23 RX ADMIN — METFORMIN HYDROCHLORIDE 500 MG: 500 TABLET, FILM COATED ORAL at 17:23

## 2024-09-23 RX ADMIN — THERA TABS 1 TABLET: TAB at 09:22

## 2024-09-23 RX ADMIN — NICOTINE POLACRILEX 4 MG: 2 GUM, CHEWING BUCCAL at 16:02

## 2024-09-23 RX ADMIN — TAMSULOSIN HYDROCHLORIDE 0.4 MG: 0.4 CAPSULE ORAL at 09:22

## 2024-09-23 ASSESSMENT — ACTIVITIES OF DAILY LIVING (ADL)
ADLS_ACUITY_SCORE: 28
ORAL_HYGIENE: INDEPENDENT
ADLS_ACUITY_SCORE: 28
HYGIENE/GROOMING: INDEPENDENT
ADLS_ACUITY_SCORE: 28
LAUNDRY: UNABLE TO COMPLETE
ADLS_ACUITY_SCORE: 28
DRESS: INDEPENDENT

## 2024-09-23 NOTE — CARE PLAN
Rehab Group    Start time: 1030   End time: 1115  Patient time total: 15 minutes    attended partial group    #5 attended   Group Type: occupational therapy and general health and coping   Group Topic Covered: coping skills and social skills     Group Session Detail:  Check-in, self awareness, goal planning - daily/weekly     Patient Response/Contribution:  positive affect, cooperative with task, socially appropriate, attentive, and actively engaged     Patient Detail:    Pt was fairly quite during check-in, attentive to others, shared he is fine, and wanted to know when he would meet with his doctor, and if he would have the opportunity to listen to music.  Attempted to at least engage pt by asking him to identify areas of progress, though he still struggled to engage, decided to pace the villarreal until he could listen to music instead.      [id number] OT Group (2 or more in attendance)      Patient Active Problem List   Diagnosis    Suicidal ideation    Psychosis (H)    Acute pulmonary embolism without acute cor pulmonale (H)    Alcohol use disorder, moderate, in sustained remission, dependence (H)    Anxiety    Cannabis use disorder, severe, dependence (H)    Class 1 drug-induced obesity without serious comorbidity with body mass index (BMI) of 33.0 to 33.9 in adult    Depression, major, single episode, moderate (H)    Episodic mood disorder (H24)    KATHE (generalized anxiety disorder)    History of marijuana use    Obesity (BMI 30-39.9)    Obesity, Class I, BMI 30-34.9    Tobacco use disorder, moderate, in sustained remission    Schizoaffective disorder, bipolar type (H)    Psychosis, unspecified psychosis type (H)    Tardive dyskinesia         Patient Active Problem List   Diagnosis    Suicidal ideation    Psychosis (H)    Acute pulmonary embolism without acute cor pulmonale (H)    Alcohol use disorder, moderate, in sustained remission, dependence (H)    Anxiety    Cannabis use disorder, severe, dependence (H)     Class 1 drug-induced obesity without serious comorbidity with body mass index (BMI) of 33.0 to 33.9 in adult    Depression, major, single episode, moderate (H)    Episodic mood disorder (H24)    KATHE (generalized anxiety disorder)    History of marijuana use    Obesity (BMI 30-39.9)    Obesity, Class I, BMI 30-34.9    Tobacco use disorder, moderate, in sustained remission    Schizoaffective disorder, bipolar type (H)    Psychosis, unspecified psychosis type (H)    Tardive dyskinesia

## 2024-09-23 NOTE — CARE PLAN
Rehab Group    Rehab Group    Start time: 1030   End time: 1115  Patient time total: 30 minutes    attended partial group    #5 attended   Group Type: occupational therapy and general health and coping   Group Topic Covered: coping skills and social skills     Group Session Detail:  Check-in, self awareness, goal planning - daily/weekly     Patient Response/Contribution:  positive affect, cooperative with task, socially appropriate, attentive, and actively engaged     Patient Detail:    Pt presented to group much more calm and relaxed as compared to the last time writer worked with him, 1 week ago.  Acknowledged the progress made, and discussed what his been helpful - pt shares grounding techniques, white noise, video games, painting, and exercise as able (would like to start doing push ups - writer will obtain yoga mat for pt).  Able to acknowledge personal strengths, and when peer had a hard time identifying areas of progress, pt was helpful to share progress observed.      [id number] OT Group (2 or more in attendance)      Patient Active Problem List   Diagnosis    Suicidal ideation    Psychosis (H)    Acute pulmonary embolism without acute cor pulmonale (H)    Alcohol use disorder, moderate, in sustained remission, dependence (H)    Anxiety    Cannabis use disorder, severe, dependence (H)    Class 1 drug-induced obesity without serious comorbidity with body mass index (BMI) of 33.0 to 33.9 in adult    Depression, major, single episode, moderate (H)    Episodic mood disorder (H24)    KATHE (generalized anxiety disorder)    History of marijuana use    Obesity (BMI 30-39.9)    Obesity, Class I, BMI 30-34.9    Tobacco use disorder, moderate, in sustained remission    Schizoaffective disorder, bipolar type (H)    Psychosis, unspecified psychosis type (H)    Tardive dyskinesia
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
## 2024-09-23 NOTE — PLAN OF CARE
"  Group Attendance:  attended full group    Time session began: 1315  Time session ended: 1347  Patient's total time in group: 32    Total # Attendees   5   Group Type psychotherapeutic, psychoeducational, and DBT     Group Topic Covered insight improvement, relationships, and healthy coping skills     Group Session Detail Participants engaged in a structured activity to first learn about biosocial theory, relate it to lived experiences, and discuss relatable lived experiences with the group. Second, participants planted seeds to promote further learning of the subject matter using metaphors and learn about the therapeutic benefits of gardening.       Patient's response to the group topic/interactions:  cooperative with task, socially appropriate, safe use of materials/supplies, listened actively, expressed understanding of topic, attentive, and actively engaged     Patient Details: Eloy was engaged throughout the group session. He contributed to the group discussion. He offered his lived experience with an invalidating social environment as a child and was able to relate his experiences in childhood to his challenges with sustaining meaningful relationships as an adult. He planted seeds and used materials appropriately. He shared that he is \"more impulsive\" in the hospital because he has \"no control over anything.\" He asked appropriate questions throughout.          [id number] - Group psychotherapy - 1 Session  Patient Active Problem List   Diagnosis    Suicidal ideation    Psychosis (H)    Acute pulmonary embolism without acute cor pulmonale (H)    Alcohol use disorder, moderate, in sustained remission, dependence (H)    Anxiety    Cannabis use disorder, severe, dependence (H)    Class 1 drug-induced obesity without serious comorbidity with body mass index (BMI) of 33.0 to 33.9 in adult    Depression, major, single episode, moderate (H)    Episodic mood disorder (H24)    KATHE (generalized anxiety disorder)    History " of marijuana use    Obesity (BMI 30-39.9)    Obesity, Class I, BMI 30-34.9    Tobacco use disorder, moderate, in sustained remission    Schizoaffective disorder, bipolar type (H)    Psychosis, unspecified psychosis type (H)    Tardive dyskinesia

## 2024-09-23 NOTE — PROGRESS NOTES
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   Reason for ongoing admission: poses an imminent risk to self, poses an imminent risk to others, and is unable to care for self due to severe psychosis or jamila  Discharge location:  TBD . Consider ACT team referral, will need discharge planning conversation when more stable  Discharge Medications: not ordered  Follow-up Appointments: not scheduled     Patient seen and discussed with attending physician, Dr. Loya, who is in agreement with my assessment and plan.    Foster Batista, PGY-4 (Psychiatry)  [facility]
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
## 2024-09-23 NOTE — PLAN OF CARE
"Goal Outcome Evaluation:    Plan of Care Reviewed With: patient        Pt visible in the milieu, watched TV, paced the hallway. Pt denies all mental health psych symptoms and contracted for safety in a dismissive way. Pt requested this writer to call the provider to order selective serotonin reuptake inhibitor as discussed this morning. Writer made pt aware that the provider have that in his plan. Pt will not leave the nursing station window and pt redirected and went into his room. Pt was then updated of the new order and pt was appreciative.  Adequate fluid and food intake, voiding freely and no BM concern during the evening shift. Denies pain and shortness of breath and vital sign stable. BP 98/83 (Patient Position: Sitting)   Pulse 85   Temp 97  F (36.1  C) (Temporal)   Resp 16   Ht 1.854 m (6' 1\")   Wt 94.8 kg (209 lb)   SpO2 98%   BMI 27.57 kg/m               "

## 2024-09-23 NOTE — PLAN OF CARE
"\"I'm not angry right now.\"  \"I still want to have my music and watch videos on my phone.\"  \"I'm still too disorganised and my memory isn't working right.\"    Intermittent eye contact: at times avoidant, at times intense. Slight latency of speech at times. WNL rate and volume of speech.   Unchanged complaints of OCD symptoms: organisation and writing. Waiting to resume [employer].  Has very large stack of notepaper filled with his writing: it seems to be a collection of separate thoughts, repeated, in lists. Also appears to be collecting items such as socks and slippers    Calmer demeanor than yesterday. No provocative behaviour with peers and no aggression again property. Limited interaction with peers.    Rapid eye blinking again repeatedly observed but no tremors (last observed 9/20/24) or other abnormal movements.    Denies cravings.    Does not want IRTS- would have too little money left, does not want CD treatment.     Plan: Monitor and document mood and behaviour, thought process and content. Establish and maintain therapeutic relationship. Educate about diagnoses, medications, treatment, legal status, plan of care. Address preexisting and concurrent medical concerns.      P:Impaired coping  G:Adequate coping  O:Not progressing    "

## 2024-09-23 NOTE — CARE PLAN
"Pt was calm and cooperative this shift. Presented with a flat affect. Pt declined most mental health s/s including SI,HI,SIB, anxiety,depression,paranoia, delusions and AVC hallucinations. Endorsed anxiety. Denied pain. Was med complaint. Ate and drank adequately. He declined completing hygiene and a shower. Pt spent most of the shift interacting with peers, in groups and pacing. Followed hourly request adequately. No s/s of agitation or aggression.No medical concerns. No behavioral concerns. No med side effects observed or reported. 1 to 1 discontinued. Reported concerns about weight gain and wants to talk to nutrition services about meal plan. Also mentioned his mouth being dry, writer suggested he drink more water.    /67 (BP Location: Right arm, Patient Position: Sitting, Cuff Size: Adult Regular)   Pulse 99   Temp 97.4  F (36.3  C) (Temporal)   Resp 16   Ht 1.854 m (6' 1\")   Wt 94.8 kg (209 lb)   SpO2 98%   BMI 27.57 kg/m       Last 24H PRN:     nicotine (COMMIT) lozenge 4 mg, 2 mg at 09/16/24 2233 **OR** nicotine polacrilex (NICORETTE) gum 4 mg, 4 mg at 09/23/24 1311    QUEtiapine (SEROquel) tablet 25 mg, 25 mg at 09/23/24 1343       "

## 2024-09-23 NOTE — CARE PLAN
Rehab Group    Start time: 1115  End time: 1200  Patient time total: 30 minutes    attended partial group     #3 attended   Group Type: OT Clinic   Group Topic Covered: activity therapy and healthy leisure time     Group Session Detail:  OT clinic group provides an opportunity for individual-based goal directed activity within a group setting - allowing for interaction and socialization.  Hands-on task work help to build/restore/or maintain skills such as: focus, concentration, decision making, and problem solving.  Patients are encouraged to carry over potential new interests/hobbies learned in group following discharge.     Patient Response/Contribution:  positive affect, cooperative with task, and actively engaged     Patient Detail:    Pt spent time working on watercolor painting for relaxation while listening to music - taking turn selecting songs.  Social at times, mainly about music.      [id number] OT Group (2 or more in attendance)      Patient Active Problem List   Diagnosis    Suicidal ideation    Psychosis (H)    Acute pulmonary embolism without acute cor pulmonale (H)    Alcohol use disorder, moderate, in sustained remission, dependence (H)    Anxiety    Cannabis use disorder, severe, dependence (H)    Class 1 drug-induced obesity without serious comorbidity with body mass index (BMI) of 33.0 to 33.9 in adult    Depression, major, single episode, moderate (H)    Episodic mood disorder (H24)    KATHE (generalized anxiety disorder)    History of marijuana use    Obesity (BMI 30-39.9)    Obesity, Class I, BMI 30-34.9    Tobacco use disorder, moderate, in sustained remission    Schizoaffective disorder, bipolar type (H)    Psychosis, unspecified psychosis type (H)    Tardive dyskinesia

## 2024-09-23 NOTE — PLAN OF CARE
BEH IP Unit Acuity Rating Score (UARS)  Patient is given one point for every criteria they meet.    CRITERIA SCORING   On a 72 hour hold, court hold, committed, stay of commitment, or revocation. 1    Patient LOS on BEH unit exceeds 20 days. 0  LOS: 20   Patient under guardianship, 55+, otherwise medically complex, or under age 11. 0   Suicide ideation without relief of precipitating factors. 0   Current plan for suicide. 0   Current plan for homicide. 0   Imminent risk or actual attempt to seriously harm another without relief of factors precipitating the attempt. 1   Severe dysfunction in daily living (ex: complete neglect for self care, extreme disruption in vegetative function, extreme deterioration in social interactions). 1   Recent (last 7 days) or current physical aggression in the ED or on unit. 1   Restraints or seclusion episode in past 72 hours. 0   Recent (last 7 days) or current verbal aggression, agitation, yelling, etc., while in the ED or unit. 1   Active psychosis. 1   Need for constant or near constant redirection (from leaving, from others, etc).  1   Intrusive or disruptive behaviors. 1   Patient requires 3 or more hours of individualized nursing care per 8-hour shift (i.e. for ADLs, meds, therapeutic interventions). 1   TOTAL 9

## 2024-09-23 NOTE — PROVIDER NOTIFICATION
09/23/24 1748   C-SSRS (Daily/Shift Screen)   Q2 Suicidal Thoughts (Since Last Contact) 0-->no   Q3 Have you been thinking about how you might do this? 0-->no   Q4 Suicidal Intent without Specific Plan 0-->no   Q5 Suicide Intent with Specific Plan 0-->no   Q6 Suicide Behavior 0-->no   Level of Risk per Screen no risk indicated   Assess Risk to Self and Maintain Safety   Behavior Management behavioral plan reviewed   Enhanced Safety Measures room near unit station;review medications for side effects with activity   Promote Psychosocial Wellbeing   Family/Support System Care involvement promoted;presence promoted;self-care encouraged   Sleep/Rest Enhancement medication;noise level reduced;regular sleep/rest pattern promoted   Supportive Measures self-care encouraged;positive reinforcement provided;self-responsibility promoted   Establish Safety Plan and Continuity of Care   Safe Transition Promotion protective factors promoted   Environment of Care Checklist   Potentially harmful objects out of patient reach? yes   Personal belongings secured? yes   Patient dressed in hospital-provided attire only? yes   Plastic bags out of patient reach? yes   Patient care equipment (cords, cables, call bells, lines, and drains) shortened, removed, or accounted for? yes   Potentially toxic materials removed or secured? yes   Sharps container removed or secured? yes   Cabinets secured? yes   Room secured by RN per shift? yes

## 2024-09-24 PROCEDURE — 250N000012 HC RX MED GY IP 250 OP 636 PS 637

## 2024-09-24 PROCEDURE — 250N000013 HC RX MED GY IP 250 OP 250 PS 637

## 2024-09-24 PROCEDURE — 250N000013 HC RX MED GY IP 250 OP 250 PS 637: Performed by: PSYCHIATRY & NEUROLOGY

## 2024-09-24 PROCEDURE — 124N000002 HC R&B MH UMMC

## 2024-09-24 PROCEDURE — 250N000013 HC RX MED GY IP 250 OP 250 PS 637: Performed by: STUDENT IN AN ORGANIZED HEALTH CARE EDUCATION/TRAINING PROGRAM

## 2024-09-24 PROCEDURE — 97150 GROUP THERAPEUTIC PROCEDURES: CPT | Mod: GO

## 2024-09-24 RX ORDER — POLYETHYLENE GLYCOL 3350 17 G/17G
17 POWDER, FOR SOLUTION ORAL 2 TIMES DAILY PRN
Status: DISPENSED | OUTPATIENT
Start: 2024-09-24

## 2024-09-24 RX ADMIN — OLANZAPINE 5 MG: 10 TABLET, ORALLY DISINTEGRATING ORAL at 07:54

## 2024-09-24 RX ADMIN — NICOTINE POLACRILEX 4 MG: 2 LOZENGE ORAL at 12:10

## 2024-09-24 RX ADMIN — POLYETHYLENE GLYCOL 3350 17 G: 17 POWDER, FOR SOLUTION ORAL at 07:52

## 2024-09-24 RX ADMIN — QUETIAPINE 400 MG: 400 TABLET, FILM COATED, EXTENDED RELEASE ORAL at 19:26

## 2024-09-24 RX ADMIN — BUPRENORPHINE AND NALOXONE 1 FILM: 2; .5 FILM BUCCAL; SUBLINGUAL at 07:54

## 2024-09-24 RX ADMIN — ASPIRIN 81 MG CHEWABLE TABLET 81 MG: 81 TABLET CHEWABLE at 07:53

## 2024-09-24 RX ADMIN — METFORMIN HYDROCHLORIDE 500 MG: 500 TABLET, FILM COATED ORAL at 07:54

## 2024-09-24 RX ADMIN — METFORMIN HYDROCHLORIDE 500 MG: 500 TABLET, FILM COATED ORAL at 18:27

## 2024-09-24 RX ADMIN — BUPRENORPHINE AND NALOXONE 1 FILM: 2; .5 FILM BUCCAL; SUBLINGUAL at 19:25

## 2024-09-24 RX ADMIN — NICOTINE 1 PATCH: 21 PATCH, EXTENDED RELEASE TRANSDERMAL at 07:53

## 2024-09-24 RX ADMIN — THERA TABS 1 TABLET: TAB at 07:53

## 2024-09-24 RX ADMIN — NICOTINE POLACRILEX 4 MG: 2 LOZENGE ORAL at 11:11

## 2024-09-24 RX ADMIN — NICOTINE POLACRILEX 4 MG: 2 GUM, CHEWING BUCCAL at 15:41

## 2024-09-24 RX ADMIN — SENNOSIDES AND DOCUSATE SODIUM 1 TABLET: 8.6; 5 TABLET ORAL at 19:25

## 2024-09-24 RX ADMIN — DIVALPROEX SODIUM 1500 MG: 500 TABLET, FILM COATED, EXTENDED RELEASE ORAL at 19:25

## 2024-09-24 RX ADMIN — NICOTINE POLACRILEX 4 MG: 2 LOZENGE ORAL at 07:55

## 2024-09-24 RX ADMIN — SENNOSIDES AND DOCUSATE SODIUM 1 TABLET: 8.6; 5 TABLET ORAL at 07:53

## 2024-09-24 RX ADMIN — FLUVOXAMINE MALEATE 25 MG: 25 TABLET, FILM COATED ORAL at 19:26

## 2024-09-24 RX ADMIN — NICOTINE POLACRILEX 4 MG: 2 GUM, CHEWING BUCCAL at 21:48

## 2024-09-24 RX ADMIN — NICOTINE POLACRILEX 4 MG: 2 GUM, CHEWING BUCCAL at 18:47

## 2024-09-24 RX ADMIN — TAMSULOSIN HYDROCHLORIDE 0.4 MG: 0.4 CAPSULE ORAL at 07:53

## 2024-09-24 RX ADMIN — ALUMINUM HYDROXIDE, MAGNESIUM HYDROXIDE, AND DIMETHICONE 30 ML: 200; 20; 200 SUSPENSION ORAL at 12:10

## 2024-09-24 RX ADMIN — OLANZAPINE 15 MG: 15 TABLET, ORALLY DISINTEGRATING ORAL at 19:25

## 2024-09-24 ASSESSMENT — ACTIVITIES OF DAILY LIVING (ADL)
ADLS_ACUITY_SCORE: 28
HYGIENE/GROOMING: INDEPENDENT
ADLS_ACUITY_SCORE: 28
DRESS: SCRUBS (BEHAVIORAL HEALTH);INDEPENDENT
ADLS_ACUITY_SCORE: 28
ORAL_HYGIENE: INDEPENDENT
ADLS_ACUITY_SCORE: 28
ORAL_HYGIENE: INDEPENDENT
ADLS_ACUITY_SCORE: 28
HYGIENE/GROOMING: INDEPENDENT
LAUNDRY: UNABLE TO COMPLETE
ADLS_ACUITY_SCORE: 28
DRESS: SCRUBS (BEHAVIORAL HEALTH);INDEPENDENT
ADLS_ACUITY_SCORE: 28

## 2024-09-24 NOTE — PLAN OF CARE
Rehab Group    Start time: 1115  End time: 1200  Patient time total: 45 minutes    attended full group    #6 attended   Group Type: OT Clinic   Group Topic Covered: coping skills     Group Session Detail:    Intervention: Pt participated in a OT Clinic group to facilitate coping skills exploration and creative expression through personally meaningful activities, and to encourage utilization of these healthy coping skills to promote overall health and wellness. Group included clinical observation of social, cognitive and kinesthetic performance skills to inform treatment and safe discharge planning.    Mood/Affect: Pleasant       Plan: Patient encouraged to maintain attendance for continued ongoing support in working towards occupational therapy goals to support overall treatment/care.        Patient Detail:    Joined at start of group, motivated to complete a specific type of project. Requested needed materials from writer and worked ind on project for duration of time in group. Was social off and on with writer and other peers briefly, both in response and initiating at times.   Requested to take markers into his room at end of group. Was accepting of not being able to do this outside of group and d/t instances of writer on the walls in the past.       [id number] OT Group (2 or more in attendance)    Patient Active Problem List   Diagnosis    Suicidal ideation    Psychosis (H)    Acute pulmonary embolism without acute cor pulmonale (H)    Alcohol use disorder, moderate, in sustained remission, dependence (H)    Anxiety    Cannabis use disorder, severe, dependence (H)    Class 1 drug-induced obesity without serious comorbidity with body mass index (BMI) of 33.0 to 33.9 in adult    Depression, major, single episode, moderate (H)    Episodic mood disorder (H24)    KATHE (generalized anxiety disorder)    History of marijuana use    Obesity (BMI 30-39.9)    Obesity, Class I, BMI 30-34.9    Tobacco use disorder, moderate, in  sustained remission    Schizoaffective disorder, bipolar type (H)    Psychosis, unspecified psychosis type (H)    Tardive dyskinesia

## 2024-09-24 NOTE — PLAN OF CARE
"  Problem: Adult Inpatient Plan of Care  Goal: Optimal Comfort and Wellbeing  Intervention: Provide Person-Centered Care  Recent Flowsheet Documentation  Taken 9/24/2024 1015 by Toña Steward RN  Trust Relationship/Rapport:   care explained   choices provided   emotional support provided   questions answered   empathic listening provided   questions encouraged   reassurance provided   thoughts/feelings acknowledged   Goal Outcome Evaluation:    Plan of Care Reviewed With: patient      Pt had an uneventful shift. He presents as calm and cooperative with a blunted affect.   Pt denies all  mental health symptoms including SI/HI/AVH and does not  appear to be responding. He does frequent the med window with the same question.  He was easily redirected and maintained behavioral control with no outbursts of aggression or agitation. He took all his meds without issue, and only requested Maalox for upset stomach. Pt came back at the end of the shift, and requested to have PRN mirilax for constipation, but the med was not avail. Writer contacted the provider, and she ordered PRN.   Pt ate and drank well, denied pain, and had no other acute physical or behavioral concerns this shift.   BP 94/66 (Patient Position: Sitting)   Pulse 85   Temp 97.9  F (36.6  C) (Temporal)   Resp 16   Ht 1.854 m (6' 1\")   Wt 94.8 kg (209 lb)   SpO2 96%   BMI 27.57 kg/m        "

## 2024-09-24 NOTE — PLAN OF CARE
Team Note Due:  Wednesday    Assessment/Intervention/Current Symtoms and Care Coordination:  Chart review and met with team, discussed pt progress, symptomology, and response to treatment.  Discussed the discharge plan and any potential impediments to discharge.    Per team, pt was not redirectable, standing by med window requesting meds, dismissive, irritable.    This morning, Pt has been mostly in his bed. He came out intermittently to play some video games with peers. He responded to my greeting, he said hello and appeared more alert today.     I asked if his commitment CM could speak to mom, since she doesn't need an KELBY. As Pt believes he has a CADI CM and doesn't know who that is. I also asked again if she is in support of him going to  with outpatient supports or not once he is stable.     Discharge Plan or Goal:  He is able to return back to group home however this is not 24/7 staffed, would benefit most from MI/JACOB treatment but is declining this option. Consider group home + IOP.      Barriers to Discharge:  Symptoms - jamila, agitation  Managing his medications in order to stabilize     Referral Status:  TBD     Legal Status:  Committed and [facility]: [place]  File Number: [id number]  Start and expiration date of commitment:   [facility] meds: Zyprexa, Seroquel, Invega and Abilify       Contacts (include KELBY status):   (Megan) Ph: [phone]  - KELBY only for specific thing to discuss discharge   Psych: Dr. Khan, [place] [facility] - Declined KELBY  Michellebhavna Cortes/Mother: [phone] - Declined KELBY x2    New Commitment CM:  Chasity [email or web address] [phone] - No KELBY Needed      Upcoming Meetings and Dates/Important Information and next steps:  Update discharge referral form at discharge   PD and COS at discharge  Follow up psych appt

## 2024-09-24 NOTE — PLAN OF CARE
Goal Outcome Evaluation:  Problem: Sleep Disturbance  Goal: Adequate Sleep/Rest  Outcome: Progressing       Pt in bed at beginning of shift, breathing quiet and unlabored. Pt slept through shift. Pt slept 6.5 hours.      No pt complaints or concerns at this time.      No PRNs given. Will continue to monitor.

## 2024-09-24 NOTE — PLAN OF CARE
BEH IP Unit Acuity Rating Score (UARS)  Patient is given one point for every criteria they meet.    CRITERIA SCORING   On a 72 hour hold, court hold, committed, stay of commitment, or revocation. 1    Patient LOS on BEH unit exceeds 20 days. 1 LOS: 21   Patient under guardianship, 55+, otherwise medically complex, or under age 11. 0   Suicide ideation without relief of precipitating factors. 0   Current plan for suicide. 0   Current plan for homicide. 0   Imminent risk or actual attempt to seriously harm another without relief of factors precipitating the attempt. 0   Severe dysfunction in daily living (ex: complete neglect for self care, extreme disruption in vegetative function, extreme deterioration in social interactions). 1   Recent (last 7 days) or current physical aggression in the ED or on unit. 1   Restraints or seclusion episode in past 72 hours. 0   Recent (last 7 days) or current verbal aggression, agitation, yelling, etc., while in the ED or unit. 1   Active psychosis. 1   Need for constant or near constant redirection (from leaving, from others, etc).  1   Intrusive or disruptive behaviors. 1   Patient requires 3 or more hours of individualized nursing care per 8-hour shift (i.e. for ADLs, meds, therapeutic interventions). 1   TOTAL 9

## 2024-09-24 NOTE — PLAN OF CARE
"  Problem: Psychotic Signs/Symptoms  Goal: Improved Behavioral Control (Psychotic Signs/Symptoms)  Outcome: Progressing     Problem: Psychotic Signs/Symptoms  Goal: Improved Mood Symptoms (Psychotic Signs/Symptoms)  Outcome: Progressing   Goal Outcome Evaluation:      Patient was calm and cooperative through out the shift.Patient visible in milieu watching television,and walking on the villarreal.Patient socialized with peers and staff appropriately. Patient denies mental health issues including SI/SIB/HI.Patient endorses anxiety. Patient denies depression,auditory and visual hallucinations.Patient denies pain or discomfort and no other physical or behavioral concerns this shift. Patient eating, drinking and sleeping well. Patient denies pain or discomfort. Patient reported that he had formed bowel movement.denies stomach upset and constipation.No medications side effect is reported or observed.      /77 (BP Location: Left arm)   Pulse 91   Temp 97.8  F (36.6  C) (Temporal)   Resp 18   Ht 1.854 m (6' 1\")   Wt 94.8 kg (209 lb)   SpO2 96%   BMI 27.57 kg/m             "

## 2024-09-24 NOTE — PLAN OF CARE
Group Attendance:  attended partial group    Time session began: 1620  Time session ended: 1650  Patient's total time in group: 19    Total # Attendees   3   Group Type psychoeducational     Group Topic Covered emotional regulation and insight improvement     Group Session Detail Group focused on brief, targeted review of coping skills with mental illness. Focus was on insight with the facilitator asking participants how their current symptoms compared to the day before. Participants did check in on this and discussed what it will mean for them to feel better. Patients participated in the therapeutic give and take transaction where they practiced sharing pleasurable activities with one another as a way of demonstrating perspective taking and self-care.     Patient's response to the group topic/interactions:  cooperative with task     Patient Details: Pt attended and participated in the group. Did step outside of the group at a time but came back promptly.         [id number] - Group psychotherapy - 1 Session    Patient Active Problem List   Diagnosis    Suicidal ideation    Psychosis (H)    Acute pulmonary embolism without acute cor pulmonale (H)    Alcohol use disorder, moderate, in sustained remission, dependence (H)    Anxiety    Cannabis use disorder, severe, dependence (H)    Class 1 drug-induced obesity without serious comorbidity with body mass index (BMI) of 33.0 to 33.9 in adult    Depression, major, single episode, moderate (H)    Episodic mood disorder (H24)    KATHE (generalized anxiety disorder)    History of marijuana use    Obesity (BMI 30-39.9)    Obesity, Class I, BMI 30-34.9    Tobacco use disorder, moderate, in sustained remission    Schizoaffective disorder, bipolar type (H)    Psychosis, unspecified psychosis type (H)    Tardive dyskinesia

## 2024-09-24 NOTE — PROGRESS NOTES
Rehab Group    Start time: 1315  End time: 1410  Patient time total: 30 minutes (only billed for 15 minutes due to group attendance)    attended partial group    #5 attended   Group Type: occupational therapy   Group Topic Covered: cognitive activities, healthy leisure time, and problem solving     Group Session Detail:  Visual-spatial group game     Patient Response/Contribution:  cooperative with task, attentive, and actively engaged     Patient Detail:    Pt actively participated in a structured occupational therapy group with a focus on visuospatial problem solving and social engagement via a group game. He initially requested to just observe, then decided to join the group task about MCFP through. Pt quickly demonstrated understanding of the novel 3-step task after an initial explanation. Strategic in his task approach. Pt remained focused and engaged throughout the full remainder of group. Calm, pleasant, and engaged.      [id number] OT Group (2 or more in attendance)      Patient Active Problem List   Diagnosis    Suicidal ideation    Psychosis (H)    Acute pulmonary embolism without acute cor pulmonale (H)    Alcohol use disorder, moderate, in sustained remission, dependence (H)    Anxiety    Cannabis use disorder, severe, dependence (H)    Class 1 drug-induced obesity without serious comorbidity with body mass index (BMI) of 33.0 to 33.9 in adult    Depression, major, single episode, moderate (H)    Episodic mood disorder (H24)    KATHE (generalized anxiety disorder)    History of marijuana use    Obesity (BMI 30-39.9)    Obesity, Class I, BMI 30-34.9    Tobacco use disorder, moderate, in sustained remission    Schizoaffective disorder, bipolar type (H)    Psychosis, unspecified psychosis type (H)    Tardive dyskinesia

## 2024-09-25 PROCEDURE — 250N000013 HC RX MED GY IP 250 OP 250 PS 637: Performed by: PSYCHIATRY & NEUROLOGY

## 2024-09-25 PROCEDURE — 97150 GROUP THERAPEUTIC PROCEDURES: CPT | Mod: GO

## 2024-09-25 PROCEDURE — 250N000012 HC RX MED GY IP 250 OP 636 PS 637

## 2024-09-25 PROCEDURE — 99232 SBSQ HOSP IP/OBS MODERATE 35: CPT | Mod: GC | Performed by: PSYCHIATRY & NEUROLOGY

## 2024-09-25 PROCEDURE — 250N000013 HC RX MED GY IP 250 OP 250 PS 637

## 2024-09-25 PROCEDURE — 250N000013 HC RX MED GY IP 250 OP 250 PS 637: Performed by: STUDENT IN AN ORGANIZED HEALTH CARE EDUCATION/TRAINING PROGRAM

## 2024-09-25 PROCEDURE — 124N000002 HC R&B MH UMMC

## 2024-09-25 RX ADMIN — NICOTINE POLACRILEX 4 MG: 2 GUM, CHEWING BUCCAL at 17:40

## 2024-09-25 RX ADMIN — METFORMIN HYDROCHLORIDE 500 MG: 500 TABLET, FILM COATED ORAL at 09:59

## 2024-09-25 RX ADMIN — NICOTINE POLACRILEX 4 MG: 2 GUM, CHEWING BUCCAL at 15:37

## 2024-09-25 RX ADMIN — BUPRENORPHINE AND NALOXONE 1 FILM: 2; .5 FILM BUCCAL; SUBLINGUAL at 09:58

## 2024-09-25 RX ADMIN — OLANZAPINE 5 MG: 10 TABLET, ORALLY DISINTEGRATING ORAL at 09:59

## 2024-09-25 RX ADMIN — NICOTINE POLACRILEX 4 MG: 2 GUM, CHEWING BUCCAL at 12:26

## 2024-09-25 RX ADMIN — QUETIAPINE 400 MG: 400 TABLET, FILM COATED, EXTENDED RELEASE ORAL at 19:26

## 2024-09-25 RX ADMIN — NICOTINE POLACRILEX 4 MG: 2 GUM, CHEWING BUCCAL at 10:19

## 2024-09-25 RX ADMIN — NICOTINE 1 PATCH: 21 PATCH, EXTENDED RELEASE TRANSDERMAL at 09:58

## 2024-09-25 RX ADMIN — SENNOSIDES AND DOCUSATE SODIUM 1 TABLET: 8.6; 5 TABLET ORAL at 19:26

## 2024-09-25 RX ADMIN — QUETIAPINE FUMARATE 25 MG: 25 TABLET ORAL at 23:01

## 2024-09-25 RX ADMIN — ALUMINUM HYDROXIDE, MAGNESIUM HYDROXIDE, AND DIMETHICONE 30 ML: 200; 20; 200 SUSPENSION ORAL at 17:22

## 2024-09-25 RX ADMIN — BUPRENORPHINE AND NALOXONE 1 FILM: 2; .5 FILM BUCCAL; SUBLINGUAL at 19:26

## 2024-09-25 RX ADMIN — THERA TABS 1 TABLET: TAB at 09:59

## 2024-09-25 RX ADMIN — NICOTINE POLACRILEX 4 MG: 2 GUM, CHEWING BUCCAL at 20:50

## 2024-09-25 RX ADMIN — POLYETHYLENE GLYCOL 3350 17 G: 17 POWDER, FOR SOLUTION ORAL at 23:38

## 2024-09-25 RX ADMIN — OLANZAPINE 15 MG: 15 TABLET, ORALLY DISINTEGRATING ORAL at 19:26

## 2024-09-25 RX ADMIN — ASPIRIN 81 MG CHEWABLE TABLET 81 MG: 81 TABLET CHEWABLE at 09:57

## 2024-09-25 RX ADMIN — METFORMIN HYDROCHLORIDE 500 MG: 500 TABLET, FILM COATED ORAL at 17:22

## 2024-09-25 ASSESSMENT — ACTIVITIES OF DAILY LIVING (ADL)
ADLS_ACUITY_SCORE: 28
ADLS_ACUITY_SCORE: 28
HYGIENE/GROOMING: INDEPENDENT
ADLS_ACUITY_SCORE: 28
ORAL_HYGIENE: INDEPENDENT
ADLS_ACUITY_SCORE: 28
ADLS_ACUITY_SCORE: 28
DRESS: INDEPENDENT
LAUNDRY: UNABLE TO COMPLETE
ADLS_ACUITY_SCORE: 28
ORAL_HYGIENE: INDEPENDENT
ADLS_ACUITY_SCORE: 28
DRESS: INDEPENDENT
ADLS_ACUITY_SCORE: 28
ADLS_ACUITY_SCORE: 28
LAUNDRY: UNABLE TO COMPLETE
ADLS_ACUITY_SCORE: 28
HYGIENE/GROOMING: INDEPENDENT
ADLS_ACUITY_SCORE: 28

## 2024-09-25 NOTE — PLAN OF CARE
"Pt had an uneventful shift this day. Pt denies SI, SIB, HI and thoughts of hurting others. Pt denies depression, anxiety and A/VH.     Rec'd phone call from pt's mother stating she had a phone call from pt last night. Pt mother states she feels pt is no ready for discharge as he was telling his mother that his father was like Dasha Rocha and exhibiting \"paranoia\" saying \"the corrupt police\" took his \"things\".    Pt was medication compliant with scheduled medications with the exception of flomax and miralax stating he is urinating and having bm's \"fine\".PRN nicotine given x1.    No behavioral outbursts or agitation reported or observed this shift.    VS reviewed: /77 (BP Location: Left arm)   Pulse 91   Temp 97.8  F (36.6  C) (Temporal)   Resp 18   Ht 1.854 m (6' 1\")   Wt 94.8 kg (209 lb)   SpO2 96%   BMI 27.57 kg/m   . Patient denies  pain.    Length of stay: 22  "
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
## 2024-09-25 NOTE — PLAN OF CARE
Team Note Due:  Wednesday    Assessment/Intervention/Current Symtoms and Care Coordination:  Chart review and met with team, discussed pt progress, symptomology, and response to treatment.  Discussed the discharge plan and any potential impediments to discharge.    Per team, pt was calm, cooperative, and endorses anxiety. Pt denies auditory and visual hallucinations. Pt may benefit from having an ACT Team to provide additional supports. Writer called CM received no answer and sent an email regarding an ACT Team referral.    CTC met with pt to discuss ACT Team referral and pt states he has all that and then asked about. CTC explained the team and their functions. Pt states he does not want a therapist.      Discharge Plan or Goal:  He is able to return back to group home however this is not 24/7 staffed, would benefit most from MI/JACOB treatment but is declining this option. Consider group home + IOP.      Barriers to Discharge:  Symptoms - jamila, agitation  Managing his medications in order to stabilize     Referral Status:  TBD     Legal Status:  Committed and [facility]: [place]  File Number: [id number]  Start and expiration date of commitment:   [facility] meds: Zyprexa, Seroquel, Invega and Abilify       Contacts (include KELBY status):   (Megan) Ph: [phone]  - KELBY only for specific thing to discuss discharge   Psych: Dr. Kahn, [facility] - Declined KELBY  Michelle Cortes/Mother: [phone] - Declined KELBY x2    New Commitment CM:  Chasity [email or web address] [phone] - No KELBY Needed      Upcoming Meetings and Dates/Important Information and next steps:  Update discharge referral form at discharge   PD and COS at discharge  Follow up psych appt

## 2024-09-25 NOTE — PLAN OF CARE
Rehab Group    Start time: 1030  End time: 1200  Patient time total: 90 minutes    attended full group    #5 attended   Group Type: OT Clinic   Group Topic Covered: coping skills     Group Session Detail:    Intervention: Pt participated in a OT Clinic group to facilitate coping skills exploration and creative expression through personally meaningful activities, and to encourage utilization of these healthy coping skills to promote overall health and wellness. Group included clinical observation of social, cognitive and kinesthetic performance skills to inform treatment and safe discharge planning.    Mood/Affect: Pleasant       Plan: Patient encouraged to maintain attendance for continued ongoing support in working towards occupational therapy goals to support overall treatment/care.        Patient Detail:    Motivated to participate in hands on creative project. Enjoys looking through the materials and project options and finding a unique way of utilizing these materials into a project. Was social off and on during this time, both with writer to request supplies as well as here and there with peers to comment on something that was said or add to a conversation. Stated agreement with another peer who voiced that they wanted to move away from their parents, stating they also do. Shared enjoying (living or visiting?) [place] in the past dt/ liking being around a lot of people but also having so many thakur to be around nature as well.       [id number] OT Group (2 or more in attendance)    Patient Active Problem List   Diagnosis    Suicidal ideation    Psychosis (H)    Acute pulmonary embolism without acute cor pulmonale (H)    Alcohol use disorder, moderate, in sustained remission, dependence (H)    Anxiety    Cannabis use disorder, severe, dependence (H)    Class 1 drug-induced obesity without serious comorbidity with body mass index (BMI) of 33.0 to 33.9 in adult    Depression, major, single episode, moderate (H)     Episodic mood disorder (H24)    KATHE (generalized anxiety disorder)    History of marijuana use    Obesity (BMI 30-39.9)    Obesity, Class I, BMI 30-34.9    Tobacco use disorder, moderate, in sustained remission    Schizoaffective disorder, bipolar type (H)    Psychosis, unspecified psychosis type (H)    Tardive dyskinesia

## 2024-09-25 NOTE — PLAN OF CARE
BEH IP Unit Acuity Rating Score (UARS)  Patient is given one point for every criteria they meet.    CRITERIA SCORING   On a 72 hour hold, court hold, committed, stay of commitment, or revocation. 1    Patient LOS on BEH unit exceeds 20 days. 1 LOS: 22   Patient under guardianship, 55+, otherwise medically complex, or under age 11. 0   Suicide ideation without relief of precipitating factors. 0   Current plan for suicide. 0   Current plan for homicide. 0   Imminent risk or actual attempt to seriously harm another without relief of factors precipitating the attempt. 0   Severe dysfunction in daily living (ex: complete neglect for self care, extreme disruption in vegetative function, extreme deterioration in social interactions). 1   Recent (last 7 days) or current physical aggression in the ED or on unit. 1   Restraints or seclusion episode in past 72 hours. 0   Recent (last 7 days) or current verbal aggression, agitation, yelling, etc., while in the ED or unit. 1   Active psychosis. 1   Need for constant or near constant redirection (from leaving, from others, etc).  1   Intrusive or disruptive behaviors. 1   Patient requires 3 or more hours of individualized nursing care per 8-hour shift (i.e. for ADLs, meds, therapeutic interventions). 1   TOTAL 9

## 2024-09-25 NOTE — PLAN OF CARE
Problem: Sleep Disturbance  Goal: Adequate Sleep/Rest  Outcome: Not Progressing   Goal Outcome Evaluation:    Patient was sleeping comfortably without s/s of respiratory distress when shift started. Patient woke up and was struggling to go back to sleep. PRN Melatonin was offered and declined. No medical or mental issues reported or noted.  Patient slept 3.75 hours.

## 2024-09-25 NOTE — PROGRESS NOTES
Rehab Group    Start time: 1615  End time: 1700  Patient time total: 10 minutes    attended partial group, in and out of group session     #3 attended   Group Type: recreation   Group Topic Covered: healthy leisure time       Group Session Detail:  TR leisure group     Patient Response/Contribution: observed and minimally cooperative with task       Patient Detail:    Pt briefly attended the structured Therapeutic Recreation group, participating in a group activity. Pt had limited participation in a leisure activity with social engagement to gain self-esteem, manage behaviors, improve social skills, decrease isolation, and reduce anxiety/depression.   Pt was in and out of group area several times, briefly participating in the leisure activity. Pt started out just observing, but decided to join in and take a couple turns during his limited time in group.      No Charge      Patient Active Problem List   Diagnosis    Suicidal ideation    Psychosis (H)    Acute pulmonary embolism without acute cor pulmonale (H)    Alcohol use disorder, moderate, in sustained remission, dependence (H)    Anxiety    Cannabis use disorder, severe, dependence (H)    Class 1 drug-induced obesity without serious comorbidity with body mass index (BMI) of 33.0 to 33.9 in adult    Depression, major, single episode, moderate (H)    Episodic mood disorder (H24)    KATHE (generalized anxiety disorder)    History of marijuana use    Obesity (BMI 30-39.9)    Obesity, Class I, BMI 30-34.9    Tobacco use disorder, moderate, in sustained remission    Schizoaffective disorder, bipolar type (H)    Psychosis, unspecified psychosis type (H)    Tardive dyskinesia

## 2024-09-25 NOTE — PLAN OF CARE
"  Problem: Adult Inpatient Plan of Care  Goal: Optimal Comfort and Wellbeing  Outcome: Progressing   Goal Outcome Evaluation:    Patient has been calm and cooperative and is visible in the milieu. He is medication compliant and took them without any problems. He was seen watching television, coloring in his room and also pacing the halls. He is unkempt/disheveled. He denies all mental health symptoms. No acute behavioral concerns this shift. Pt denies pain and also denies any physical concerns.      Pt wants to be on a selective serotonin reuptake inhibitor, he wants to be on Luvox and asked for Luvox. He was told by writer that there is no order for Luvox. He wants this topic to be brought up in teams. He also asked writer he wants Lyrica and mentioned that it has helped him for anxiety in the past.       Last 24H PRN:     alum & mag hydroxide-simethicone (MAALOX) suspension 30 mL, 30 mL at 09/25/24 1722    nicotine (COMMIT) lozenge 4 mg, 4 mg at 09/24/24 1210 **OR** nicotine polacrilex (NICORETTE) gum 4 mg, 4 mg at 09/25/24 2050    QUEtiapine (SEROquel) tablet 25 mg, 25 mg at 09/25/24 2301 for anxiety      /73   Pulse 79   Temp 97.7  F (36.5  C) (Temporal)   Resp 18   Ht 1.854 m (6' 1\")   Wt 94.8 kg (209 lb)   SpO2 96%   BMI 27.57 kg/m     "

## 2024-09-26 PROCEDURE — 97150 GROUP THERAPEUTIC PROCEDURES: CPT | Mod: GO

## 2024-09-26 PROCEDURE — 99232 SBSQ HOSP IP/OBS MODERATE 35: CPT | Mod: GC | Performed by: PSYCHIATRY & NEUROLOGY

## 2024-09-26 PROCEDURE — 250N000013 HC RX MED GY IP 250 OP 250 PS 637

## 2024-09-26 PROCEDURE — 250N000013 HC RX MED GY IP 250 OP 250 PS 637: Performed by: STUDENT IN AN ORGANIZED HEALTH CARE EDUCATION/TRAINING PROGRAM

## 2024-09-26 PROCEDURE — 124N000002 HC R&B MH UMMC

## 2024-09-26 PROCEDURE — 250N000013 HC RX MED GY IP 250 OP 250 PS 637: Performed by: PSYCHIATRY & NEUROLOGY

## 2024-09-26 PROCEDURE — 250N000012 HC RX MED GY IP 250 OP 636 PS 637

## 2024-09-26 RX ORDER — QUETIAPINE 200 MG/1
200 TABLET, FILM COATED, EXTENDED RELEASE ORAL AT BEDTIME
Status: DISCONTINUED | OUTPATIENT
Start: 2024-09-26 | End: 2024-10-03

## 2024-09-26 RX ADMIN — QUETIAPINE 200 MG: 200 TABLET, FILM COATED, EXTENDED RELEASE ORAL at 20:29

## 2024-09-26 RX ADMIN — TAMSULOSIN HYDROCHLORIDE 0.4 MG: 0.4 CAPSULE ORAL at 09:32

## 2024-09-26 RX ADMIN — NICOTINE POLACRILEX 4 MG: 2 GUM, CHEWING BUCCAL at 10:02

## 2024-09-26 RX ADMIN — THERA TABS 1 TABLET: TAB at 09:32

## 2024-09-26 RX ADMIN — OLANZAPINE 5 MG: 10 TABLET, ORALLY DISINTEGRATING ORAL at 09:32

## 2024-09-26 RX ADMIN — NICOTINE POLACRILEX 4 MG: 2 GUM, CHEWING BUCCAL at 14:09

## 2024-09-26 RX ADMIN — SENNOSIDES AND DOCUSATE SODIUM 1 TABLET: 8.6; 5 TABLET ORAL at 09:32

## 2024-09-26 RX ADMIN — NICOTINE POLACRILEX 4 MG: 2 LOZENGE ORAL at 15:46

## 2024-09-26 RX ADMIN — BUPRENORPHINE AND NALOXONE 1 FILM: 2; .5 FILM BUCCAL; SUBLINGUAL at 20:07

## 2024-09-26 RX ADMIN — NICOTINE POLACRILEX 4 MG: 2 GUM, CHEWING BUCCAL at 17:10

## 2024-09-26 RX ADMIN — POLYETHYLENE GLYCOL 3350 17 G: 17 POWDER, FOR SOLUTION ORAL at 10:04

## 2024-09-26 RX ADMIN — NICOTINE 1 PATCH: 21 PATCH, EXTENDED RELEASE TRANSDERMAL at 09:32

## 2024-09-26 RX ADMIN — OLANZAPINE 15 MG: 15 TABLET, ORALLY DISINTEGRATING ORAL at 20:06

## 2024-09-26 RX ADMIN — BUPRENORPHINE AND NALOXONE 1 FILM: 2; .5 FILM BUCCAL; SUBLINGUAL at 09:32

## 2024-09-26 RX ADMIN — METFORMIN HYDROCHLORIDE 500 MG: 500 TABLET, FILM COATED ORAL at 09:32

## 2024-09-26 RX ADMIN — METFORMIN HYDROCHLORIDE 500 MG: 500 TABLET, FILM COATED ORAL at 17:10

## 2024-09-26 RX ADMIN — NICOTINE POLACRILEX 4 MG: 2 GUM, CHEWING BUCCAL at 22:00

## 2024-09-26 RX ADMIN — SENNOSIDES AND DOCUSATE SODIUM 1 TABLET: 8.6; 5 TABLET ORAL at 20:06

## 2024-09-26 RX ADMIN — NICOTINE POLACRILEX 4 MG: 2 GUM, CHEWING BUCCAL at 11:40

## 2024-09-26 RX ADMIN — ASPIRIN 81 MG CHEWABLE TABLET 81 MG: 81 TABLET CHEWABLE at 09:32

## 2024-09-26 ASSESSMENT — ACTIVITIES OF DAILY LIVING (ADL)
ADLS_ACUITY_SCORE: 28

## 2024-09-26 NOTE — PLAN OF CARE
"Team Note Due:  Wednesday  *Do Friday     Assessment/Intervention/Current Symtoms and Care Coordination:  Chart review and met with team, discussed pt progress, symptomology, and response to treatment. Discussed the discharge plan and any potential impediments to discharge.    Per team, pt is ready for discharge.     I emailed CM Chasity again as I haven't heard back if she approves group home with outpatient supports while awaiting ACT referral.     I met with Pt and he was sitting in the chair in the hallway gazing at the floor. I asked how he was and he asked if I played video games stating he was looking for  friends. I encouraged him to check on [facility] community. He then asked to speak in his room. I shared I reached out to his CM again as I am awaiting to see her thoughts about discharge. I suggested an ACT Team, he says he likes his current psychiatrist. I did share if CM isn't agreeable to group home and op supports we have to revisit options. He still thinks he doesn't need treatment. He says he is feeling better. I encouraged him to attend group and then he started on a tangent stating that the music therapist is \"culturally appropriating\" as she doesn't know the cultures to the songs.     Chasity nogueira CM said he is able to go to group home.   I called Megan at group home to share this and she asked if he was on Suboxone and I stated yes. She said they cannot live there if he takes Suboxone.  was rude stating \"we messed up giving him Suboxone\". The KELBY is only for discharge info so I was unable to explain details.     I met with Pt again to inform him that the group home cannot take him back since he takes Suboxone and he said he will sign an KELBY for his mom as she knows his CADI CM information. He only signed a release for discharge information.     Mom called me and I shared that we now have a release but only to discuss discharge. Mom was extremely upset stating \"we " "got him addicted to Suboxone now we have to ween him off... he should be going to inpatient treatment now so he can get off this suboxone.... I talked to Tenisha Bill and she is appalled that he is on Suboxone....\". I shared with mom that I think some of the discrepancies in communication is because we cannot share all of the information due to no KELBY. I shared same with the group home Megan - we had no full release. I asked mom for his CAD[facility] CM ph # and she said \"I'm working right now\" and said she would leave it on a voicemail later, only gave name \"Abiola W\". Mom is upset and said this is a serious issue.     Discharge Plan or Goal:  Cannot return to [facility] due to Suboxone use.   TBD - awaiting CADI CM contact info to search for other housing options.      Barriers to Discharge:  Symptoms - jamila, agitation  Managing his medications in order to stabilize     Referral Status:  TBD     Legal Status:  Committed and [facility]: [place]  File Number: [id number]  Start and expiration date of commitment:   [facility] meds: Zyprexa, Seroquel, Invega and Abilify       Contacts (include KELBY status):   (Megan) Ph: [phone]  - KELBY only for specific thing to discuss discharge   Psych: Dr. Khan, [facility] - Declined KELBY  Michelle Cortes/Mother: [phone]    New Commitment CM:  Chasity [email or web address] [phone]     Upcoming Meetings and Dates/Important Information and next steps:  Update discharge referral form at discharge   PD and COS at discharge  Follow up psych appt  "

## 2024-09-26 NOTE — PLAN OF CARE
BEH IP Unit Acuity Rating Score (UARS)  Patient is given one point for every criteria they meet.    CRITERIA SCORING   On a 72 hour hold, court hold, committed, stay of commitment, or revocation. 1    Patient LOS on BEH unit exceeds 20 days. 1 LOS: 23   Patient under guardianship, 55+, otherwise medically complex, or under age 11. 0   Suicide ideation without relief of precipitating factors. 0   Current plan for suicide. 0   Current plan for homicide. 0   Imminent risk or actual attempt to seriously harm another without relief of factors precipitating the attempt. 0   Severe dysfunction in daily living (ex: complete neglect for self care, extreme disruption in vegetative function, extreme deterioration in social interactions). 1   Recent (last 7 days) or current physical aggression in the ED or on unit. 0   Restraints or seclusion episode in past 72 hours. 0   Recent (last 7 days) or current verbal aggression, agitation, yelling, etc., while in the ED or unit. 0   Active psychosis. 1   Need for constant or near constant redirection (from leaving, from others, etc).  0   Intrusive or disruptive behaviors. 0   Patient requires 3 or more hours of individualized nursing care per 8-hour shift (i.e. for ADLs, meds, therapeutic interventions). 0   TOTAL 4
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
## 2024-09-26 NOTE — PROGRESS NOTES
CLINICAL NUTRITION SERVICES - BRIEF NOTE     Nutrition Prescription    RECOMMENDATIONS FOR MDs/PROVIDERS TO ORDER:  None at this time    Recommendations already ordered by Registered Dietitian (RD):  -Ensure Max Protein BID  -Cancel Ensure Enlive    Future/Additional Recommendations:  RD to sign off at this time, but will remain available by consult if new nutrition problem arises.       REASON FOR ASSESSMENT  Eloy Storm is a/an 24 year old male assessed by the dietitian for Provider Order - pt request    Findings  RD visited with Eloy at bedside who reports concerns r/t weight gain during admission. Pt reports he always gains weight on psych units and would like to avoid further weight gain during current admission. Pt would like to eat <1500 kcal per day and skip most meals, writer discouraged this as it would provide less than pt's minimum estimated nutrition needs. Writer offered to order Ensure Max for higher protein supplement option BID if pt agrees to consume 100% of breakfast and at least 25% of lunch/dinner, pt is agreeable to this plan.     INTERVENTIONS  Implementation  Nutrition Education: RD role in care   Medical food supplement therapy  Nutrition education for recommended modifications     Follow up/Monitoring  RD to sign off at this time, but will remain available by consult if new nutrition problem arises.      Octavia Han, MPH, RDN, LD  Behavioral Health Adult & Pediatric Dietitian  [facility] Clinical Dietitian Voctori, Teams, or Desk: [phone]  Weekend/Holiday Vocera: Weekend Holiday Clinical Dietitian [Multi Site Groups]

## 2024-09-26 NOTE — PLAN OF CARE
Eloy was awake at the start of shift, verbalized constipation and received PRN Miralax at 2338.  Woke up multiple times between 5470-8781 and was looking in POD 1 refrigerator  for something to eat.   Took 4 bottles of Ensure, few applesauce, few pudding, and few fruit cocktail.  Denies pain, SI, delusions, and  hallucination   Noted to have slept for 5 hours without respiratory distress.           Problem: Adult Behavioral Health Plan of Care  Goal: Plan of Care Review  Outcome: Progressing  Goal: Adheres to Safety Considerations for Self and Others  Intervention: Develop and Maintain Individualized Safety Plan  Recent Flowsheet Documentation  Taken 9/26/2024 0447 by Disha Finn, RN  Safety Measures: safety rounds completed  Goal: Absence of New-Onset Illness or Injury  Intervention: Identify and Manage Fall Risk  Recent Flowsheet Documentation  Taken 9/26/2024 0447 by Disha Finn, RN  Safety Measures: safety rounds completed   Goal Outcome Evaluation:

## 2024-09-26 NOTE — PLAN OF CARE
"Pt had an uneventful shift this day. Pt denies SI, SIB, HI and thoughts of hurting others. Pt denies depression, anxiety and A/VH.  Pt mood was calm, flat affect, pt did smile once today in response to humorous comment by RN writer.    Pt participated in group, was visible in the milieu with appropriate interaction with staff, little socialization with peers.     Pt was medication compliant with scheduled medications with the exception of flomax and miralax stating he is urinating and having bm's \"fine\".PRN nicotine given x1. Pt had a consultation visit with the nutritionist this shift. See note and recommendations.     No behavioral outbursts or agitation reported or observed this shift.     VS reviewed: /73   Pulse 79   Temp 97.7  F (36.5  C) (Temporal)   Resp 18   Ht 1.854 m (6' 1\")   Wt 94.8 kg (209 lb)   SpO2 96%   BMI 27.57 kg/m   . Patient denies  pain.    Length of stay: 23  "

## 2024-09-26 NOTE — PLAN OF CARE
"  Rehab Group    Start time: 1030  End time: 1130  Patient time total: 50 minutes    attended partial group    #5 attended   Group Type: OT Clinic   Group Topic Covered: coping skills     Group Session Detail:    Intervention: Pt participated in a OT Clinic group to facilitate coping skills exploration and creative expression through personally meaningful activities, and to encourage utilization of these healthy coping skills to promote overall health and wellness. Group included clinical observation of social, cognitive and kinesthetic performance skills to inform treatment and safe discharge planning.    Mood/Affect: Pleasant       Plan: Patient encouraged to maintain attendance for continued ongoing support in working towards occupational therapy goals to support overall treatment/care.        Patient Detail:    Active participant for duration of time in group, leaving briefly to meet with provider. Expressed interest in starting a new project, and wanting to try something he has not done before. Able to setup with new project that patient expressed enjoyment, stating \"I think this is my favorite thing that I've done here\". Continues to be mostly focused on project during this time, making brief comments here and there or replying when another peer initiates the interaction. Continues to ask to use materials outside of group, but is also accepting of writer declining these requests.        [id number] OT Group (2 or more in attendance)    Patient Active Problem List   Diagnosis    Suicidal ideation    Psychosis (H)    Acute pulmonary embolism without acute cor pulmonale (H)    Alcohol use disorder, moderate, in sustained remission, dependence (H)    Anxiety    Cannabis use disorder, severe, dependence (H)    Class 1 drug-induced obesity without serious comorbidity with body mass index (BMI) of 33.0 to 33.9 in adult    Depression, major, single episode, moderate (H)    Episodic mood disorder (H24)    KATHE (generalized " anxiety disorder)    History of marijuana use    Obesity (BMI 30-39.9)    Obesity, Class I, BMI 30-34.9    Tobacco use disorder, moderate, in sustained remission    Schizoaffective disorder, bipolar type (H)    Psychosis, unspecified psychosis type (H)    Tardive dyskinesia

## 2024-09-27 PROCEDURE — 250N000013 HC RX MED GY IP 250 OP 250 PS 637

## 2024-09-27 PROCEDURE — 124N000002 HC R&B MH UMMC

## 2024-09-27 PROCEDURE — 250N000013 HC RX MED GY IP 250 OP 250 PS 637: Performed by: PSYCHIATRY & NEUROLOGY

## 2024-09-27 PROCEDURE — 250N000013 HC RX MED GY IP 250 OP 250 PS 637: Performed by: STUDENT IN AN ORGANIZED HEALTH CARE EDUCATION/TRAINING PROGRAM

## 2024-09-27 PROCEDURE — 97150 GROUP THERAPEUTIC PROCEDURES: CPT | Mod: GO

## 2024-09-27 PROCEDURE — 99232 SBSQ HOSP IP/OBS MODERATE 35: CPT | Performed by: PSYCHIATRY & NEUROLOGY

## 2024-09-27 PROCEDURE — 250N000012 HC RX MED GY IP 250 OP 636 PS 637

## 2024-09-27 RX ADMIN — NICOTINE POLACRILEX 4 MG: 2 GUM, CHEWING BUCCAL at 14:46

## 2024-09-27 RX ADMIN — SENNOSIDES AND DOCUSATE SODIUM 1 TABLET: 8.6; 5 TABLET ORAL at 19:04

## 2024-09-27 RX ADMIN — NICOTINE POLACRILEX 4 MG: 2 GUM, CHEWING BUCCAL at 09:35

## 2024-09-27 RX ADMIN — NICOTINE POLACRILEX 4 MG: 2 GUM, CHEWING BUCCAL at 17:12

## 2024-09-27 RX ADMIN — NICOTINE 1 PATCH: 21 PATCH, EXTENDED RELEASE TRANSDERMAL at 08:23

## 2024-09-27 RX ADMIN — ASPIRIN 81 MG CHEWABLE TABLET 81 MG: 81 TABLET CHEWABLE at 07:49

## 2024-09-27 RX ADMIN — METFORMIN HYDROCHLORIDE 500 MG: 500 TABLET, FILM COATED ORAL at 18:37

## 2024-09-27 RX ADMIN — BUPRENORPHINE AND NALOXONE 1 FILM: 2; .5 FILM BUCCAL; SUBLINGUAL at 19:04

## 2024-09-27 RX ADMIN — NICOTINE POLACRILEX 4 MG: 2 GUM, CHEWING BUCCAL at 13:34

## 2024-09-27 RX ADMIN — OLANZAPINE 15 MG: 15 TABLET, ORALLY DISINTEGRATING ORAL at 19:04

## 2024-09-27 RX ADMIN — NICOTINE POLACRILEX 4 MG: 2 GUM, CHEWING BUCCAL at 20:27

## 2024-09-27 RX ADMIN — POLYETHYLENE GLYCOL 3350 17 G: 17 POWDER, FOR SOLUTION ORAL at 14:58

## 2024-09-27 RX ADMIN — TAMSULOSIN HYDROCHLORIDE 0.4 MG: 0.4 CAPSULE ORAL at 07:49

## 2024-09-27 RX ADMIN — THERA TABS 1 TABLET: TAB at 07:49

## 2024-09-27 RX ADMIN — NICOTINE POLACRILEX 4 MG: 2 GUM, CHEWING BUCCAL at 11:41

## 2024-09-27 RX ADMIN — OLANZAPINE 5 MG: 10 TABLET, ORALLY DISINTEGRATING ORAL at 07:49

## 2024-09-27 RX ADMIN — BUPRENORPHINE AND NALOXONE 1 FILM: 2; .5 FILM BUCCAL; SUBLINGUAL at 07:49

## 2024-09-27 RX ADMIN — METFORMIN HYDROCHLORIDE 500 MG: 500 TABLET, FILM COATED ORAL at 07:49

## 2024-09-27 RX ADMIN — POLYETHYLENE GLYCOL 3350 17 G: 17 POWDER, FOR SOLUTION ORAL at 14:44

## 2024-09-27 RX ADMIN — QUETIAPINE 200 MG: 200 TABLET, FILM COATED, EXTENDED RELEASE ORAL at 19:04

## 2024-09-27 ASSESSMENT — ACTIVITIES OF DAILY LIVING (ADL)
ADLS_ACUITY_SCORE: 28
ADLS_ACUITY_SCORE: 28
LAUNDRY: UNABLE TO COMPLETE
ADLS_ACUITY_SCORE: 28
HYGIENE/GROOMING: INDEPENDENT
ADLS_ACUITY_SCORE: 28
LAUNDRY: UNABLE TO COMPLETE
ADLS_ACUITY_SCORE: 28
ADLS_ACUITY_SCORE: 28
DRESS: INDEPENDENT
ADLS_ACUITY_SCORE: 28
ADLS_ACUITY_SCORE: 28
HYGIENE/GROOMING: INDEPENDENT
ADLS_ACUITY_SCORE: 28
ORAL_HYGIENE: INDEPENDENT
ORAL_HYGIENE: INDEPENDENT
ADLS_ACUITY_SCORE: 28
DRESS: INDEPENDENT;SCRUBS (BEHAVIORAL HEALTH)

## 2024-09-27 NOTE — PLAN OF CARE
Eloy is visible in the milieu most of the shift and  presented with flat affect.  Complained of been bored and used coloring as a coping mechanism.   Noted swallowing his medication without cheeking this shift   No harm and assault  to self and others noted   Pt denies pain  SI/HI, delusions, and hallucination   Remain  cooperative with cares and behaviorally controlled without issue.     Problem: Adult Behavioral Health Plan of Care  Goal: Plan of Care Review  Outcome: Progressing  Flowsheets (Taken 9/26/2024 1923)  Patient Agreement with Plan of Care:   agrees with comment (describe)   agrees  Goal: Optimized Coping Skills in Response to Life Stressors  Intervention: Promote Effective Coping Strategies  Recent Flowsheet Documentation  Taken 9/26/2024 1923 by Disha Finn RN  Supportive Measures:   active listening utilized   self-care encouraged  Goal: Develops/Participates in Therapeutic [place] to Support Successful Transition  Intervention: Foster Therapeutic [place]  Recent Flowsheet Documentation  Taken 9/26/2024 1923 by Disha Finn RN  Trust Relationship/Rapport:   care explained   choices provided   questions answered   thoughts/feelings acknowledged     Problem: Psychotic Signs/Symptoms  Goal: Optimal Cognitive Function (Psychotic Signs/Symptoms)  Intervention: Support and Promote Cognitive Ability  Recent Flowsheet Documentation  Taken 9/26/2024 1923 by Disha Finn RN  Trust Relationship/Rapport:   care explained   choices provided   questions answered   thoughts/feelings acknowledged  Goal: Increased Participation and Engagement (Psychotic Signs/Symptoms)  Intervention: Facilitate Participation and Engagement  Recent Flowsheet Documentation  Taken 9/26/2024 1923 by Disha Finn RN  Supportive Measures:   active listening utilized   self-care encouraged  Goal: Improved Mood Symptoms (Psychotic Signs/Symptoms)  Intervention: Optimize Emotion and Mood  Recent  Flowsheet Documentation  Taken 9/26/2024 1923 by Disha Finn RN  Supportive Measures:   active listening utilized   self-care encouraged  Goal: Enhanced Social, Occupational or Functional Skills (Psychotic Signs/Symptoms)  Intervention: Promote Social, Occupational and Functional Ability  Recent Flowsheet Documentation  Taken 9/26/2024 1923 by Disha Finn, RN  Social Functional Ability Promotion: autonomy promoted  Trust Relationship/Rapport:   care explained   choices provided   questions answered   thoughts/feelings acknowledged   Goal Outcome Evaluation:    Plan of Care Reviewed With: patient

## 2024-09-27 NOTE — CARE PLAN
Rehab Group    Start time: 1030  End time: 1045  Patient time total: 15 minutes    attended full group    #2 attended   Group Type: general health and coping   Group Topic Covered: coping skills     Group Session Detail:  Pt attended the OT discussion group which involved reading a selected quote, discussing it's meaning, and relating it to personal life experience or situation.     Patient Response/Contribution:  organized, attentive, and actively engaged     Patient Detail:    Pt chose quotes that led him to discuss what success means, and the importance of not comparing others success to himself.  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      [id number] OT Group (2 or more in attendance)      Patient Active Problem List   Diagnosis    Suicidal ideation    Psychosis (H)    Acute pulmonary embolism without acute cor pulmonale (H)    Alcohol use disorder, moderate, in sustained remission, dependence (H)    Anxiety    Cannabis use disorder, severe, dependence (H)    Class 1 drug-induced obesity without serious comorbidity with body mass index (BMI) of 33.0 to 33.9 in adult    Depression, major, single episode, moderate (H)    Episodic mood disorder (H)    KATHE (generalized anxiety disorder)    History of marijuana use    Obesity (BMI 30-39.9)    Obesity, Class I, BMI 30-34.9    Tobacco use disorder, moderate, in sustained remission    Schizoaffective disorder, bipolar type (H)    Psychosis, unspecified psychosis type (H)    Tardive dyskinesia

## 2024-09-27 NOTE — PROGRESS NOTES
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  Discharge location:  TBD . Consider ACT team referral, will need discharge planning conversation when more stable  Discharge Medications: not ordered  Follow-up Appointments: not scheduled     Virginie Loya MD  [facility]

## 2024-09-27 NOTE — PLAN OF CARE
Team Note Due:  Wednesday  *Do Friday     Assessment/Intervention/Current Symtoms and Care Coordination:  Chart review and met with team, discussed pt progress, symptomology, and response to treatment. Discussed the discharge plan and any potential impediments to discharge.    Per team, pt is ready for discharge. Writer met with pt who states he would like to explore housing options with single apartments as he does not like having a room mate. Pt is interested in [facility] apartment. Writer contacted [facility] and per Bro they currently do not have openings. Writer updated pt and advised his [facility] CM can be followed up with as she may have knowledge of the housing supports program.     Mom called left lengthy message about pt losing his housing and that his current provider is not a suboxone provider.  called and requesting an update on the pt's care, it appears pt declined [facility]. Writer sent info to provider Vincenzo with 's cell phone info.      Discharge Plan or Goal:  Cannot return to [facility] due to Suboxone use.   TBD - awaiting [facility] CM contact info to search for other housing options.      Barriers to Discharge:  Symptoms - jamila, agitation  Managing his medications in order to stabilize     Referral Status:  TBD     Legal Status:  Committed and [facility]: [place]  File Number: [id number]  Start and expiration date of commitment:   [facility] meds: Zyprexa, Seroquel, Invega and Abilify     Contacts (include KELBY status):   (Megan) Ph: [phone]  - KELBY only for specific thing to discuss discharge   Psych: Dr. Khan, [place] [facility] - Declined [facility] called from cell phone for an update #[phone]  Michelle Hensonnadja/Mother: [phone]  [facility] Cadi CM [phone]      New Commitment CM:  Chasity [email or web address] [phone]- Primary CTC emailed CM Chasity again as I haven't heard back if she approves  group home with outpatient supports while awaiting ACT referral.      Upcoming Meetings and Dates/Important Information and next steps:  Update discharge referral form at discharge   PD and COS at discharge  Follow up psych appt

## 2024-09-27 NOTE — PLAN OF CARE
NOC Shift Report    Pt slept 4.5 hours overnight.     Pt was awake at the start of shift. He presented as calm and quiet. He was in and out of his room, briefly walked the hallway, and spent the remainder time in his room. Pt denied pain and declined prns for sleep or anxiety.    When the pt was sleeping, no apparent respiratory distress was observed.    PRNs given: None.    Pt did not present with any acute medical or behavioral concerns overnight.    Will continue to monitor.     Goal Outcome Evaluation:  Problem: Sleep Disturbance  Goal: Adequate Sleep/Rest  Outcome: Not Progressing

## 2024-09-27 NOTE — PLAN OF CARE
"Problem: Adult Behavioral Health Plan of Care  Goal: Adheres to Safety Considerations for Self and Others  Outcome: Progressing  The patient was visible and friendly with both peers and staff while in the milieu, and attended group. His affect is flat and mood is calm. He is compliant with medication administration and vitals check. He did not complain of pain and he told writer that he voided and had a bm this morning without issues.  He ate 100% of  breakfast and lunch without issues. Patient was polite and appropriate with no agitation, aggression or safety concerns. No complain of pain or discomfort, patient did not take a shower this shift.    /64 (BP Location: Left arm, Patient Position: Sitting, Cuff Size: Adult Regular)   Pulse 78   Temp 97.9  F (36.6  C) (Temporal)   Resp 16   Ht 1.854 m (6' 1\")   Wt 94.8 kg (209 lb)   SpO2 98%   BMI 27.57 kg/m          "

## 2024-09-27 NOTE — PROGRESS NOTES
"\"I feel like Suboxone doesn't work the way it's supposed to anymore. I feel like I'm in withdrawal and my stomach hurts sometimes.\"  Agreed MD would be notified by note. Approached nurse with this concern. Rapid eye blinking at the time.  "

## 2024-09-27 NOTE — PLAN OF CARE
"  Problem: Psychotic Signs/Symptoms  Goal: Improved Behavioral Control (Psychotic Signs/Symptoms)  Outcome: Progressing   Goal Outcome Evaluation:  Patient was calm and cooperative with cares. Patient was visible in milieu most of the shift pacing the halls,watching television and coloring. patient present with flat affect. Patient denies all mental health issues including anxiety, depression and SI/SIB/HI. Patient also denies auditory and visual hallucination. Patient denies pain or discomfort. No medication side effect was reported or observed. Patient eating, drinking and sleeping well.No acute behavioral concern this shift.    /74 (BP Location: Right arm, Patient Position: Sitting, Cuff Size: Adult Regular)   Pulse 80   Temp 97.2  F (36.2  C) (Temporal)   Resp 17   Ht 1.854 m (6' 1\")   Wt 94.8 kg (209 lb)   SpO2 98%   BMI 27.57 kg/m     "

## 2024-09-27 NOTE — PLAN OF CARE
BEH IP Unit Acuity Rating Score (UARS)  Patient is given one point for every criteria they meet.    CRITERIA SCORING   On a 72 hour hold, court hold, committed, stay of commitment, or revocation. 1    Patient LOS on BEH unit exceeds 20 days. 1 LOS: 24   Patient under guardianship, 55+, otherwise medically complex, or under age 11. 0   Suicide ideation without relief of precipitating factors. 0   Current plan for suicide. 0   Current plan for homicide. 0   Imminent risk or actual attempt to seriously harm another without relief of factors precipitating the attempt. 0   Severe dysfunction in daily living (ex: complete neglect for self care, extreme disruption in vegetative function, extreme deterioration in social interactions). 1   Recent (last 7 days) or current physical aggression in the ED or on unit. 0   Restraints or seclusion episode in past 72 hours. 0   Recent (last 7 days) or current verbal aggression, agitation, yelling, etc., while in the ED or unit. 0   Active psychosis. 1   Need for constant or near constant redirection (from leaving, from others, etc).  0   Intrusive or disruptive behaviors. 0   Patient requires 3 or more hours of individualized nursing care per 8-hour shift (i.e. for ADLs, meds, therapeutic interventions). 0   TOTAL 4

## 2024-09-27 NOTE — CARE PLAN
Rehab Group    Start time: 1115  End time: 1200  Patient time total: 45 minutes    attended full group    #4 attended   Group Type: OT Clinic   Group Topic Covered: activity therapy, coping skills, and social skills     Group Session Detail:  The purpose of occupational therapy clinic is to facilitate coping skill exploration, creative expression within personally meaningful activities, and clinical observation of social, cognitive, and kinesthetic performance skills.      Patient Response/Contribution:  positive affect, cooperative with task, and organized     Patient Detail:  Pt attended OT clinic group, was able to initiate task and ask for help as needed. Pt demonstrated good planning, task focus, and problem solving. Appeared comfortable interacting with peers.        [id number] OT Group (2 or more in attendance)      Patient Active Problem List   Diagnosis    Suicidal ideation    Psychosis (H)    Acute pulmonary embolism without acute cor pulmonale (H)    Alcohol use disorder, moderate, in sustained remission, dependence (H)    Anxiety    Cannabis use disorder, severe, dependence (H)    Class 1 drug-induced obesity without serious comorbidity with body mass index (BMI) of 33.0 to 33.9 in adult    Depression, major, single episode, moderate (H)    Episodic mood disorder (H)    KATHE (generalized anxiety disorder)    History of marijuana use    Obesity (BMI 30-39.9)    Obesity, Class I, BMI 30-34.9    Tobacco use disorder, moderate, in sustained remission    Schizoaffective disorder, bipolar type (H)    Psychosis, unspecified psychosis type (H)    Tardive dyskinesia

## 2024-09-28 PROCEDURE — 250N000013 HC RX MED GY IP 250 OP 250 PS 637

## 2024-09-28 PROCEDURE — 250N000012 HC RX MED GY IP 250 OP 636 PS 637

## 2024-09-28 PROCEDURE — 250N000013 HC RX MED GY IP 250 OP 250 PS 637: Performed by: STUDENT IN AN ORGANIZED HEALTH CARE EDUCATION/TRAINING PROGRAM

## 2024-09-28 PROCEDURE — 250N000013 HC RX MED GY IP 250 OP 250 PS 637: Performed by: PSYCHIATRY & NEUROLOGY

## 2024-09-28 PROCEDURE — 124N000002 HC R&B MH UMMC

## 2024-09-28 RX ADMIN — ASPIRIN 81 MG CHEWABLE TABLET 81 MG: 81 TABLET CHEWABLE at 08:52

## 2024-09-28 RX ADMIN — METFORMIN HYDROCHLORIDE 500 MG: 500 TABLET, FILM COATED ORAL at 08:52

## 2024-09-28 RX ADMIN — BUPRENORPHINE AND NALOXONE 1 FILM: 2; .5 FILM BUCCAL; SUBLINGUAL at 08:52

## 2024-09-28 RX ADMIN — QUETIAPINE 200 MG: 200 TABLET, FILM COATED, EXTENDED RELEASE ORAL at 18:43

## 2024-09-28 RX ADMIN — NICOTINE POLACRILEX 4 MG: 2 GUM, CHEWING BUCCAL at 20:05

## 2024-09-28 RX ADMIN — THERA TABS 1 TABLET: TAB at 08:52

## 2024-09-28 RX ADMIN — OLANZAPINE 10 MG: 10 TABLET, FILM COATED ORAL at 17:32

## 2024-09-28 RX ADMIN — SENNOSIDES AND DOCUSATE SODIUM 1 TABLET: 8.6; 5 TABLET ORAL at 08:52

## 2024-09-28 RX ADMIN — SENNOSIDES AND DOCUSATE SODIUM 1 TABLET: 8.6; 5 TABLET ORAL at 19:40

## 2024-09-28 RX ADMIN — BUPRENORPHINE AND NALOXONE 1 FILM: 2; .5 FILM BUCCAL; SUBLINGUAL at 19:40

## 2024-09-28 RX ADMIN — NICOTINE 1 PATCH: 21 PATCH, EXTENDED RELEASE TRANSDERMAL at 08:51

## 2024-09-28 RX ADMIN — OLANZAPINE 5 MG: 10 TABLET, ORALLY DISINTEGRATING ORAL at 08:52

## 2024-09-28 RX ADMIN — NICOTINE POLACRILEX 4 MG: 2 GUM, CHEWING BUCCAL at 12:21

## 2024-09-28 RX ADMIN — OLANZAPINE 15 MG: 15 TABLET, ORALLY DISINTEGRATING ORAL at 22:04

## 2024-09-28 RX ADMIN — METFORMIN HYDROCHLORIDE 500 MG: 500 TABLET, FILM COATED ORAL at 19:40

## 2024-09-28 RX ADMIN — NICOTINE POLACRILEX 4 MG: 2 GUM, CHEWING BUCCAL at 11:02

## 2024-09-28 RX ADMIN — POLYETHYLENE GLYCOL 3350 17 G: 17 POWDER, FOR SOLUTION ORAL at 08:51

## 2024-09-28 RX ADMIN — TAMSULOSIN HYDROCHLORIDE 0.4 MG: 0.4 CAPSULE ORAL at 08:52

## 2024-09-28 RX ADMIN — NICOTINE POLACRILEX 4 MG: 2 GUM, CHEWING BUCCAL at 16:13

## 2024-09-28 RX ADMIN — NICOTINE POLACRILEX 4 MG: 2 GUM, CHEWING BUCCAL at 13:59

## 2024-09-28 ASSESSMENT — ACTIVITIES OF DAILY LIVING (ADL)
ADLS_ACUITY_SCORE: 28
DRESS: INDEPENDENT;SCRUBS (BEHAVIORAL HEALTH)
ADLS_ACUITY_SCORE: 28
ADLS_ACUITY_SCORE: 28
HYGIENE/GROOMING: INDEPENDENT
ADLS_ACUITY_SCORE: 28
ORAL_HYGIENE: INDEPENDENT
ADLS_ACUITY_SCORE: 28
LAUNDRY: UNABLE TO COMPLETE

## 2024-09-28 NOTE — PLAN OF CARE
09/27/24 1932   Individualization/Patient Specific Goals   Patient Personal Strengths expressive of needs;resilient;family/social support   Patient Vulnerabilities history of unsuccessful treatment;substance abuse/addiction   Interprofessional Rounds   Participants psychiatrist;nursing;[facility]   Behavioral Team Discussion   Participants , [facility] Olivia LEDESMA RN Charge Tenisha   Progress Pt symptoms have improved. Pt near or at baseline and is unable to return to group home due to being placed on suboxone   Anticipated length of stay 1-2 weeks   Continued Stay Criteria/Rationale housing barriers   Medical/Physical See H&P, hx of PE   Precautions see below   Plan CD treatment, IRTS, or new group home   Rationale for change in precautions or plan Pt is unable to return to group home   Safety Plan Unit protocol, Safety plan to be completed prior to d/c   Anticipated Discharge Disposition group home;IRTS;substance use treatment     PRECAUTIONS AND SAFETY    Behavioral Orders   Procedures    Assault precautions    Cheeking Precautions (behavioral units)     Patient Observed swallowing PO medications; Patient asked to drink water after swallowing medication; Patient in Staff line of sight for 15 minutes after medication given; Mouth checks after PO administration (patient asked to open mouth and stick out their tongue).    Code 1 - Restrict to Unit    Routine Programming     As clinically indicated    Status 15     Every 15 minutes.    Suicide precautions: Suicide Risk: MODERATE; Clinical rationale to override score: Exhibiting Suicidal/self-harm behaviors or thoughts     Patients on Suicide Precautions should have a Combination Diet ordered that includes a Diet selection(s) AND a Behavioral Tray selection for Safe Tray - with utensils, or Safe Tray - NO utensils       Order Specific Question:   Suicide Risk     Answer:   MODERATE     Order Specific Question:   Clinical rationale to override score:     Answer:    Exhibiting Suicidal/self-harm behaviors or thoughts       Safety  Safety WDL: WDL  Patient Location: patient room, own  Observed Behavior: walking  Safety Measures: safety rounds completed  Diversional Activity: art work, television, music  De-Escalation Techniques: appropriate behavior reinforced, medication administered, medication offered  Suicidality: Status 15, Minimal furniture in room  Seizure precautions: clutter free environment, calm, consistent lighting  Assault: status 15, private room  Elopement Interventions: status 15, room away from unit doors, behavioral scrubs (pajamas), signs posted on unit entrance / exit doors  Additional Documentation:  (cheeking)

## 2024-09-28 NOTE — PLAN OF CARE
Problem: Behavioral Disturbance  Goal: Behavioral Disturbance  Description: Signs and symptoms of listed problems will be absent or manageable by discharge or transition of care.  Outcome: Progressing   Goal Outcome Evaluation:    Eloy appeared to sleep a total of 5 hours this night shift.  Up a few times during the night for many puddings.  No prns given or requested.  No behavior concerns noted.  Calm, quiet, and polite.

## 2024-09-28 NOTE — PLAN OF CARE
"  Problem: Behavioral Disturbance  Goal: Behavioral Disturbance  Description: Signs and symptoms of listed problems will be absent or manageable by discharge or transition of care.  Outcome: Not Progressing   Goal Outcome Evaluation:    Plan of Care Reviewed With: patient      Initially the patient was cooperative with assessment. Patient endorses depression. He denies anxiety, auditory and visual hallucinations. Patient denies hurting him self or some one else.The patient approached to nursing window requested for anxiety medication and writer offer Seroquel which he refused. He requested for Benzo.On call provider was notified. Around 1630 pm patient broke the headphone, pacing the halls, pour the cup of water under his door. Patient was agitated and started to be disruptive on the unit. Patient threw pudding all over the wall in his room and on the floor. He also continued to spill water on the floor along with the pudding.Patient was stating  \"I want to kill my self\".  Writer and another RN redirected.Patient was stating \" fuck you bitch, you have always been mean to me, you have been nothing but a bitch to me since I got here, bitch get out of my room\".Code 21 was called, FLY team show up to the unit and patient accepted Zyprexa 10 mg oral tablet. He agree to be safe for himself and others. Patient voluntarily moved to his room  stayed for 15-20 minute and come out pacing the halls. He detached the door key fob from its panel and the stair secured pass code.He reached up and removed cover from the camera. Around 18:20 pm another code 21 was called, FLY team redirected him to deescalate but continues to escalate. Then writer and the team decided to put him in seclusion. Patient agreed to walk and two staff escorted him to seclusion. Seclusion started at 18:30 pm, 15 minutes assessment, and face to face assessment was done at seclusion. The patient calm down and requested to come out of seclusions.  Writer and " "other RN assessed the patient, he agreed to be safe to himself and to others.  He also agreed not to damage properties, after that walked him out of seclusions at 19:35 pm. Patient calm with flat affect. Cooperative with assessment. Patient eating, drinking and sleeping well. Denies pain or discomfort. No medications side effect reported or observed. Vital sigh stable. The patient is restricted to hot fluid,television remote, and headphone.        /74 (BP Location: Right arm, Patient Position: Sitting, Cuff Size: Adult Regular)   Pulse 94   Temp 97.4  F (36.3  C) (Temporal)   Resp 18   Ht 1.854 m (6' 1\")   Wt 94.8 kg (209 lb)   SpO2 96%   BMI 27.57 kg/m                 "
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
## 2024-09-29 LAB
ALBUMIN SERPL BCG-MCNC: 4.2 G/DL (ref 3.5–5.2)
ALP SERPL-CCNC: 93 U/L (ref 40–150)
ALT SERPL W P-5'-P-CCNC: 28 U/L (ref 0–70)
ANION GAP SERPL CALCULATED.3IONS-SCNC: 12 MMOL/L (ref 7–15)
AST SERPL W P-5'-P-CCNC: 34 U/L (ref 0–45)
BASOPHILS # BLD AUTO: 0 10E3/UL (ref 0–0.2)
BASOPHILS NFR BLD AUTO: 0 %
BILIRUB SERPL-MCNC: 0.4 MG/DL
BUN SERPL-MCNC: 17.5 MG/DL (ref 6–20)
CALCIUM SERPL-MCNC: 9 MG/DL (ref 8.8–10.4)
CHLORIDE SERPL-SCNC: 101 MMOL/L (ref 98–107)
CREAT SERPL-MCNC: 0.73 MG/DL (ref 0.67–1.17)
EGFRCR SERPLBLD CKD-EPI 2021: >90 ML/MIN/1.73M2
EOSINOPHIL # BLD AUTO: 0.1 10E3/UL (ref 0–0.7)
EOSINOPHIL NFR BLD AUTO: 2 %
ERYTHROCYTE [DISTWIDTH] IN BLOOD BY AUTOMATED COUNT: 12.2 % (ref 10–15)
GLUCOSE SERPL-MCNC: 107 MG/DL (ref 70–99)
HCO3 SERPL-SCNC: 24 MMOL/L (ref 22–29)
HCT VFR BLD AUTO: 40.9 % (ref 40–53)
HGB BLD-MCNC: 14.2 G/DL (ref 13.3–17.7)
IMM GRANULOCYTES # BLD: 0 10E3/UL
IMM GRANULOCYTES NFR BLD: 0 %
LIPASE SERPL-CCNC: 30 U/L (ref 13–60)
LYMPHOCYTES # BLD AUTO: 2.1 10E3/UL (ref 0.8–5.3)
LYMPHOCYTES NFR BLD AUTO: 26 %
MCH RBC QN AUTO: 31.3 PG (ref 26.5–33)
MCHC RBC AUTO-ENTMCNC: 34.7 G/DL (ref 31.5–36.5)
MCV RBC AUTO: 90 FL (ref 78–100)
MONOCYTES # BLD AUTO: 0.8 10E3/UL (ref 0–1.3)
MONOCYTES NFR BLD AUTO: 10 %
NEUTROPHILS # BLD AUTO: 5 10E3/UL (ref 1.6–8.3)
NEUTROPHILS NFR BLD AUTO: 62 %
NRBC # BLD AUTO: 0 10E3/UL
NRBC BLD AUTO-RTO: 0 /100
PLATELET # BLD AUTO: 239 10E3/UL (ref 150–450)
POTASSIUM SERPL-SCNC: 4.3 MMOL/L (ref 3.4–5.3)
PROT SERPL-MCNC: 6.6 G/DL (ref 6.4–8.3)
RBC # BLD AUTO: 4.53 10E6/UL (ref 4.4–5.9)
SODIUM SERPL-SCNC: 137 MMOL/L (ref 135–145)
WBC # BLD AUTO: 8 10E3/UL (ref 4–11)

## 2024-09-29 PROCEDURE — 84146 ASSAY OF PROLACTIN: CPT

## 2024-09-29 PROCEDURE — 124N000002 HC R&B MH UMMC

## 2024-09-29 PROCEDURE — 250N000013 HC RX MED GY IP 250 OP 250 PS 637

## 2024-09-29 PROCEDURE — 85004 AUTOMATED DIFF WBC COUNT: CPT | Performed by: PSYCHIATRY & NEUROLOGY

## 2024-09-29 PROCEDURE — 250N000013 HC RX MED GY IP 250 OP 250 PS 637: Performed by: STUDENT IN AN ORGANIZED HEALTH CARE EDUCATION/TRAINING PROGRAM

## 2024-09-29 PROCEDURE — 36415 COLL VENOUS BLD VENIPUNCTURE: CPT | Performed by: PSYCHIATRY & NEUROLOGY

## 2024-09-29 PROCEDURE — 83690 ASSAY OF LIPASE: CPT | Performed by: PSYCHIATRY & NEUROLOGY

## 2024-09-29 PROCEDURE — 250N000012 HC RX MED GY IP 250 OP 636 PS 637

## 2024-09-29 PROCEDURE — 99232 SBSQ HOSP IP/OBS MODERATE 35: CPT | Performed by: PSYCHIATRY & NEUROLOGY

## 2024-09-29 PROCEDURE — 80053 COMPREHEN METABOLIC PANEL: CPT | Performed by: PSYCHIATRY & NEUROLOGY

## 2024-09-29 PROCEDURE — 250N000013 HC RX MED GY IP 250 OP 250 PS 637: Performed by: PSYCHIATRY & NEUROLOGY

## 2024-09-29 PROCEDURE — 97150 GROUP THERAPEUTIC PROCEDURES: CPT | Mod: GO

## 2024-09-29 RX ADMIN — SENNOSIDES AND DOCUSATE SODIUM 1 TABLET: 8.6; 5 TABLET ORAL at 19:42

## 2024-09-29 RX ADMIN — THERA TABS 1 TABLET: TAB at 10:04

## 2024-09-29 RX ADMIN — NICOTINE 1 PATCH: 21 PATCH, EXTENDED RELEASE TRANSDERMAL at 10:04

## 2024-09-29 RX ADMIN — NICOTINE POLACRILEX 4 MG: 2 GUM, CHEWING BUCCAL at 13:26

## 2024-09-29 RX ADMIN — NICOTINE POLACRILEX 4 MG: 2 GUM, CHEWING BUCCAL at 14:20

## 2024-09-29 RX ADMIN — OLANZAPINE 15 MG: 15 TABLET, ORALLY DISINTEGRATING ORAL at 19:42

## 2024-09-29 RX ADMIN — NICOTINE POLACRILEX 4 MG: 2 GUM, CHEWING BUCCAL at 18:18

## 2024-09-29 RX ADMIN — SENNOSIDES AND DOCUSATE SODIUM 1 TABLET: 8.6; 5 TABLET ORAL at 10:04

## 2024-09-29 RX ADMIN — METFORMIN HYDROCHLORIDE 500 MG: 500 TABLET, FILM COATED ORAL at 10:04

## 2024-09-29 RX ADMIN — BUPRENORPHINE AND NALOXONE 1 FILM: 2; .5 FILM BUCCAL; SUBLINGUAL at 10:03

## 2024-09-29 RX ADMIN — METFORMIN HYDROCHLORIDE 500 MG: 500 TABLET, FILM COATED ORAL at 17:50

## 2024-09-29 RX ADMIN — NICOTINE POLACRILEX 4 MG: 2 GUM, CHEWING BUCCAL at 21:35

## 2024-09-29 RX ADMIN — OLANZAPINE 5 MG: 10 TABLET, ORALLY DISINTEGRATING ORAL at 10:11

## 2024-09-29 RX ADMIN — BUPRENORPHINE AND NALOXONE 1 FILM: 2; .5 FILM BUCCAL; SUBLINGUAL at 19:42

## 2024-09-29 RX ADMIN — ASPIRIN 81 MG CHEWABLE TABLET 81 MG: 81 TABLET CHEWABLE at 10:03

## 2024-09-29 RX ADMIN — QUETIAPINE 200 MG: 200 TABLET, FILM COATED, EXTENDED RELEASE ORAL at 19:42

## 2024-09-29 RX ADMIN — POLYETHYLENE GLYCOL 3350 17 G: 17 POWDER, FOR SOLUTION ORAL at 10:04

## 2024-09-29 RX ADMIN — QUETIAPINE FUMARATE 25 MG: 25 TABLET ORAL at 14:20

## 2024-09-29 RX ADMIN — TAMSULOSIN HYDROCHLORIDE 0.4 MG: 0.4 CAPSULE ORAL at 10:04

## 2024-09-29 RX ADMIN — NICOTINE POLACRILEX 4 MG: 2 GUM, CHEWING BUCCAL at 10:59

## 2024-09-29 RX ADMIN — ALUMINUM HYDROXIDE, MAGNESIUM HYDROXIDE, AND DIMETHICONE 30 ML: 200; 20; 200 SUSPENSION ORAL at 13:26

## 2024-09-29 ASSESSMENT — ACTIVITIES OF DAILY LIVING (ADL)
ADLS_ACUITY_SCORE: 28
ADLS_ACUITY_SCORE: 28
LAUNDRY: WITH SUPERVISION
ADLS_ACUITY_SCORE: 28
ORAL_HYGIENE: INDEPENDENT
ADLS_ACUITY_SCORE: 28
ORAL_HYGIENE: INDEPENDENT
ADLS_ACUITY_SCORE: 28
DRESS: INDEPENDENT;SCRUBS (BEHAVIORAL HEALTH)
ADLS_ACUITY_SCORE: 28
HYGIENE/GROOMING: INDEPENDENT
ADLS_ACUITY_SCORE: 28
HYGIENE/GROOMING: INDEPENDENT
DRESS: INDEPENDENT
ADLS_ACUITY_SCORE: 28
LAUNDRY: UNABLE TO COMPLETE

## 2024-09-29 NOTE — PROVIDER NOTIFICATION
"   09/28/24 1900   Seclusion or Restraint Order Upon Initiation and With Every Order   In Person Face to Face Assessment Conducted Yes-Eval of pt's immediate situation, reaction to intervention, complete review of systems assessment, behavioral assessment & review/assessment of hx, drugs & meds, recent labs, etc, behavioral condition, need to continue/terminate restraint/seclusion   Restraint Monitoring Q15 Minutes   Resp 17   Observed Behavior PA   Other Behavior Observed calm   Physical Comfort   (calm)   Other Observed Physical Comfort in no apparent distress   Skin Color and Appearance NS   Continuous Observation Yes   Restraint Type Q15 Minutes   Type of Restraint Seclusion   Physical Hold (Comment) Discontinued   Seclusion Continued       Around 1630 pm patient broke the headphone, pacing the halls, pour the cup of water under his door. Patient was agitated and started to be disruptive on the unit. Patient threw pudding all over the wall in his room and on the floor. He also continued to spill water on the floor along with the pudding.Patient was stating  \"I want to kill my self\".  Writer and another RN redirected.Patient was stating \" fuck you bitch, you have always been mean to me, you have been nothing but a bitch to me since I got here, bitch get out of my room\".Code 21 was called, FLY team show up to the unit and patient accepted Zyprexa 10 mg oral tablet. He agree to be safe for himself and others. Patient voluntarily moved to his room  stayed for 15-20 minute and come out pacing the halls. He detached the door key fob from its panel and the stair secured pass code.He reached up and removed cover from the camera. Around 18:20 pm another code 21 was called, FLY team redirected him to deescalate but continues to escalate. Then writer and the team decided to put him in seclusion. Seclusion started at 18:30 pm, 15 minutes assessment, and face to face assessment was done.         /74 (BP Location: " "Right arm, Patient Position: Sitting, Cuff Size: Adult Regular)   Pulse 94   Temp 97.4  F (36.3  C) (Temporal)   Resp 18   Ht 1.854 m (6' 1\")   Wt 94.8 kg (209 lb)   SpO2 96%   BMI 27.57 kg/m             "

## 2024-09-29 NOTE — PROVIDER NOTIFICATION
09/28/24 1900   Seclusion or Restraint Order Upon Initiation and With Every Order   In Person Face to Face Assessment Conducted Yes-Eval of pt's immediate situation, reaction to intervention, complete review of systems assessment, behavioral assessment & review/assessment of hx, drugs & meds, recent labs, etc, behavioral condition, need to continue/terminate restraint/seclusion     Patient face to face assessment completed. I reviewed most recent medication history, laboratory findings, vital signs and progress notes. Current condition and behavioral situation was discussed with attending provider. Patient did not experience physical sequelae related to seclusion and physical hold. Patient was sitting on the mat and responding to verbal interaction and pt denies or expressed any physical discomfort. Normal breathing pattern and denies shortness of breath.  Patient had no insight into behavioral situation at time of assessment and continue in seclusion. Dr. Ortega notified with pt condition and no new order related to face to face.

## 2024-09-29 NOTE — PROGRESS NOTES
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 programming    Safety:  - Continue precautions as noted above  - Status 15 minute checks    Legal Status: full commitment with [facility] for Zyprexa, Seroquel, Invega and Abilify    Disposition Plan   Reason for ongoing admission: poses an imminent risk to self, poses an imminent risk to others, and is unable to care for self due to severe psychosis or jamila  Discharge location:  TBD . Consider ACT team referral, will need discharge planning conversation when more stable  Discharge Medications: not ordered  Follow-up Appointments: not scheduled     Virginie Loya MD  [facility] Psychiat[facility]

## 2024-09-29 NOTE — PLAN OF CARE
Problem: Psychotic Signs/Symptoms  Goal: Improved Sleep (Psychotic Signs/Symptoms)  Intervention: Promote Healthy Sleep Hygiene  Recent Flowsheet Documentation  Taken 9/29/2024 0600 by Tasha Talamantes RN  Sleep Hygiene Promotion:   noise level reduced   regular sleep pattern promoted   room lighting adjusted   Goal Outcome Evaluation:    Patient appeared to sleep 6.25 hours this night shift.  No prns or snacks given or requested.  Room cleared of markers and glasses to avoid writing on the wall in his room and throwing water on the floor of his room and at other patients. No concerns were reported or noted.

## 2024-09-29 NOTE — PROVIDER NOTIFICATION
09/29/24 1803   C-SSRS (Daily/Shift Screen)   Q2 Suicidal Thoughts (Since Last Contact) 0-->no   Q3 Have you been thinking about how you might do this? 0-->no   Q4 Suicidal Intent without Specific Plan 0-->no   Q5 Suicide Intent with Specific Plan 0-->no   Q6 Suicide Behavior 0-->no   Level of Risk per Screen no risk indicated   Assess Risk to Self and Maintain Safety   Behavior Management behavioral plan reviewed;boundaries reinforced   Self-Harm Prevention environmental self-harm risks assessed   Enhanced Safety Measures room near unit station;review medications for side effects with activity   Promote Psychosocial Wellbeing   Family/Support System Care self-care encouraged;involvement promoted   Sleep/Rest Enhancement medication;regular sleep/rest pattern promoted;noise level reduced;comfort measures;consistent schedule promoted;massage given   Supportive Measures active listening utilized;goal-setting facilitated;positive reinforcement provided;problem-solving facilitated;relaxation techniques promoted;self-care encouraged;self-reflection promoted;self-responsibility promoted   Establish Safety Plan and Continuity of Care   Safe Transition Promotion personal safety plan developed;protective factors promoted;resources access evaluated   Environment of Care Checklist   Potentially harmful objects out of patient reach? yes   Personal belongings secured? yes   Patient dressed in hospital-provided attire only? yes   Plastic bags out of patient reach? yes   Patient care equipment (cords, cables, call bells, lines, and drains) shortened, removed, or accounted for? yes   Potentially toxic materials removed or secured? yes   Sharps container removed or secured? yes   Cabinets secured? yes   Room secured by RN per shift? yes

## 2024-09-29 NOTE — PROVIDER NOTIFICATION
09/28/24 1935   Restraint Type Q15 Minutes   Type of Restraint Physical Hold;Seclusion   Physical Hold (Comment) Discontinued   Seclusion Discontinued   Debrief Immediately Before or After Removal   Debriefing DO   Does patient understand why the event happened? Yes   Does patient agree to safe behaviors? Yes   What can we do differently so this doesn't happen again? Patient refuses to answer   Plan of care reviewed and modified Yes       Pt was calm in seclusion but did not agree to contract for safety he kept stating he will hurt staff hence why pt was kept in seclusion until he contracted for safety.     Writer assessed the patient, he agreed to be safe to himself and to others. He also agreed not to damage properties, after that walked him out of seclusions at 19:35 pm he is cooperative with assessment. Denies pain or discomfort.

## 2024-09-29 NOTE — PROVIDER NOTIFICATION
09/28/24 1935   Debrief Immediately Before or After Removal   Debriefing DO   Does patient understand why the event happened? Yes   Does patient agree to safe behaviors? Yes   What can we do differently so this doesn't happen again? Patient refuses to answer   Plan of care reviewed and modified Yes     Writer assessed the patient, he agreed to be safe to himself and to others. He also agreed not to damage properties, after that walked him out of seclusions at 19:35 pm, Cooperative with assessment. Denies pain or discomfort.

## 2024-09-29 NOTE — CARE PLAN
09/28/24 1900   Seclusion or Restraint Order Upon Initiation and With Every Order   In Person Face to Face Assessment Conducted Yes-Eval of pt's immediate situation, reaction to intervention, complete review of systems assessment, behavioral assessment & review/assessment of hx, drugs & meds, recent labs, etc, behavioral condition, need to continue/terminate restraint/seclusion

## 2024-09-29 NOTE — CARE PLAN
09/28/24 3295   Seclusion or Restraint Order Upon Initiation and With Every Order   Attending Provider Notified No (comment)   Attending Provider's Name Dr. Loya   In Person Face to Face Assessment Conducted Yes-Eval of pt's immediate situation, reaction to intervention, complete review of systems assessment, behavioral assessment & review/assessment of hx, drugs & meds, recent labs, etc, behavioral condition, need to continue/terminate restraint/seclusion   RN Notified Provider of Result of Face to Face Yes   Describe adverse physical outcome None   Describe actions taken Physical hold, Seroquel 200mg roral

## 2024-09-29 NOTE — PLAN OF CARE
"Problem: Psychotic Signs/Symptoms  Goal: Improved Behavioral Control (Psychotic Signs/Symptoms)  Outcome: Progressing    Patient is restless and he frequently seek out writer. His affect is labile and mood is tense. He is compliant with medication administration and vitals check. He was present in the [facility] area, but he was not social with peers. He express that he is board and would like to be discharge. He shaved morales this afternoon and took a shower afterwards. He did not complain of pain, but he endorse anxiety rated 5/10 and received Seroquel. He denies all other psych symptoms. Patient was overall appropriate this shift with no agitation, aggression or safety concerns. Patient was able to get his lab draw done this afternoon. Patient told writer that he is urinating fine without issues so patient was not bladder scan.       /80 (BP Location: Left leg, Patient Position: Sitting, Cuff Size: Adult Regular)   Pulse 108   Temp 97.5  F (36.4  C) (Temporal)   Resp 16   Ht 1.854 m (6' 1\")   Wt 94.8 kg (209 lb)   SpO2 96%   BMI 27.57 kg/m       Prn given  nicotine gum   Seroquel 25 mg  Maalox   "

## 2024-09-30 PROCEDURE — 250N000013 HC RX MED GY IP 250 OP 250 PS 637

## 2024-09-30 PROCEDURE — 124N000002 HC R&B MH UMMC

## 2024-09-30 PROCEDURE — 97150 GROUP THERAPEUTIC PROCEDURES: CPT | Mod: GO

## 2024-09-30 PROCEDURE — 250N000012 HC RX MED GY IP 250 OP 636 PS 637

## 2024-09-30 PROCEDURE — 250N000013 HC RX MED GY IP 250 OP 250 PS 637: Performed by: PSYCHIATRY & NEUROLOGY

## 2024-09-30 PROCEDURE — 250N000013 HC RX MED GY IP 250 OP 250 PS 637: Performed by: STUDENT IN AN ORGANIZED HEALTH CARE EDUCATION/TRAINING PROGRAM

## 2024-09-30 PROCEDURE — 99232 SBSQ HOSP IP/OBS MODERATE 35: CPT | Mod: GC | Performed by: PSYCHIATRY & NEUROLOGY

## 2024-09-30 PROCEDURE — 90853 GROUP PSYCHOTHERAPY: CPT

## 2024-09-30 RX ORDER — OLANZAPINE 10 MG/2ML
5 INJECTION, POWDER, FOR SOLUTION INTRAMUSCULAR EVERY MORNING
Status: ACTIVE | OUTPATIENT
Start: 2024-10-01

## 2024-09-30 RX ORDER — OLANZAPINE 10 MG/1
10 TABLET, ORALLY DISINTEGRATING ORAL EVERY MORNING
Status: DISPENSED | OUTPATIENT
Start: 2024-10-01

## 2024-09-30 RX ORDER — OLANZAPINE 20 MG/1
20 TABLET, ORALLY DISINTEGRATING ORAL AT BEDTIME
Status: DISPENSED | OUTPATIENT
Start: 2024-09-30

## 2024-09-30 RX ORDER — OLANZAPINE 10 MG/2ML
10 INJECTION, POWDER, FOR SOLUTION INTRAMUSCULAR AT BEDTIME
Status: ACTIVE | OUTPATIENT
Start: 2024-09-30

## 2024-09-30 RX ADMIN — TAMSULOSIN HYDROCHLORIDE 0.4 MG: 0.4 CAPSULE ORAL at 09:48

## 2024-09-30 RX ADMIN — METFORMIN HYDROCHLORIDE 500 MG: 500 TABLET, FILM COATED ORAL at 09:48

## 2024-09-30 RX ADMIN — OLANZAPINE 20 MG: 20 TABLET, ORALLY DISINTEGRATING ORAL at 19:40

## 2024-09-30 RX ADMIN — QUETIAPINE FUMARATE 25 MG: 25 TABLET ORAL at 21:49

## 2024-09-30 RX ADMIN — NICOTINE POLACRILEX 4 MG: 2 GUM, CHEWING BUCCAL at 15:50

## 2024-09-30 RX ADMIN — NICOTINE POLACRILEX 4 MG: 2 GUM, CHEWING BUCCAL at 14:59

## 2024-09-30 RX ADMIN — OLANZAPINE 5 MG: 10 TABLET, ORALLY DISINTEGRATING ORAL at 09:48

## 2024-09-30 RX ADMIN — QUETIAPINE 200 MG: 200 TABLET, FILM COATED, EXTENDED RELEASE ORAL at 19:40

## 2024-09-30 RX ADMIN — NICOTINE POLACRILEX 4 MG: 2 GUM, CHEWING BUCCAL at 11:02

## 2024-09-30 RX ADMIN — ALUMINUM HYDROXIDE, MAGNESIUM HYDROXIDE, AND DIMETHICONE 30 ML: 200; 20; 200 SUSPENSION ORAL at 15:50

## 2024-09-30 RX ADMIN — SENNOSIDES AND DOCUSATE SODIUM 1 TABLET: 8.6; 5 TABLET ORAL at 19:40

## 2024-09-30 RX ADMIN — NICOTINE POLACRILEX 4 MG: 2 GUM, CHEWING BUCCAL at 17:10

## 2024-09-30 RX ADMIN — BUPRENORPHINE AND NALOXONE 1 FILM: 2; .5 FILM BUCCAL; SUBLINGUAL at 19:40

## 2024-09-30 RX ADMIN — THERA TABS 1 TABLET: TAB at 09:48

## 2024-09-30 RX ADMIN — POLYETHYLENE GLYCOL 3350 17 G: 17 POWDER, FOR SOLUTION ORAL at 09:47

## 2024-09-30 RX ADMIN — BUPRENORPHINE AND NALOXONE 1 FILM: 2; .5 FILM BUCCAL; SUBLINGUAL at 09:48

## 2024-09-30 RX ADMIN — ASPIRIN 81 MG CHEWABLE TABLET 81 MG: 81 TABLET CHEWABLE at 09:48

## 2024-09-30 RX ADMIN — SENNOSIDES AND DOCUSATE SODIUM 1 TABLET: 8.6; 5 TABLET ORAL at 09:48

## 2024-09-30 RX ADMIN — METFORMIN HYDROCHLORIDE 500 MG: 500 TABLET, FILM COATED ORAL at 17:11

## 2024-09-30 RX ADMIN — NICOTINE 1 PATCH: 21 PATCH, EXTENDED RELEASE TRANSDERMAL at 09:50

## 2024-09-30 RX ADMIN — NICOTINE POLACRILEX 4 MG: 2 GUM, CHEWING BUCCAL at 19:42

## 2024-09-30 ASSESSMENT — ACTIVITIES OF DAILY LIVING (ADL)
LAUNDRY: WITH SUPERVISION
ADLS_ACUITY_SCORE: 28

## 2024-09-30 NOTE — PROVIDER NOTIFICATION
09/30/24 1715   C-SSRS (Daily/Shift Screen)   Q2 Suicidal Thoughts (Since Last Contact) 0-->no   Q3 Have you been thinking about how you might do this? 0-->no   Q4 Suicidal Intent without Specific Plan 0-->no   Q5 Suicide Intent with Specific Plan 0-->no   Q6 Suicide Behavior 0-->no   Level of Risk per Screen no risk indicated   Assess Risk to Self and Maintain Safety   Behavior Management behavioral plan reviewed;boundaries reinforced;impulse control promoted   Self-Harm Prevention environmental self-harm risks assessed   Promote Psychosocial Wellbeing   Family/Support System Care involvement promoted;presence promoted   Sleep/Rest Enhancement noise level reduced;relaxation techniques promoted;music provided;medication;regular sleep/rest pattern promoted   Establish Safety Plan and Continuity of Care   Safe Transition Promotion personal safety plan developed;protective factors promoted   Environment of Care Checklist   Potentially harmful objects out of patient reach? yes   Personal belongings secured? yes   Patient dressed in hospital-provided attire only? yes   Plastic bags out of patient reach? yes   Patient care equipment (cords, cables, call bells, lines, and drains) shortened, removed, or accounted for? yes   Potentially toxic materials removed or secured? yes   Sharps container removed or secured? yes   Cabinets secured? yes   Room secured by RN per shift? yes

## 2024-09-30 NOTE — PROGRESS NOTES
Rehab Group    Start time: 1610  End time: 1700  Patient time total: 50 minutes    attended full group    #5 attended   Group Type: occupational therapy and OT Clinic   Group Topic Covered: activity therapy, coping skills, and problem solving       Group Session Detail:  OT Clinic     Patient Response/Contribution:  cooperative with task, organized, attentive, and actively engaged       Patient Detail:    Pt actively participated in occupational therapy clinic to facilitate coping skill exploration, creative expression within personally meaningful activities, and clinical observation of social, cognitive, and kinesthetic performance skills. Pt response: Needed some encouragement to initiate, gather materials, sequence, and adjust to workspace demands as needed. Demonstrated fair focus, planning, and problem solving for selected bracelet and earrings task. Able to ask for assistance as needed, and appropriately social staff. Pt offered assistance in cleaning up clinic after group.  Was polite and acknowledged when he interrupted another pt .  Pt will continue to be encouraged to attend groups for further asssesssment and to address goals identified on plan of care.       [id number] OT Group (2 or more in attendance)      Patient Active Problem List   Diagnosis    Suicidal ideation    Psychosis (H)    Acute pulmonary embolism without acute cor pulmonale (H)    Alcohol use disorder, moderate, in sustained remission, dependence (H)    Anxiety    Cannabis use disorder, severe, dependence (H)    Class 1 drug-induced obesity without serious comorbidity with body mass index (BMI) of 33.0 to 33.9 in adult    Depression, major, single episode, moderate (H)    Episodic mood disorder (H)    KATHE (generalized anxiety disorder)    History of marijuana use    Obesity (BMI 30-39.9)    Obesity, Class I, BMI 30-34.9    Tobacco use disorder, moderate, in sustained remission    Schizoaffective disorder, bipolar type (H)    Psychosis,  unspecified psychosis type (H)    Tardive dyskinesia

## 2024-09-30 NOTE — PLAN OF CARE
Team Note Due:  Wednesday  *Do Friday     Assessment/Intervention/Current Symtoms and Care Coordination:  Chart review and met with team, discussed pt progress, symptomology, and response to treatment. Discussed the discharge plan and any potential impediments to discharge.    Per team, pt is decompensating since Depakote discontinued. Over the weekend staff noticed pt to be more tense, irritable, agitated, restless, and demanding. On Saturday pt was placed in seclusion after pt detached the door badge reader and key code reader from the wall. Provider would like to amend the [facility] order so that Invega is removed and Prolixin is added.     Writer wrote and faxed amended [facility] letter request to [facility] attorney's office.     Discharge Plan or Goal:  Cannot return to [facility] due to Suboxone use.   TBD - awaiting [facility] CM contact info to search for other housing options.      Barriers to Discharge:  Symptoms - jamila, agitation  Managing his medications in order to stabilize     Referral Status:  TBD     Legal Status:  Committed and [facility] [place]  File Number: [id number]  Start and expiration date of commitment:   [facility] meds: Zyprexa, Seroquel, Invega and Abilify     Contacts (include KELBY status):   (Megan) Ph: [phone]  - KELBY only for specific thing to discuss discharge   Psych: Dr. Khan, [facility] - Declined K[facility] called from cell phone for an update #[phone]  Michelle Cortes/Mother: [phone]  [facility] Cadi CM Jsuvj-[phone]      New Commitment CM:  Chasity [email or web address] [phone]- Primary CTC emailed CM Chasity again as I haven't heard back if she approves group home with outpatient supports while awaiting ACT referral.      Upcoming Meetings and Dates/Important Information and next steps:  Update discharge referral form at discharge   PD and COS at discharge  Follow up psych appt

## 2024-09-30 NOTE — CARE PLAN
Rehab Group    Start time: 1030   End time: 1115  Patient time total: 30 minutes    attended partial group    #4 attended   Group Type: general health and coping   Group Topic Covered: coping skills and self-esteem     Group Session Detail:  Self awareness topic group exploring meaningful things about today, things that have not gone as expected/ways to manage/what has been learned along the way, and good things in life right now/good things you're working toward in the future.     Patient Response/Contribution:  positive affect, cooperative with task, organized, and actively engaged     Patient Detail:    Pt discussed reflecting on family, nature, and love/having supports.  Most additional answers focused on his work/successes in OT clinic and enjoying music.  Patient in waiting for others to share answers, though little interaction between group members.      [id number] OT Group (2 or more in attendance)    Patient Active Problem List   Diagnosis    Suicidal ideation    Psychosis (H)    Acute pulmonary embolism without acute cor pulmonale (H)    Alcohol use disorder, moderate, in sustained remission, dependence (H)    Anxiety    Cannabis use disorder, severe, dependence (H)    Class 1 drug-induced obesity without serious comorbidity with body mass index (BMI) of 33.0 to 33.9 in adult    Depression, major, single episode, moderate (H)    Episodic mood disorder (H)    KATHE (generalized anxiety disorder)    History of marijuana use    Obesity (BMI 30-39.9)    Obesity, Class I, BMI 30-34.9    Tobacco use disorder, moderate, in sustained remission    Schizoaffective disorder, bipolar type (H)    Psychosis, unspecified psychosis type (H)    Tardive dyskinesia

## 2024-09-30 NOTE — PLAN OF CARE
Problem: Psychotic Signs/Symptoms  Goal: Improved Sleep (Psychotic Signs/Symptoms)  Intervention: Promote Healthy Sleep Hygiene  Recent Flowsheet Documentation  Taken 9/30/2024 3738 by Tasha Talamantes RN  Sleep Hygiene Promotion:   noise level reduced   regular sleep pattern promoted   room lighting adjusted   Goal Outcome Evaluation:    Eloy appeared comfortable and asleep for a total of 5.5 hours this night shift.  No prns requested or given but apparently had many snacks during the night.  No markers, crayons, drinking cups, hot fluids, pudding, head phones... in his room.  Will continue to monitor and support.

## 2024-09-30 NOTE — PLAN OF CARE
"Goal Outcome Evaluation:    Pt slept in this morning   When asked to check in with the pt, pt stated \"later, I am just waking up\"  Blunted affect   Spent most of the shift pacing in the hallway or watching TV in the lounge  Isolative and keeps to self   At check in, pt was guarded and dismissive  Tense, and hesitant to make eye contact   Restless and uncooperative   Appears very irritable     Pt did not answer assessment questions and walked away from writer   Pt was medication compliant-cheeking precaution followed.     Pt did not eat breakfast this morning though ate lunch in the lounge.   Pt spent most of shift visibile in the milieu. Attended group      PRN nicotine gum given X2  No unusual behavior noted.             "

## 2024-09-30 NOTE — CARE PLAN
Rehab Group    Start time: 1115  End time: 1200  Patient time total: 45 minutes    attended full group    #4 attended   Group Type: OT Clinic   Group Topic Covered: activity therapy, coping skills, and mindfulness     Group Session Detail:  The purpose of occupational therapy clinic is to facilitate coping skill exploration, creative expression within personally meaningful activities, and clinical observation of social, cognitive, and kinesthetic performance skills.      Patient Response/Contribution:  cooperative with task, attentive, and actively engaged     Patient Detail:    Eager to listen to music and begin work on a dream catcher.  Follows direction well, asks for assist after trying to problem solve on his own.  Friendly with peers, asking about music, offering suggestions.      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

## 2024-09-30 NOTE — PLAN OF CARE
"Goal Outcome Evaluation:    Plan of Care Reviewed With: patient        Pt visible in the milieu with minimal interaction with peers, watched TV both in room and lounge area, did coloring in room and attended and participated in group activities. Pt denies all mental health psych symptoms and contracted for safety in a dismissive way. Pt described mood as OK and presented with flat and blunted affect,  and cooperated with medications. Pt speech moderate in tone, clear and illogical sometimes and maintained tensed eye contact.   Adequate fluid and food intake, voiding freely and no BM concern per pt. Denies pain and shortness of breath and vital sign stable. /73 (BP Location: Right arm)   Pulse 87   Temp 97.1  F (36.2  C) (Temporal)   Resp 16   Ht 1.854 m (6' 1\")   Wt 94.8 kg (209 lb)   SpO2 96%   BMI 27.57 kg/m                "

## 2024-09-30 NOTE — PROGRESS NOTES
Rehab Group     Start time: 1615  End time: 1700  Patient time total: 45 minutes     attended full group     #3 attended   Group Type: occupational therapy and OT Clinic   Group Topic Covered: activity therapy, coping skills, and problem solving         Group Session Detail:  OT Clinic      Patient Response/Contribution:  cooperative with task, safe use of materials/supplies, and actively engaged         Patient Detail:     Pt actively participated in occupational therapy clinic to facilitate coping skill exploration, creative expression within personally meaningful activities, and clinical observation of social, cognitive, and kinesthetic performance skills. Pt response: needed some assistance to initiate, gather materials, sequence, and adjust to workspace demands as needed. Demonstrated fair focus, planning, and problem solving for selected beading then drawing task. Able to ask for assistance as needed, and appropriately social with peers and staff.  Pt will continue to be encouraged to attend groups for further asssesssment and to address goals identified on plan of care.        [id number] OT Group (2 or more in attendance)         Patient Active Problem List   Diagnosis    Suicidal ideation    Psychosis (H)    Acute pulmonary embolism without acute cor pulmonale (H)    Alcohol use disorder, moderate, in sustained remission, dependence (H)    Anxiety    Cannabis use disorder, severe, dependence (H)    Class 1 drug-induced obesity without serious comorbidity with body mass index (BMI) of 33.0 to 33.9 in adult    Depression, major, single episode, moderate (H)    Episodic mood disorder (H)    KATHE (generalized anxiety disorder)    History of marijuana use    Obesity (BMI 30-39.9)    Obesity, Class I, BMI 30-34.9    Tobacco use disorder, moderate, in sustained remission    Schizoaffective disorder, bipolar type (H)    Psychosis, unspecified psychosis type (H)    Tardive dyskinesia

## 2024-09-30 NOTE — PROGRESS NOTES
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 Suboxone and showed volumes consistently below 500ml. Thus 9/16, bladder scans made prn.     Plan:  - Notify if fevers, worsening abdominal pain, flank pain  - notify medicine and urology if sxs worsen  - Pt does note a few days of constipation which can exacerbate urinary retention              - Scheduled Miralax daily + Senokot BID for now (hold for loose stools)  -Scan only needed if pt endorses urinary retention and trouble urinating  - parameters for straight cath in place (ok to use hardy catheter for comfort) as needed for high volume as outlined by Urology, see urology note 9/11   - urine cx no growth   - continue to monitor his symptoms and offer less invasive measures first. Currently, patient is voiding and not needing an urgent cath.     Hand pain and swelling   s/p punching mirror in room, per patient on night of 9/10 Staff noted full ROM however. On call doctor notified who placed hand XR  - Hand XR 9/10 showing tissue swelling and NO displacement or fracture per radiologist read  -prns for pain and swelling     Behavioral/Psychological/Social:  - Encourage unit programming    Safety:  - Continue precautions as noted above  - Status 15 minute checks    Legal Status: full commitment with [facility] for Zyprexa, Seroquel, Invega and  Abilify    Disposition Plan   Reason for ongoing admission: poses an imminent risk to self, poses an imminent risk to others, and is unable to care for self due to severe psychosis or jamila  Discharge location:  TBD . Consider ACT team referral, will need discharge planning conversation when more stable  Discharge Medications: not ordered  Follow-up Appointments: not scheduled     Patient seen and discussed with attending physician, Dr. Loya, who is in agreement with my assessment and plan.    Foster Batista, PGY-4 (Psychiatry)  [facility]

## 2024-09-30 NOTE — PLAN OF CARE
Group Attendance:  attended full group    Time session began: 1415  Time session ended: 1448  Patient's total time in group: 43    Total # Attendees   4   Group Type DBT     Group Topic Covered emotional regulation, insight improvement, relationships, healthy coping skills, and mindfulness     Group Session Detail Participants engaged in a structured activity to promote learning DBT skills and implementing them in life scenarios. Participants chose one skill in emotion regulation, interpersonal effectiveness, mindfulness, or distress tolerance to be an effective tool for different life scenarios and had to share with the group why their chosen skill would be most effective. One participant then chose a winner based on what they perceived would be most effective.      Patient's response to the group topic/interactions:  cooperative with task, organized, socially appropriate, requested more information on topic, listened actively, expressed understanding of topic, attentive, and actively engaged     Patient Details: Eloy was an active participant throughout. He appeared to be familiar with DBT. He chose effective skills related to each conflict scenario. He shared his rationalization for his chosen skills with group members. He requested a list with all of the DBT skills, writer will provide this.          01627 - Group psychotherapy - 1 Session  Patient Active Problem List   Diagnosis    Suicidal ideation    Psychosis (H)    Acute pulmonary embolism without acute cor pulmonale (H)    Alcohol use disorder, moderate, in sustained remission, dependence (H)    Anxiety    Cannabis use disorder, severe, dependence (H)    Class 1 drug-induced obesity without serious comorbidity with body mass index (BMI) of 33.0 to 33.9 in adult    Depression, major, single episode, moderate (H)    Episodic mood disorder (H)    KATHE (generalized anxiety disorder)    History of marijuana use    Obesity (BMI 30-39.9)    Obesity, Class I,  BMI 30-34.9    Tobacco use disorder, moderate, in sustained remission    Schizoaffective disorder, bipolar type (H)    Psychosis, unspecified psychosis type (H)    Tardive dyskinesia

## 2024-09-30 NOTE — CARE PLAN
Rehab Group    Start time: 1315  End time: 1400  Patient time total: 45 minutes    attended full group     #3 attended   Group Type: OT Clinic   Group Topic Covered: activity therapy and coping skills     Group Session Detail:  OT clinic group provides an opportunity for individual-based goal directed activity within a group setting - allowing for interaction and socialization.  Hands-on task work help to build/restore/or maintain skills such as: focus, concentration, decision making, and problem solving.  Patients are encouraged to carry over potential new interests/hobbies learned in group following discharge.     Patient Response/Contribution:  positive affect, cooperative with task, and organized     Patient Detail:    Continued work on his dream catcher activity from this morning.  Demonstrated good planning and problem solving related to the activity - took project apart twice to redo it to his satisfaction.      [id number] OT Group (2 or more in attendance)    Patient Active Problem List   Diagnosis    Suicidal ideation    Psychosis (H)    Acute pulmonary embolism without acute cor pulmonale (H)    Alcohol use disorder, moderate, in sustained remission, dependence (H)    Anxiety    Cannabis use disorder, severe, dependence (H)    Class 1 drug-induced obesity without serious comorbidity with body mass index (BMI) of 33.0 to 33.9 in adult    Depression, major, single episode, moderate (H)    Episodic mood disorder (H)    KATHE (generalized anxiety disorder)    History of marijuana use    Obesity (BMI 30-39.9)    Obesity, Class I, BMI 30-34.9    Tobacco use disorder, moderate, in sustained remission    Schizoaffective disorder, bipolar type (H)    Psychosis, unspecified psychosis type (H)    Tardive dyskinesia

## 2024-09-30 NOTE — PLAN OF CARE
BEH IP Unit Acuity Rating Score (UARS)  Patient is given one point for every criteria they meet.    CRITERIA SCORING   On a 72 hour hold, court hold, committed, stay of commitment, or revocation. 1    Patient LOS on BEH unit exceeds 20 days. 1 LOS: 27   Patient under guardianship, 55+, otherwise medically complex, or under age 11. 0   Suicide ideation without relief of precipitating factors. 0   Current plan for suicide. 0   Current plan for homicide. 0   Imminent risk or actual attempt to seriously harm another without relief of factors precipitating the attempt. 0   Severe dysfunction in daily living (ex: complete neglect for self care, extreme disruption in vegetative function, extreme deterioration in social interactions). 1   Recent (last 7 days) or current physical aggression in the ED or on unit. 1 9/28   Restraints or seclusion episode in past 72 hours. 1  9/28   Recent (last 7 days) or current verbal aggression, agitation, yelling, etc., while in the ED or unit. 1   Active psychosis. 1   Need for constant or near constant redirection (from leaving, from others, etc).  0   Intrusive or disruptive behaviors. 0   Patient requires 3 or more hours of individualized nursing care per 8-hour shift (i.e. for ADLs, meds, therapeutic interventions). 0   TOTAL 7

## 2024-10-01 PROCEDURE — 250N000013 HC RX MED GY IP 250 OP 250 PS 637

## 2024-10-01 PROCEDURE — 250N000012 HC RX MED GY IP 250 OP 636 PS 637

## 2024-10-01 PROCEDURE — 124N000002 HC R&B MH UMMC

## 2024-10-01 PROCEDURE — 250N000013 HC RX MED GY IP 250 OP 250 PS 637: Performed by: PSYCHIATRY & NEUROLOGY

## 2024-10-01 PROCEDURE — 250N000013 HC RX MED GY IP 250 OP 250 PS 637: Performed by: STUDENT IN AN ORGANIZED HEALTH CARE EDUCATION/TRAINING PROGRAM

## 2024-10-01 PROCEDURE — 97150 GROUP THERAPEUTIC PROCEDURES: CPT | Mod: GO

## 2024-10-01 PROCEDURE — H2032 ACTIVITY THERAPY, PER 15 MIN: HCPCS

## 2024-10-01 RX ADMIN — OLANZAPINE 20 MG: 20 TABLET, ORALLY DISINTEGRATING ORAL at 20:38

## 2024-10-01 RX ADMIN — NICOTINE POLACRILEX 4 MG: 2 GUM, CHEWING BUCCAL at 17:09

## 2024-10-01 RX ADMIN — SENNOSIDES AND DOCUSATE SODIUM 1 TABLET: 8.6; 5 TABLET ORAL at 20:38

## 2024-10-01 RX ADMIN — THERA TABS 1 TABLET: TAB at 09:43

## 2024-10-01 RX ADMIN — POLYETHYLENE GLYCOL 3350 17 G: 17 POWDER, FOR SOLUTION ORAL at 13:18

## 2024-10-01 RX ADMIN — ASPIRIN 81 MG CHEWABLE TABLET 81 MG: 81 TABLET CHEWABLE at 09:43

## 2024-10-01 RX ADMIN — NICOTINE 1 PATCH: 21 PATCH, EXTENDED RELEASE TRANSDERMAL at 09:43

## 2024-10-01 RX ADMIN — BUPRENORPHINE AND NALOXONE 1 FILM: 2; .5 FILM BUCCAL; SUBLINGUAL at 09:44

## 2024-10-01 RX ADMIN — METFORMIN HYDROCHLORIDE 500 MG: 500 TABLET, FILM COATED ORAL at 09:43

## 2024-10-01 RX ADMIN — NICOTINE POLACRILEX 4 MG: 2 LOZENGE ORAL at 18:37

## 2024-10-01 RX ADMIN — OLANZAPINE 10 MG: 10 TABLET, ORALLY DISINTEGRATING ORAL at 09:43

## 2024-10-01 RX ADMIN — QUETIAPINE 200 MG: 200 TABLET, FILM COATED, EXTENDED RELEASE ORAL at 20:38

## 2024-10-01 RX ADMIN — BUPRENORPHINE AND NALOXONE 1 FILM: 2; .5 FILM BUCCAL; SUBLINGUAL at 19:05

## 2024-10-01 RX ADMIN — NICOTINE POLACRILEX 4 MG: 2 LOZENGE ORAL at 14:07

## 2024-10-01 RX ADMIN — NICOTINE POLACRILEX 4 MG: 2 GUM, CHEWING BUCCAL at 15:53

## 2024-10-01 RX ADMIN — ALUMINUM HYDROXIDE, MAGNESIUM HYDROXIDE, AND DIMETHICONE 30 ML: 200; 20; 200 SUSPENSION ORAL at 12:09

## 2024-10-01 RX ADMIN — METFORMIN HYDROCHLORIDE 500 MG: 500 TABLET, FILM COATED ORAL at 17:09

## 2024-10-01 RX ADMIN — SENNOSIDES AND DOCUSATE SODIUM 1 TABLET: 8.6; 5 TABLET ORAL at 13:18

## 2024-10-01 ASSESSMENT — ACTIVITIES OF DAILY LIVING (ADL)
LAUNDRY: UNABLE TO COMPLETE
DRESS: SCRUBS (BEHAVIORAL HEALTH)
ADLS_ACUITY_SCORE: 28
HYGIENE/GROOMING: HANDWASHING;INDEPENDENT
ADLS_ACUITY_SCORE: 28
ORAL_HYGIENE: INDEPENDENT
ADLS_ACUITY_SCORE: 28

## 2024-10-01 NOTE — PLAN OF CARE
"Problem: Adult Inpatient Plan of Care  Goal: Readiness for Transition of Care  Outcome: Progressing  Patient express to writer at the start of shift that he is board and would like to leave. He was visible in the lounge area this shift observer watching tv and playing video game at times. He is alert and oriented, calm and compliant with vitals check and medication administration. He did not complain of urine retention or pain or pain this shift.  He ate breakfast and lunch without issues.     At about 2 pm patient got agitated and started targeting another patient (SA) in room 137. Patient made several threats  that he will pour hot fluids on patient because patient is a annoying and is a snitch. He said the peer told staff that he was trying to get on Youtube. It was also reported that he took the remote and throw it towards the direction of (SA) and the remote almost hit the peer. He stated that he will assault patient and nothing will happen to him because he is mentally ill, and he does not care what happens to him. Writer told him that the patient will press charges with [place] police if he assault a peer and he stated \"I don't give a shit, care home is going to be better than here and besides I don't give a fuck, bitch just get out my face and leave me alone\" Patient is rude, agitated and irritable. He refused to follow redirections and is disruptive to the unit. He was observed messing with the keypad on the door and while two peers were coloring he walked up to them, took the coloring markers and walked to his room.      /73 (BP Location: Right arm, Patient Position: Sitting, Cuff Size: Adult Large)   Pulse 102   Temp 97.1  F (36.2  C) (Temporal)   Resp 16   Ht 1.854 m (6' 1\")   Wt 94.8 kg (209 lb)   SpO2 96%   BMI 27.57 kg/m     "

## 2024-10-01 NOTE — PLAN OF CARE
"  Problem: Psychotic Signs/Symptoms  Goal: Improved Sleep (Psychotic Signs/Symptoms)  Intervention: Promote Healthy Sleep Hygiene  Recent Flowsheet Documentation  Taken 10/1/2024 4701 by Tasha Talamantes RN  Sleep Hygiene Promotion:   noise level reduced   regular sleep pattern promoted   room lighting adjusted   Goal Outcome Evaluation:    Eloy had a difficult time settling in to sleep tonight.  He did appear to sleep a total of 4.75 hours but requested prns he did not have available at the time and also wanted many snacks.  Finally fell asleep to the TV and woke up wanting more snacks. Was given pudding which he ate with supervision in the lounge.  Denies any pain and denies any wrong doing with snack containers, fluids, head phones, etc.  States that must all have been \"the other Eloy on the unit\".  Continue to monitor Eloy for cheeking and safety concerns.                      "

## 2024-10-01 NOTE — PLAN OF CARE
"Goal Outcome Evaluation:    Plan of Care Reviewed With: patient        Pt visible in the milieu, socialized with peers and staff, paced the hallway, watched TV with peers, and attended and participated in group activities. Pt expressed mild anxiety and depression and used essential oil with effectiveness. Pt described mood as \"ok\" and presented with flat and blunted affect, polite and cooperated with assessment and medications. No outburst behavior observed or reported and no medications side effect observed or reported. About 2243 pt came and writer to look into his belongings in his locker just to make sure everything is in intact and writer assured pt all his belongings are intact and he appreciated.   Adequate fluid and food intake, voiding freely and no BM concern reported. Denies pain and shortness of breath and vital sign stable.  /73 (BP Location: Right arm, Patient Position: Sitting, Cuff Size: Adult Large)   Pulse 102   Temp 97.1  F (36.2  C) (Temporal)   Resp 16   Ht 1.854 m (6' 1\")   Wt 94.8 kg (209 lb)   SpO2 96%   BMI 27.57 kg/m               "

## 2024-10-01 NOTE — PLAN OF CARE
BEH IP Unit Acuity Rating Score (UARS)  Patient is given one point for every criteria they meet.    CRITERIA SCORING   On a 72 hour hold, court hold, committed, stay of commitment, or revocation. 1    Patient LOS on BEH unit exceeds 20 days. 1 LOS: 28   Patient under guardianship, 55+, otherwise medically complex, or under age 11. 0   Suicide ideation without relief of precipitating factors. 0   Current plan for suicide. 0   Current plan for homicide. 0   Imminent risk or actual attempt to seriously harm another without relief of factors precipitating the attempt. 0   Severe dysfunction in daily living (ex: complete neglect for self care, extreme disruption in vegetative function, extreme deterioration in social interactions). 1   Recent (last 7 days) or current physical aggression in the ED or on unit. 0   Restraints or seclusion episode in past 72 hours. 0   Recent (last 7 days) or current verbal aggression, agitation, yelling, etc., while in the ED or unit. 0   Active psychosis. 1   Need for constant or near constant redirection (from leaving, from others, etc).  0   Intrusive or disruptive behaviors. 0   Patient requires 3 or more hours of individualized nursing care per 8-hour shift (i.e. for ADLs, meds, therapeutic interventions). 0   TOTAL 4

## 2024-10-01 NOTE — PLAN OF CARE
"  Problem: Adult Inpatient Plan of Care  Goal: Plan of Care Review  Description: The Plan of Care Review/Shift note should be completed every shift.  The Outcome Evaluation is a brief statement about your assessment that the patient is improving, declining, or no change.  This information will be displayed automatically on your shift  note.  Outcome: Progressing  Flowsheets (Taken 10/1/2024 2316)  Plan of Care Reviewed With: patient  Overall Patient Progress: no change   Goal Outcome Evaluation:    Plan of Care Reviewed With: patient Plan of Care Reviewed With: patient    Overall Patient Progress: no changeOverall Patient Progress: no change     Patient present in the milieu throughout the day mainly isolative to self though does attend art therapy. Patient upon approach flat in affect, calm and cooperative with verbal assessment. Patient cooperative with finishing admission assessment questions that he had previously declined to answer. Patient reporting overall mood as \"good\". Patient requesting information about prolixin and Haldol stating he would like to read up on the medications. Patient a short time later stating he would prefer to have Haldol ordered as he has had in in the past. Patient denies SI/SIB, AH/VH, anxiety, depression,  or thoughts of harming others.     Patient cooperative with vital sign assessments which were stable. Patient diet good eating 100% of breakfast and lunch. Patient independent with requesting and accepting of scheduled medications with no stated or observed side effects. Patient after lunch at 1209 reporting indigestion, receptive of prn Maalox and later reporting relief. Patient independent with identifying needs and completing adl's.       "

## 2024-10-01 NOTE — PROGRESS NOTES
Rehab Group     Start time:1615  End time: 1710  Patient time total: 45 minutes     came in and out of group session     #7 attended   Group Type: music   Group Topic Covered:      Focus/concentration, relaxation, healthy leisure time, socialization, self-expression   Group Session Detail:  Participated in Music Therapy intervention of [employer] Onofre gaviria.  Goals of session were focusing, memory recall, positive distraction, emotional containment and social cohesion.  Pt response was engaged and cooperative.     Patient Response/Contribution:  listened actively   Patient Detail: Pt engaged in group music game, was quiet and kept to self.  Cooperative with more of a flat affect.  Highly motivated by patient preferred music.      Activity Therapy Per 15 min ()    Patient Active Problem List   Diagnosis    Suicidal ideation    Psychosis (H)    Acute pulmonary embolism without acute cor pulmonale (H)    Alcohol use disorder, moderate, in sustained remission, dependence (H)    Anxiety    Cannabis use disorder, severe, dependence (H)    Class 1 drug-induced obesity without serious comorbidity with body mass index (BMI) of 33.0 to 33.9 in adult    Depression, major, single episode, moderate (H)    Episodic mood disorder (H)    KATHE (generalized anxiety disorder)    History of marijuana use    Obesity (BMI 30-39.9)    Obesity, Class I, BMI 30-34.9    Tobacco use disorder, moderate, in sustained remission    Schizoaffective disorder, bipolar type (H)    Psychosis, unspecified psychosis type (H)    Tardive dyskinesia

## 2024-10-01 NOTE — PLAN OF CARE
Team Note Due:  Wednesday  *Do Friday     Assessment/Intervention/Current Symtoms and Care Coordination:  Chart review and met with team, discussed pt progress, symptomology, and response to treatment. Discussed the discharge plan and any potential impediments to discharge.    Per team, Pt had amended Payan submitted yesterday to take off Invega and add Prolixin - awaiting this process. Pt appears to be decompensating after being off depakote. Pt has been throwing food over the weekend, and targeting peers. Last night, no outbursts.    Pt attended group today. I met with him to get him to sign KELBY for new CADI CM Sonia Burdick, and also he signed one for Dr. Khan for medications/labs related to meds. Placed in binder.     I met with Pt who was in his room sitting up in his bed. I shared that I will contact his CADI CM, and that we will have to wait for discharge until they start him on Prolixin. I explained a payan amendment was submitted yesterday. He asked for some information about Prolixin and I said I could print him some information about this. I printed him information about the fluphenazine.      I emailed Sonia Burdick to update her on Eloy and send copy of commitment order that CADI cm requested. I informed her that we requested a Payan amendment yesterday to remove Invega and add prolixin.     Discharge Plan or Goal:  Cannot return to [facility] housing due to Suboxone use.   TBD - awaiting CADI CM contact info to search for other housing options.      Barriers to Discharge:  Symptoms - jamila, agitation  Managing his medications in order to stabilize     Referral Status:  TBD     Legal Status:  Committed and [facility]: [place]  File Number: [id number]  Start and expiration date of commitment:   Payan meds: Zyprexa, Seroquel, Invega and Abilify   9/30: Submitted Payan Amendment to remove Invega and add Prolixin.    Contacts (include KELBY status):  Psych: Dr. Khan, [place] [facility]  - called from cell phone for an update #[phone] (Eloy signed KELBY 10/1 for medications related info)  Michelle Cortes/Mother: [phone]    [facility] CM Ctfey-[phone]  [email or web address]  Abiola is leaving Friday - New CADI CM is: Sonia Burdick Ph: [phone], email: [email or web address] (KELBY signed 10/1/24)    New Commitment CM:  Chasity [email or web address] [phone]- Primary CTC emailed CM Chasity again as I haven't heard back if she approves group home with outpatient supports while awaiting ACT referral.      Upcoming Meetings and Dates/Important Information and next steps:  Update discharge referral form at discharge   PD and COS at discharge  Follow up psych appt

## 2024-10-01 NOTE — PLAN OF CARE
Rehab Group    Start time: 1030  End time: 1200  Patient time total: 90 minutes    attended full group    #6 attended   Group Type: OT Clinic   Group Topic Covered: coping skills     Group Session Detail:    Intervention: Pt participated in a OT Clinic group to facilitate coping skills exploration and creative expression through personally meaningful activities, and to encourage utilization of these healthy coping skills to promote overall health and wellness. Group included clinical observation of social, cognitive and kinesthetic performance skills to inform treatment and safe discharge planning.    Mood/Affect:  Pleasant      Plan: Patient encouraged to maintain attendance for continued ongoing support in working towards occupational therapy goals to support overall treatment/care.        Patient Detail:    Was an active participant for the duration of time in group. Was social off and on with writer and briefly with other peers. Required one brief redirection to avoid engaging with another peer who was stating negative comments. Was easily receptive to this and refocused on project. Ind to complete project outside of set up assistance from writer for supplies.       [id number] OT Group (2 or more in attendance)    Patient Active Problem List   Diagnosis    Suicidal ideation    Psychosis (H)    Acute pulmonary embolism without acute cor pulmonale (H)    Alcohol use disorder, moderate, in sustained remission, dependence (H)    Anxiety    Cannabis use disorder, severe, dependence (H)    Class 1 drug-induced obesity without serious comorbidity with body mass index (BMI) of 33.0 to 33.9 in adult    Depression, major, single episode, moderate (H)    Episodic mood disorder (H)    KATHE (generalized anxiety disorder)    History of marijuana use    Obesity (BMI 30-39.9)    Obesity, Class I, BMI 30-34.9    Tobacco use disorder, moderate, in sustained remission    Schizoaffective disorder, bipolar type (H)    Psychosis,  unspecified psychosis type (H)    Tardive dyskinesia

## 2024-10-01 NOTE — PLAN OF CARE
Nursing assessment completed. Patient awake and visible between his room and the lounge throughout the shift. Presents with a guarded and flat, but pleasant affect. He states he would like more information regarding the differences with neurotransmitters and haldol and prolixin. Per report, patient has been provided educational reading materials, but may benefit from speaking to a pharmacist. Request for pharm education consult to be placed. Patient denies symptoms. Denies SI/SIB or thoughts to harm others. Medication compliant. Continue to monitor and assess.

## 2024-10-02 LAB — PROLACTIN SERPL 3RD IS-MCNC: 32 NG/ML (ref 4–15)

## 2024-10-02 PROCEDURE — 250N000012 HC RX MED GY IP 250 OP 636 PS 637

## 2024-10-02 PROCEDURE — 99232 SBSQ HOSP IP/OBS MODERATE 35: CPT | Mod: GC | Performed by: PSYCHIATRY & NEUROLOGY

## 2024-10-02 PROCEDURE — 250N000013 HC RX MED GY IP 250 OP 250 PS 637: Performed by: PSYCHIATRY & NEUROLOGY

## 2024-10-02 PROCEDURE — 250N000013 HC RX MED GY IP 250 OP 250 PS 637

## 2024-10-02 PROCEDURE — 124N000002 HC R&B MH UMMC

## 2024-10-02 PROCEDURE — 97150 GROUP THERAPEUTIC PROCEDURES: CPT | Mod: GO

## 2024-10-02 PROCEDURE — 250N000013 HC RX MED GY IP 250 OP 250 PS 637: Performed by: STUDENT IN AN ORGANIZED HEALTH CARE EDUCATION/TRAINING PROGRAM

## 2024-10-02 RX ADMIN — ASPIRIN 81 MG CHEWABLE TABLET 81 MG: 81 TABLET CHEWABLE at 08:18

## 2024-10-02 RX ADMIN — NICOTINE POLACRILEX 4 MG: 2 GUM, CHEWING BUCCAL at 08:56

## 2024-10-02 RX ADMIN — OLANZAPINE 10 MG: 10 TABLET, ORALLY DISINTEGRATING ORAL at 08:18

## 2024-10-02 RX ADMIN — NICOTINE POLACRILEX 4 MG: 2 GUM, CHEWING BUCCAL at 12:14

## 2024-10-02 RX ADMIN — SENNOSIDES AND DOCUSATE SODIUM 1 TABLET: 8.6; 5 TABLET ORAL at 19:01

## 2024-10-02 RX ADMIN — THERA TABS 1 TABLET: TAB at 08:18

## 2024-10-02 RX ADMIN — NICOTINE 1 PATCH: 21 PATCH, EXTENDED RELEASE TRANSDERMAL at 08:17

## 2024-10-02 RX ADMIN — ALUMINUM HYDROXIDE, MAGNESIUM HYDROXIDE, AND DIMETHICONE 30 ML: 200; 20; 200 SUSPENSION ORAL at 14:49

## 2024-10-02 RX ADMIN — BUPRENORPHINE AND NALOXONE 1 FILM: 2; .5 FILM BUCCAL; SUBLINGUAL at 19:00

## 2024-10-02 RX ADMIN — NICOTINE POLACRILEX 4 MG: 2 GUM, CHEWING BUCCAL at 18:52

## 2024-10-02 RX ADMIN — METFORMIN HYDROCHLORIDE 500 MG: 500 TABLET, FILM COATED ORAL at 08:18

## 2024-10-02 RX ADMIN — METFORMIN HYDROCHLORIDE 500 MG: 500 TABLET, FILM COATED ORAL at 17:45

## 2024-10-02 RX ADMIN — NICOTINE POLACRILEX 4 MG: 2 GUM, CHEWING BUCCAL at 15:18

## 2024-10-02 RX ADMIN — QUETIAPINE FUMARATE 25 MG: 25 TABLET ORAL at 17:12

## 2024-10-02 RX ADMIN — OLANZAPINE 20 MG: 20 TABLET, ORALLY DISINTEGRATING ORAL at 19:01

## 2024-10-02 RX ADMIN — NICOTINE POLACRILEX 4 MG: 2 GUM, CHEWING BUCCAL at 17:12

## 2024-10-02 RX ADMIN — QUETIAPINE 200 MG: 200 TABLET, FILM COATED, EXTENDED RELEASE ORAL at 19:01

## 2024-10-02 RX ADMIN — BUPRENORPHINE AND NALOXONE 1 FILM: 2; .5 FILM BUCCAL; SUBLINGUAL at 08:17

## 2024-10-02 ASSESSMENT — ACTIVITIES OF DAILY LIVING (ADL)
HYGIENE/GROOMING: HANDWASHING;SHOWER;INDEPENDENT
ADLS_ACUITY_SCORE: 28
HYGIENE/GROOMING: INDEPENDENT
ADLS_ACUITY_SCORE: 28
ADLS_ACUITY_SCORE: 28
LAUNDRY: UNABLE TO COMPLETE
ADLS_ACUITY_SCORE: 28
DRESS: SCRUBS (BEHAVIORAL HEALTH)
ADLS_ACUITY_SCORE: 28
DRESS: SCRUBS (BEHAVIORAL HEALTH);INDEPENDENT
ADLS_ACUITY_SCORE: 28
ORAL_HYGIENE: INDEPENDENT
ADLS_ACUITY_SCORE: 28
ORAL_HYGIENE: INDEPENDENT
ADLS_ACUITY_SCORE: 28

## 2024-10-02 NOTE — PLAN OF CARE
"  Problem: Adult Behavioral Health Plan of Care  Goal: Adheres to Safety Considerations for Self and Others  Outcome: Progressing   Goal Outcome Evaluation:    Plan of Care Reviewed With: patient Plan of Care Reviewed With: patient    Overall Patient Progress: no changeOverall Patient Progress: no change         Patient present in the milieu throughout the day mainly isolative to self though does attend art therapy. Patient upon approach flat in affect, calm and cooperative with verbal assessment. Patient reporting overall mood as \"good\". Patient continues to request information about prolixin and Haldol though has already been provided literature and education. Attending providers notified and considering pharmacy consult for further questions. Patient denies SI/SIB, AH/VH, anxiety, depression,  or thoughts of harming others.     Patient cooperative with vital sign assessments which were stable. Patient diet good eating 100% of breakfast and lunch. Patient independent with requesting and accepting of scheduled medications with no stated or observed side effects. Patient independent with identifying needs and completing adl's.   "

## 2024-10-02 NOTE — PLAN OF CARE
Problem: Psychotic Signs/Symptoms  Goal: Improved Sleep (Psychotic Signs/Symptoms)  Intervention: Promote Healthy Sleep Hygiene  Recent Flowsheet Documentation  Taken 10/2/2024 6296 by Tasha Talamantes RN  Sleep Hygiene Promotion:   noise level reduced   regular sleep pattern promoted   room lighting adjusted   Goal Outcome Evaluation:    Patient appeared to sleep 5.75 hours this night shift.  No prns or snacks given or requested.  No concerns were reported or noted.

## 2024-10-02 NOTE — PLAN OF CARE
"  Rehab Group    Start time: 1315  End time: 1415  Patient time total: 25 minutes    attended partial group     #3 attended   Group Type: occupational therapy   Group Topic Covered: coping skills, mindfulness, and self-esteem    Positive affirmations      Group Session Detail:    Patient Response: Pt participated in group focused on the use of positive affirmations as a healthy coping skill. Group involved discussion of positive affirmations and creating a personal affirmations project using personally selected meaningful affirmations.      Mood/Affect: Pleasant     Plan: Patient encouraged to maintain attendance for continued ongoing support in working towards occupational therapy goals to support overall treatment/care.        Patient Detail:    Joined for second half of group after initially declining to join for discussion part of group. Was an active participant in the hands on/creative portion of the group, which patient typically states is the most enjoyable types of group for him. Selected the project with the affirmation of \"I am my own kind of amazing\" and worked on this for duration of time in group. Initially left after about 18 minutes, however returned and worked until completed project.       [id number] OT Group (2 or more in attendance)    Patient Active Problem List   Diagnosis    Suicidal ideation    Psychosis (H)    Acute pulmonary embolism without acute cor pulmonale (H)    Alcohol use disorder, moderate, in sustained remission, dependence (H)    Anxiety    Cannabis use disorder, severe, dependence (H)    Class 1 drug-induced obesity without serious comorbidity with body mass index (BMI) of 33.0 to 33.9 in adult    Depression, major, single episode, moderate (H)    Episodic mood disorder (H)    KATHE (generalized anxiety disorder)    History of marijuana use    Obesity (BMI 30-39.9)    Obesity, Class I, BMI 30-34.9    Tobacco use disorder, moderate, in sustained remission    Schizoaffective disorder, " bipolar type (H)    Psychosis, unspecified psychosis type (H)    Tardive dyskinesia
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
## 2024-10-02 NOTE — PLAN OF CARE
"Calm, cooperative. Blunted affect. Upon assessment, denied having any thoughts of harming himself or others. Denied hallucinations. Denied anxiety and depression upon assessment, however, did request PRN Seroquel for feeling \"not calm.\" Alert, grossly oriented. Visible in milieu, listening to music, pacing hallway.     Denies pain. No acute physical concerns observed or reported. No adverse effects of medication noted or reported. Reported to writer that he only had his protein drink and nothing else from his dinner.     Took scheduled medication without issue.    No acute behavioral or safety concerns noted or reported.     /76   Pulse 93   Temp 97.5  F (36.4  C) (Temporal)   Resp 16   Ht 1.854 m (6' 1\")   Wt 94.8 kg (209 lb)   SpO2 97%   BMI 27.57 kg/m      "
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
## 2024-10-02 NOTE — PLAN OF CARE
Rehab Group    Start time: 1030  End time: 1200  Patient time total: 80 minutes    attended partial group    #6 attended   Group Type: OT Clinic   Group Topic Covered: coping skills     Group Session Detail:    Intervention: Pt participated in a OT Clinic group to facilitate coping skills exploration and creative expression through personally meaningful activities, and to encourage utilization of these healthy coping skills to promote overall health and wellness. Group included clinical observation of social, cognitive and kinesthetic performance skills to inform treatment and safe discharge planning.    Mood/Affect: Pleasant       Plan: Patient encouraged to maintain attendance for continued ongoing support in working towards occupational therapy goals to support overall treatment/care.        Patient Detail:    Continues to be an active participant in this type of group, stating that it is the one that he gets the most out of and looks forward to the most. Social off and on during this time with writer and other peers for brief periods, typically asking them a question regarding something they had mentioned or a project they were working on.       [id number] OT Group (2 or more in attendance)    Patient Active Problem List   Diagnosis    Suicidal ideation    Psychosis (H)    Acute pulmonary embolism without acute cor pulmonale (H)    Alcohol use disorder, moderate, in sustained remission, dependence (H)    Anxiety    Cannabis use disorder, severe, dependence (H)    Class 1 drug-induced obesity without serious comorbidity with body mass index (BMI) of 33.0 to 33.9 in adult    Depression, major, single episode, moderate (H)    Episodic mood disorder (H)    KATHE (generalized anxiety disorder)    History of marijuana use    Obesity (BMI 30-39.9)    Obesity, Class I, BMI 30-34.9    Tobacco use disorder, moderate, in sustained remission    Schizoaffective disorder, bipolar type (H)    Psychosis, unspecified psychosis type  (H)    Tardive dyskinesia

## 2024-10-02 NOTE — PLAN OF CARE
Team Note Due:  Wednesday     Assessment/Intervention/Current Symtoms and Care Coordination:  Chart review and met with team, discussed pt progress, symptomology, and response to treatment. Discussed the discharge plan and any potential impediments to discharge.    Per team, Pt requesting more info about his medications. RN put pharm consult in.     Today I met with pt he requested info about haloperidol, which was provided. He appeared to have a blunted affect. He said he didn't know who his nurse was, and I informed him of who this was.     Behavioral Team Note completed.     Discharge Plan or Goal:  Cannot return to [facility] due to Suboxone use.   TBD -  group home once stable - awaiting [facility] amendment to start Prolixin     Barriers to Discharge:  Symptoms - jamila, agitation  Managing his medications in order to stabilize   Awaiting [facility] amendment to start Prolixin    Referral Status:  TBD     Legal Status:  Committed and [facility]   [facility]: [place]  File Number: [id number]  Start and expiration date of commitment:   DueDil meds: Zyprexa, Seroquel, Invega and Abilify   9/30: Submitted [place] Amendment to remove Invega and add Prolixin.    Contacts (include KELBY status):  [facility] - called from cell phone for an update #[phone] (Eloy signed KELBY 10/1 for medications related info)  Michelle Cortes/Mother: [phone]    [facility] Cadi CM Wsdrc-[phone]  [email or web address]  Abiola is leaving Friday - New CADI CM is: Sonia Burdick Ph: [phone], email: [email or web address] (KELBY signed 10/1/24)    New Commitment CM:  Chasity [email or web address] [phone]- Primary CTC emailed CM Chasity again as I haven't heard back if she approves group home with outpatient supports while awaiting ACT referral.      Upcoming Meetings and Dates/Important Information and next steps:  Update discharge referral form at discharge   PD and  COS at discharge  Follow up psych appt

## 2024-10-02 NOTE — PLAN OF CARE
BEH IP Unit Acuity Rating Score (UARS)  Patient is given one point for every criteria they meet.    CRITERIA SCORING   On a 72 hour hold, court hold, committed, stay of commitment, or revocation. 1    Patient LOS on BEH unit exceeds 20 days. 1 LOS: 29   Patient under guardianship, 55+, otherwise medically complex, or under age 11. 0   Suicide ideation without relief of precipitating factors. 0   Current plan for suicide. 0   Current plan for homicide. 0   Imminent risk or actual attempt to seriously harm another without relief of factors precipitating the attempt. 0   Severe dysfunction in daily living (ex: complete neglect for self care, extreme disruption in vegetative function, extreme deterioration in social interactions). 1   Recent (last 7 days) or current physical aggression in the ED or on unit. 0   Restraints or seclusion episode in past 72 hours. 0   Recent (last 7 days) or current verbal aggression, agitation, yelling, etc., while in the ED or unit. 0   Active psychosis. 1   Need for constant or near constant redirection (from leaving, from others, etc).  0   Intrusive or disruptive behaviors. 0   Patient requires 3 or more hours of individualized nursing care per 8-hour shift (i.e. for ADLs, meds, therapeutic interventions). 0   TOTAL 4

## 2024-10-03 PROCEDURE — 124N000002 HC R&B MH UMMC

## 2024-10-03 PROCEDURE — 250N000013 HC RX MED GY IP 250 OP 250 PS 637

## 2024-10-03 PROCEDURE — 97150 GROUP THERAPEUTIC PROCEDURES: CPT | Mod: GO

## 2024-10-03 PROCEDURE — 250N000012 HC RX MED GY IP 250 OP 636 PS 637

## 2024-10-03 PROCEDURE — 99232 SBSQ HOSP IP/OBS MODERATE 35: CPT | Mod: GC | Performed by: PSYCHIATRY & NEUROLOGY

## 2024-10-03 PROCEDURE — 250N000013 HC RX MED GY IP 250 OP 250 PS 637: Performed by: PSYCHIATRY & NEUROLOGY

## 2024-10-03 PROCEDURE — 250N000013 HC RX MED GY IP 250 OP 250 PS 637: Performed by: STUDENT IN AN ORGANIZED HEALTH CARE EDUCATION/TRAINING PROGRAM

## 2024-10-03 RX ORDER — ARIPIPRAZOLE 5 MG/1
5 TABLET ORAL DAILY
Status: DISPENSED | OUTPATIENT
Start: 2024-10-03

## 2024-10-03 RX ADMIN — OLANZAPINE 20 MG: 20 TABLET, ORALLY DISINTEGRATING ORAL at 19:31

## 2024-10-03 RX ADMIN — NICOTINE POLACRILEX 4 MG: 2 LOZENGE ORAL at 20:05

## 2024-10-03 RX ADMIN — ASPIRIN 81 MG CHEWABLE TABLET 81 MG: 81 TABLET CHEWABLE at 09:43

## 2024-10-03 RX ADMIN — NICOTINE POLACRILEX 4 MG: 2 LOZENGE ORAL at 12:41

## 2024-10-03 RX ADMIN — OLANZAPINE 10 MG: 10 TABLET, ORALLY DISINTEGRATING ORAL at 09:43

## 2024-10-03 RX ADMIN — NICOTINE POLACRILEX 4 MG: 2 GUM, CHEWING BUCCAL at 21:30

## 2024-10-03 RX ADMIN — ARIPIPRAZOLE 5 MG: 5 TABLET ORAL at 13:16

## 2024-10-03 RX ADMIN — BUPRENORPHINE AND NALOXONE 1 FILM: 2; .5 FILM BUCCAL; SUBLINGUAL at 19:31

## 2024-10-03 RX ADMIN — SENNOSIDES AND DOCUSATE SODIUM 1 TABLET: 8.6; 5 TABLET ORAL at 19:31

## 2024-10-03 RX ADMIN — METFORMIN HYDROCHLORIDE 500 MG: 500 TABLET, FILM COATED ORAL at 09:43

## 2024-10-03 RX ADMIN — NICOTINE POLACRILEX 4 MG: 2 LOZENGE ORAL at 10:18

## 2024-10-03 RX ADMIN — NICOTINE 1 PATCH: 21 PATCH, EXTENDED RELEASE TRANSDERMAL at 09:49

## 2024-10-03 RX ADMIN — THERA TABS 1 TABLET: TAB at 09:43

## 2024-10-03 RX ADMIN — METFORMIN HYDROCHLORIDE 500 MG: 500 TABLET, FILM COATED ORAL at 17:52

## 2024-10-03 RX ADMIN — TAMSULOSIN HYDROCHLORIDE 0.4 MG: 0.4 CAPSULE ORAL at 09:43

## 2024-10-03 RX ADMIN — ALUMINUM HYDROXIDE, MAGNESIUM HYDROXIDE, AND DIMETHICONE 30 ML: 200; 20; 200 SUSPENSION ORAL at 13:16

## 2024-10-03 RX ADMIN — NICOTINE POLACRILEX 4 MG: 2 GUM, CHEWING BUCCAL at 17:38

## 2024-10-03 RX ADMIN — SENNOSIDES AND DOCUSATE SODIUM 1 TABLET: 8.6; 5 TABLET ORAL at 09:43

## 2024-10-03 RX ADMIN — POLYETHYLENE GLYCOL 3350 17 G: 17 POWDER, FOR SOLUTION ORAL at 09:43

## 2024-10-03 RX ADMIN — BUPRENORPHINE AND NALOXONE 1 FILM: 2; .5 FILM BUCCAL; SUBLINGUAL at 09:43

## 2024-10-03 ASSESSMENT — ACTIVITIES OF DAILY LIVING (ADL)
ADLS_ACUITY_SCORE: 28
DRESS: INDEPENDENT
ADLS_ACUITY_SCORE: 28
DRESS: INDEPENDENT;SCRUBS (BEHAVIORAL HEALTH)
ADLS_ACUITY_SCORE: 28
HYGIENE/GROOMING: HANDWASHING;INDEPENDENT
HYGIENE/GROOMING: INDEPENDENT
ORAL_HYGIENE: INDEPENDENT
ADLS_ACUITY_SCORE: 28
LAUNDRY: UNABLE TO COMPLETE
ADLS_ACUITY_SCORE: 28
ORAL_HYGIENE: INDEPENDENT
ADLS_ACUITY_SCORE: 28

## 2024-10-03 NOTE — PLAN OF CARE
"Calm, cooperative. Blunted affect. Denies having any thoughts of harming self or others. Denies anxiety and depression. Sleeping during day. Visible in milieu at times, not observed socializing. Listening to music. Behaviorally appropriate. Independent with identifying needs.   Reports that he had about 25% of his dinner.   Came to medication window to requested nighttime medication, took without incident.   Denies pain, but endorses having a stomach ache, denied intervention.  No acute behavioral or safety concerns noted.     /82 (BP Location: Right arm, Patient Position: Sitting, Cuff Size: Adult Regular)   Pulse 87   Temp 97  F (36.1  C) (Tympanic)   Resp 18   Ht 1.854 m (6' 1\")   Wt 101.2 kg (223 lb 3.2 oz)   SpO2 97%   BMI 29.45 kg/m  \  "

## 2024-10-03 NOTE — PLAN OF CARE
Team Note Due:  Wednesday     Assessment/Intervention/Current Symtoms and Care Coordination:  Chart review and met with team, discussed pt progress, symptomology, and response to treatment. Discussed the discharge plan and any potential impediments to discharge.    Per team, Pt slept 6.25 hours, prolactin has doubled, some residual jamila, plan to stop seroquel and start abilify per providers.     I met with Pt to check in and he said he had a good night. He asked for me to look into Supportive living solutions - [facility]. I said I would get an KELBY for him. He was calm, cooperative and pleasant. I shared the providers would come speak to him about his med updates and pharmacy as well.     Discharge Plan or Goal:  Cannot return to [facility] due to Suboxone use.   TBD -  group home once stable - awaiting [facility]ado amendment to start Prolixin     Barriers to Discharge:  Symptoms - jamila, agitation  Managing his medications in order to stabilize   Awaiting Prado amendment to start Prolixin    Referral Status:  TBD     Legal Status:  Committed and [facility]: [place]  File Number: [id number]  Start and expiration date of commitment:   [facility] meds: Zyprexa, Seroquel, Invega and Abilify   9/30: Submitted [facility] Amendment to remove Invega and add Prolixin.    Contacts (include KELBY status):  Psych: Dr. Khan, [place] [facility] - called from cell phone for an update #[phone] ([place] signed KELBY 10/1 for medications related info)  Michelle Hensonnadja/Mother: [phone]    [facility] Cadi CM Dzuta-[phone]  [email or web address]  Abiola is leaving Friday - New CADI CM is: Sonia Burdick Ph: [phone], email: [email or web address] (KELBY signed 10/1/24)    New Commitment CM:  Chasity [email or web address] [phone]- Primary CTC emailed CM Chasity again as I haven't heard back if she approves group home with outpatient supports while awaiting ACT referral.       Upcoming Meetings and Dates/Important Information and next steps:  Update discharge referral form at discharge   PD and COS at discharge  Follow up psych appt

## 2024-10-03 NOTE — PLAN OF CARE
Eloy appeared sleeping  for 6.25 hours with uneventful night, breathing was quiet and unlabored.  No distress noted or reported this shift  Woke up for snacks and return to his room without issue.  No pain or discomfort endorsed  No  aggressive  behaviors demonstrated    Problem: Behavioral Disturbance  Goal: Behavioral Disturbance  Description: Signs and symptoms of listed problems will be absent or manageable by discharge or transition of care.  Outcome: Progressing     Problem: Adult Behavioral Health Plan of Care  Goal: Plan of Care Review  Outcome: Progressing  Goal: Adheres to Safety Considerations for Self and Others  Intervention: Develop and Maintain Individualized Safety Plan  Recent Flowsheet Documentation  Taken 10/3/2024 0520 by Disha Finn, RN  Safety Measures: safety rounds completed  Goal: Absence of New-Onset Illness or Injury  Intervention: Identify and Manage Fall Risk  Recent Flowsheet Documentation  Taken 10/3/2024 0520 by Disha Finn, RN  Safety Measures: safety rounds completed   Goal Outcome Evaluation:

## 2024-10-03 NOTE — PLAN OF CARE
BEH IP Unit Acuity Rating Score (UARS)  Patient is given one point for every criteria they meet.    CRITERIA SCORING   On a 72 hour hold, court hold, committed, stay of commitment, or revocation. 1    Patient LOS on BEH unit exceeds 20 days. 1 LOS: 30   Patient under guardianship, 55+, otherwise medically complex, or under age 11. 0   Suicide ideation without relief of precipitating factors. 0   Current plan for suicide. 0   Current plan for homicide. 0   Imminent risk or actual attempt to seriously harm another without relief of factors precipitating the attempt. 0   Severe dysfunction in daily living (ex: complete neglect for self care, extreme disruption in vegetative function, extreme deterioration in social interactions). 1   Recent (last 7 days) or current physical aggression in the ED or on unit. 0   Restraints or seclusion episode in past 72 hours. 0   Recent (last 7 days) or current verbal aggression, agitation, yelling, etc., while in the ED or unit. 0   Active psychosis. 1   Need for constant or near constant redirection (from leaving, from others, etc).  0   Intrusive or disruptive behaviors. 0   Patient requires 3 or more hours of individualized nursing care per 8-hour shift (i.e. for ADLs, meds, therapeutic interventions). 0   TOTAL 4

## 2024-10-03 NOTE — PHARMACY-CONSULT NOTE
"Pharmacist was consulted by Dr. Foster Batista regarding: \"Comparison and review of potential antipsychotic medications (haloperidol, prolixin)\"     Eloy wanted to speak to a pharmacist regarding questions with his current antipsychotic regimen (olanzapine) and additional antipsychotics that he may be interested in taking (haloperidol, fluphenazine, aripiprazole). Eloy had a print out with information on each medication however he still had questions including what neurotransmitters these medications work on, side effects, and the efficacy. We discussed the mechanism of action and how there are some antipsychotics that work by blocking dopamine and there are some medications that work by blocking dopamine and serotonin. Regarding the side effects, we discussed certain side effects such as extrapyramidal symptoms and what to look out for, weight gain, sedation, cardiac abnormalities, and anticholinergic side effects. He then asked which antipsychotic is most effective. This writer discussed that each antipsychotics can be very effective medication however they might be used a little differently, in addition, this writer discussed that each antipsychotic has it's own side effect profile. Eloy stated he had no further questions.     Thank you for the consult, please reach out if you have any additional questions or concerns.      Ajith Valle, PharmD on 10/3/2024 at 12:07 PM    "

## 2024-10-03 NOTE — CARE PLAN
Rehab Group    Start time: 1030  End time: 1115  Patient time total: 25minutes    attended full group     #3 attended   Group Type: OT Clinic   Group Topic Covered: activity therapy and coping skills     Group Session Detail:    The purpose of occupational therapy clinic is to facilitate coping skill exploration, creative expression within personally meaningful activities, and clinical observation of social, cognitive, and kinesthetic performance skills.      Patient Response/Contribution:  cooperative with task, attentive, and actively engaged     Patient Detail:    Went between two different activities, though was mostly interested in selecting and listening to music. Pleasant conversation.      [id number] OT Group (2 or more in attendance)        Patient Active Problem List   Diagnosis    Suicidal ideation    Psychosis (H)    Acute pulmonary embolism without acute cor pulmonale (H)    Alcohol use disorder, moderate, in sustained remission, dependence (H)    Anxiety    Cannabis use disorder, severe, dependence (H)    Class 1 drug-induced obesity without serious comorbidity with body mass index (BMI) of 33.0 to 33.9 in adult    Depression, major, single episode, moderate (H)    Episodic mood disorder (H)    KATHE (generalized anxiety disorder)    History of marijuana use    Obesity (BMI 30-39.9)    Obesity, Class I, BMI 30-34.9    Tobacco use disorder, moderate, in sustained remission    Schizoaffective disorder, bipolar type (H)    Psychosis, unspecified psychosis type (H)    Tardive dyskinesia

## 2024-10-03 NOTE — PROGRESS NOTES
"  Rehab Group    Start time: 1115  End time: 1200  Patient time total: 30 minutes    attended full group (pulled out of group to meet with a pharmacist then returned)    #1 attended   Group Type: occupational therapy   Group Topic Covered: cognitive activities, healthy leisure time, and problem solving     Group Session Detail:  Visual-spatial group game     Patient Response/Contribution:  cooperative with task, attentive, and actively engaged     Patient Detail:    Pt actively participated in a structured occupational therapy group with a focus on a visual-spatial leisure task. Pt was quickly able to follow 2-step directions of the novel task, and demonstrated strategic planning and problem solving throughout the task. Pt remained focused and engaged for the full duration of group. Observed actively planning ahead and tracking the task consistently. Shared that he enjoys playing games, and that he specifically enjoys playing \"Monopoly Deal\" with his mom. Reported enjoying the task at the end of group. Calm, pleasant, and engaged. Politely thanked writer for group at the end.      No Charge-not enough group participants to bill      Patient Active Problem List   Diagnosis    Suicidal ideation    Psychosis (H)    Acute pulmonary embolism without acute cor pulmonale (H)    Alcohol use disorder, moderate, in sustained remission, dependence (H)    Anxiety    Cannabis use disorder, severe, dependence (H)    Class 1 drug-induced obesity without serious comorbidity with body mass index (BMI) of 33.0 to 33.9 in adult    Depression, major, single episode, moderate (H)    Episodic mood disorder (H)    KATHE (generalized anxiety disorder)    History of marijuana use    Obesity (BMI 30-39.9)    Obesity, Class I, BMI 30-34.9    Tobacco use disorder, moderate, in sustained remission    Schizoaffective disorder, bipolar type (H)    Psychosis, unspecified psychosis type (H)    Tardive dyskinesia       "
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
## 2024-10-03 NOTE — PLAN OF CARE
"  Problem: Adult Inpatient Plan of Care  Goal: Plan of Care Review  Description: The Plan of Care Review/Shift note should be completed every shift.  The Outcome Evaluation is a brief statement about your assessment that the patient is improving, declining, or no change.  This information will be displayed automatically on your shift  note.  Outcome: Progressing  Flowsheets (Taken 10/3/2024 1257)  Plan of Care Reviewed With: patient  Overall Patient Progress: improving   Goal Outcome Evaluation:    Plan of Care Reviewed With: patient Plan of Care Reviewed With: patient    Overall Patient Progress: improvingOverall Patient Progress: improving     Patient present in the milieu throughout the day mainly isolative to self though does attend group therapy. Patient upon approach flat in affect, calm and cooperative with verbal assessment. Patient reporting overall mood as \"good\" though reports he is experiencing depression 7/10 and symptoms of compulsivity that he relates to not getting selective serotonin reuptake inhibitor medications. Patient denies agitation or anxiety with the symptoms and demonstrates coping techniques using music and lavender patch's to cope. Patient denies SI/SIB, AH/VH, anxiety, depression,  or thoughts of harming others.     Patient cooperative with vital sign assessments which were stable. Patient diet good eating 100% of breakfast and lunch. Patient independent with requesting and accepting of scheduled medications with no stated or observed side effects. Patient independent with identifying needs and completing adl's.       "

## 2024-10-04 PROCEDURE — 250N000013 HC RX MED GY IP 250 OP 250 PS 637: Performed by: STUDENT IN AN ORGANIZED HEALTH CARE EDUCATION/TRAINING PROGRAM

## 2024-10-04 PROCEDURE — 250N000013 HC RX MED GY IP 250 OP 250 PS 637

## 2024-10-04 PROCEDURE — 124N000002 HC R&B MH UMMC

## 2024-10-04 PROCEDURE — 99232 SBSQ HOSP IP/OBS MODERATE 35: CPT | Performed by: PSYCHIATRY & NEUROLOGY

## 2024-10-04 PROCEDURE — 250N000012 HC RX MED GY IP 250 OP 636 PS 637

## 2024-10-04 PROCEDURE — 250N000013 HC RX MED GY IP 250 OP 250 PS 637: Performed by: PSYCHIATRY & NEUROLOGY

## 2024-10-04 PROCEDURE — 97150 GROUP THERAPEUTIC PROCEDURES: CPT | Mod: GO

## 2024-10-04 RX ADMIN — NICOTINE POLACRILEX 4 MG: 2 GUM, CHEWING BUCCAL at 08:07

## 2024-10-04 RX ADMIN — NICOTINE POLACRILEX 4 MG: 2 GUM, CHEWING BUCCAL at 10:19

## 2024-10-04 RX ADMIN — NICOTINE POLACRILEX 4 MG: 2 GUM, CHEWING BUCCAL at 17:44

## 2024-10-04 RX ADMIN — THERA TABS 1 TABLET: TAB at 07:40

## 2024-10-04 RX ADMIN — ALUMINUM HYDROXIDE, MAGNESIUM HYDROXIDE, AND DIMETHICONE 30 ML: 200; 20; 200 SUSPENSION ORAL at 15:17

## 2024-10-04 RX ADMIN — BUPRENORPHINE AND NALOXONE 1 FILM: 2; .5 FILM BUCCAL; SUBLINGUAL at 19:54

## 2024-10-04 RX ADMIN — OLANZAPINE 10 MG: 10 TABLET, ORALLY DISINTEGRATING ORAL at 07:40

## 2024-10-04 RX ADMIN — METFORMIN HYDROCHLORIDE 500 MG: 500 TABLET, FILM COATED ORAL at 17:44

## 2024-10-04 RX ADMIN — NICOTINE 1 PATCH: 21 PATCH, EXTENDED RELEASE TRANSDERMAL at 07:41

## 2024-10-04 RX ADMIN — BUPRENORPHINE AND NALOXONE 1 FILM: 2; .5 FILM BUCCAL; SUBLINGUAL at 07:40

## 2024-10-04 RX ADMIN — METFORMIN HYDROCHLORIDE 500 MG: 500 TABLET, FILM COATED ORAL at 07:40

## 2024-10-04 RX ADMIN — SENNOSIDES AND DOCUSATE SODIUM 1 TABLET: 8.6; 5 TABLET ORAL at 07:40

## 2024-10-04 RX ADMIN — ASPIRIN 81 MG CHEWABLE TABLET 81 MG: 81 TABLET CHEWABLE at 07:40

## 2024-10-04 RX ADMIN — NICOTINE POLACRILEX 4 MG: 2 GUM, CHEWING BUCCAL at 12:21

## 2024-10-04 RX ADMIN — SENNOSIDES AND DOCUSATE SODIUM 1 TABLET: 8.6; 5 TABLET ORAL at 19:54

## 2024-10-04 RX ADMIN — NICOTINE POLACRILEX 4 MG: 2 GUM, CHEWING BUCCAL at 20:38

## 2024-10-04 RX ADMIN — ARIPIPRAZOLE 5 MG: 5 TABLET ORAL at 07:40

## 2024-10-04 RX ADMIN — NICOTINE POLACRILEX 2 MG: 2 LOZENGE ORAL at 15:17

## 2024-10-04 RX ADMIN — TAMSULOSIN HYDROCHLORIDE 0.4 MG: 0.4 CAPSULE ORAL at 07:40

## 2024-10-04 RX ADMIN — OLANZAPINE 20 MG: 20 TABLET, ORALLY DISINTEGRATING ORAL at 19:54

## 2024-10-04 ASSESSMENT — ACTIVITIES OF DAILY LIVING (ADL)
ADLS_ACUITY_SCORE: 28

## 2024-10-04 NOTE — PROVIDER NOTIFICATION
10/04/24 1752   C-SSRS (Daily/Shift Screen)   Q2 Suicidal Thoughts (Since Last Contact) 0-->no   Q3 Have you been thinking about how you might do this? 0-->no   Q4 Suicidal Intent without Specific Plan 0-->no   Q5 Suicide Intent with Specific Plan 0-->no   Level of Risk per Screen no risk indicated   Assess Risk to Self and Maintain Safety   Behavior Management boundaries reinforced;behavioral plan reviewed   Enhanced Safety Measures room near unit station   Promote Psychosocial Wellbeing   Family/Support System Care presence promoted;self-care encouraged   Sleep/Rest Enhancement noise level reduced;medication;relaxation techniques promoted   Supportive Measures goal-setting facilitated;decision-making supported;positive reinforcement provided;self-care encouraged;problem-solving facilitated;relaxation techniques promoted   Establish Safety Plan and Continuity of Care   Safe Transition Promotion personal safety plan developed   Environment of Care Checklist   Potentially harmful objects out of patient reach? yes   Personal belongings secured? yes   Patient dressed in hospital-provided attire only? yes   Plastic bags out of patient reach? yes   Patient care equipment (cords, cables, call bells, lines, and drains) shortened, removed, or accounted for? yes   Potentially toxic materials removed or secured? yes   Sharps container removed or secured? yes   Cabinets secured? yes   Room secured by RN per shift? yes

## 2024-10-04 NOTE — PLAN OF CARE
Team Note Due:  Wednesday     Assessment/Intervention/Current Symtoms and Care Coordination:  Chart review and met with team, discussed pt progress, symptomology, and response to treatment. Discussed the discharge plan and any potential impediments to discharge.    Since declining mood stabilizer (Depakote) that he was stabilizing on, we are now seeing some residual jamila, waiting on amended Prado order to try new meds. He did receive a pharmD consult yesterday.    I reached out to CADI CM and commitment CM about [facility] supportive apartment living [facility]. I contacted [facility] and [facility] to ask about availability.     I met with patient to check in with him and share an update about future housing prospects above. We submitted a referral for [facility], and signed KELBY. I also let him know I [facility] about [facility].   He attended group, was interested in music during group. It appears he has washed his walls (some of it) the marker has been removed but walls still look dirty.     Discharge Plan or Goal:  Cannot return to  previous  housing due to Suboxone use.   TBD -  group home once stable - awaiting Prado amendment to start Prolixin     Barriers to Discharge:  Symptoms - jamila, agitation  Managing his medications in order to stabilize   Awaiting Pardo amendment to start Prolixin    Referral Status:  TBD     Legal Status:  Committed and [facility]: [place]  File Number: [id number]  Start and expiration date of commitment:   9/30: Submitted Prado Amendment to remove Invega and add Prolixin.  New Prado meds as of 10/1: Zyprexa, Seroquel, Prolixin, Abilify     Contacts (include KELBY status):  Psych: [facility]. [facility] - called from cell phone for an update #[phone] (Eloy signed KELBY 10/1 for medications related info)  Michelle Cortes/Mother: [phone]    [facility]TER  Bbvoy-[phone]  [email or web address]  Abiola is leaving Friday - New CADI CM is: Sonia Burdick Ph: [phone], email: [email or web address] (KELBY signed 10/1/24)    New Commitment CM:  Chasity [email or web address] [phone]- Primary CTC emailed CM Chasity again as I haven't heard back if she approves group home with outpatient supports while awaiting ACT referral.      Upcoming Meetings and Dates/Important Information and next steps:  Update discharge referral form at discharge   PD and COS at discharge  Follow up psych appt

## 2024-10-04 NOTE — CARE PLAN
Rehab Group    Start time: 1115  End time: 1200  Patient time total: 45 minutes    attended full group    #4 attended   Group Type: OT Clinic   Group Topic Covered: activity therapy and coping skills     Group Session Detail:  The purpose of occupational therapy clinic is to facilitate coping skill exploration, creative expression within personally meaningful activities, and clinical observation of social, cognitive, and kinesthetic performance skills.        Patient Response/Contribution:  cooperative with task, organized, and actively engaged     Patient Detail:    Pt response: Independent to initiate, gather materials, sequence, and adjust to workspace demands as needed. Demonstrated good focus, planning, and problem solving for selected beading task. Able to ask for assistance as needed, and was social with peers and staff. Pt remained pleasant with peer that was somewhat irritable at times.      [id number] OT Group (2 or more in attendance)  Patient Active Problem List   Diagnosis    Suicidal ideation    Psychosis (H)    Acute pulmonary embolism without acute cor pulmonale (H)    Alcohol use disorder, moderate, in sustained remission, dependence (H)    Anxiety    Cannabis use disorder, severe, dependence (H)    Class 1 drug-induced obesity without serious comorbidity with body mass index (BMI) of 33.0 to 33.9 in adult    Depression, major, single episode, moderate (H)    Episodic mood disorder (H)    KATHE (generalized anxiety disorder)    History of marijuana use    Obesity (BMI 30-39.9)    Obesity, Class I, BMI 30-34.9    Tobacco use disorder, moderate, in sustained remission    Schizoaffective disorder, bipolar type (H)    Psychosis, unspecified psychosis type (H)    Tardive dyskinesia

## 2024-10-04 NOTE — CARE PLAN
Rehab Group    Start time: 1030   End time: 1115  Patient time total: 35 minutes    attended full group     #3 attended   Group Type: general health and coping   Group Topic Covered: coping skills  Self awareness   Group Session Detail:  Discussion group on creating lifestyle improvements/positive changes     Patient Response/Contribution:  cooperative with task, socially appropriate, attentive, and actively engaged     Patient Detail:    Participated in group, completed worksheet with question prompts, and was open to sharing answers verbally. Pt shared to 'make his corner of the world a better place' he needs to continue to be kind, compost, and be virtuous.  This led into a discussion on values, and values he chooses to live by, which others chimed in on their values as well.      [id number] OT Group (2 or more in attendance)      Patient Active Problem List   Diagnosis    Suicidal ideation    Psychosis (H)    Acute pulmonary embolism without acute cor pulmonale (H)    Alcohol use disorder, moderate, in sustained remission, dependence (H)    Anxiety    Cannabis use disorder, severe, dependence (H)    Class 1 drug-induced obesity without serious comorbidity with body mass index (BMI) of 33.0 to 33.9 in adult    Depression, major, single episode, moderate (H)    Episodic mood disorder (H)    KATHE (generalized anxiety disorder)    History of marijuana use    Obesity (BMI 30-39.9)    Obesity, Class I, BMI 30-34.9    Tobacco use disorder, moderate, in sustained remission    Schizoaffective disorder, bipolar type (H)    Psychosis, unspecified psychosis type (H)    Tardive dyskinesia

## 2024-10-04 NOTE — PLAN OF CARE
Problem: Adult Inpatient Plan of Care  Goal: Plan of Care Review  Outcome: Progressing    Plan of Care Reviewed With: patient    Milieu Participation:Behavior: Pt was calm, cooperative with staff, and visible on the unit most of the shift. Denies all mental health s/s including depression, anxiety, SI, SIB, AVH. Good behavioral control when another patient became offensive to him. Pt refused to respond, staff redirected other pt, and Eloy finished his request at the medication window. Pt attended OT group and contributed well. No physical complaints. Medication compliant with no stated or observed side effects to medications.    Safety: status 15 SI/Self harm: denies  Aggression/agitation/HI: denies, exhibited safe behavior  AVH: denies Affect: blunted Mood: calm  Physical Complaints/Issues: denies  PRN Med: Nicorette gum 4 mg  Medication AE: denies  I & O: eating and drinking well 100%  Sleep: denies concerns  LBM: denies concerns  ADLs: independent  Visits/calls: none

## 2024-10-04 NOTE — PLAN OF CARE
BEH IP Unit Acuity Rating Score (UARS)  Patient is given one point for every criteria they meet.    CRITERIA SCORING   On a 72 hour hold, court hold, committed, stay of commitment, or revocation. 1    Patient LOS on BEH unit exceeds 20 days. 1 LOS: 31   Patient under guardianship, 55+, otherwise medically complex, or under age 11. 0   Suicide ideation without relief of precipitating factors. 0   Current plan for suicide. 0   Current plan for homicide. 0   Imminent risk or actual attempt to seriously harm another without relief of factors precipitating the attempt. 0   Severe dysfunction in daily living (ex: complete neglect for self care, extreme disruption in vegetative function, extreme deterioration in social interactions). 1   Recent (last 7 days) or current physical aggression in the ED or on unit. 0   Restraints or seclusion episode in past 72 hours. 0   Recent (last 7 days) or current verbal aggression, agitation, yelling, etc., while in the ED or unit. 0   Active psychosis. 1   Need for constant or near constant redirection (from leaving, from others, etc).  0   Intrusive or disruptive behaviors. 0   Patient requires 3 or more hours of individualized nursing care per 8-hour shift (i.e. for ADLs, meds, therapeutic interventions). 0   TOTAL 4

## 2024-10-04 NOTE — CARE PLAN
Rehab Group    Start time: 1315  End time: 1400  Patient time total: 45 minutes    attended full group     #3 attended   Group Type: occupational therapy   Group Topic Covered: activity therapy, coping skills, and social skills     Group Session Detail:  The purpose of occupational therapy clinic is to facilitate coping skill exploration, creative expression within personally meaningful activities, and clinical observation of social, cognitive, and kinesthetic performance skills.        Patient Response/Contribution:  positive affect and cooperative with task       Patient Detail:    Afternoon leisure group offered, though pt was eager to continue working on a beading  project from this morning, which he enjoys doing.  Calm and relaxed while sorting beads and putting on string.      [id number] OT Group (2 or more in attendance)  Patient Active Problem List   Diagnosis    Suicidal ideation    Psychosis (H)    Acute pulmonary embolism without acute cor pulmonale (H)    Alcohol use disorder, moderate, in sustained remission, dependence (H)    Anxiety    Cannabis use disorder, severe, dependence (H)    Class 1 drug-induced obesity without serious comorbidity with body mass index (BMI) of 33.0 to 33.9 in adult    Depression, major, single episode, moderate (H)    Episodic mood disorder (H)    KATHE (generalized anxiety disorder)    History of marijuana use    Obesity (BMI 30-39.9)    Obesity, Class I, BMI 30-34.9    Tobacco use disorder, moderate, in sustained remission    Schizoaffective disorder, bipolar type (H)    Psychosis, unspecified psychosis type (H)    Tardive dyskinesia

## 2024-10-04 NOTE — PROGRESS NOTES
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 and is unable to care for self due to severe psychosis or jamila  Discharge location:  TBD . Consider ACT team referral, will need discharge planning conversation when more stable  Discharge Medications: not ordered  Follow-up Appointments: not scheduled     Virginie Loya MD  [facility]

## 2024-10-04 NOTE — PLAN OF CARE
Goal Outcome Evaluation:  Problem: Sleep Disturbance  Goal: Adequate Sleep/Rest  Outcome: Alison Lyons was awake at the start of this shift. Appeared to be struggling to sleep. He was offered PRN to sleep and declined. Requested tea and had milk. Slept 5 hours. Breathing even and non labored. No medical and behavioral issues noted and reported.

## 2024-10-05 PROCEDURE — 124N000002 HC R&B MH UMMC

## 2024-10-05 PROCEDURE — 250N000013 HC RX MED GY IP 250 OP 250 PS 637: Performed by: STUDENT IN AN ORGANIZED HEALTH CARE EDUCATION/TRAINING PROGRAM

## 2024-10-05 PROCEDURE — 250N000013 HC RX MED GY IP 250 OP 250 PS 637: Performed by: PSYCHIATRY & NEUROLOGY

## 2024-10-05 PROCEDURE — 250N000012 HC RX MED GY IP 250 OP 636 PS 637

## 2024-10-05 PROCEDURE — 250N000013 HC RX MED GY IP 250 OP 250 PS 637

## 2024-10-05 PROCEDURE — 97150 GROUP THERAPEUTIC PROCEDURES: CPT | Mod: GO

## 2024-10-05 RX ADMIN — BUPRENORPHINE AND NALOXONE 1 FILM: 2; .5 FILM BUCCAL; SUBLINGUAL at 08:53

## 2024-10-05 RX ADMIN — ALUMINUM HYDROXIDE, MAGNESIUM HYDROXIDE, AND DIMETHICONE 30 ML: 200; 20; 200 SUSPENSION ORAL at 17:19

## 2024-10-05 RX ADMIN — NICOTINE POLACRILEX 4 MG: 2 LOZENGE ORAL at 15:48

## 2024-10-05 RX ADMIN — NICOTINE 1 PATCH: 21 PATCH, EXTENDED RELEASE TRANSDERMAL at 08:53

## 2024-10-05 RX ADMIN — NICOTINE POLACRILEX 4 MG: 2 GUM, CHEWING BUCCAL at 17:39

## 2024-10-05 RX ADMIN — METFORMIN HYDROCHLORIDE 500 MG: 500 TABLET, FILM COATED ORAL at 17:19

## 2024-10-05 RX ADMIN — TAMSULOSIN HYDROCHLORIDE 0.4 MG: 0.4 CAPSULE ORAL at 08:53

## 2024-10-05 RX ADMIN — SENNOSIDES AND DOCUSATE SODIUM 1 TABLET: 8.6; 5 TABLET ORAL at 19:21

## 2024-10-05 RX ADMIN — NICOTINE POLACRILEX 4 MG: 2 GUM, CHEWING BUCCAL at 13:05

## 2024-10-05 RX ADMIN — ASPIRIN 81 MG CHEWABLE TABLET 81 MG: 81 TABLET CHEWABLE at 08:53

## 2024-10-05 RX ADMIN — POLYETHYLENE GLYCOL 3350 17 G: 17 POWDER, FOR SOLUTION ORAL at 08:53

## 2024-10-05 RX ADMIN — OLANZAPINE 20 MG: 20 TABLET, ORALLY DISINTEGRATING ORAL at 19:22

## 2024-10-05 RX ADMIN — OLANZAPINE 10 MG: 10 TABLET, ORALLY DISINTEGRATING ORAL at 08:53

## 2024-10-05 RX ADMIN — METFORMIN HYDROCHLORIDE 500 MG: 500 TABLET, FILM COATED ORAL at 08:53

## 2024-10-05 RX ADMIN — THERA TABS 1 TABLET: TAB at 08:53

## 2024-10-05 RX ADMIN — BUPRENORPHINE AND NALOXONE 1 FILM: 2; .5 FILM BUCCAL; SUBLINGUAL at 19:22

## 2024-10-05 RX ADMIN — SENNOSIDES AND DOCUSATE SODIUM 1 TABLET: 8.6; 5 TABLET ORAL at 08:53

## 2024-10-05 RX ADMIN — ARIPIPRAZOLE 5 MG: 5 TABLET ORAL at 08:53

## 2024-10-05 ASSESSMENT — ACTIVITIES OF DAILY LIVING (ADL)
ADLS_ACUITY_SCORE: 28
HYGIENE/GROOMING: INDEPENDENT
ADLS_ACUITY_SCORE: 28
LAUNDRY: UNABLE TO COMPLETE
ADLS_ACUITY_SCORE: 28
ORAL_HYGIENE: INDEPENDENT
DRESS: INDEPENDENT

## 2024-10-05 NOTE — PLAN OF CARE
"Goal Outcome Evaluation:    Plan of Care Reviewed With: patient        Pt visible in the milieu, paced the hallway, watched TV with peers briefly with minimal interaction. Pt used jaimie to write his name on the door only with supervision and was redirected not to take it into his room because he had been writing on the wall and pt was receptive to redirection. Pt complied with medications after shaving and no side effect observed or reported. Pt endorsed moderate anxiety and declined any pharmacological intervention. Pt denies all other mental health psych symptoms and contracted for safety.   Pt eating and drinking adequately, voiding freely without concern per pt and denies pain and shortness of breath and vital sign stable. /62 (BP Location: Right arm, Patient Position: Sitting, Cuff Size: Adult Regular)   Pulse 89   Temp 98.3  F (36.8  C) (Oral)   Resp 18   Ht 1.854 m (6' 1\")   Wt 101.2 kg (223 lb 3.2 oz)   SpO2 97%   BMI 29.45 kg/m               "

## 2024-10-05 NOTE — PLAN OF CARE
Rehab Group    Start time: 11:15  End time: 12:00  Patient time total: 10 minutes    attended partial group     #3 attended   Group Type: occupational therapy   Group Topic Covered: cognitive activities, coping skills, healthy leisure time, and social skills     Group Session Detail:  Patient engaged in a leisure activity with a visuospatial and cognitive component in order to promote: problem solving skills, improve attention, emphasize new learning, exercise cognitive skills, and foster leisure and relaxation.       Patient Response/Contribution:  cooperative with task and actively engaged     Patient Detail:  Patient entered group late and apologized for tardiness as he was taking a shower. Patient was quickly able to engage in task following demonstration and verbal instructions. Patient asked appropriate follow-up questions and remained engaged and attentive until task completion. Minimal social interaction observed. Pleasant, cooperative, and calm in demeanor.        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

## 2024-10-05 NOTE — PLAN OF CARE
Eloy appeared sleeping for approximately 5.25 hours without distress  15 minutes safety checks completed and no issues identified  Continue  on SI, assault and cheeking precautions  with absence of such behavior this shift  No pain or discomfort endorsed.  No outburst this shift.      Problem: Adult Behavioral Health Plan of Care  Goal: Plan of Care Review  Outcome: Progressing  Goal: Adheres to Safety Considerations for Self and Others  Intervention: Develop and Maintain Individualized Safety Plan  Recent Flowsheet Documentation  Taken 10/5/2024 0522 by Disha Finn, RN  Safety Measures: safety rounds completed  Goal: Absence of New-Onset Illness or Injury  Intervention: Identify and Manage Fall Risk  Recent Flowsheet Documentation  Taken 10/5/2024 0522 by Disha Finn, RN  Safety Measures: safety rounds completed   Goal Outcome Evaluation:

## 2024-10-05 NOTE — PLAN OF CARE
Nursing Assessment    Recent Vitals: refused    Patient has primarily isolated to his room, coming out for meals or various requests. He has been eating in his room. He denied AVH/HI/SI/SIB. He denied depression and voiced mild anxiety. He presented with a flat affect. Patient states that he is bored and seems uninterested in unit activities. Writer offered fidget devices. The fidgets available he wasn't interested in. He voiced wanting a fidget spinner however upon looking, these are no longer available. He was medication compliant, no voiced side effects. No signs of internal stimuli. Eating well.    Tenisha Barboza, MELIDA MSN

## 2024-10-06 PROCEDURE — 124N000002 HC R&B MH UMMC

## 2024-10-06 PROCEDURE — 250N000013 HC RX MED GY IP 250 OP 250 PS 637

## 2024-10-06 PROCEDURE — 250N000012 HC RX MED GY IP 250 OP 636 PS 637

## 2024-10-06 PROCEDURE — 250N000013 HC RX MED GY IP 250 OP 250 PS 637: Performed by: PSYCHIATRY & NEUROLOGY

## 2024-10-06 PROCEDURE — 250N000013 HC RX MED GY IP 250 OP 250 PS 637: Performed by: STUDENT IN AN ORGANIZED HEALTH CARE EDUCATION/TRAINING PROGRAM

## 2024-10-06 RX ADMIN — NICOTINE POLACRILEX 4 MG: 2 GUM, CHEWING BUCCAL at 14:50

## 2024-10-06 RX ADMIN — TAMSULOSIN HYDROCHLORIDE 0.4 MG: 0.4 CAPSULE ORAL at 08:56

## 2024-10-06 RX ADMIN — SENNOSIDES AND DOCUSATE SODIUM 1 TABLET: 8.6; 5 TABLET ORAL at 08:56

## 2024-10-06 RX ADMIN — THERA TABS 1 TABLET: TAB at 08:56

## 2024-10-06 RX ADMIN — NICOTINE 1 PATCH: 21 PATCH, EXTENDED RELEASE TRANSDERMAL at 08:55

## 2024-10-06 RX ADMIN — ALUMINUM HYDROXIDE, MAGNESIUM HYDROXIDE, AND DIMETHICONE 30 ML: 200; 20; 200 SUSPENSION ORAL at 13:37

## 2024-10-06 RX ADMIN — OLANZAPINE 20 MG: 20 TABLET, ORALLY DISINTEGRATING ORAL at 19:22

## 2024-10-06 RX ADMIN — METFORMIN HYDROCHLORIDE 500 MG: 500 TABLET, FILM COATED ORAL at 17:31

## 2024-10-06 RX ADMIN — NICOTINE POLACRILEX 4 MG: 2 GUM, CHEWING BUCCAL at 11:35

## 2024-10-06 RX ADMIN — METFORMIN HYDROCHLORIDE 500 MG: 500 TABLET, FILM COATED ORAL at 08:56

## 2024-10-06 RX ADMIN — ASPIRIN 81 MG CHEWABLE TABLET 81 MG: 81 TABLET CHEWABLE at 08:56

## 2024-10-06 RX ADMIN — POLYETHYLENE GLYCOL 3350 17 G: 17 POWDER, FOR SOLUTION ORAL at 08:55

## 2024-10-06 RX ADMIN — BUPRENORPHINE AND NALOXONE 1 FILM: 2; .5 FILM BUCCAL; SUBLINGUAL at 19:22

## 2024-10-06 RX ADMIN — BUPRENORPHINE AND NALOXONE 1 FILM: 2; .5 FILM BUCCAL; SUBLINGUAL at 08:56

## 2024-10-06 RX ADMIN — OLANZAPINE 10 MG: 10 TABLET, ORALLY DISINTEGRATING ORAL at 08:56

## 2024-10-06 RX ADMIN — QUETIAPINE FUMARATE 25 MG: 25 TABLET ORAL at 14:50

## 2024-10-06 RX ADMIN — ARIPIPRAZOLE 5 MG: 5 TABLET ORAL at 08:56

## 2024-10-06 RX ADMIN — NICOTINE POLACRILEX 4 MG: 2 GUM, CHEWING BUCCAL at 14:17

## 2024-10-06 RX ADMIN — NICOTINE POLACRILEX 4 MG: 2 LOZENGE ORAL at 16:23

## 2024-10-06 RX ADMIN — SENNOSIDES AND DOCUSATE SODIUM 1 TABLET: 8.6; 5 TABLET ORAL at 19:22

## 2024-10-06 ASSESSMENT — ACTIVITIES OF DAILY LIVING (ADL)
ADLS_ACUITY_SCORE: 28
DRESS: INDEPENDENT
ADLS_ACUITY_SCORE: 28
LAUNDRY: UNABLE TO COMPLETE
ADLS_ACUITY_SCORE: 28
DRESS: INDEPENDENT
ADLS_ACUITY_SCORE: 28
ORAL_HYGIENE: INDEPENDENT
ADLS_ACUITY_SCORE: 28
ADLS_ACUITY_SCORE: 28
HYGIENE/GROOMING: INDEPENDENT
ADLS_ACUITY_SCORE: 28
ADLS_ACUITY_SCORE: 28
HYGIENE/GROOMING: INDEPENDENT
ADLS_ACUITY_SCORE: 28
LAUNDRY: UNABLE TO COMPLETE
ADLS_ACUITY_SCORE: 28
ORAL_HYGIENE: INDEPENDENT
ADLS_ACUITY_SCORE: 28
ADLS_ACUITY_SCORE: 28

## 2024-10-06 NOTE — PLAN OF CARE
Goal Outcome Evaluation:  Problem: Sleep Disturbance  Goal: Adequate Sleep/Rest  10/6/2024 0611 by Alondra Garcia RN  Outcome: Not Progressing       Patient was awake most of the night. PRN to help patient sleep offered and declined. Patient denies pain and medical concerns. No behavioral concern noted or reported. Pt slept 2.5 hours. Team will continue to monitor for safety/behavior and intervene as needed.

## 2024-10-06 NOTE — PLAN OF CARE
"Nursing Assessment    Recent Vitals: B/P: 112/73, T: 97.9, P: 85, R: 16    Patient has been noted more in the milieu compared to yesterday. Interactions are minimal but have been appropriate. He denied HI/AVH/SI/SIB. Medication cooperative, no voiced side effects. Eating well. Hygiene is fair. Incite and judgement are fair. Affect is flat. Mood is melancholic and pessimistic.    Patient was weighted and the amount was 227.4 lb. Patient said that he is heavier compared to his prior weight. Patient then said \"I need to eat less\". Writer attempted to educate that weight can fluctuate during the day and a lot over 3 days. Patient didn't say anything and walked away. He is 4 lbs up from 3 days ago, writer did not inform him of this.    Patient stated he was feeling anxious and requested medication at 1450. Writer asked about his triggers and he stated \"I don't know, I'm just bored and tired of being here. It's making me anxious.\". Writer recommended various activities but patient said he wasn't interested. He was provided with 25mg of Seroquel.    Plan is to continue to monitor patient status q 15 mins, assess response to medications, and maintain the patients safety.    Tenisha Barboza RN MSN  "

## 2024-10-06 NOTE — PLAN OF CARE
Nursing assessment completed. Patient awake and visible between his room and the lounge throughout the shift. He presents with a blunted affect. Guarded, but pleasant on approach. Endorses some anxiety, but states he is mostly bored and hopeful to discharge soon. No PRNs requested or received this shift. He is medication compliant. Denies SI/SIB or thoughts to harm others. Denies pain or medication side effects. Continue to monitor and assess.

## 2024-10-07 VITALS
DIASTOLIC BLOOD PRESSURE: 71 MMHG | RESPIRATION RATE: 18 BRPM | TEMPERATURE: 98.5 F | SYSTOLIC BLOOD PRESSURE: 108 MMHG | WEIGHT: 227.4 LBS | BODY MASS INDEX: 30.14 KG/M2 | HEART RATE: 86 BPM | OXYGEN SATURATION: 97 % | HEIGHT: 73 IN

## 2024-10-07 PROCEDURE — 250N000013 HC RX MED GY IP 250 OP 250 PS 637

## 2024-10-07 PROCEDURE — 97150 GROUP THERAPEUTIC PROCEDURES: CPT | Mod: GO

## 2024-10-07 PROCEDURE — 250N000013 HC RX MED GY IP 250 OP 250 PS 637: Performed by: STUDENT IN AN ORGANIZED HEALTH CARE EDUCATION/TRAINING PROGRAM

## 2024-10-07 PROCEDURE — 250N000012 HC RX MED GY IP 250 OP 636 PS 637

## 2024-10-07 PROCEDURE — 99232 SBSQ HOSP IP/OBS MODERATE 35: CPT | Mod: GC | Performed by: PSYCHIATRY & NEUROLOGY

## 2024-10-07 PROCEDURE — 124N000002 HC R&B MH UMMC

## 2024-10-07 PROCEDURE — 250N000013 HC RX MED GY IP 250 OP 250 PS 637: Performed by: PSYCHIATRY & NEUROLOGY

## 2024-10-07 PROCEDURE — 97165 OT EVAL LOW COMPLEX 30 MIN: CPT | Mod: GO

## 2024-10-07 RX ORDER — BUPRENORPHINE AND NALOXONE 4; 1 MG/1; MG/1
1 FILM, SOLUBLE BUCCAL; SUBLINGUAL 2 TIMES DAILY
Status: DISCONTINUED | OUTPATIENT
Start: 2024-10-07 | End: 2024-10-07

## 2024-10-07 RX ORDER — BUPRENORPHINE AND NALOXONE 2; .5 MG/1; MG/1
1 FILM, SOLUBLE BUCCAL; SUBLINGUAL AT BEDTIME
Status: DISPENSED | OUTPATIENT
Start: 2024-10-07

## 2024-10-07 RX ORDER — BUPRENORPHINE AND NALOXONE 4; 1 MG/1; MG/1
1 FILM, SOLUBLE BUCCAL; SUBLINGUAL EVERY MORNING
Status: DISPENSED | OUTPATIENT
Start: 2024-10-08

## 2024-10-07 RX ORDER — BUPRENORPHINE AND NALOXONE 2; .5 MG/1; MG/1
1 FILM, SOLUBLE BUCCAL; SUBLINGUAL ONCE
Status: COMPLETED | OUTPATIENT
Start: 2024-10-07 | End: 2024-10-07

## 2024-10-07 RX ADMIN — ALUMINUM HYDROXIDE, MAGNESIUM HYDROXIDE, AND DIMETHICONE 30 ML: 200; 20; 200 SUSPENSION ORAL at 17:45

## 2024-10-07 RX ADMIN — TAMSULOSIN HYDROCHLORIDE 0.4 MG: 0.4 CAPSULE ORAL at 11:02

## 2024-10-07 RX ADMIN — NICOTINE POLACRILEX 4 MG: 2 GUM, CHEWING BUCCAL at 20:12

## 2024-10-07 RX ADMIN — POLYETHYLENE GLYCOL 3350 17 G: 17 POWDER, FOR SOLUTION ORAL at 12:51

## 2024-10-07 RX ADMIN — BUPRENORPHINE AND NALOXONE 1 FILM: 2; .5 FILM BUCCAL; SUBLINGUAL at 19:22

## 2024-10-07 RX ADMIN — OLANZAPINE 20 MG: 20 TABLET, ORALLY DISINTEGRATING ORAL at 19:25

## 2024-10-07 RX ADMIN — SENNOSIDES AND DOCUSATE SODIUM 1 TABLET: 8.6; 5 TABLET ORAL at 11:02

## 2024-10-07 RX ADMIN — ASPIRIN 81 MG CHEWABLE TABLET 81 MG: 81 TABLET CHEWABLE at 11:02

## 2024-10-07 RX ADMIN — NICOTINE POLACRILEX 4 MG: 2 GUM, CHEWING BUCCAL at 12:28

## 2024-10-07 RX ADMIN — BUPRENORPHINE AND NALOXONE 1 FILM: 2; .5 FILM BUCCAL; SUBLINGUAL at 11:02

## 2024-10-07 RX ADMIN — THERA TABS 1 TABLET: TAB at 11:02

## 2024-10-07 RX ADMIN — NICOTINE 1 PATCH: 21 PATCH, EXTENDED RELEASE TRANSDERMAL at 11:08

## 2024-10-07 RX ADMIN — NICOTINE POLACRILEX 4 MG: 2 LOZENGE ORAL at 18:12

## 2024-10-07 RX ADMIN — OLANZAPINE 10 MG: 10 TABLET, ORALLY DISINTEGRATING ORAL at 11:02

## 2024-10-07 RX ADMIN — SENNOSIDES AND DOCUSATE SODIUM 1 TABLET: 8.6; 5 TABLET ORAL at 19:22

## 2024-10-07 RX ADMIN — METFORMIN HYDROCHLORIDE 1000 MG: 500 TABLET, FILM COATED ORAL at 17:46

## 2024-10-07 RX ADMIN — METFORMIN HYDROCHLORIDE 500 MG: 500 TABLET, FILM COATED ORAL at 11:02

## 2024-10-07 RX ADMIN — BUPRENORPHINE AND NALOXONE 1 FILM: 2; .5 FILM BUCCAL; SUBLINGUAL at 12:48

## 2024-10-07 RX ADMIN — NICOTINE POLACRILEX 4 MG: 2 GUM, CHEWING BUCCAL at 14:03

## 2024-10-07 RX ADMIN — ARIPIPRAZOLE 5 MG: 5 TABLET ORAL at 19:22

## 2024-10-07 RX ADMIN — NICOTINE POLACRILEX 4 MG: 2 GUM, CHEWING BUCCAL at 16:12

## 2024-10-07 RX ADMIN — OLANZAPINE 10 MG: 10 TABLET, FILM COATED ORAL at 19:23

## 2024-10-07 ASSESSMENT — ACTIVITIES OF DAILY LIVING (ADL)
ADLS_ACUITY_SCORE: 28
ORAL_HYGIENE: INDEPENDENT
LAUNDRY: UNABLE TO COMPLETE
ADLS_ACUITY_SCORE: 28
ADLS_ACUITY_SCORE: 28
HYGIENE/GROOMING: INDEPENDENT
LAUNDRY: UNABLE TO COMPLETE
ADLS_ACUITY_SCORE: 28
DRESS: SCRUBS (BEHAVIORAL HEALTH);INDEPENDENT
ADLS_ACUITY_SCORE: 28
ORAL_HYGIENE: INDEPENDENT
ADLS_ACUITY_SCORE: 28
DRESS: INDEPENDENT;SCRUBS (BEHAVIORAL HEALTH)
ADLS_ACUITY_SCORE: 28
HYGIENE/GROOMING: INDEPENDENT

## 2024-10-07 NOTE — PLAN OF CARE
"\"I want my Abilify at night because it makes me tired.\"  \"I still have OCD symptoms.\"  \"I don't want to move but I have to quit Suboxone  if I want to stay there I was. CD treatment never helps  \"I still have cravings for opiods, I'd like to have my Suboxone increased.\"  \"I gained a lot of weight since I came.\"     Stated mood:\"Okay.\" Affect: blunted. Speech: WNL rate and volume. Eye contact: WNL. Reports continuing cravings and wishes suboxone dosage increase while acknowledging he would have to quit suboxone if he wants to be allowed to remain at the  there he lived. Said he would hate to have to move and does not want CD treatment since  \"it never works.\"    Denies hallucinations endorses continuing compulsions/OCD urges. No paranoia noted.    No provocative behaviour towards peers observed or reported.       Knows name and purpose of his medications.     No abnormal body movements including no tremors or eye blinking. No physical complaints except has gained 18 lbs since arrival with a prior diagnosis of drug induced obesity. Previously had stopped medications due to this problem.    Referrals to [facility] in progress.     Plan: Monitor and document mood and behaviour, thought process and content. Establish and maintain therapeutic relationship. Educate about diagnoses, medications, treatment, legal status, plan of care. Address preexisting and concurrent medical concerns.      P:Impaired coping  G:Adequate coping  O:Progressing  "

## 2024-10-07 NOTE — CARE PLAN
Rehab Group    Start time: 1030  End time: 1200  Patient time total: 45 minutes    attended full group    #5 attended   Group Type: OT Clinic   Group Topic Covered: activity therapy and coping skills     Group Session Detail:  OT clinic group provides an opportunity for individual-based goal directed activity within a group setting - allowing for interaction and socialization.  Hands-on task work help to build/restore/or maintain skills such as: focus, concentration, decision making, and problem solving.  Patients are encouraged to carry over potential new interests/hobbies learned in group following discharge.     Patient Response/Contribution:  cooperative with task, socially appropriate, actively engaged, and worked intermittently     Patient Detail:    Pt was interested in an activity group, though unsure what he felt like doing, and not much was of interest.  Pt went between a few different activities through the course of group - set up for beading, then changed mind to work on drawing and watercolor painting.  Pleasant mood while listening to music.      [id number] OT Group (2 or more in attendance)    Patient Active Problem List   Diagnosis    Suicidal ideation    Psychosis (H)    Acute pulmonary embolism without acute cor pulmonale (H)    Alcohol use disorder, moderate, in sustained remission, dependence (H)    Anxiety    Cannabis use disorder, severe, dependence (H)    Class 1 drug-induced obesity without serious comorbidity with body mass index (BMI) of 33.0 to 33.9 in adult    Depression, major, single episode, moderate (H)    Episodic mood disorder (H)    KATHE (generalized anxiety disorder)    History of marijuana use    Obesity (BMI 30-39.9)    Obesity, Class I, BMI 30-34.9    Tobacco use disorder, moderate, in sustained remission    Schizoaffective disorder, bipolar type (H)    Psychosis, unspecified psychosis type (H)    Tardive dyskinesia

## 2024-10-07 NOTE — PROGRESS NOTES
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 Thus 9/16, bladder scans made prn.     Plan:  - Notify if fevers, worsening abdominal pain, flank pain  - notify medicine and urology if sxs worsen  - Pt does note a few days of constipation which can exacerbate urinary retention              - Scheduled Miralax daily + Senokot BID for now (hold for loose stools)  -Scan only needed if pt endorses urinary retention and trouble urinating  - parameters for straight cath in place (ok to use hardy catheter for comfort) as needed for high volume as outlined by Urology, see urology note 9/11   - urine cx no growth   - continue to monitor his symptoms and offer less invasive measures first. Currently, patient is voiding and not needing an urgent cath.     Hand pain and swelling   s/p punching mirror in room, per patient on night of 9/10 Staff noted full ROM however. On call doctor notified who placed hand XR  - Hand XR 9/10 showing tissue swelling and NO displacement or fracture per radiologist read  -prns for pain and swelling     Behavioral/Psychological/Social:  - Encourage unit programming    Safety:  - Continue precautions as noted above  - Status 15 minute checks    Legal Status: full commitment with [facility] for Zyprexa, Seroquel, Invega and Abilify    Disposition Plan   Reason for ongoing admission: poses an  imminent risk to self, poses an imminent risk to others, and is unable to care for self due to severe psychosis or jamila  Discharge location:  TBD . Consider ACT team referral, will need discharge planning conversation when more stable  Discharge Medications: not ordered  Follow-up Appointments: not scheduled     Patient seen and discussed with attending physician, Dr. Loya, who is in agreement with my assessment and plan.    Foster Batista, PGY-4 (Psychiatry)  [facility]

## 2024-10-07 NOTE — PLAN OF CARE
Goal Outcome Evaluation:    Plan of Care Reviewed With: patient        Eloy was up ad neto, slept in until 10:45 am, did not eat breakfast.  Eloy denied having suicidal ideation of self harm thoughts. Pt also denied feeling depressed or anxious. Eloy complained of feeling sleepy/tired today. Pt appreciated that his scheduled abilify was changed from am to HS, in addition to his Suboxone dose being increased.     Eloy was med adherent, his food and fluid intake were WNL.

## 2024-10-07 NOTE — CARE PLAN
Rehab Group    Start time: 1330  End time: 1415  Patient time total: 45 minutes    attended full group    #5 attended   Group Type: general health and coping and life skills   Group Topic Covered: activity therapy, coping skills, and social skills       Group Session Detail:  Socialization group with group game and popcorn to promote interaction and healthy leisure time.     Patient Response/Contribution:  positive affect, cooperative with task, socially appropriate, and actively engaged     Patient Detail:    Participated in group game, demonstrated ability to think quickly of answers, and waited for his turn to share.  Enjoyed popcorn, and social aspect of group.      [id number] OT Group (2 or more in attendance)      Patient Active Problem List   Diagnosis    Suicidal ideation    Psychosis (H)    Acute pulmonary embolism without acute cor pulmonale (H)    Alcohol use disorder, moderate, in sustained remission, dependence (H)    Anxiety    Cannabis use disorder, severe, dependence (H)    Class 1 drug-induced obesity without serious comorbidity with body mass index (BMI) of 33.0 to 33.9 in adult    Depression, major, single episode, moderate (H)    Episodic mood disorder (H)    KATHE (generalized anxiety disorder)    History of marijuana use    Obesity (BMI 30-39.9)    Obesity, Class I, BMI 30-34.9    Tobacco use disorder, moderate, in sustained remission    Schizoaffective disorder, bipolar type (H)    Psychosis, unspecified psychosis type (H)    Tardive dyskinesia

## 2024-10-07 NOTE — PLAN OF CARE
Eloy appeared sleeping for 4.75 hours without acute significant discomfort  still on SI and assault precautions with absence of harm  to self and others this shift  No pain or discomfort verbalized  No outburst or other inappropriate behavior  No reported safety concern this shift.    Problem: Sleep Disturbance  Goal: Adequate Sleep/Rest  Outcome: Progressing     Problem: Adult Behavioral Health Plan of Care  Goal: Adheres to Safety Considerations for Self and Others  Intervention: Develop and Maintain Individualized Safety Plan  Recent Flowsheet Documentation  Taken 10/7/2024 0613 by Disha Finn RN  Safety Measures: safety rounds completed  Goal: Absence of New-Onset Illness or Injury  Intervention: Identify and Manage Fall Risk  Recent Flowsheet Documentation  Taken 10/7/2024 0613 by Disha Finn, RN  Safety Measures: safety rounds completed   Goal Outcome Evaluation:

## 2024-10-07 NOTE — PLAN OF CARE
BEH IP Unit Acuity Rating Score (UARS)  Patient is given one point for every criteria they meet.    CRITERIA SCORING   On a 72 hour hold, court hold, committed, stay of commitment, or revocation. 1    Patient LOS on BEH unit exceeds 20 days. 1 LOS: 34   Patient under guardianship, 55+, otherwise medically complex, or under age 11. 0   Suicide ideation without relief of precipitating factors. 0   Current plan for suicide. 0   Current plan for homicide. 0   Imminent risk or actual attempt to seriously harm another without relief of factors precipitating the attempt. 0   Severe dysfunction in daily living (ex: complete neglect for self care, extreme disruption in vegetative function, extreme deterioration in social interactions). 1   Recent (last 7 days) or current physical aggression in the ED or on unit. 0   Restraints or seclusion episode in past 72 hours. 0   Recent (last 7 days) or current verbal aggression, agitation, yelling, etc., while in the ED or unit. 0   Active psychosis. 1   Need for constant or near constant redirection (from leaving, from others, etc).  0   Intrusive or disruptive behaviors. 0   Patient requires 3 or more hours of individualized nursing care per 8-hour shift (i.e. for ADLs, meds, therapeutic interventions). 0   TOTAL 4

## 2024-10-07 NOTE — PLAN OF CARE
Team Note Due:  Wednesday     Assessment/Intervention/Current Symtoms and Care Coordination:  Chart review and met with team, discussed pt progress, symptomology, and response to treatment. Discussed the discharge plan and any potential impediments to discharge.    Per team, expected discharge end of this week or next week as long as no abrupt changes. Eloy was asking about his last placement and considering no Suboxone to go back there but at the same time reports he wants to increase Suboxone which is contradicting    [facility] said they do take Suboxone if they have a prescriber but they have a few months wait list. His commitment CM is through [facility] and I asked if she will look into this referral in the future.     I emailed [facility] to follow up on the referral I sent on Friday, and included his CADI CM Sonia Burdick who is helping as well.     I met with Pt to check in and provide an update on discharge. I shared [facility] is pending and [facility] has several months wait, so the latter is not a possibility from here but that his CM can follow up with that one. He asked about his old group home if he could go back and I said they won't take him on Suboxone so I asked if he was planning on getting off that? And also reiterated he asked to increase this and he stated 'yeah' he wants it increased, so I stated that the old group home is not an option. I also shared they discharged him from there. He stated 'ok'. I notice his sweatshirt has new marker drawings on it including a big black colored square on the back bottom of his sweatshirt. He was attending group at the time eating popcorn.     Discharge Plan or Goal:  Cannot return to  previous  housing due to Suboxone use.   TBD -  group home once stable - awaiting [facility] amendment to start Prolixin     Barriers to Discharge:  Symptoms - jamila, agitation  Managing his medications in order to stabilize   Awaiting [facility] amendment to start  Prolixin    Referral Status:  TBD     Legal Status:  Committed and [facility]   [facility]: [place]  File Number: [id number]  Start and expiration date of commitment:   9/30: Submitted [facility] Amendment to remove Invega and add Prolixin.  New [facility] meds as of 10/1: Zyprexa, Seroquel, Prolixin, Abilify     Contacts (include KELBY status):  Psych: Dr. Khan [place] [facility] - called from cell phone for an update #[phone] (Eloy signed KELBY 10/1 for medications related info)  Michelle Cortes/Mother: [phone]    [facility] Cadi CM Nqgrk-[phone]  [email or web address]  Abiola is leaving Friday - New CADI CM is: Sonia Burdick Ph: [phone], email: [email or web address] (KELBY signed 10/1/24)    New Commitment CM:  [email or web address] [phone]- Primary CTC emailed CM Chasity again as I haven't heard back if she approves group home with outpatient supports while awaiting ACT referral.      Upcoming Meetings and Dates/Important Information and next steps:  Update discharge referral form at discharge   PD and COS at discharge  Follow up psych appt

## 2024-10-08 PROCEDURE — 124N000002 HC R&B MH UMMC

## 2024-10-08 PROCEDURE — 250N000013 HC RX MED GY IP 250 OP 250 PS 637

## 2024-10-08 PROCEDURE — 250N000013 HC RX MED GY IP 250 OP 250 PS 637: Performed by: STUDENT IN AN ORGANIZED HEALTH CARE EDUCATION/TRAINING PROGRAM

## 2024-10-08 PROCEDURE — 97150 GROUP THERAPEUTIC PROCEDURES: CPT | Mod: GO

## 2024-10-08 PROCEDURE — 250N000012 HC RX MED GY IP 250 OP 636 PS 637

## 2024-10-08 RX ADMIN — NICOTINE POLACRILEX 4 MG: 2 LOZENGE ORAL at 17:21

## 2024-10-08 RX ADMIN — SENNOSIDES AND DOCUSATE SODIUM 1 TABLET: 8.6; 5 TABLET ORAL at 19:15

## 2024-10-08 RX ADMIN — TAMSULOSIN HYDROCHLORIDE 0.4 MG: 0.4 CAPSULE ORAL at 09:06

## 2024-10-08 RX ADMIN — SENNOSIDES AND DOCUSATE SODIUM 1 TABLET: 8.6; 5 TABLET ORAL at 09:06

## 2024-10-08 RX ADMIN — THERA TABS 1 TABLET: TAB at 09:07

## 2024-10-08 RX ADMIN — BUPRENORPHINE AND NALOXONE 1 FILM: 2; .5 FILM BUCCAL; SUBLINGUAL at 19:15

## 2024-10-08 RX ADMIN — POLYETHYLENE GLYCOL 3350 17 G: 17 POWDER, FOR SOLUTION ORAL at 09:06

## 2024-10-08 RX ADMIN — ARIPIPRAZOLE 5 MG: 5 TABLET ORAL at 19:15

## 2024-10-08 RX ADMIN — METFORMIN HYDROCHLORIDE 500 MG: 500 TABLET, FILM COATED ORAL at 09:06

## 2024-10-08 RX ADMIN — BUPRENORPHINE AND NALOXONE 1 FILM: 4; 1 FILM, SOLUBLE BUCCAL; SUBLINGUAL at 09:06

## 2024-10-08 RX ADMIN — ASPIRIN 81 MG CHEWABLE TABLET 81 MG: 81 TABLET CHEWABLE at 09:06

## 2024-10-08 RX ADMIN — NICOTINE POLACRILEX 4 MG: 2 GUM, CHEWING BUCCAL at 15:12

## 2024-10-08 RX ADMIN — NICOTINE POLACRILEX 4 MG: 2 GUM, CHEWING BUCCAL at 10:18

## 2024-10-08 RX ADMIN — OLANZAPINE 20 MG: 20 TABLET, ORALLY DISINTEGRATING ORAL at 19:15

## 2024-10-08 RX ADMIN — NICOTINE POLACRILEX 4 MG: 2 GUM, CHEWING BUCCAL at 20:03

## 2024-10-08 RX ADMIN — OLANZAPINE 10 MG: 10 TABLET, ORALLY DISINTEGRATING ORAL at 09:05

## 2024-10-08 RX ADMIN — METFORMIN HYDROCHLORIDE 1000 MG: 500 TABLET, FILM COATED ORAL at 18:04

## 2024-10-08 RX ADMIN — NICOTINE 1 PATCH: 21 PATCH, EXTENDED RELEASE TRANSDERMAL at 09:07

## 2024-10-08 RX ADMIN — NICOTINE POLACRILEX 4 MG: 2 LOZENGE ORAL at 22:45

## 2024-10-08 ASSESSMENT — ACTIVITIES OF DAILY LIVING (ADL)
ADLS_ACUITY_SCORE: 28
ADLS_ACUITY_SCORE: 28
DRESS: INDEPENDENT
ADLS_ACUITY_SCORE: 28
ORAL_HYGIENE: INDEPENDENT
ADLS_ACUITY_SCORE: 28
LAUNDRY: UNABLE TO COMPLETE
ADLS_ACUITY_SCORE: 28
HYGIENE/GROOMING: INDEPENDENT
ADLS_ACUITY_SCORE: 28
ADLS_ACUITY_SCORE: 28

## 2024-10-08 NOTE — PLAN OF CARE
Team Note Due:  Wednesday     Assessment/Intervention/Current Symtoms and Care Coordination:  Chart review and met with team, discussed pt progress, symptomology, and response to treatment. Discussed the discharge plan and any potential impediments to discharge.    No team on patient today due to an issue going on at the time.     I met with Pt to share that I think we should explore IRTS incase the supported apartments will be a while and we can't have him in the hospital as the drs feel he is ready end of this week or beginning of next week potentially, and he said he doesn't like 'treatment' nor want it. He again said that he called his group home [facility] and if he goes off Suboxone he can go back there, I said does he want to go off suboxone and I again asked did it get increased per his request yesterday? And he said yes it did get increased and no he doesn't really want to go off Suboxone. I said if [facility] is too long of a wait then he will have to consider Plan C. He said if [facility] has too long of a wait he will 'go off Suboxone and go back to group home'. I don't believe this is a good plan as he may have strong cravings/high risk of relapse again but I said he can speak to the drs about that if that becomes a potential plan of his. He asked me several times to reach out to [facility] and I shared I was waiting for their return email or call as I already contacted them yesterday. I shared I would try again this afternoon if I haven't heard back. He was in the milieu today, went to group. Blunted/flat affect.     I emailed Pt's commitment HUNTER Gaspar again to update her on the discharge status.     Discharge Plan or Goal:  Cannot return to  previous  housing due to Suboxone use.   TBD -  group home once stable - awaiting [facility] amendment to start Prolixin     Barriers to Discharge:  Symptoms - jamila, agitation  Managing his medications in order to stabilize   Awaiting [facility] amendment to  start Prolixin    Referral Status:  TBD     Legal Status:  Committed and [facility]   [facility]: [place]  File Number: [id number]  Start and expiration date of commitment:   9/30: Submitted [facility] Amendment to remove Invega and add Prolixin.  New [facility] meds as of 10/1: Zyprexa, Seroquel, Prolixin, Abilify     Contacts (include KELBY status):  Psych: Dr. Khan, [place] [facility] - called from cell phone for an update #[phone] (Eloy signed KELBY 10/1 for medications related info)  Michelle Cortes/Mother: [phone]    [facility] Cadi CM Rauhf-[phone]  [email or web address]  [facility] is leaving Friday - New CADI CM is: Sonia Burdick Ph: [phone], email: [email or web address] (KELBY signed 10/1/24)    New Commitment CM:  [email or web address] [phone]    Upcoming Meetings and Dates/Important Information and next steps:  Update discharge referral form at discharge   PD and COS at discharge  Follow up psych appt

## 2024-10-08 NOTE — PLAN OF CARE
Problem: Adult Behavioral Health Plan of Care  Goal: Plan of Care Review  Outcome: Progressing     Problem: Psychotic Signs/Symptoms  Goal: Improved Behavioral Control (Psychotic Signs/Symptoms)  Outcome: Progressing   Goal Outcome Evaluation:    Plan of Care Reviewed With: patient        Pt denies pain, anxiety, depression, SI/SIB/HI/AVH, and contracts for safety. Up and occasionally visible in the milieu, else pt is isolative to room the majority of the shift. Even when in the milieu, pt remains socially isolative. Pt is pleasant on approach. Affect presents as flat and blunted. Mood presents as calm and cooperative. Insight appears to be appropriate to situation. Speech is clear and coherent. Pt is eating and drinking adequately. Pt is medication compliant. No medication adverse effects noted or verbalized. Will continue with same plan of care.

## 2024-10-08 NOTE — PLAN OF CARE
Problem: Adult Inpatient Plan of Care  Goal: Absence of Hospital-Acquired Illness or Injury  Outcome: Progressing     Problem: Adult Inpatient Plan of Care  Goal: Optimal Comfort and Wellbeing  Outcome: Progressing   Infection and accident prevention protocols are in place to help prevent injury and/or illness to pt. Pt has PRN medication available to assist with dysregulation and coping skills as well as therapeutic groups and one on one time with staff to help with comfort and wellbeing. Pt reports his mood is good, affect is congruent, he took his medication as prescribed and utilized PRN nicotine gum. He has been appropriate and in behavioral control in the milieu. He denies SI/SIB/HI/AVH. He denies difficulties voiding or with bowel movements, he denies pain or discomfort of any kind and has shown no signs of aggression towards staff, peers, or property.

## 2024-10-08 NOTE — PLAN OF CARE
BEH IP Unit Acuity Rating Score (UARS)  Patient is given one point for every criteria they meet.    CRITERIA SCORING   On a 72 hour hold, court hold, committed, stay of commitment, or revocation. 1    Patient LOS on BEH unit exceeds 20 days. 1 LOS: 35   Patient under guardianship, 55+, otherwise medically complex, or under age 11. 0   Suicide ideation without relief of precipitating factors. 0   Current plan for suicide. 0   Current plan for homicide. 0   Imminent risk or actual attempt to seriously harm another without relief of factors precipitating the attempt. 0   Severe dysfunction in daily living (ex: complete neglect for self care, extreme disruption in vegetative function, extreme deterioration in social interactions). 1   Recent (last 7 days) or current physical aggression in the ED or on unit. 0   Restraints or seclusion episode in past 72 hours. 0   Recent (last 7 days) or current verbal aggression, agitation, yelling, etc., while in the ED or unit. 0   Active psychosis. 1   Need for constant or near constant redirection (from leaving, from others, etc).  0   Intrusive or disruptive behaviors. 0   Patient requires 3 or more hours of individualized nursing care per 8-hour shift (i.e. for ADLs, meds, therapeutic interventions). 0   TOTAL 4

## 2024-10-08 NOTE — PLAN OF CARE
"  Problem: Psychotic Signs/Symptoms  Goal: Improved Behavioral Control (Psychotic Signs/Symptoms)  Outcome: Progressing   Goal Outcome Evaluation:    Patient has been calm and cooperative.Patient visible in milieu watching television, attending group and pacing the halls.Affect presents as flat and blunted. Patient speech is clear and coherent.Patient insight appears to be appropriate to situations. Patient denies all mental health symptoms including anxiety,depression,and A/V hallucinations.Patient denies also not having any thought of harming himself or others.Patient eating and drinking well. Patient denies pain or discomfort. He is medications compliant. No medications side effect reported or verbalized.    /86 (BP Location: Right arm)   Pulse 85   Temp 98.1  F (36.7  C) (Oral)   Resp 16   Ht 1.854 m (6' 1\")   Wt 103.1 kg (227 lb 6.4 oz)   SpO2 98%   BMI 30.00 kg/m     "

## 2024-10-08 NOTE — DISCHARGE INSTRUCTIONS
Behavioral Discharge Planning and Instructions    Summary: You were admitted on 9/3/2024  due to Manic Symptomology.  You were treated by Nan Loya MD and Foster Batista MD and discharged on *** from [facility]    Main Diagnosis:   Provisional diagnosis of Bipolar Disorder, Type I, currently mixed manic episode vs Schizoaffective Disorder, Bipolar Type  Opioid Use Disorder, severe, dependence  Alcohol Use Disorder, moderate, in early remission  Sedative hypnotic use disorder, in early remission  Cannabis Use Disorder, severe  KATHE  Hx of pulmonary embolism  Tardive Dyskinesia    Commitment: You were dually committed to the [facility] [place] and the Commissioner of Human Services on *** and you are being discharged on a Provisional Discharge Agreement which shall remain in effect for the duration of the Commitment which expires on ***. and [facility]: You are also court ordered to take the medications that the doctor ordered for you.     Health Care Follow-up:     SUBOXONE:  [facility]   Ph: [phone].  Monday October 28th, 2024 at 2PM with Janelle Molina DNP.   [facility] [address] [place] [address]    PSYCHIATRIST:  Dr. Tenisha Khan  Appt: 10/21/24 9:30AM in Person  [address] [place] [address].     Commitment CM:   Chasity Hill  Ph: [phone]     CADI CM:  Sonia Burdick  Ph: [phone]     Information will be faxed to your outpatient providers to ensure a healthy continuity of care for you.   Attend all scheduled appointments with your outpatient providers. Call at least 24 hours in advance if you need to reschedule an appointment to ensure continued access to your outpatient providers.     Major Treatments, Procedures and Findings:  You were provided with: a psychiatric assessment, assessed for medical stability, medication evaluation and/or management, group therapy, individual therapy, milieu management, and medical interventions    Symptoms to  Report: feeling more aggressive, increased confusion, losing more sleep, mood getting worse, or thoughts of suicide    Early warning signs can include: increased depression or anxiety sleep disturbances increased thoughts or behaviors of suicide or self-harm  increased unusual thinking, such as paranoia or hearing voices    Safety and Wellness:  Take all medicines as directed.  Make no changes unless your doctor suggests them.      Follow treatment recommendations.  Refrain from alcohol and non-prescribed drugs.  Ask your support system to help you reduce your access to items that could harm yourself or others. If there is a concern for safety, call 911.    Resources:   Mental Health Crisis Resources  Throughout [place]: call **CRISIS (**109879)  Crisis Text Line: is available for free, 24/7 by texting [place] to 753007  Suicide Awareness Voices of Education (SAVE) (www.save.org): 586-208-JLJF (8082)  The National Suicide Prevention Lifeline is now: 988 Suicide and Crisis Lifeline. Call 988 anytime.  National [place] on Mental Illness (www.[place].karissa.org): 667.506.5406 or 699-841-3532.  Mwqa5clvi: text the word LIFE to 62334 for immediate support and crisis intervention  Mental Health Consumer/Survivor Network of [place] (www.mhcsn.net): 445.674.2228 or 601-894-7986  Mental Health Association of [place] (www.mentalhealth.org): 279.837.6442 or 089-092-7790  Peer Support Connection [place] Warmline (PSC) 1-132.386.3654 Available from 5pm - 9am (7 days a week/365 days a year)  [facility] 1-243.859.8099, [facility] 1-513.265.1902 Community Outreach for Psych Emergencies, or [facility] 1-304.739.6466 [facility] Mental Crisis Program    General Medication Instructions:   See your medication sheet(s) for instructions.   Take all medicines as directed.  Make no changes unless your doctor suggests them.   Go to all your doctor visits.  Be sure to have all your required lab tests. This way, your medicines can be refilled on  time.  Do not use any drugs not prescribed by your doctor.  Avoid alcohol.    Advance Directives:   Scanned document on file with [facility]? No scanned doc  Is document scanned? No. Copy Requested.  Honoring Choices Your Rights Handout: Minor - N/A  Was more information offered? Pt declined    The Treatment team has appreciated the opportunity to work with you. If you have any questions or concerns about your recent admission, you can contact the unit which can receive your call 24 hours a day, 7 days a week. They will be able to get in touch with a Provider if needed. The unit number is [phone].

## 2024-10-08 NOTE — PLAN OF CARE
Goal Outcome Evaluation:  Problem: Suicidal Behavior  Goal: Suicidal Behavior is Absent or Managed  Outcome: Progressing     Patient was asleep when shift started. Appeared to sleep 6.5 hours. Breathing quite and non labored.. No PRNs requested and given. No complaint of pain noted or reported. Team will continue to monitor for safety/behavior and intervene as needed.

## 2024-10-08 NOTE — CARE PLAN
Start time: 1030  End time: 1115  Patient time total: 30 minutes    attended full group    #4 attended   Group Type: general health and coping   Group Topic Covered: coping skills and social skills     Group Session Detail:  Self awareness topic group     Patient Response/Contribution:  positive affect, cooperative with task, socially appropriate, and actively engaged     Patient Detail:    Self awareness question card prompts led pt to discuss his interest in creating music and maintaining motivation to do this despite occasional struggles.  Currently, pt states he wishes he had access to music software, though has been content being able to select music in OT clinic, as this is important to him.      [id number] OT Group (2 or more in attendance)      Patient Active Problem List   Diagnosis    Suicidal ideation    Psychosis (H)    Acute pulmonary embolism without acute cor pulmonale (H)    Alcohol use disorder, moderate, in sustained remission, dependence (H)    Anxiety    Cannabis use disorder, severe, dependence (H)    Class 1 drug-induced obesity without serious comorbidity with body mass index (BMI) of 33.0 to 33.9 in adult    Depression, major, single episode, moderate (H)    Episodic mood disorder (H)    KATHE (generalized anxiety disorder)    History of marijuana use    Obesity (BMI 30-39.9)    Obesity, Class I, BMI 30-34.9    Tobacco use disorder, moderate, in sustained remission    Schizoaffective disorder, bipolar type (H)    Psychosis, unspecified psychosis type (H)    Tardive dyskinesia

## 2024-10-09 PROCEDURE — 250N000013 HC RX MED GY IP 250 OP 250 PS 637

## 2024-10-09 PROCEDURE — H2032 ACTIVITY THERAPY, PER 15 MIN: HCPCS

## 2024-10-09 PROCEDURE — 99232 SBSQ HOSP IP/OBS MODERATE 35: CPT | Performed by: PSYCHIATRY & NEUROLOGY

## 2024-10-09 PROCEDURE — 250N000012 HC RX MED GY IP 250 OP 636 PS 637

## 2024-10-09 PROCEDURE — 250N000013 HC RX MED GY IP 250 OP 250 PS 637: Performed by: STUDENT IN AN ORGANIZED HEALTH CARE EDUCATION/TRAINING PROGRAM

## 2024-10-09 PROCEDURE — 124N000002 HC R&B MH UMMC

## 2024-10-09 RX ADMIN — METFORMIN HYDROCHLORIDE 1000 MG: 500 TABLET, FILM COATED ORAL at 17:26

## 2024-10-09 RX ADMIN — OLANZAPINE 20 MG: 20 TABLET, ORALLY DISINTEGRATING ORAL at 19:29

## 2024-10-09 RX ADMIN — ARIPIPRAZOLE 5 MG: 5 TABLET ORAL at 19:29

## 2024-10-09 RX ADMIN — NICOTINE POLACRILEX 4 MG: 2 GUM, CHEWING BUCCAL at 17:18

## 2024-10-09 RX ADMIN — POLYETHYLENE GLYCOL 3350 17 G: 17 POWDER, FOR SOLUTION ORAL at 09:43

## 2024-10-09 RX ADMIN — NICOTINE POLACRILEX 4 MG: 2 LOZENGE ORAL at 15:14

## 2024-10-09 RX ADMIN — BUPRENORPHINE AND NALOXONE 1 FILM: 2; .5 FILM BUCCAL; SUBLINGUAL at 19:30

## 2024-10-09 RX ADMIN — NICOTINE 1 PATCH: 21 PATCH, EXTENDED RELEASE TRANSDERMAL at 11:05

## 2024-10-09 RX ADMIN — NICOTINE POLACRILEX 4 MG: 2 LOZENGE ORAL at 22:00

## 2024-10-09 RX ADMIN — SENNOSIDES AND DOCUSATE SODIUM 1 TABLET: 8.6; 5 TABLET ORAL at 19:29

## 2024-10-09 RX ADMIN — SENNOSIDES AND DOCUSATE SODIUM 1 TABLET: 8.6; 5 TABLET ORAL at 09:43

## 2024-10-09 RX ADMIN — THERA TABS 1 TABLET: TAB at 09:43

## 2024-10-09 RX ADMIN — TAMSULOSIN HYDROCHLORIDE 0.4 MG: 0.4 CAPSULE ORAL at 09:43

## 2024-10-09 RX ADMIN — METFORMIN HYDROCHLORIDE 500 MG: 500 TABLET, FILM COATED ORAL at 09:43

## 2024-10-09 RX ADMIN — ASPIRIN 81 MG CHEWABLE TABLET 81 MG: 81 TABLET CHEWABLE at 09:43

## 2024-10-09 RX ADMIN — OLANZAPINE 10 MG: 10 TABLET, ORALLY DISINTEGRATING ORAL at 09:43

## 2024-10-09 RX ADMIN — BUPRENORPHINE AND NALOXONE 1 FILM: 4; 1 FILM, SOLUBLE BUCCAL; SUBLINGUAL at 09:43

## 2024-10-09 NOTE — PROGRESS NOTES
Rehab Group    Start time: 1330  End time: 1430  Patient time total: 60 minutes    attended full group    #2 attended   Group Type: dance movement   Group Topic Covered: coping skills and relationship skills/support systems       Group Session Detail:  Pts used collective decision-making and realized commonalities through the music and movement implemented in this session.  With this foundation, pts were able to share more about themselves with an increased sense of their humanity.     Patient Response/Contribution:  cooperative with task, supportive of peers, socially appropriate, safe use of materials/supplies, and Limited eye contact       Patient Detail:    Pt demonstrated more vulnerability in this group than previous sessions with this therapist. He shared some music he enjoyed as a child as well as more about his family.  He was less impulsive with materials and therefore had greater freedoms.  Therefore, he was better able to relax and join movements that were enjoyable for him.  This predominantly involved upper body flow with rhythmic pulses backward.  This invited in a more open and upward attitude supporting a social connection with a peer in the group.        No Charge-not enough group participants to bill      Patient Active Problem List   Diagnosis    Suicidal ideation    Psychosis (H)    Acute pulmonary embolism without acute cor pulmonale (H)    Alcohol use disorder, moderate, in sustained remission, dependence (H)    Anxiety    Cannabis use disorder, severe, dependence (H)    Class 1 drug-induced obesity without serious comorbidity with body mass index (BMI) of 33.0 to 33.9 in adult    Depression, major, single episode, moderate (H)    Episodic mood disorder (H)    KATHE (generalized anxiety disorder)    History of marijuana use    Obesity (BMI 30-39.9)    Obesity, Class I, BMI 30-34.9    Tobacco use disorder, moderate, in sustained remission    Schizoaffective disorder, bipolar type (H)     Psychosis, unspecified psychosis type (H)    Tardive dyskinesia

## 2024-10-09 NOTE — PROGRESS NOTES
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 psychosis or jamila  Discharge location:  TBD . Consider ACT team referral, will need discharge planning conversation when more stable  Discharge Medications: not ordered  Follow-up Appointments: not scheduled     Virginie Loya MD  [facility]

## 2024-10-09 NOTE — PROGRESS NOTES
Rehab Group    Start time: 1615  End time: 1700  Patient time total: 10 minutes    came in and out of group session    #4 attended   Group Type: recreation   Group Topic Covered: cognitive activities and healthy leisure time       Group Session Detail:  TR leisure group     Patient Response/Contribution:  cooperative with task and left group on several occasions       Patient Detail:    Pt briefly participated in Therapeutic Recreation group with focus on leisure participation, communication skills, and critical thinking. Pt was limited in his participation in the group activity. Throughout the group time pt was leaving the area several times, participating casually briefly in group. Pt participated for the total of approximately 10 minutes combined.        No Charge      Patient Active Problem List   Diagnosis    Suicidal ideation    Psychosis (H)    Acute pulmonary embolism without acute cor pulmonale (H)    Alcohol use disorder, moderate, in sustained remission, dependence (H)    Anxiety    Cannabis use disorder, severe, dependence (H)    Class 1 drug-induced obesity without serious comorbidity with body mass index (BMI) of 33.0 to 33.9 in adult    Depression, major, single episode, moderate (H)    Episodic mood disorder (H)    KATHE (generalized anxiety disorder)    History of marijuana use    Obesity (BMI 30-39.9)    Obesity, Class I, BMI 30-34.9    Tobacco use disorder, moderate, in sustained remission    Schizoaffective disorder, bipolar type (H)    Psychosis, unspecified psychosis type (H)    Tardive dyskinesia

## 2024-10-09 NOTE — PLAN OF CARE
"Pt had an uneventful shift this day. Pt denies SI, SIB, HI and thoughts of hurting others. Pt denies depression, anxiety and A/VH.  Pt mood calm/cooperative with congruent affect.     Pt participated in group, was visible in the milieu with appropriate interaction with staff, little socialization with peers.     Pt was medication compliant with scheduled medications with the exception of flomax and miralax stating he is urinating and having bm's \"fine\".PRN nicotine given x1. Pt had a consultation visit with the nutritionist this shift. See note and recommendations.     No behavioral outbursts or agitation reported or observed this shift.  "

## 2024-10-09 NOTE — PLAN OF CARE
Goal Outcome Evaluation:  Problem: Sleep Disturbance  Goal: Adequate Sleep/Rest  Outcome: Progressing    Patient appeared to sleep for 4.75 hours. Breathing quite and unlabored. No PRNs requested and given. No medical and behavioral concerns noted or reported.

## 2024-10-09 NOTE — PROGRESS NOTES
Rehab Group     Start time: 1330  End time: 1430  Patient time total: 45-50 minutes     attended partial group     #4 attended   Group Type: music   Group Topic Covered: coping skills, mindfulness, relaxation , self-care, and self-esteem      Group Session Detail: Cooperatively engaged in Evening Music Relaxation group to decrease anxiety and promote Mindfulness.         Patient Response/Contribution:  positive affect, cooperative with task, attentive, and actively engaged      Patient Detail: Calm affect, appropriately engaged in session, responding well to the music.      Pt brought to group multiple lists of songs he finds helpful.  Were only able to listen to so many in group time.  Pt appeared disappointed at group end, but handled it without incident, and group was extended slightly to accommodate for more music experiences today as well.     Pt did not verbalize or interact with others much, so his current thought process was not able to be assessed.         Activity Therapy Per 15 min ()    Patient Active Problem List   Diagnosis    Suicidal ideation    Psychosis (H)    Acute pulmonary embolism without acute cor pulmonale (H)    Alcohol use disorder, moderate, in sustained remission, dependence (H)    Anxiety    Cannabis use disorder, severe, dependence (H)    Class 1 drug-induced obesity without serious comorbidity with body mass index (BMI) of 33.0 to 33.9 in adult    Depression, major, single episode, moderate (H)    Episodic mood disorder (H)    KATHE (generalized anxiety disorder)    History of marijuana use    Obesity (BMI 30-39.9)    Obesity, Class I, BMI 30-34.9    Tobacco use disorder, moderate, in sustained remission    Schizoaffective disorder, bipolar type (H)    Psychosis, unspecified psychosis type (H)    Tardive dyskinesia

## 2024-10-09 NOTE — PROGRESS NOTES
Rehab Group    Start time: 1615  End time: 1700  Patient time total: 45 minutes    attended full group    #4 attended   Group Type: occupational therapy and OT Clinic   Group Topic Covered: activity therapy, coping skills, and problem solving       Group Session Detail:  OT Clinic     Patient Response/Contribution:  safe use of materials/supplies and actively engaged       Patient Detail:  Pt actively participated in occupational therapy clinic to facilitate coping skill exploration, creative expression within personally meaningful activities, and clinical observation of social, cognitive, and kinesthetic performance skills. Pt response: Needed minimal assistance to initiate, gather materials, sequence, and adjust to workspace demands as needed. Demonstrated good focus, planning, and problem solving for selected beading task. Able to ask for assistance as needed, and minimally social with peers and staff.  Pt will continue to be encouraged to attend groups for further asssesssment and to address goals identified on plan of care.       [id number] OT Group (2 or more in attendance)      Patient Active Problem List   Diagnosis    Suicidal ideation    Psychosis (H)    Acute pulmonary embolism without acute cor pulmonale (H)    Alcohol use disorder, moderate, in sustained remission, dependence (H)    Anxiety    Cannabis use disorder, severe, dependence (H)    Class 1 drug-induced obesity without serious comorbidity with body mass index (BMI) of 33.0 to 33.9 in adult    Depression, major, single episode, moderate (H)    Episodic mood disorder (H)    KATHE (generalized anxiety disorder)    History of marijuana use    Obesity (BMI 30-39.9)    Obesity, Class I, BMI 30-34.9    Tobacco use disorder, moderate, in sustained remission    Schizoaffective disorder, bipolar type (H)    Psychosis, unspecified psychosis type (H)    Tardive dyskinesia

## 2024-10-09 NOTE — PROGRESS NOTES
Rehab Group     Start time: 1030  End time: 1200  Patient time total: 85 minutes     attended full group      #3 attended   Group Type: music   Group Topic Covered: balanced lifestyle, cognitive therapy, relaxation , and self-care      Group Session Detail: Participated in Music Therapy intervention of Music Bingo today.  Goals of session were focusing, memory recall, positive distraction, emotional containment and social cohesion.       For the prize for winning Music Bingo, participants were given the chance to chose music that is helpful to them.   Patient Response/Contribution:  Pt was focused and actively engaged throughout session, appearing to track well.  Pt wrote down names of artists and songs as group progressed to remember for later.        Patient Detail: Pt response was calm, engaged and cooperative.  Pt was calmed by the music and having some choice in the selection, for the Music Bingo prize.         Activity Therapy Per 15 min ()    Patient Active Problem List   Diagnosis    Suicidal ideation    Psychosis (H)    Acute pulmonary embolism without acute cor pulmonale (H)    Alcohol use disorder, moderate, in sustained remission, dependence (H)    Anxiety    Cannabis use disorder, severe, dependence (H)    Class 1 drug-induced obesity without serious comorbidity with body mass index (BMI) of 33.0 to 33.9 in adult    Depression, major, single episode, moderate (H)    Episodic mood disorder (H)    KATHE (generalized anxiety disorder)    History of marijuana use    Obesity (BMI 30-39.9)    Obesity, Class I, BMI 30-34.9    Tobacco use disorder, moderate, in sustained remission    Schizoaffective disorder, bipolar type (H)    Psychosis, unspecified psychosis type (H)    Tardive dyskinesia

## 2024-10-09 NOTE — PLAN OF CARE
"  Problem: Adult Inpatient Plan of Care  Goal: Optimal Comfort and Wellbeing  Outcome: Progressing   Goal Outcome Evaluation:    Patient is cooperative with cares. He is medication complaint. He is visible in the milieu but keeps to himself. He states that this place is getting annoying. He does seem to be bored. He denies all mental health symptoms and states that his anxiety is better today. Pt denies pain and denies any other physical concerns. He states that he is using the bathroom regularly and does not appear to be having any problems. He is eating and hydrating well. He stated that he feels more sleepy from the medications. Will continue to monitor.       /67 (BP Location: Right arm, Patient Position: Sitting, Cuff Size: Adult Large)   Pulse 68   Temp 97.8  F (36.6  C) (Temporal)   Resp 16   Ht 1.854 m (6' 1\")   Wt 103.1 kg (227 lb 6.4 oz)   SpO2 98%   BMI 30.00 kg/m     "

## 2024-10-09 NOTE — CARE PLAN
Rehab Group    Start time: 1115  End time: 1200  Patient time total: 30 minutes    attended partial group    #5 attended   Group Type: OT Clinic   Group Topic Covered: activity therapy and coping skills     Group Session Detail:  OT clinic group provides an opportunity for individual-based goal directed activity within a group setting - allowing for interaction and socialization.  Hands-on task work help to build/restore/or maintain skills such as: focus, concentration, decision making, and problem solving.  Patients are encouraged to carry over potential new interests/hobbies learned in group following discharge.     Patient Response/Contribution:  positive affect     Patient Detail:    Congruent and engaged. Polite to writer and peers. Demonstrated consistent performance skills as observed on previous dates - independent with all simple tasks. Observed with calming effect while completing project.         [id number] OT Group (2 or more in attendance)      Patient Active Problem List   Diagnosis    Suicidal ideation    Psychosis (H)    Acute pulmonary embolism without acute cor pulmonale (H)    Alcohol use disorder, moderate, in sustained remission, dependence (H)    Anxiety    Cannabis use disorder, severe, dependence (H)    Class 1 drug-induced obesity without serious comorbidity with body mass index (BMI) of 33.0 to 33.9 in adult    Depression, major, single episode, moderate (H)    Episodic mood disorder (H)    KATHE (generalized anxiety disorder)    History of marijuana use    Obesity (BMI 30-39.9)    Obesity, Class I, BMI 30-34.9    Tobacco use disorder, moderate, in sustained remission    Schizoaffective disorder, bipolar type (H)    Psychosis, unspecified psychosis type (H)    Tardive dyskinesia

## 2024-10-10 LAB
HOLD SPECIMEN: NORMAL
PROLACTIN SERPL 3RD IS-MCNC: 12 NG/ML (ref 4–15)

## 2024-10-10 PROCEDURE — 99232 SBSQ HOSP IP/OBS MODERATE 35: CPT | Performed by: PSYCHIATRY & NEUROLOGY

## 2024-10-10 PROCEDURE — 97150 GROUP THERAPEUTIC PROCEDURES: CPT | Mod: GO

## 2024-10-10 PROCEDURE — 90853 GROUP PSYCHOTHERAPY: CPT

## 2024-10-10 PROCEDURE — 250N000013 HC RX MED GY IP 250 OP 250 PS 637

## 2024-10-10 PROCEDURE — 250N000012 HC RX MED GY IP 250 OP 636 PS 637

## 2024-10-10 PROCEDURE — 124N000002 HC R&B MH UMMC

## 2024-10-10 PROCEDURE — 36415 COLL VENOUS BLD VENIPUNCTURE: CPT

## 2024-10-10 PROCEDURE — 250N000013 HC RX MED GY IP 250 OP 250 PS 637: Performed by: STUDENT IN AN ORGANIZED HEALTH CARE EDUCATION/TRAINING PROGRAM

## 2024-10-10 PROCEDURE — 84146 ASSAY OF PROLACTIN: CPT

## 2024-10-10 RX ADMIN — TAMSULOSIN HYDROCHLORIDE 0.4 MG: 0.4 CAPSULE ORAL at 08:49

## 2024-10-10 RX ADMIN — ASPIRIN 81 MG CHEWABLE TABLET 81 MG: 81 TABLET CHEWABLE at 08:49

## 2024-10-10 RX ADMIN — NICOTINE POLACRILEX 4 MG: 2 GUM, CHEWING BUCCAL at 20:07

## 2024-10-10 RX ADMIN — METFORMIN HYDROCHLORIDE 500 MG: 500 TABLET, FILM COATED ORAL at 08:50

## 2024-10-10 RX ADMIN — NICOTINE POLACRILEX 4 MG: 2 LOZENGE ORAL at 14:07

## 2024-10-10 RX ADMIN — NICOTINE POLACRILEX 4 MG: 2 GUM, CHEWING BUCCAL at 15:58

## 2024-10-10 RX ADMIN — OLANZAPINE 10 MG: 10 TABLET, ORALLY DISINTEGRATING ORAL at 08:49

## 2024-10-10 RX ADMIN — BUPRENORPHINE AND NALOXONE 1 FILM: 2; .5 FILM BUCCAL; SUBLINGUAL at 19:14

## 2024-10-10 RX ADMIN — BUPRENORPHINE AND NALOXONE 1 FILM: 4; 1 FILM, SOLUBLE BUCCAL; SUBLINGUAL at 08:47

## 2024-10-10 RX ADMIN — METFORMIN HYDROCHLORIDE 1000 MG: 500 TABLET, FILM COATED ORAL at 17:30

## 2024-10-10 RX ADMIN — SENNOSIDES AND DOCUSATE SODIUM 1 TABLET: 8.6; 5 TABLET ORAL at 19:14

## 2024-10-10 RX ADMIN — ARIPIPRAZOLE 5 MG: 5 TABLET ORAL at 19:13

## 2024-10-10 RX ADMIN — NICOTINE POLACRILEX 4 MG: 2 LOZENGE ORAL at 17:13

## 2024-10-10 RX ADMIN — SENNOSIDES AND DOCUSATE SODIUM 1 TABLET: 8.6; 5 TABLET ORAL at 08:49

## 2024-10-10 RX ADMIN — OLANZAPINE 20 MG: 20 TABLET, ORALLY DISINTEGRATING ORAL at 19:13

## 2024-10-10 RX ADMIN — NICOTINE 1 PATCH: 21 PATCH, EXTENDED RELEASE TRANSDERMAL at 08:53

## 2024-10-10 RX ADMIN — THERA TABS 1 TABLET: TAB at 08:50

## 2024-10-10 RX ADMIN — NICOTINE POLACRILEX 4 MG: 2 LOZENGE ORAL at 11:58

## 2024-10-10 ASSESSMENT — ACTIVITIES OF DAILY LIVING (ADL)
ADLS_ACUITY_SCORE: 28

## 2024-10-10 NOTE — PROVIDER NOTIFICATION
"   10/10/24 1200   Individualization/Patient Specific Goals   Patient Personal Strengths expressive of needs;resilient   Patient Vulnerabilities history of unsuccessful treatment;substance abuse/addiction   Interprofessional Rounds   Participants psychiatrist;nursing;[facility]   Behavioral Team Discussion   Participants , [facility] Keisha, RN Klever Jamil Pt reports this place is \"boring and annoying\". He was informed that [facility] will interview him Tuesday at 1PM-2PM for a virtual tour. He appeared flat/minimal expression and said ok. I encouraged him to write down any questions he may have and he said \"I don't have any\" and appeared dismissive. I shared if any come up between now and the interview to write them down. He has been behaviorally appropriate, no outbursts, no overt signs of paranoia observed.   Anticipated length of stay Until placement is found, hopefully early to mid next week.   Medical/Physical See H&P, hx of PE   Precautions see below   Plan The plan is to discharge to [facility], bedroom with shared bathroom and amenities.   Safety Plan To be completed with unit psychotherapist prior to discharge.   Anticipated Discharge Disposition other (see comments);group home;assisted living     Goal Outcome Evaluation:PRECAUTIONS AND SAFETY    Behavioral Orders   Procedures    Assault precautions    Cheeking Precautions (behavioral units)     Patient Observed swallowing PO medications; Patient asked to drink water after swallowing medication; Patient in Staff line of sight for 15 minutes after medication given; Mouth checks after PO administration (patient asked to open mouth and stick out their tongue).    Code 1 - Restrict to Unit    Routine Programming     As clinically indicated    Status 15     Every 15 minutes.    Suicide precautions: Suicide Risk: MODERATE; Clinical rationale to override score: Exhibiting Suicidal/self-harm behaviors or thoughts     Patients on Suicide Precautions should " have a Combination Diet ordered that includes a Diet selection(s) AND a Behavioral Tray selection for Safe Tray - with utensils, or Safe Tray - NO utensils       Order Specific Question:   Suicide Risk     Answer:   MODERATE     Order Specific Question:   Clinical rationale to override score:     Answer:   Exhibiting Suicidal/self-harm behaviors or thoughts       Safety  Safety WDL: WDL  [facility]  Observed Behavior: calm  Safety Measures: safety rounds completed, suicide check-in completed  Diversional Activity: television, music (Playing video games)  De-Escalation Techniques: appropriate behavior reinforced, medication administered, medication offered  Suicidality: Status 15  Seizure precautions: clutter free environment  Assault: status 15  Elopement Interventions: status 15  Additional Documentation:  (jose alberto)

## 2024-10-10 NOTE — PLAN OF CARE
"  Problem: Psychotic Signs/Symptoms  Goal: Improved Mood Symptoms (Psychotic Signs/Symptoms)  Outcome: Progressing   Milieu Participation:Behavior: Pt calm, cooperative with staff. Attended groups and contributed well. Appropriate on the unit with no behavioral issues to note. Denies all MH s/s. Pt had lab draw for Prolactin levels this AM. Previous prolactin level 32. Unkept. Medication compliant. PRN paco gum given. Pt forward thinking in wanting to discharge and finding placement. States, \"this place in [place] getting old\".  Able to make needs known and pleasant but brief in conversation.    Safety: status 15 SI/Self harm: denies  Aggression/agitation/HI: denies, exhibited safe behavior  AVH: denies Affect: blunted Mood: calm    Physical Complaints/Issues: denies    PRN Med: Paco gum  Medication AE: denies    I & O: eating and drinking well 100%  Sleep: denies concerns  LBM: denies concerns  ADLs: independent  Visits/calls: none    Vitals: Blood pressure 104/67, pulse 76, temperature 98.1  F (36.7  C), temperature source Temporal, resp. rate 16, height 1.854 m (6' 1\"), weight 103.1 kg (227 lb 6.4 oz), SpO2 97%.                         "

## 2024-10-10 NOTE — PLAN OF CARE
Rehab Group    Start time: 1315  End time: 1400  Patient time total: 20 minutes    attended partial group     #3 attended   Group Type: occupational therapy   Group Topic Covered: healthy leisure time and movement     Ping pong      Group Session Detail:    Patient Response: Pt partcipated in group focused on leisure participation and exploration, socialization and movement. Discussed how participation in leisure activities can be used as a healthy coping skill in symptom management and a strategy to reduce stress.    Mood/Affect:  Pleasant      Plan: Patient encouraged to maintain attendance for continued ongoing support in working towards occupational therapy goals to support overall treatment/care.        Patient Detail:    Joined without need for invitation, participating with another peer for duration of time in group. Was social off and on with this peer. Noted that he has played in the past with a friend as well as with family on trips to [place] where they stayed at a location with a ping pong table and enjoyed playing this with family.       [id number] OT Group (2 or more in attendance)    Patient Active Problem List   Diagnosis    Suicidal ideation    Psychosis (H)    Acute pulmonary embolism without acute cor pulmonale (H)    Alcohol use disorder, moderate, in sustained remission, dependence (H)    Anxiety    Cannabis use disorder, severe, dependence (H)    Class 1 drug-induced obesity without serious comorbidity with body mass index (BMI) of 33.0 to 33.9 in adult    Depression, major, single episode, moderate (H)    Episodic mood disorder (H)    KATHE (generalized anxiety disorder)    History of marijuana use    Obesity (BMI 30-39.9)    Obesity, Class I, BMI 30-34.9    Tobacco use disorder, moderate, in sustained remission    Schizoaffective disorder, bipolar type (H)    Psychosis, unspecified psychosis type (H)    Tardive dyskinesia

## 2024-10-10 NOTE — PROGRESS NOTES
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 Medications: not ordered  Follow-up Appointments: not scheduled     Virginie Loya MD  [facility] Psychiatry

## 2024-10-10 NOTE — PLAN OF CARE
"  Group Attendance:  attended full group    Time session began: 1410  Time session ended: 1435  Patient's total time in group: 25    Total # Attendees   5   Group Type psychotherapeutic     Group Topic Covered CBT, relaxation and grounding techniques/coping skills and positive psychology      Group Session Detail Participants engaged in a structured activity to explore their past, present, and future selves. Participants chose three wen for each version of self and shared with the group members the assigned meaning for each flower. Socratic dialogue was utilized to further explore assigned meaning and discuss anticipated barriers to success for their future selves. Lived experiences with gardening and perceived benefits were discussed.       Patient's response to the group topic/interactions:  cooperative with task, socially appropriate, and listened actively     Patient Details: Eloy was actively engaged throughout the session. He assigned meaning to all three flowers. When discussing his future self he stated that he hopes to be more \"fierce.\" He appears to be progressing as evidenced by less tension and less impulsive and passive aggressive verbalizations during group sessions.          [id number] - Group psychotherapy - 1 Session  Patient Active Problem List   Diagnosis    Suicidal ideation    Psychosis (H)    Acute pulmonary embolism without acute cor pulmonale (H)    Alcohol use disorder, moderate, in sustained remission, dependence (H)    Anxiety    Cannabis use disorder, severe, dependence (H)    Class 1 drug-induced obesity without serious comorbidity with body mass index (BMI) of 33.0 to 33.9 in adult    Depression, major, single episode, moderate (H)    Episodic mood disorder (H)    KATHE (generalized anxiety disorder)    History of marijuana use    Obesity (BMI 30-39.9)    Obesity, Class I, BMI 30-34.9    Tobacco use disorder, moderate, in sustained remission    Schizoaffective disorder, bipolar type (H) "    Psychosis, unspecified psychosis type (H)    Tardive dyskinesia

## 2024-10-10 NOTE — PLAN OF CARE
"Team Note Due:  Wednesday     Assessment/Intervention/Current Symtoms and Care Coordination:  Chart review and met with team, discussed pt progress, symptomology, and response to treatment. Discussed the discharge plan and any potential impediments to discharge.    Per team, Pt reports this place is boring and annoying. He was informed that [facility] will interview Pt Tuesday at 1PM-2PM for a virtual tour. He appeared flat and said ok. I encouraged him to write down any questions he may have and he said \"I don't have any\" and appeared dismissive. I shared if any come up between now and the interview to write them down.     Behavioral Team Note complete today due to no team yesterday.     I informed Pt's  that he has a tour on Tuesday for [facility].     Discharge Plan or Goal:  Cannot return to  previous  housing due to Suboxone use.   TBD - [facility] is pending      Barriers to Discharge:  Placement / stabilizing     Referral Status:  [place] tour Tues 10/15 at 1pm  [facility] - on waitlist (months away)      Legal Status:  Committed and [place]   [facility]: [place]  File Number: [id number]  Start and expiration date of commitment:   9/30: Submitted [place] Amendment to remove Invega and add Prolixin.  [place] meds as of 10/1: Zyprexa, Seroquel, Prolixin, Abilify     Contacts (include KELBY status):  Psych: Dr. Khan, [place] [facility] - called from cell phone for an update #[phone] (Eloy signed KELBY 10/1 for medications related info)  Michelle Cortes/Mother: [phone]    -   New CADI CM is: Sonia Burdick Ph: [phone]  Email: [email or web address] (KELBY signed 10/1/24)    New Commitment CM:  Chasity [email or web address] [phone] - No KELBY needed.    Upcoming Meetings and Dates/Important Information and next steps:  Update discharge referral form at discharge   PD and COS at discharge  Follow up psych appt  "

## 2024-10-10 NOTE — PLAN OF CARE
BEH IP Unit Acuity Rating Score (UARS)  Patient is given one point for every criteria they meet.    CRITERIA SCORING   On a 72 hour hold, court hold, committed, stay of commitment, or revocation. 1    Patient LOS on BEH unit exceeds 20 days. 1 LOS: 37   Patient under guardianship, 55+, otherwise medically complex, or under age 11. 0   Suicide ideation without relief of precipitating factors. 0   Current plan for suicide. 0   Current plan for homicide. 0   Imminent risk or actual attempt to seriously harm another without relief of factors precipitating the attempt. 0   Severe dysfunction in daily living (ex: complete neglect for self care, extreme disruption in vegetative function, extreme deterioration in social interactions). 1   Recent (last 7 days) or current physical aggression in the ED or on unit. 0   Restraints or seclusion episode in past 72 hours. 0   Recent (last 7 days) or current verbal aggression, agitation, yelling, etc., while in the ED or unit. 0   Active psychosis. 1   Need for constant or near constant redirection (from leaving, from others, etc).  0   Intrusive or disruptive behaviors. 0   Patient requires 3 or more hours of individualized nursing care per 8-hour shift (i.e. for ADLs, meds, therapeutic interventions). 0   TOTAL 4

## 2024-10-10 NOTE — PLAN OF CARE
Rehab Group    Start time: 1030  End time: 120  Patient time total: 90 minutes    attended full group    #5 attended   Group Type: OT Clinic   Group Topic Covered: coping skills     Group Session Detail:    Intervention:  Pt participated in a OT Clinic group to facilitate coping skills exploration and creative expression through personally meaningful activities, and to encourage utilization of these healthy coping skills to promote overall health and wellness. Group included clinical observation of social, cognitive and kinesthetic performance skills to inform treatment and safe discharge planning.    Mood/Affect: Pleasant       Plan: Patient encouraged to maintain attendance for continued ongoing support in working towards occupational therapy goals to support overall treatment/care.        Patient Detail:    Was actively engaged in working on selected projects for duration of time in group. Was briefly social with writer or peer if they initiated conversation, otherwise demonstrated consistent focus on projects. Continues to ask each time if writer will let him select his own music to which writer responds that they will be playing a play list in the background and not taking requests. Is however easily receptive to this and moves on and refocuses back on project.       [id number] OT Group (2 or more in attendance)    Patient Active Problem List   Diagnosis    Suicidal ideation    Psychosis (H)    Acute pulmonary embolism without acute cor pulmonale (H)    Alcohol use disorder, moderate, in sustained remission, dependence (H)    Anxiety    Cannabis use disorder, severe, dependence (H)    Class 1 drug-induced obesity without serious comorbidity with body mass index (BMI) of 33.0 to 33.9 in adult    Depression, major, single episode, moderate (H)    Episodic mood disorder (H)    KATHE (generalized anxiety disorder)    History of marijuana use    Obesity (BMI 30-39.9)    Obesity, Class I, BMI 30-34.9    Tobacco use  disorder, moderate, in sustained remission    Schizoaffective disorder, bipolar type (H)    Psychosis, unspecified psychosis type (H)    Tardive dyskinesia
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
## 2024-10-11 PROCEDURE — 90853 GROUP PSYCHOTHERAPY: CPT

## 2024-10-11 PROCEDURE — 250N000013 HC RX MED GY IP 250 OP 250 PS 637

## 2024-10-11 PROCEDURE — 250N000013 HC RX MED GY IP 250 OP 250 PS 637: Performed by: STUDENT IN AN ORGANIZED HEALTH CARE EDUCATION/TRAINING PROGRAM

## 2024-10-11 PROCEDURE — 250N000013 HC RX MED GY IP 250 OP 250 PS 637: Performed by: PSYCHIATRY & NEUROLOGY

## 2024-10-11 PROCEDURE — 124N000002 HC R&B MH UMMC

## 2024-10-11 PROCEDURE — 250N000012 HC RX MED GY IP 250 OP 636 PS 637

## 2024-10-11 PROCEDURE — 99232 SBSQ HOSP IP/OBS MODERATE 35: CPT | Performed by: PSYCHIATRY & NEUROLOGY

## 2024-10-11 RX ADMIN — QUETIAPINE FUMARATE 25 MG: 25 TABLET ORAL at 14:10

## 2024-10-11 RX ADMIN — ASPIRIN 81 MG CHEWABLE TABLET 81 MG: 81 TABLET CHEWABLE at 09:00

## 2024-10-11 RX ADMIN — TAMSULOSIN HYDROCHLORIDE 0.4 MG: 0.4 CAPSULE ORAL at 09:00

## 2024-10-11 RX ADMIN — NICOTINE POLACRILEX 4 MG: 2 LOZENGE ORAL at 16:18

## 2024-10-11 RX ADMIN — BUPRENORPHINE AND NALOXONE 1 FILM: 4; 1 FILM, SOLUBLE BUCCAL; SUBLINGUAL at 09:00

## 2024-10-11 RX ADMIN — METFORMIN HYDROCHLORIDE 1000 MG: 500 TABLET, FILM COATED ORAL at 18:31

## 2024-10-11 RX ADMIN — METFORMIN HYDROCHLORIDE 1000 MG: 500 TABLET, FILM COATED ORAL at 09:00

## 2024-10-11 RX ADMIN — NICOTINE 1 PATCH: 21 PATCH, EXTENDED RELEASE TRANSDERMAL at 09:01

## 2024-10-11 RX ADMIN — BUPRENORPHINE AND NALOXONE 1 FILM: 2; .5 FILM BUCCAL; SUBLINGUAL at 19:07

## 2024-10-11 RX ADMIN — OLANZAPINE 10 MG: 10 TABLET, ORALLY DISINTEGRATING ORAL at 09:00

## 2024-10-11 RX ADMIN — ARIPIPRAZOLE 5 MG: 5 TABLET ORAL at 19:07

## 2024-10-11 RX ADMIN — NICOTINE POLACRILEX 4 MG: 2 LOZENGE ORAL at 10:55

## 2024-10-11 RX ADMIN — OLANZAPINE 20 MG: 20 TABLET, ORALLY DISINTEGRATING ORAL at 19:07

## 2024-10-11 RX ADMIN — SENNOSIDES AND DOCUSATE SODIUM 1 TABLET: 8.6; 5 TABLET ORAL at 09:00

## 2024-10-11 RX ADMIN — POLYETHYLENE GLYCOL 3350 17 G: 17 POWDER, FOR SOLUTION ORAL at 09:01

## 2024-10-11 RX ADMIN — NICOTINE POLACRILEX 4 MG: 2 GUM, CHEWING BUCCAL at 20:40

## 2024-10-11 RX ADMIN — SENNOSIDES AND DOCUSATE SODIUM 1 TABLET: 8.6; 5 TABLET ORAL at 19:07

## 2024-10-11 RX ADMIN — THERA TABS 1 TABLET: TAB at 09:00

## 2024-10-11 RX ADMIN — NICOTINE POLACRILEX 4 MG: 2 GUM, CHEWING BUCCAL at 14:10

## 2024-10-11 ASSESSMENT — ACTIVITIES OF DAILY LIVING (ADL)
ADLS_ACUITY_SCORE: 28
LAUNDRY: UNABLE TO COMPLETE
LAUNDRY: UNABLE TO COMPLETE
DRESS: INDEPENDENT
ADLS_ACUITY_SCORE: 28
ORAL_HYGIENE: INDEPENDENT
ADLS_ACUITY_SCORE: 28
HYGIENE/GROOMING: INDEPENDENT
ADLS_ACUITY_SCORE: 28
ORAL_HYGIENE: INDEPENDENT
ADLS_ACUITY_SCORE: 28
ADLS_ACUITY_SCORE: 28
HYGIENE/GROOMING: INDEPENDENT
ADLS_ACUITY_SCORE: 28
ADLS_ACUITY_SCORE: 28
DRESS: INDEPENDENT

## 2024-10-11 NOTE — PLAN OF CARE
"  Problem: Adult Behavioral Health Plan of Care  Goal: Plan of Care Review  Outcome: Progressing     Problem: Psychotic Signs/Symptoms  Goal: Improved Behavioral Control (Psychotic Signs/Symptoms)  Outcome: Progressing     Problem: Psychotic Signs/Symptoms  Goal: Improved Psychomotor Symptoms (Psychotic Signs/Symptoms)  Outcome: Progressing   Goal Outcome Evaluation:    Patient is calm and cooperative.Patient has been visible in milieu watching television,walking on halls and writing on the board.Patient was appropriately socializing with staff and peers. Patient initially reported experiencing symptoms anxiety and depression; however,up on assessment, he denies any feeling or manifestation of those symptoms.Patient denies any thought of harming him self or others. Patient also denies auditory and visual hallucinations. No pain or discomfort. Patient eating and drinking well. No medications side effect reported or observed. No acute concern noted at this time.    /67 (BP Location: Right arm, Patient Position: Sitting, Cuff Size: Adult Large)   Pulse 83   Temp 97.3  F (36.3  C)   Resp 18   Ht 1.854 m (6' 1\")   Wt 103.1 kg (227 lb 6.4 oz)   SpO2 98%   BMI 30.00 kg/m       "

## 2024-10-11 NOTE — PLAN OF CARE
Team Note Due:  Wednesday     Assessment/Intervention/Current Symtoms and Care Coordination:  Chart review and met with team, discussed pt progress, symptomology, and response to treatment. Discussed the discharge plan and any potential impediments to discharge.    Per team, no outbursts. Pt was directed to room during a crisis on the unit and he complied with no issues.     I called [facility] to establish [place] with a suboxone program Ph: [phone]. I got an appt for Monday October 28th, 2024 at 2PM with Janelle Molina DNP. She will meet with him and they will establish a plan[place] [address]    I called [facility] to set up after care with Tenisha Khan, Appt 10/21 at 9:30am in person. Updated AVS.     Discharge Plan or Goal:  Cannot return to  previous  housing due to Suboxone use.   TBD - [facility] is pending. He has a virtual tour on Tuesday 10/15 at 1PM and will most likely be accepted if all goes well.      Barriers to Discharge:  Placement / stabilizing     Referral Status:  [place] tour Tues 10/15 at 1pm  [facility] - on waitlist (months away)      [facility] at [facility] Monday 10/28 at 2PM [place] [address]    Psychiatry appt: [facility] outpatient Appt: 10/21/24 9:30AM in Person, [address] [facility].     Legal Status:  Committed and [place]   [facility]: [place]  File Number: [id number]  Start and expiration date of commitment:   9/30: Submitted [place] Amendment to remove Invega and add Prolixin.  New [place] meds as of 10/1: Zyprexa, Seroquel, Prolixin, Abilify     Contacts (include KELBY status):  Psych: Dr. Khan [place] [facility] - called from cell phone for an update #[phone] (Eloy signed KELBY 10/1 for medications related info)  Michelletravon Cortes/Mother: [phone]    -   New CADI CM is: Sonia Burdick Ph:  [phone]  Email: [email or web address] (KELBY signed 10/1/24)    New Commitment CM:  Chasity [email or web address] [phone] - No KELBY needed.    Upcoming Meetings and Dates/Important Information and next steps:  Update discharge referral form at discharge   PD and COS at discharge  Needs Follow up psych appt and Suboxone prescriber

## 2024-10-11 NOTE — PLAN OF CARE
"  Group Attendance:  attended full group    Time session began: 1115  Time session ended: 1145  Patient's total time in group: 30    Total # Attendees   4   Group Type psychotherapeutic     Group Topic Covered substance use, incorporating skill sets, goals and motivation, and relaxation and grounding techniques/coping skills     Group Session Detail Participants engaged in an activity to explore support systems and barriers to mental health and wellbeing using plant propagation. Writer provided education on adventitious root systems and asked participants to consider how stressful periods such as hospitalization promote the growth of new supports in their lives. Participants were asked to consider ways they want to adapt to the environments upon discharge. Participants then chose a cutting to plant in a unit safe container and care for throughout hospitalization.       Patient's response to the group topic/interactions:  cooperative with task, socially appropriate, expressed understanding of topic, and actively engaged     Patient Details: Eloy shared that Suboxone has been supportive and he hopes to continue Suboxone upon discharge because he was \"using opiates\" and that interfered with his ability to function in his daily life activities. He named music as another support to his mental health and wellbeing. He actively participated in planting his cutting and requested additional supplies to plant more cuttings. He continues to present with less passive aggressive communication. He stated that music group has been very therapeutic throughout hospitalization.          53572 - Group psychotherapy - 1 Session  Patient Active Problem List   Diagnosis    Suicidal ideation    Psychosis (H)    Acute pulmonary embolism without acute cor pulmonale (H)    Alcohol use disorder, moderate, in sustained remission, dependence (H)    Anxiety    Cannabis use disorder, severe, dependence (H)    Class 1 drug-induced obesity without " Chief Complaint   Patient presents with    Nasal Congestion    Cough       History obtained from mother.    HPI: Matti Mckenzie is a 3 y.o. child here for evaluation of nasal congestion, runny nose, and cough that started several days ago.  These episodes are chronic and recurrent.  He usually has thick nasal discharge that last several weeks.  This causes some mouth breathing and sleep disordered breathing.  No fever currently.      Review of Systems   Constitutional:  Negative for fever and malaise/fatigue.   HENT:  Positive for congestion. Negative for ear discharge, ear pain and sore throat.    Respiratory:  Positive for cough. Negative for shortness of breath, wheezing and stridor.    Cardiovascular:  Negative for chest pain.   Gastrointestinal:  Negative for diarrhea and vomiting.      No current outpatient medications on file prior to visit.     No current facility-administered medications on file prior to visit.       Patient Active Problem List   Diagnosis    Speech disturbance    Expressive language delay            No past medical history on file.  Past Surgical History:   Procedure Laterality Date    CIRCUMCISION        Social History     Social History Narrative    Lives with mom, dad, sister, mgm, and mgf. 3 dogs. +smokers. No  (11/01/2022)      Family History   Problem Relation Age of Onset    Asthma Mother         Copied from mother's history at birth    Migraines Father     Diabetes Maternal Grandmother         Copied from mother's family history at birth    Hypertension Maternal Grandmother         Copied from mother's family history at birth    Heart disease Maternal Grandmother     Arthritis Maternal Grandmother         Copied from mother's family history at birth    Fibromyalgia Maternal Grandmother         Copied from mother's family history at birth    Bipolar disorder Maternal Grandmother         Copied from mother's family history at birth    Hyperlipidemia Maternal  Grandmother     Hypertension Paternal Grandmother     Heart disease Paternal Grandmother           EXAM:  Vitals:    03/13/23 1438   Pulse: 113   Resp: 24   Temp: 98.5 °F (36.9 °C)     Pulse 113   Temp 98.5 °F (36.9 °C)   Resp 24   Wt 17.4 kg (38 lb 7.5 oz)   SpO2 99%   General appearance: alert, appears stated age, and cooperative  Ears: normal TM's and external ear canals both ears  Nose: clear discharge, moderate congestion  Throat: lips, mucosa, and tongue normal; teeth and gums normal  Lungs: clear to auscultation bilaterally  Heart: regular rate and rhythm, S1, S2 normal, no murmur, click, rub or gallop        IMPRESSION  1. Acute recurrent sinusitis, unspecified location  amoxicillin (AMOXIL) 400 mg/5 mL suspension    fluticasone propionate (FLONASE) 50 mcg/actuation nasal spray    Ambulatory referral/consult to Allergy      2. Sleep disorder breathing            PLAN  Matti was seen today for nasal congestion and cough.    Diagnoses and all orders for this visit:    Acute recurrent sinusitis, unspecified location  -     amoxicillin (AMOXIL) 400 mg/5 mL suspension; Take 8 mLs (640 mg total) by mouth 2 (two) times daily. for 10 days  -     fluticasone propionate (FLONASE) 50 mcg/actuation nasal spray; 1 spray (50 mcg total) by Each Nostril route once daily.  -     Ambulatory referral/consult to Allergy; Future    Sleep disorder breathing    Start Amoxil as directed for recurrent sinusitis.  Begin Flonase 1 spray each nostril daily.  Refer to Peds Allergy for further eval of recurrent sinusitis and possible need for pneumococcal titer testing   serious comorbidity with body mass index (BMI) of 33.0 to 33.9 in adult    Depression, major, single episode, moderate (H)    Episodic mood disorder (H)    KATHE (generalized anxiety disorder)    History of marijuana use    Obesity (BMI 30-39.9)    Obesity, Class I, BMI 30-34.9    Tobacco use disorder, moderate, in sustained remission    Schizoaffective disorder, bipolar type (H)    Psychosis, unspecified psychosis type (H)    Tardive dyskinesia

## 2024-10-11 NOTE — PLAN OF CARE
Problem: Psychotic Signs/Symptoms  Goal: Improved Behavioral Control (Psychotic Signs/Symptoms)  Intervention: Manage Behavior  Recent Flowsheet Documentation  Taken 10/11/2024 1042 by Navya Almonte RN  De-Escalation Techniques:   appropriate behavior reinforced   medication administered   medication offered   Goal Outcome Evaluation:    Plan of Care Reviewed With: patient      Patient alert and ambulatory. He presented with flat affect, mood was calm. He denied pain. He denied anxiety, depression, SI/HI/AVH. He was pleasant on approach. He was visible in the milieu. He's eating and drinking adequately. He was seen writing on the chalkboard, watching TV, and pacing in the lounge. Patient is medication compliant.     At 2 PM, patient c/o anxiety and requested for seroquel. Medication given; as per patient, it was effective. PRN nicotine given.

## 2024-10-11 NOTE — PLAN OF CARE
Group Attendance:  attended full group    Time session began: 1415  Time session ended: 1445  Patient's total time in group: 30    Total # Attendees   3   Group Type psychotherapeutic     Group Topic Covered symptom management and healthy coping skills   Group Session Detail Participants engaged in an activity to promote understanding of and coping skills in anxiety and panic. Participants shared their lived experiences with both and participated in a discussion about what has been helpful in managing symptoms. Prompts were used to foster group discussion.       Patient's response to the group topic/interactions:  cooperative with task, supportive of peers, listened actively, expressed understanding of topic, attentive, and actively engaged     Patient Details: Eloy was an active participant in the group discussion. He was knowledgeable about coping skills for these particular symptoms, stating that he had previously completed a program for OCD and anxiety that was beneficial.          15875 - Group psychotherapy - 1 Session  Patient Active Problem List   Diagnosis    Suicidal ideation    Psychosis (H)    Acute pulmonary embolism without acute cor pulmonale (H)    Alcohol use disorder, moderate, in sustained remission, dependence (H)    Anxiety    Cannabis use disorder, severe, dependence (H)    Class 1 drug-induced obesity without serious comorbidity with body mass index (BMI) of 33.0 to 33.9 in adult    Depression, major, single episode, moderate (H)    Episodic mood disorder (H)    KATHE (generalized anxiety disorder)    History of marijuana use    Obesity (BMI 30-39.9)    Obesity, Class I, BMI 30-34.9    Tobacco use disorder, moderate, in sustained remission    Schizoaffective disorder, bipolar type (H)    Psychosis, unspecified psychosis type (H)    Tardive dyskinesia

## 2024-10-11 NOTE — PROGRESS NOTES
Rehab Group     Start time: 1715  End time: 1800  Patient time total: 45 minutes     attended full group     #6 attended   Group Type: occupational therapy and OT Clinic   Group Topic Covered: activity therapy, coping skills, and problem solving         Group Session Detail:  OT Clinic      Patient Response/Contribution:  cooperative with task, safe use of materials/supplies, and actively engaged         Patient Detail:  Pt actively participated in occupational therapy clinic to facilitate coping skill exploration, creative expression within personally meaningful activities, and clinical observation of social, cognitive, and kinesthetic performance skills. Pt response: Independent to initiate, needed minimal assist to gather materials, sequence, and adjust to workspace demands as needed. Demonstrated fair focus, planning, and problem solving for selected wood task, then beading.  Able to ask for assistance as needed, and minimally social with peers and staff.  Pt will continue to be encouraged to attend groups for further asssesssment and to address goals identified on plan of care.        [id number] OT Group (2 or more in attendance)      Patient Active Problem List   Diagnosis    Suicidal ideation    Psychosis (H)    Acute pulmonary embolism without acute cor pulmonale (H)    Alcohol use disorder, moderate, in sustained remission, dependence (H)    Anxiety    Cannabis use disorder, severe, dependence (H)    Class 1 drug-induced obesity without serious comorbidity with body mass index (BMI) of 33.0 to 33.9 in adult    Depression, major, single episode, moderate (H)    Episodic mood disorder (H)    KATHE (generalized anxiety disorder)    History of marijuana use    Obesity (BMI 30-39.9)    Obesity, Class I, BMI 30-34.9    Tobacco use disorder, moderate, in sustained remission    Schizoaffective disorder, bipolar type (H)    Psychosis, unspecified psychosis type (H)    Tardive dyskinesia

## 2024-10-11 NOTE — PLAN OF CARE
"  Problem: Adult Behavioral Health Plan of Care  Goal: Adheres to Safety Considerations for Self and Others  Outcome: Progressing  Flowsheets (Taken 10/10/2024 2051)  Adheres to Safety Considerations for Self and Others: making progress toward outcome   Patient was active on the unit participating in unit activities. Able to make needs known. Endorsed back pain which he describe as \" tense.\" Believe that Lyrica will help. Denies confusion. Endorsed depression rated 6/10. Denies anxiety and other mental health symptoms.  Denies symptoms of urinary retention; no concern with voiding. Reports being hungry all the time. Shared that he is a forgetful of recent events and conversation. Denies any further concern.   Patient was involve in a verbal altercation with another peer over the use of yellow note pad. Patient was compliant with redirection to his room. He took his medication as prescribed and utilized PRN Paco gum and lozenges. Pt denies any medication adverse effect. Obsessed and demanded to go through his belongings in the locker room. Patient was reassured that it is the staff responsibility to keep it self. Will continue to monitor and support.  "

## 2024-10-11 NOTE — PROGRESS NOTES
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 in room, per patient on night of 9/10 Staff noted full ROM however. On call doctor notified who placed hand XR  - Hand XR 9/10 showing tissue swelling and NO displacement or fracture per radiologist read  -prns for pain and swelling     Behavioral/Psychological/Social:  - Encourage unit programming    Safety:  - Continue precautions as noted above  - Status 15 minute checks    Legal Status: full commitment with [facility] for Zyprexa, Seroquel, Invega and Abilify    Disposition Plan   Reason for ongoing admission: poses an imminent risk to self, poses an imminent risk to others, and is unable to care for self due to severe psychosis or jamila  Discharge location:  TBD . Consider ACT team referral, will need discharge planning conversation when more stable  Discharge Medications: not ordered  Follow-up Appointments: not scheduled     Virginie Loya MD  [facility]

## 2024-10-11 NOTE — PLAN OF CARE
BEH IP Unit Acuity Rating Score (UARS)  Patient is given one point for every criteria they meet.    CRITERIA SCORING   On a 72 hour hold, court hold, committed, stay of commitment, or revocation. 1    Patient LOS on BEH unit exceeds 20 days. 1 LOS: 38   Patient under guardianship, 55+, otherwise medically complex, or under age 11. 0   Suicide ideation without relief of precipitating factors. 0   Current plan for suicide. 0   Current plan for homicide. 0   Imminent risk or actual attempt to seriously harm another without relief of factors precipitating the attempt. 0   Severe dysfunction in daily living (ex: complete neglect for self care, extreme disruption in vegetative function, extreme deterioration in social interactions). 1   Recent (last 7 days) or current physical aggression in the ED or on unit. 0   Restraints or seclusion episode in past 72 hours. 0   Recent (last 7 days) or current verbal aggression, agitation, yelling, etc., while in the ED or unit. 0   Active psychosis. 1   Need for constant or near constant redirection (from leaving, from others, etc).  0   Intrusive or disruptive behaviors. 0   Patient requires 3 or more hours of individualized nursing care per 8-hour shift (i.e. for ADLs, meds, therapeutic interventions). 0   TOTAL 4
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
## 2024-10-12 PROCEDURE — 250N000013 HC RX MED GY IP 250 OP 250 PS 637

## 2024-10-12 PROCEDURE — 250N000013 HC RX MED GY IP 250 OP 250 PS 637: Performed by: PSYCHIATRY & NEUROLOGY

## 2024-10-12 PROCEDURE — 124N000002 HC R&B MH UMMC

## 2024-10-12 PROCEDURE — 250N000012 HC RX MED GY IP 250 OP 636 PS 637

## 2024-10-12 PROCEDURE — 250N000013 HC RX MED GY IP 250 OP 250 PS 637: Performed by: STUDENT IN AN ORGANIZED HEALTH CARE EDUCATION/TRAINING PROGRAM

## 2024-10-12 RX ADMIN — NICOTINE POLACRILEX 4 MG: 2 GUM, CHEWING BUCCAL at 20:50

## 2024-10-12 RX ADMIN — METFORMIN HYDROCHLORIDE 1000 MG: 500 TABLET, FILM COATED ORAL at 09:17

## 2024-10-12 RX ADMIN — THERA TABS 1 TABLET: TAB at 09:18

## 2024-10-12 RX ADMIN — TAMSULOSIN HYDROCHLORIDE 0.4 MG: 0.4 CAPSULE ORAL at 09:18

## 2024-10-12 RX ADMIN — BUPRENORPHINE AND NALOXONE 1 FILM: 4; 1 FILM, SOLUBLE BUCCAL; SUBLINGUAL at 09:17

## 2024-10-12 RX ADMIN — METFORMIN HYDROCHLORIDE 1000 MG: 500 TABLET, FILM COATED ORAL at 19:27

## 2024-10-12 RX ADMIN — ARIPIPRAZOLE 5 MG: 5 TABLET ORAL at 19:28

## 2024-10-12 RX ADMIN — BUPRENORPHINE AND NALOXONE 1 FILM: 2; .5 FILM BUCCAL; SUBLINGUAL at 19:29

## 2024-10-12 RX ADMIN — OLANZAPINE 20 MG: 20 TABLET, ORALLY DISINTEGRATING ORAL at 19:28

## 2024-10-12 RX ADMIN — OLANZAPINE 10 MG: 10 TABLET, ORALLY DISINTEGRATING ORAL at 09:18

## 2024-10-12 RX ADMIN — NICOTINE POLACRILEX 4 MG: 2 GUM, CHEWING BUCCAL at 11:39

## 2024-10-12 RX ADMIN — NICOTINE POLACRILEX 4 MG: 2 GUM, CHEWING BUCCAL at 14:27

## 2024-10-12 RX ADMIN — ALUMINUM HYDROXIDE, MAGNESIUM HYDROXIDE, AND DIMETHICONE 30 ML: 200; 20; 200 SUSPENSION ORAL at 10:13

## 2024-10-12 RX ADMIN — ASPIRIN 81 MG CHEWABLE TABLET 81 MG: 81 TABLET CHEWABLE at 09:17

## 2024-10-12 RX ADMIN — SENNOSIDES AND DOCUSATE SODIUM 1 TABLET: 8.6; 5 TABLET ORAL at 09:18

## 2024-10-12 RX ADMIN — NICOTINE 1 PATCH: 21 PATCH, EXTENDED RELEASE TRANSDERMAL at 09:18

## 2024-10-12 RX ADMIN — SENNOSIDES AND DOCUSATE SODIUM 1 TABLET: 8.6; 5 TABLET ORAL at 19:28

## 2024-10-12 RX ADMIN — NICOTINE POLACRILEX 4 MG: 2 GUM, CHEWING BUCCAL at 09:46

## 2024-10-12 ASSESSMENT — ACTIVITIES OF DAILY LIVING (ADL)
ORAL_HYGIENE: INDEPENDENT
ADLS_ACUITY_SCORE: 28
HYGIENE/GROOMING: INDEPENDENT
ADLS_ACUITY_SCORE: 28
DRESS: INDEPENDENT
ADLS_ACUITY_SCORE: 28
LAUNDRY: UNABLE TO COMPLETE
ADLS_ACUITY_SCORE: 28

## 2024-10-12 NOTE — PLAN OF CARE
RN Assessment   The patient has been observed to be isolated and withdrawn, primarily keeping to himself and not engaging much with others. Despite this behavior, he denies feeling depressed and instead describes his emotional state as one of boredom. He attended a group session but reported that he did not find it entertaining.   The patient expressed a desire to return to taking SSRI upon discharge. However, when questioned about depression again, he denied. Denied any safety concerns, including active or passive suicidal ideations or thoughts of non-suicidal self-injury. He also denies experiencing any other psychiatric symptoms such as hallucinations, delusional thoughts, paranoia, or referential thinking. The patient reports no issues with urinary retention or constipation and has declined Miralax today. States that he is looking forward to his discharge and has a virtual apartment tour scheduled for Tuesday, which he is hopeful about. He has no physical complaints or concerns during this shift and accepted his medications without any issues.

## 2024-10-12 NOTE — CARE PLAN
Pt appeared to have slept 6.25 hours on the night shift. Observed to be breathing with no signs of respiratory distress. No signs of pain or discomfort. No PRN's given. No behavioral or acute medical concerns on this shift. Woke up once for a pudding then went back to bed.

## 2024-10-12 NOTE — PLAN OF CARE
"  Problem: Psychotic Signs/Symptoms  Goal: Improved Behavioral Control (Psychotic Signs/Symptoms)  Outcome: Progressing   Goal Outcome Evaluation:    Plan of Care Reviewed With: patient      Patient is calm and cooperative. Patient visible in milieu socializing with staff and peers. Patient initially endorse anxiety 4/10 which he denies later.Patient denies depression,auditory and visual hallucinations. After dinner patient returned to his room and fell sleep with out any distress. He later woke up and appears at the nursing window to request his bedtime medication. Patient displayed a blunted affect and appeared drowsy. Patient denies pain or discomfort. No medication side effect reported or observed. Patient eating and drinking well.     /71 (Patient Position: Sitting)   Pulse 90   Temp 97.7  F (36.5  C) (Temporal)   Resp 18   Ht 1.854 m (6' 1\")   Wt 103.1 kg (227 lb 6.4 oz)   SpO2 96%   BMI 30.00 kg/m           "

## 2024-10-13 PROCEDURE — 124N000002 HC R&B MH UMMC

## 2024-10-13 PROCEDURE — 250N000013 HC RX MED GY IP 250 OP 250 PS 637: Performed by: STUDENT IN AN ORGANIZED HEALTH CARE EDUCATION/TRAINING PROGRAM

## 2024-10-13 PROCEDURE — 250N000013 HC RX MED GY IP 250 OP 250 PS 637: Performed by: PSYCHIATRY & NEUROLOGY

## 2024-10-13 PROCEDURE — 250N000013 HC RX MED GY IP 250 OP 250 PS 637

## 2024-10-13 PROCEDURE — 250N000012 HC RX MED GY IP 250 OP 636 PS 637

## 2024-10-13 RX ADMIN — ARIPIPRAZOLE 5 MG: 5 TABLET ORAL at 19:05

## 2024-10-13 RX ADMIN — NICOTINE POLACRILEX 4 MG: 2 GUM, CHEWING BUCCAL at 09:32

## 2024-10-13 RX ADMIN — NICOTINE POLACRILEX 4 MG: 2 GUM, CHEWING BUCCAL at 12:48

## 2024-10-13 RX ADMIN — BUPRENORPHINE AND NALOXONE 1 FILM: 4; 1 FILM, SOLUBLE BUCCAL; SUBLINGUAL at 08:50

## 2024-10-13 RX ADMIN — THERA TABS 1 TABLET: TAB at 08:50

## 2024-10-13 RX ADMIN — BUPRENORPHINE AND NALOXONE 1 FILM: 2; .5 FILM BUCCAL; SUBLINGUAL at 19:05

## 2024-10-13 RX ADMIN — TAMSULOSIN HYDROCHLORIDE 0.4 MG: 0.4 CAPSULE ORAL at 08:50

## 2024-10-13 RX ADMIN — SENNOSIDES AND DOCUSATE SODIUM 1 TABLET: 8.6; 5 TABLET ORAL at 08:49

## 2024-10-13 RX ADMIN — OLANZAPINE 20 MG: 20 TABLET, ORALLY DISINTEGRATING ORAL at 19:05

## 2024-10-13 RX ADMIN — METFORMIN HYDROCHLORIDE 1000 MG: 500 TABLET, FILM COATED ORAL at 18:24

## 2024-10-13 RX ADMIN — ACETAMINOPHEN 650 MG: 325 TABLET, FILM COATED ORAL at 09:43

## 2024-10-13 RX ADMIN — ASPIRIN 81 MG CHEWABLE TABLET 81 MG: 81 TABLET CHEWABLE at 08:50

## 2024-10-13 RX ADMIN — OLANZAPINE 10 MG: 10 TABLET, ORALLY DISINTEGRATING ORAL at 08:49

## 2024-10-13 RX ADMIN — NICOTINE POLACRILEX 4 MG: 2 GUM, CHEWING BUCCAL at 15:12

## 2024-10-13 RX ADMIN — METFORMIN HYDROCHLORIDE 1000 MG: 500 TABLET, FILM COATED ORAL at 08:49

## 2024-10-13 RX ADMIN — SENNOSIDES AND DOCUSATE SODIUM 1 TABLET: 8.6; 5 TABLET ORAL at 19:05

## 2024-10-13 RX ADMIN — QUETIAPINE FUMARATE 25 MG: 25 TABLET ORAL at 23:55

## 2024-10-13 RX ADMIN — NICOTINE POLACRILEX 4 MG: 2 GUM, CHEWING BUCCAL at 20:15

## 2024-10-13 RX ADMIN — NICOTINE 1 PATCH: 21 PATCH, EXTENDED RELEASE TRANSDERMAL at 08:50

## 2024-10-13 RX ADMIN — NICOTINE POLACRILEX 4 MG: 2 GUM, CHEWING BUCCAL at 16:51

## 2024-10-13 ASSESSMENT — ACTIVITIES OF DAILY LIVING (ADL)
ADLS_ACUITY_SCORE: 48
ADLS_ACUITY_SCORE: 28
ORAL_HYGIENE: INDEPENDENT
ADLS_ACUITY_SCORE: 28
DRESS: INDEPENDENT
LAUNDRY: UNABLE TO COMPLETE
HYGIENE/GROOMING: INDEPENDENT;PROMPTS
DRESS: INDEPENDENT;PROMPTS
ADLS_ACUITY_SCORE: 28
ADLS_ACUITY_SCORE: 48
ADLS_ACUITY_SCORE: 48
ADLS_ACUITY_SCORE: 28
ADLS_ACUITY_SCORE: 48
LAUNDRY: UNABLE TO COMPLETE
ADLS_ACUITY_SCORE: 28
ORAL_HYGIENE: INDEPENDENT;PROMPTS
ADLS_ACUITY_SCORE: 28
ADLS_ACUITY_SCORE: 48
ADLS_ACUITY_SCORE: 28
HYGIENE/GROOMING: INDEPENDENT
ADLS_ACUITY_SCORE: 28
ADLS_ACUITY_SCORE: 48
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
## 2024-10-13 NOTE — PLAN OF CARE
"  Problem: Psychotic Signs/Symptoms  Goal: Improved Behavioral Control (Psychotic Signs/Symptoms)  Outcome: Progressing   Goal Outcome Evaluation:    Plan of Care Reviewed With: patient      Patient is calm and cooperative.Patient initially isolative to his room,lying in his bed and writing on a paper.Patient later visible in milieu, watching television,pacing, listening to music and socializing with staff and peers.Patient denies anxiety, depression, auditory and visual hallucinations.patient also denies any thought harming himself or others. Patient reported pain of his lower back around noon,after tylenol administration the pain subsidized.Later patient denies pain or discomfort,No complain of urinary retentions. Patient eating, drinking and sleeping well. No medications side effect reported or observed. No acute concern at this time.    /72 (BP Location: Left arm, Patient Position: Sitting, Cuff Size: Adult Regular)   Pulse 95   Temp 97.9  F (36.6  C) (Temporal)   Resp 16   Ht 1.854 m (6' 1\")   Wt 103.1 kg (227 lb 6.4 oz)   SpO2 98%   BMI 30.00 kg/m                "

## 2024-10-13 NOTE — PLAN OF CARE
"Problem: Behavioral Disturbance  Goal: Behavioral Disturbance  Description: Signs and symptoms of listed problems will be absent or manageable by discharge or transition of care.  Outcome: Progressing  Patient was visible in the lounge area observed pacing and listening to music. He is compliant with medication administration and vitals check. He did not complain of urine retentions or discomfort this shift. Patient endorse pain and received prn tylenol with relief. Patient is eating and drinking without issues. Patient denies all psych symptoms.     /76 (BP Location: Left arm, Patient Position: Sitting, Cuff Size: Adult Large)   Pulse 87   Temp 97.8  F (36.6  C)   Resp 18   Ht 1.854 m (6' 1\")   Wt 103.1 kg (227 lb 6.4 oz)   SpO2 96%   BMI 30.00 kg/m         "

## 2024-10-14 PROCEDURE — 250N000013 HC RX MED GY IP 250 OP 250 PS 637: Performed by: STUDENT IN AN ORGANIZED HEALTH CARE EDUCATION/TRAINING PROGRAM

## 2024-10-14 PROCEDURE — 250N000013 HC RX MED GY IP 250 OP 250 PS 637

## 2024-10-14 PROCEDURE — 250N000013 HC RX MED GY IP 250 OP 250 PS 637: Performed by: PSYCHIATRY & NEUROLOGY

## 2024-10-14 PROCEDURE — 250N000012 HC RX MED GY IP 250 OP 636 PS 637: Performed by: STUDENT IN AN ORGANIZED HEALTH CARE EDUCATION/TRAINING PROGRAM

## 2024-10-14 PROCEDURE — 250N000012 HC RX MED GY IP 250 OP 636 PS 637

## 2024-10-14 PROCEDURE — 97150 GROUP THERAPEUTIC PROCEDURES: CPT | Mod: GO

## 2024-10-14 PROCEDURE — 124N000002 HC R&B MH UMMC

## 2024-10-14 PROCEDURE — 99232 SBSQ HOSP IP/OBS MODERATE 35: CPT | Performed by: STUDENT IN AN ORGANIZED HEALTH CARE EDUCATION/TRAINING PROGRAM

## 2024-10-14 RX ORDER — BUPRENORPHINE AND NALOXONE 4; 1 MG/1; MG/1
1 FILM, SOLUBLE BUCCAL; SUBLINGUAL 2 TIMES DAILY
Status: DISCONTINUED | OUTPATIENT
Start: 2024-10-14 | End: 2024-10-24

## 2024-10-14 RX ADMIN — BUPRENORPHINE AND NALOXONE 1 FILM: 4; 1 FILM, SOLUBLE BUCCAL; SUBLINGUAL at 08:43

## 2024-10-14 RX ADMIN — ASPIRIN 81 MG CHEWABLE TABLET 81 MG: 81 TABLET CHEWABLE at 08:43

## 2024-10-14 RX ADMIN — SENNOSIDES AND DOCUSATE SODIUM 1 TABLET: 8.6; 5 TABLET ORAL at 08:43

## 2024-10-14 RX ADMIN — OLANZAPINE 10 MG: 10 TABLET, ORALLY DISINTEGRATING ORAL at 08:43

## 2024-10-14 RX ADMIN — THERA TABS 1 TABLET: TAB at 08:43

## 2024-10-14 RX ADMIN — SENNOSIDES AND DOCUSATE SODIUM 1 TABLET: 8.6; 5 TABLET ORAL at 19:02

## 2024-10-14 RX ADMIN — OLANZAPINE 20 MG: 20 TABLET, ORALLY DISINTEGRATING ORAL at 19:02

## 2024-10-14 RX ADMIN — POLYETHYLENE GLYCOL 3350 17 G: 17 POWDER, FOR SOLUTION ORAL at 13:18

## 2024-10-14 RX ADMIN — BUPRENORPHINE AND NALOXONE 1 FILM: 4; 1 FILM, SOLUBLE BUCCAL; SUBLINGUAL at 19:02

## 2024-10-14 RX ADMIN — METFORMIN HYDROCHLORIDE 1000 MG: 500 TABLET, FILM COATED ORAL at 08:43

## 2024-10-14 RX ADMIN — TAMSULOSIN HYDROCHLORIDE 0.4 MG: 0.4 CAPSULE ORAL at 08:43

## 2024-10-14 RX ADMIN — NICOTINE POLACRILEX 4 MG: 2 GUM, CHEWING BUCCAL at 13:19

## 2024-10-14 RX ADMIN — NICOTINE POLACRILEX 4 MG: 2 GUM, CHEWING BUCCAL at 21:04

## 2024-10-14 RX ADMIN — NICOTINE 1 PATCH: 21 PATCH, EXTENDED RELEASE TRANSDERMAL at 08:43

## 2024-10-14 RX ADMIN — NICOTINE POLACRILEX 4 MG: 2 GUM, CHEWING BUCCAL at 18:07

## 2024-10-14 RX ADMIN — ARIPIPRAZOLE 5 MG: 5 TABLET ORAL at 19:02

## 2024-10-14 RX ADMIN — ALUMINUM HYDROXIDE, MAGNESIUM HYDROXIDE, AND DIMETHICONE 30 ML: 200; 20; 200 SUSPENSION ORAL at 00:52

## 2024-10-14 RX ADMIN — ACETAMINOPHEN 650 MG: 325 TABLET, FILM COATED ORAL at 00:52

## 2024-10-14 RX ADMIN — ALUMINUM HYDROXIDE, MAGNESIUM HYDROXIDE, AND DIMETHICONE 30 ML: 200; 20; 200 SUSPENSION ORAL at 13:19

## 2024-10-14 RX ADMIN — ACETAMINOPHEN 650 MG: 325 TABLET, FILM COATED ORAL at 22:32

## 2024-10-14 RX ADMIN — NICOTINE POLACRILEX 4 MG: 2 GUM, CHEWING BUCCAL at 08:47

## 2024-10-14 RX ADMIN — METFORMIN HYDROCHLORIDE 1000 MG: 500 TABLET, FILM COATED ORAL at 18:19

## 2024-10-14 ASSESSMENT — ACTIVITIES OF DAILY LIVING (ADL)
ORAL_HYGIENE: INDEPENDENT
ADLS_ACUITY_SCORE: 28
LAUNDRY: WITH SUPERVISION
ADLS_ACUITY_SCORE: 28
DRESS: SCRUBS (BEHAVIORAL HEALTH);INDEPENDENT
ADLS_ACUITY_SCORE: 28
HYGIENE/GROOMING: HANDWASHING;INDEPENDENT
ADLS_ACUITY_SCORE: 28
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
## 2024-10-14 NOTE — CARE PLAN
Rehab Group    Start time: 1415   End time: 1500  Patient time total: 45 minutes    attended full group    #5 attended   Group Type: occupational therapy, general health and coping, and recreation   Group Topic Covered: activity therapy and problem solving     Group Session Detail:  Focus of today's group was on healthy leisure exploration and engagement. Patient actively participated in 2 group games in order to: improve social skills and decrease isolative behaviors, exercise cognitive skills, improve concentration, and encourage new learning and retention.       Patient Response/Contribution:  positive affect, cooperative with task, organized, and socially appropriate       Patient Detail:    Patient presented with a calm, cooperative mood. Patient was willing to learn a new recreational activity - listened actively to instructions of task; asked appropriate clarifying questions. Patient was able to grasp the basics and incorporating strategy and problem-solving into play.  Patient was able to engage independently and remain fully attentive and focused on task until conclusion. Pleasant and polite group attendee. No safety or behavioral concerns noted in group         [id number] OT Group (2 or more in attendance)    Patient Active Problem List   Diagnosis    Suicidal ideation    Psychosis (H)    Acute pulmonary embolism without acute cor pulmonale (H)    Alcohol use disorder, moderate, in sustained remission, dependence (H)    Anxiety    Cannabis use disorder, severe, dependence (H)    Class 1 drug-induced obesity without serious comorbidity with body mass index (BMI) of 33.0 to 33.9 in adult    Depression, major, single episode, moderate (H)    Episodic mood disorder (H)    KATHE (generalized anxiety disorder)    History of marijuana use    Obesity (BMI 30-39.9)    Obesity, Class I, BMI 30-34.9    Tobacco use disorder, moderate, in sustained remission    Schizoaffective disorder, bipolar type (H)    Psychosis,  unspecified psychosis type (H)    Tardive dyskinesia

## 2024-10-14 NOTE — PLAN OF CARE
Team Note Due:  Wednesday     Assessment/Intervention/Current Symtoms and Care Coordination:  Chart review and met with team, discussed pt progress, symptomology, and response to treatment. Discussed the discharge plan and any potential impediments to discharge.    Per team, no outbursts. Pt appears more mellow, less requests, not so tense. Med compliant.     I met with patient he was asking how he will get his belongings from his old group home when he moves. I shared he can ask his mom if she could, or ask [facility] tomorrow during the tour if they can help. He has writing on his sweatshirt with marker. He was calm, cooperative and pleasant.     Discharge Plan or Goal:  Cannot return to  previous  housing due to Suboxone use.   TBD - [facility] is pending. He has a virtual tour on Tuesday 10/15 at 1PM and will most likely be accepted if all goes well.      Barriers to Discharge:  Placement / stabilizing     Referral Status:  [facility] tour Tues 10/15 at 1pm  [facility] - on waitlist (months away)      Suboxone at [facility] Monday 10/28 at 2PM with Janelle Molina DNP  [facility] [address] [place] [id number]    Psychiatry appt: [facility] outpatient Appt: 10/21/24 9:30AM in Person, [address] [place] [address].     Legal Status:  Committed and [place]   County: [place]  File Number: [id number]  Start and expiration date of commitment:   9/30: Submitted [place] Amendment to remove Invega and add Prolixin.  New [place] meds as of 10/1: Zyprexa, Seroquel, Prolixin, Abilify     Contacts (include KELBY status):  Psych: Dr. Khan, [facility] - called from cell phone for an update #[phone] (Eloy signed KELBY 10/1 for medications related info)  Michelle Cortes/Mother: [phone]    -   New CADI CM is: Sonia Burdick Ph: [phone]  Email: [email or web address] (KELBY signed 10/1/24)    New Commitment CM:  Chasity [email or web address] [phone]  - No KELBY needed.    Upcoming Meetings and Dates/Important Information and next steps:  Update discharge referral form at discharge   PD and COS at discharge

## 2024-10-14 NOTE — PLAN OF CARE
"Problem: Behavioral Disturbance  Goal: Behavioral Disturbance  Description: Signs and symptoms of listed problems will be absent or manageable by discharge or transition of care.  Outcome: Progressing   Goal Outcome Evaluation:  Patient is alert and oriented x3, calm and compliant with vitals check and medication administration. He was visible in the lounge area, attended group and spent time pacing in the hallway. His affect this morning this flat and mood is calm. He did not complain of pain, and he denies all psych symptoms at this time. Patient is eating and drinking without issues. Patient is looking forward to discharge. Patient told writer that he is urinating without issus and last bm was yesterday. He complained of indigestion and requested prn maalox. No medication side effect was reported or stated.    Prn given  Maalox   Nicotine gum 4 mg     /64 (BP Location: Left arm, Patient Position: Sitting, Cuff Size: Adult Regular)   Pulse 73   Temp 97.6  F (36.4  C) (Temporal)   Resp 18   Ht 1.854 m (6' 1\")   Wt 103.1 kg (227 lb 6.4 oz)   SpO2 99%   BMI 30.00 kg/m     "

## 2024-10-14 NOTE — CARE PLAN
Rehab Group    Start time: 1030  End time: 1200  Patient time total: 90 minutes    attended full group    #5 attended   Group Type: OT Clinic   Group Topic Covered: activity therapy, coping skills, and problem solving     Group Session Detail:  OT clinic group provides an opportunity for individual-based goal directed activity within a group setting - allowing for interaction and socialization.  Hands-on task work help to build/restore/or maintain skills such as: focus, concentration, decision making, and problem solving.  Patients are encouraged to carry over potential new interests/hobbies learned in group following discharge.     Patient Response/Contribution:  positive affect, cooperative with task, and actively engaged     Patient Detail:    Looking forward to interview tomorrow, though seemed indifferent about long hospital stay.  Continues to enjoy activity based groups and listening to music.  Fairly independent with problem solving craft related tasks, discusses new ideas and plan, though open to writers suggestion regarding tools/supplies that would work well for task.      [id number] OT Group (2 or more in attendance)    Patient Active Problem List   Diagnosis    Suicidal ideation    Psychosis (H)    Acute pulmonary embolism without acute cor pulmonale (H)    Alcohol use disorder, moderate, in sustained remission, dependence (H)    Anxiety    Cannabis use disorder, severe, dependence (H)    Class 1 drug-induced obesity without serious comorbidity with body mass index (BMI) of 33.0 to 33.9 in adult    Depression, major, single episode, moderate (H)    Episodic mood disorder (H)    KATHE (generalized anxiety disorder)    History of marijuana use    Obesity (BMI 30-39.9)    Obesity, Class I, BMI 30-34.9    Tobacco use disorder, moderate, in sustained remission    Schizoaffective disorder, bipolar type (H)    Psychosis, unspecified psychosis type (H)    Tardive dyskinesia
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
## 2024-10-14 NOTE — PLAN OF CARE
BEH IP Unit Acuity Rating Score (UARS)  Patient is given one point for every criteria they meet.    CRITERIA SCORING   On a 72 hour hold, court hold, committed, stay of commitment, or revocation. 1    Patient LOS on BEH unit exceeds 20 days. 1 LOS: 41   Patient under guardianship, 55+, otherwise medically complex, or under age 11. 0   Suicide ideation without relief of precipitating factors. 0   Current plan for suicide. 0   Current plan for homicide. 0   Imminent risk or actual attempt to seriously harm another without relief of factors precipitating the attempt. 0   Severe dysfunction in daily living (ex: complete neglect for self care, extreme disruption in vegetative function, extreme deterioration in social interactions). 1   Recent (last 7 days) or current physical aggression in the ED or on unit. 0   Restraints or seclusion episode in past 72 hours. 0   Recent (last 7 days) or current verbal aggression, agitation, yelling, etc., while in the ED or unit. 0   Active psychosis. 0   Need for constant or near constant redirection (from leaving, from others, etc).  0   Intrusive or disruptive behaviors. 0   Patient requires 3 or more hours of individualized nursing care per 8-hour shift (i.e. for ADLs, meds, therapeutic interventions). 0   TOTAL 3

## 2024-10-15 PROCEDURE — 250N000013 HC RX MED GY IP 250 OP 250 PS 637: Performed by: STUDENT IN AN ORGANIZED HEALTH CARE EDUCATION/TRAINING PROGRAM

## 2024-10-15 PROCEDURE — 99232 SBSQ HOSP IP/OBS MODERATE 35: CPT | Performed by: STUDENT IN AN ORGANIZED HEALTH CARE EDUCATION/TRAINING PROGRAM

## 2024-10-15 PROCEDURE — 250N000013 HC RX MED GY IP 250 OP 250 PS 637

## 2024-10-15 PROCEDURE — 124N000002 HC R&B MH UMMC

## 2024-10-15 PROCEDURE — 97150 GROUP THERAPEUTIC PROCEDURES: CPT | Mod: GO

## 2024-10-15 PROCEDURE — 250N000012 HC RX MED GY IP 250 OP 636 PS 637: Performed by: STUDENT IN AN ORGANIZED HEALTH CARE EDUCATION/TRAINING PROGRAM

## 2024-10-15 PROCEDURE — 250N000013 HC RX MED GY IP 250 OP 250 PS 637: Performed by: PSYCHIATRY & NEUROLOGY

## 2024-10-15 RX ORDER — PREGABALIN 100 MG/1
200 CAPSULE ORAL 2 TIMES DAILY PRN
Status: DISCONTINUED | OUTPATIENT
Start: 2024-10-15 | End: 2024-10-16

## 2024-10-15 RX ADMIN — OLANZAPINE 20 MG: 20 TABLET, ORALLY DISINTEGRATING ORAL at 19:28

## 2024-10-15 RX ADMIN — NICOTINE POLACRILEX 4 MG: 2 GUM, CHEWING BUCCAL at 12:27

## 2024-10-15 RX ADMIN — METFORMIN HYDROCHLORIDE 1000 MG: 500 TABLET, FILM COATED ORAL at 08:36

## 2024-10-15 RX ADMIN — SENNOSIDES AND DOCUSATE SODIUM 1 TABLET: 8.6; 5 TABLET ORAL at 19:27

## 2024-10-15 RX ADMIN — OLANZAPINE 10 MG: 10 TABLET, ORALLY DISINTEGRATING ORAL at 08:36

## 2024-10-15 RX ADMIN — PREGABALIN 200 MG: 100 CAPSULE ORAL at 22:08

## 2024-10-15 RX ADMIN — METFORMIN HYDROCHLORIDE 1000 MG: 500 TABLET, FILM COATED ORAL at 18:24

## 2024-10-15 RX ADMIN — BUPRENORPHINE AND NALOXONE 1 FILM: 4; 1 FILM, SOLUBLE BUCCAL; SUBLINGUAL at 19:28

## 2024-10-15 RX ADMIN — ASPIRIN 81 MG CHEWABLE TABLET 81 MG: 81 TABLET CHEWABLE at 08:36

## 2024-10-15 RX ADMIN — ARIPIPRAZOLE 5 MG: 5 TABLET ORAL at 19:27

## 2024-10-15 RX ADMIN — BUPRENORPHINE AND NALOXONE 1 FILM: 4; 1 FILM, SOLUBLE BUCCAL; SUBLINGUAL at 08:36

## 2024-10-15 RX ADMIN — NICOTINE POLACRILEX 4 MG: 2 GUM, CHEWING BUCCAL at 11:19

## 2024-10-15 RX ADMIN — NICOTINE POLACRILEX 4 MG: 2 GUM, CHEWING BUCCAL at 22:10

## 2024-10-15 RX ADMIN — NICOTINE 1 PATCH: 21 PATCH, EXTENDED RELEASE TRANSDERMAL at 08:36

## 2024-10-15 RX ADMIN — POLYETHYLENE GLYCOL 3350 17 G: 17 POWDER, FOR SOLUTION ORAL at 08:36

## 2024-10-15 RX ADMIN — TAMSULOSIN HYDROCHLORIDE 0.4 MG: 0.4 CAPSULE ORAL at 08:36

## 2024-10-15 RX ADMIN — THERA TABS 1 TABLET: TAB at 08:36

## 2024-10-15 RX ADMIN — SENNOSIDES AND DOCUSATE SODIUM 1 TABLET: 8.6; 5 TABLET ORAL at 08:36

## 2024-10-15 RX ADMIN — PREGABALIN 200 MG: 100 CAPSULE ORAL at 11:58

## 2024-10-15 ASSESSMENT — ACTIVITIES OF DAILY LIVING (ADL)
ADLS_ACUITY_SCORE: 28

## 2024-10-15 NOTE — PLAN OF CARE
"Team Note Due:  Wednesday     Assessment/Intervention/Current Symtoms and Care Coordination:  Chart review and met with team, discussed pt progress, symptomology, and response to treatment. Discussed the discharge plan and any potential impediments to discharge.    Per team, pt denies having any mental health symptoms, he is eagerly awaiting his [facility] tour today at 1 PM.     I met with Pt he said he was doing well, I shared I will set up the laptop for him at 1pm today.     Eloy had 1PM [facility] tour. I went to bring him the laptop and he was on the phone with someone in the villarreal, appeared irritated and said \"did you talk to my  yet\". He then ended call abruptly to join his zoom call on the laptop. Pt was observed 3x \"browsing\" the computer while on his zoom call. I asked him to stop. After the call I checked the browser and it showed \"streaming music\". I reiterated when he is on a meeting call he cannot be browsing other things. He said ok. I asked how the meeting went and he said they want to 'take all of his money' and he would only have $150 left and he doesn't want to go there. I shared that his meals would be included and he said \"yeah but I have other things to buy\". He said he wants to go off Suboxone and go back to his old group home. I shared that we can check with provider tomorrow how the process would work to titrate off that. I also shared without Suboxone he may have high cravings and higher risk of relapse. Pt avoided eye contact, had red/noel cheeks, appears irritable.     [facility] , Rekha Little - Ph: [phone] called for an update stating they weren't notified that he was hospitalized. I shared that he didn't want to go to [facility] but tomorrow I will speak with provider as he doesn't have many options and is ready for discharge, He wants to explore his group home, otherwise consider: crisis.     Discharge Plan or Goal:  Cannot return to  previous [facility] " housing due to Suboxone use.   TBD - [facility] is pending. He has a virtual tour on Tuesday 10/15 at 1PM and will most likely be accepted if all goes well.   10/15: Eloy wants to return to his old group home - will have to ask them again if he is able to return once off Suboxone.      Barriers to Discharge:  Placement / stabilizing     Referral Status:  [facility] tour Tues 10/15 at 1pm  [facility] - on waitlist (months away)      [facility]one at [facility] Monday 10/28 at 2PM with Janelle Molina DNP  [facility] [address] [address]    Psychiatry appt: [facility] outpatient Appt: 10/21/24 9:30AM in Person, [address] [facility].     Legal Status:  Committed and [place]   County: [place]  File Number: [id number]  Start and expiration date of commitment:   9/30: Submitted [place] Amendment to remove Invega and add Prolixin.  New [place] meds as of 10/1: Zyprexa, Seroquel, Prolixin, Abilify     Contacts (include KELBY status):  Psych: Dr. Khan, [facility] - called from cell phone for an update #[phone] (Eloy signed KELBY 10/1 for medications related info)  Michelle Cortes/Mother: [phone]    -   New CADI CM is: Sonia Burdick Ph: [phone]  Email: [email or web address] (KELBY signed 10/1/24)    New Commitment CM:  Chasity [email or web address] [phone] - No KELBY needed.    Upcoming Meetings and Dates/Important Information and next steps:  Update discharge referral form at discharge   PD and COS at discharge

## 2024-10-15 NOTE — PLAN OF CARE
Initial meeting note:    Therapist introduced self to patient and discussed psychotherapy service available to patient.     Pt response: Pt not interested currently in meeting 1:1; therapist will continue remaining available for pt     Plan: Pt was encouraged to attend groups and therapist will remain available for 1:1 sessions

## 2024-10-15 NOTE — PLAN OF CARE
Rehab Group    Start time: 1030  End time: 1200  Patient time total: 80 minutes    attended partial group    #6 attended   Group Type: OT Clinic   Group Topic Covered: coping skills     Group Session Detail:    Intervention: Pt participated in a OT Clinic group to facilitate coping skills exploration and creative expression through personally meaningful activities, and to encourage utilization of these healthy coping skills to promote overall health and wellness. Group included clinical observation of social, cognitive and kinesthetic performance skills to inform treatment and safe discharge planning.    Mood/Affect: Pleasant       Plan: Patient encouraged to maintain attendance for continued ongoing support in working towards occupational therapy goals to support overall treatment/care.        Patient Detail:    Joined right at start of group, motivated to continue working on a project started in a previous group. Requested writer's attention and assistance multiple times while writer was getting other peers setup with projects. Was however easily redirectable to waiting until writer was available to assist. Was mostly ind after brief assistance from writer. Worked mostly while keeping to self, other than brief conversations with writer about supplies. Did offer another peer a compliment on their project       [id number] OT Group (2 or more in attendance)    Patient Active Problem List   Diagnosis    Suicidal ideation    Psychosis (H)    Acute pulmonary embolism without acute cor pulmonale (H)    Alcohol use disorder, moderate, in sustained remission, dependence (H)    Anxiety    Cannabis use disorder, severe, dependence (H)    Class 1 drug-induced obesity without serious comorbidity with body mass index (BMI) of 33.0 to 33.9 in adult    Depression, major, single episode, moderate (H)    Episodic mood disorder (H)    KATHE (generalized anxiety disorder)    History of marijuana use    Obesity (BMI 30-39.9)    Obesity,  Class I, BMI 30-34.9    Tobacco use disorder, moderate, in sustained remission    Schizoaffective disorder, bipolar type (H)    Psychosis, unspecified psychosis type (H)    Tardive dyskinesia

## 2024-10-15 NOTE — PLAN OF CARE
"Problem: Adult Inpatient Plan of Care  Goal: Readiness for Transition of Care  Outcome: Progressing  Patient is alert and oriented, calm and compliant with vitals check and medication administration. He was visible in the lounge area, attended group and was observed pacing in the hallway. His affect is flat and mood is calm. He complained of being board and hope discharge will happen soon. He did not complain of urine retention and he told writer that he had a bowel movement last night. Patient is eating and drinking without issues and he did not complain of pain.    Patient had a virtual tour for a new group home (see 's note)       /73 (Patient Position: Sitting, Cuff Size: Adult Regular)   Pulse 73   Temp 97.4  F (36.3  C) (Temporal)   Resp 18   Ht 1.854 m (6' 1\")   Wt 103.1 kg (227 lb 6.4 oz)   SpO2 97%   BMI 30.00 kg/m           "

## 2024-10-15 NOTE — PLAN OF CARE
"RN Assessment  The patient had an uneventful shift overall and continues to endorse boredom as the main concern. Mood  was reported to be \"alright.\" Denies depressive thoughts and feelings, anxiety, and any other psychiatric concerns, including hallucinations, delusions, paranoia, and referential thinking. Denies safety concerns including SI/HI/SIB. Looking forward to an [employer] tour tomorrow via virtual means and is hopeful for discharge soon.   The patient has accepted all scheduled medications and denies any side effects. Still expresses a desire to restart an SSRI, citing OCD symptoms including intrusive thoughts and compulsive behaviors. However, stated he would not want to elaborate on these symptoms again as these have already been discussed with his IP provider.  In terms of physical health complaints, the patient denies any urinary symptoms, including retention, hesitancy, or urgency. The patient also denies constipation.      " Audiology referral signed.
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
## 2024-10-15 NOTE — PROGRESS NOTES
"  -------------------------------------------------------------------------------------  [facility], [place]   Psychiatric Progress Note  Hospital Day #42  Date of Service: 10/15/2024    Interval History:  The patient's care was discussed with the treatment team and chart notes were reviewed.    Sleep: 5 hours (10/15/24 0600)  PRN medications:   Last 24H PRN:     acetaminophen (TYLENOL) tablet 650 mg, 650 mg at 10/14/24 2232    alum & mag hydroxide-simethicone (MAALOX) suspension 30 mL, 30 mL at 10/14/24 1319    nicotine (COMMIT) lozenge 4 mg, 4 mg at 10/11/24 1618 **OR** nicotine polacrilex (NICORETTE) gum 4 mg, 4 mg at 10/14/24 0439  Staff Report:   No acute safety concerns. See staff notes for additional details.     Patient Interview:   Today, Eloy says he is doing \"pretty good\". He says he has some back pain that feels like tense muscles. He thinks it may be due to sleeping in an odd position and not being very physically active while in the hospital. He has taken pregabalin in the past for this kind of pain and it was helpful. He doesn't think that gabapentin is helpful. He is tolerating the suboxone well but doesn't think it helps with pain. No other symptoms of concern. He denies SI/HI/AH/VH/paranoia. He is looking forward to getting a tour of a group home today.     Physical Examination:  /73 (Patient Position: Sitting, Cuff Size: Adult Regular)   Pulse 73   Temp 97.4  F (36.3  C) (Temporal)   Resp 18   Ht 1.854 m (6' 1\")   Wt 103.1 kg (227 lb 6.4 oz)   SpO2 97%   BMI 30.00 kg/m    Weight is 227 lbs 6.4 oz  Body mass index is 30 kg/m .    Mental Status Exam:  Oriented to:  Grossly Oriented, alert  General:  Awake and Alert  Appearance:  appears stated age, hair unkempt; unit sweatshirt with some drawings on it but fewer than previously  Behavior/Attitude: engaged on topics of personal interest  Eye Contact: improved  Psychomotor: No evidence of tics, dystonia, or " "tardive dyskinesia, no catatonia present  Speech: normal volume/tone, spontaneous, good articulation   Language: Fluent in English with appropriate syntax and vocabulary; normal rate  Mood: \"good\"   Affect:  blunted  Thought Process:  Generally linear and goal oriented, coherent  Thought Content: No noted AH/VH/SI/HI or other concerns at present; self-report of obsessions/compulsions related to organizing  Associations:  Generally intact  Insight:  fair  Judgment: fair  Impulse control: fair, improved  Attention Span:  adequate  Concentration:  grossly intact  Recent and Remote Memory:  not formally assessed  Fund of Knowledge: average  Muscle Strength and Tone: normal  Gait and Station: Normal    Liver/kidney function Metabolic CBC   Recent Labs   Lab Test 09/29/24  1350 09/11/24  1743   CR 0.73 0.93   AST 34 38   ALT 28 35   ALKPHOS 93 101    Recent Labs   Lab Test 09/05/24  0927 09/05/24  0926 09/16/20  0835 10/31/18  0745   CHOL 110  --    < > 115   TRIG 226*  --    < > 82   LDL 39  --    < > 61   HDL 26*  --    < > 38*   A1C  --  4.8   < >  --    TSH  --   --   --  1.44    < > = values in this interval not displayed.    Recent Labs   Lab Test 09/29/24  1350   WBC 8.0   HGB 14.2   HCT 40.9   MCV 90             Lab results in the last 24 hours:  No results found for this or any previous visit (from the past 24 hour(s)).      Assessment:  Diagnoses:  Provisional diagnosis of Bipolar Disorder, Type I, currently mixed manic episode vs Schizoaffective Disorder, Bipolar Type  Opioid Use Disorder, severe, dependence  Alcohol Use Disorder, moderate, in early remission  Sedative hypnotic use disorder, in early remission  Cannabis Use Disorder, severe  KATHE  Hx of pulmonary embolism  Tardive Dyskinesia    Eloy is a 24 year old male previously diagnosed with schizoaffective disorder, polysubstance use, and KATHE, currently admitted on hospital day 42 for presumed mixed mood episode. He presented to the ED in [facility] " "by police after being found to be driving erratically up to 100 mph and allegedly was driving into oncoming traffic. There was concern for co-occurring substance use and pt endorsed withdrawal sxs in the ED from recent Kratom use.      Most recent psychiatric hospitalization was Oct 2021 at [facility]. Pt has not had CD treatment for several years and has been living in a .      Significant symptoms on admission include passive SI, conflicting sxs of \"improved\" mood and \"normal energy levels\" although he \"never sleeps well\". He also has erratic behavior documented in his chart with increased paranoia regarding GH and his mother and was noted to be writing on the walls in the ED. The MSE on admission was pertinent for poor insight and judgment regarding his mental health and recent erratic driving. He also presented with labile affect, restricted with negative sxs, and irritability. He seemed suspicious of the treating team. Biological contributions to presentation include previous diagnoses of JACOB and schizoaffective disorder. Psychological contributions to presentation include poor insight and limited coping/poor stress tolerance as evidenced in interview. Social factors contributing to presentation include isolating himself from family over past couple months although his mother is still involved in his care. Has strained relationship with family. Worked briefly this summer but has been recently off. Protective factors include mother and step sister,  system, .      The patient's reported symptoms of erratic behavior, passive SI, poor insight with lability and irritability, increased paranoia over past several months in the context of substance use (kratom and cannabis) are consistent with polysubstance use disorder and co-occurring mixed mood and psychotic episode of schizoaffective disorder in conjunction with behavioral components. The substance use is likely triggering underlying schizoaffective disorder given " long hx of psychosis. He has had repeated targeted aggressive statements towards staff and peers which increased as medication titrations have also increased. This is not common in pure psychosis and indicates a probable behavioral component along with a diagnosis of primary psychosis. Patient's definitive diagnosis is still in evolution and will require further observation and assessment. Pt does not exhibit clear manic sx at this time nor does he exhibit clear OCD sx although these have been documented in his chart and will require further assessment outpt. Given the risk of jamila with fluvoxamine and continued agitated behavior, fluvoxamine was discontinued on 9/11. He will likely benefit from medication optimization and CD referral if open to this during this admission. MICD commitment was attempted this hospital stay. However, pre-petition screen deemed that there was not enough information to support it in the records and patient is currently not interested in CD treatment/wishes to return to using.     Today, Eloy says he is doing well. He has back pain and feels that this will improve with pregabalin, which was helpful for him in the past. I am concerned for muscle tension secondary to olanzapine and will continue to monitor. For today we will add a PRN dose of pregabalin 200mg BID PRN.    Plan:  # Mixed mood episode  #Schizoaffective disorder   - olanzapine 10mg QAM + 20mg QHS with IM olanzapine 5mg/10mg back up if he refuses oral under [place]   - aripiprazole 5mg at bedtime    # Polysubstance use disorder  #OUD  - Suboxone 4-1 mg BID  - PRN bladder scans if reporting difficulty with urination     # OCD by report  - Continue to monitor. Fluvoxamine 25mg daily discontinued in setting of patient declining Depakote and concerns for re-emerging manic sx without mood stabilizer prescribed    Patient will be treated in therapeutic milieu with appropriate individual and group therapies as described.    Clinically  "Significant Risk Factors        # Financial/Environmental Concerns:    # Support System: poor social support noted in nursing assessment      Medical diagnoses to be addressed this admission:    # Back pain, likely musculoskeletal  Pregabalin 200mg BID PRN for pain per patient preference, continue to monitor    #Elevated prolactin   Had elevated prolactin on Risperdal a few months ago per Dr. Khan. Since starting Seroquel, was not able to recheck prolactin over recent months since stopping Risperdal. Pt has continued on Seroquel during this hospitalization with decreased dose on 9/12. Prolactin level obtained on 9/11 which was 16 and borderline high.   16 on 9/11/24; 32 on 9/29/24     #Hx of TD  #BUE tremor (hands) - mild  Outpatient provider Dr. Khan is concerned for TD with CALLAHAN. Pt had TD while on Risperdal a few months ago. Less risk while on Zyprexa this hospitalization but will continue to monitor and decrease Seroquel on 9/12 as we increase Zyprexa dose for psychiatric emergency.   - Continue to monitor for recurrence of TD and tremor  - No UE tremor noted on later assessments    Prior blood clot in leg  Intake labs showing mildly low hematocrit with normal hgb, repeat cbc on 9/11 showed mild improvement  - PTA baby aspirin continued      Suspected neuroleptic induced weight gain  - Metformin 1,000 mg BID  - monitor weight  - Nutrition was consulted     Urinary Retention  Pt noted urinary retention sxs on 9/8 and medicine was consulted. Noted to have 1090cc in bladder at that time. Pt said he is unable to void. Requested a diuretic and feels that drinking more water will help, but medicine explained to patient these will only exacerbate bladder distention.      Per medicine: \"Review of chart shows he has followed w/ Urology in the past, but mostly for STI-related issues. No documented hx of urinary retention, but pt notes frequently occurs when he withdrawing from Kratom (which he feels he is at the " "moment, was using PTA). At this time, certainly could be withdrawal-related, but he is also on multiple anticholinergic meds, so his burden there is high which could be also playing a role. Low suspicion of primary neurogenic cause (cauda equina) as denies back pain, bowel movements otherwise unaffected (he feels his constipation is unrelated as this has been an issue in the past), and no BLE symptoms\".      Medicine strongly recommended a straight cath by medicine on 9/9, but pt declined multiple times despite education on risks of bladder distention/urinary retention. Encouraged him to continue to attempt to volitionally void. medicine signed off on 9/9 due to patient voiding without worsening sxs, will CTM.      Per Pharmacy consult re urinary retention on 9/8:  \"High doses of kratom can have an opioid-like effect and can also result in urinary retention, which patient reports experiencing in the past.  Suspect current symptoms most likely due to recent kratom use.  Suboxone may also be contributing since that was started 9/5/2024.  The only other recent medication change was addition of fluvoxamine on 8/29 at a low dose, so wouldn't expect that to be the primary cause. Quetiapine can also contribute to urinary retention, but patient has been on this high dose for several months, so not likely the cause. If due to kratom, would expect symptoms to start improving within 7 days of discontinuation.\" Given pt has been hospitalized for over a week now, Kratom induced causes are less likely.      On 9/11, Eloy agreed to bladder scan after endorsing continued sxs while being prescribe Flomax which was started on 9/10, and was noted by staff to have 1604 ml of urine. Staff notified medicine on call or recommended consult Urology. Urology consult placed and recommended labs and straight cath or hardy with monitoring electrolytes, kidney function, and UA. Pt refused all interventions at first, but later gave urine sample " and labs. Cr was reassuring wnl, and UA showed elevated nitrites but no WBCs. Of note, pt did say he urinated as well in the evening of 9/11 after last scan which was also reassuring. Bladder scan this AM was just over 100mls indicating that patient is voiding at this time.   Ethics consult placed 9/12 for question of forced catheterization if needed, pt deemed to not have capacity to consent to urinary straight cath if medically necessary at this time. See progress note from 9/12 for full capacity assessment.   Eloy was asked to complete bladder scans once per shift before getting scheduled Suboxone and showed volumes consistently below 500ml. Thus 9/16, bladder scans made prn.      Plan:  - Notify if fevers, worsening abdominal pain, flank pain  - notify medicine and urology if sxs worsen  - Pt does note a few days of constipation which can exacerbate urinary retention              - Scheduled Miralax daily + Senokot BID for now (hold for loose stools)  -Scan only needed if pt endorses urinary retention and trouble urinating  - parameters for straight cath in place (ok to use hardy catheter for comfort) as needed for high volume as outlined by Urology, see urology note 9/11   - urine cx no growth   - continue to monitor his symptoms and offer less invasive measures first. Currently, patient is voiding and not needing an urgent cath.      Hand pain and swelling   s/p punching mirror in room, per patient on night of 9/10 Staff noted full ROM however. On call doctor notified who placed hand XR  - Hand XR 9/10 showing tissue swelling and NO displacement or fracture per radiologist read  -prns for pain and swelling     Hospital course:  Eloy Storm was admitted to [facility] [address] a [address] hold on 9/3/2024 after presenting to the ED on 8/28/24.   Eloy Storm was admitted to [facility] on [address] hold.   Medications:  PTA fluvoxamine 50mg, olanzapine 5mg, quetiapine ER 800mg were continued.   PTA fluoxetine was held  "due to pt already having been tapered off of it.    New medications started at the time of admission include Suboxone started in the ED.   On 9/5, increased Suboxone to 2 mg BID to target ongoing cravings  On 9/11, stopped fluvoxamine due to concern for jamila and aggravating underlying psychosis. Also stopped prn trazodone for sleep and scheduled Suboxone due to severe urinary retention seen on bladder scan.    On 9/12, restarted Suboxone 2-0.5mg film bid with understanding that patient must get bladder scans before and after otherwise it would be held. This catie be to prevent risk of urinary rentention worsening without adequate monitoring . Holding parameters also outlined for if urine volume on scan is greater than 500ml. A psychiatric emergency was declared as patient had made repeated verbally aggressive and threatening statements to hit staff and said \"I will kill you\" to another patient, and also aggressively took down ceiling tiles on the night of 9/11. In lieu of the psychiatric emergency, quetiapine was decreased to 400mg daily from 800mg as we started him on scheduled olanzapine Zydis 10mg BID PO with IM olanzapine 10mg back up if he refuses oral. Stopped olanzapine 5mg at bedtime. Ethics consult was also placed on 9/12 to discuss if team can force catheterize patient. Concluded that patient must have a capacity assessment and least restrictive means with bargaining sought first. See ethics notes from 9/12. Capacity assessment completed on 9/12 indicating pt does not have capacity to consent to urinary straight cath if medically needed at this time due to severity of mental illness impacting judgement. See note from 9/12 with full capacity assessment.   9/13, stopped Depakote as pt was refusing and cannot enforce under psychiatric emergency. Pt concerned about weight gain. Stopped lab trough level on Monday as pt no longer taking Depakote.   9/16 restarted Depakote 1000mg at bedtime for mood stability. " Prior improvement in patient judgement, behavior likely due to mood stabilizer. Discontinued bladder scans per shift to as needed due to adequate voiding. Discontinued SIO due to continued safe behaviors.  9/18: olanzapine consolidated to 5mg QAM + 15mg QHS to address feeling of daytime sedation   9/19: Mild tremor in BUE (L>R) that varies in magnitude on assessment. Possible that it could be related to Depakote and will continue to monitor for changes going forward.  9/23: Start fluvoxamine 25mg daily per patient request given adequate dose of Depakote  9/25: Discontinue Depakote and fluvoxamine and stating he no longer will take Depakote.  9/26: Reduce quetiapine XR to 200mg due to reported feelings of sedation and little apparent benefit during this hospitalization  9/30: Marked increase in irritability and behavioral concerns (threatening other patients and staff) concerning for deterioration of symptoms since declining Depakote. Increase olanzapine to 10mg QAM + 20mg QHS and will file a [facility] amendment to remove paliperidone (due to history of hyperprolactinemia on risperidone) and add fluphenazine. Elected not to add haloperidol due to reported hives when taking the medication previously while at a hospital in [place].  10/3: Discontinue quetiapine. Start aripiprazole 5mg daily given prolactin elevation from add-on lab ordered 10/2. PharmD met with Eloy to address questions and concerns about AP medications.  10/7: increase Suboxone to 4mg QAM and increase metformin to 500mg QAM + 1000mg Qevening. aripiprazole switched to at bedtime  10/10: Increased metformin to 1000 mg BID to prevent neuroleptic induced wt gain. Prolactin level again normal.    Legal Status:   Orders Placed This Encounter      Legal status Patient is Committed  [place] meds as of 10/1: Olanzapine, quetiapine, fluphenazine, aripiprazole     Disposition: TBD, pending stabilization & development of a safe discharge plan.     Cathy JENKINS  MD Mehul, PhD  10/15/2024      Cardiometabolic risk assessment. 10/15/2024    Reviewed patient profile for cardiometabolic risk factors    Date taken / Value  REFERENCE RANGE   Abdominal Obesity  (Waist Circumference)   See nursing flowsheet Women ?35 in (88 cm)   Men ?40 in (102 cm)      Triglycerides  Triglycerides   Date Value Ref Range Status   09/05/2024 226 (H) <150 mg/dL Final   10/31/2018 82 <90 mg/dL Final        HDL cholesterol  HDL Cholesterol   Date Value Ref Range Status   10/31/2018 38 (L) >45 mg/dL Final     Comment:     Low:             <40 mg/dl  Borderline low:   40-45 mg/dl       Direct Measure HDL   Date Value Ref Range Status   09/05/2024 26 (L) >=40 mg/dL Final      Fasting plasma glucose (FPG) Lab Results   Component Value Date     09/29/2024    GLC 88 11/08/2018        Blood pressure  Blood Pressure  10/15/24 : 113/73  09/03/24 : 106/66  03/26/21 : 119/56   Blood pressure ?130/85 mmHg or treatment for elevated blood pressure   Family History  See family history       Safety Assessment:   Behavioral Orders   Procedures    Assault precautions    Cheeking Precautions (behavioral units)     Patient Observed swallowing PO medications; Patient asked to drink water after swallowing medication; Patient in Staff line of sight for 15 minutes after medication given; Mouth checks after PO administration (patient asked to open mouth and stick out their tongue).    Code 1 - Restrict to Unit    Routine Programming     As clinically indicated    Status 15     Every 15 minutes.    Suicide precautions: Suicide Risk: MODERATE; Clinical rationale to override score: Exhibiting Suicidal/self-harm behaviors or thoughts     Patients on Suicide Precautions should have a Combination Diet ordered that includes a Diet selection(s) AND a Behavioral Tray selection for Safe Tray - with utensils, or Safe Tray - NO utensils       Order Specific Question:   Suicide Risk     Answer:   MODERATE     Order Specific  Question:   Clinical rationale to override score:     Answer:   Exhibiting Suicidal/self-harm behaviors or thoughts       Current Facility-Administered Medications   Medication Dose Route Frequency Provider Last Rate Last Admin    ARIPiprazole (ABILIFY) tablet 5 mg  5 mg Oral Daily Foster Batista MD   5 mg at 10/14/24 1902    aspirin (ASA) chewable tablet 81 mg  81 mg Oral Daily Berkley Arreola MD   81 mg at 10/15/24 0836    buprenorphine HCl-naloxone HCl (SUBOXONE) 4-1 MG per film 1 Film  1 Film Sublingual BID Cathy Carver MD   1 Film at 10/15/24 0836    metFORMIN (GLUCOPHAGE) tablet 1,000 mg  1,000 mg Oral Daily with breakfast Nan Loya MD   1,000 mg at 10/15/24 0836    metFORMIN (GLUCOPHAGE) tablet 1,000 mg  1,000 mg Oral Daily with supper Foster Batista MD   1,000 mg at 10/14/24 1819    multivitamin, therapeutic (THERA-VIT) tablet 1 tablet  1 tablet Oral Daily Berkley Arreola MD   1 tablet at 10/15/24 0836    nicotine (NICODERM CQ) 21 MG/24HR 24 hr patch 1 patch  1 patch Transdermal Daily Luh Tsai MD   1 patch at 10/15/24 0836    OLANZapine zydis (zyPREXA) ODT tab 20 mg  20 mg Oral At Bedtime Foster Batista MD   20 mg at 10/14/24 1902    Or    OLANZapine (zyPREXA) injection 10 mg  10 mg Intramuscular At Bedtime Foster Batista MD        OLANZapine zydis (zyPREXA) ODT tab 10 mg  10 mg Oral QAM Foster Batista MD   10 mg at 10/15/24 0836    Or    OLANZapine (zyPREXA) injection 5 mg  5 mg Intramuscular QAM Foster Batista MD        polyethylene glycol (MIRALAX) Packet 17 g  17 g Oral Daily Bo Valle PA-C   17 g at 10/15/24 0836    senna-docusate (SENOKOT-S/PERICOLACE) 8.6-50 MG per tablet 1 tablet  1 tablet Oral BID Bo Valle PA-C   1 tablet at 10/15/24 0836    tamsulosin (FLOMAX) capsule 0.4 mg  0.4 mg Oral Daily Berkley Arreola MD   0.4 mg at 10/15/24 0836     Current Facility-Administered Medications   Medication Dose Route Frequency Provider Last Rate Last Admin     acetaminophen (TYLENOL) tablet 650 mg  650 mg Oral Q4H PRN Neto Bolanos MD   650 mg at 10/14/24 2232    alum & mag hydroxide-simethicone (MAALOX) suspension 30 mL  30 mL Oral Q4H PRN Neto Bolanos MD   30 mL at 10/14/24 1319    naloxone (NARCAN) injection 0.2 mg  0.2 mg Intravenous Q2 Min PRN Nan Loya MD        Or    naloxone (NARCAN) injection 0.4 mg  0.4 mg Intravenous Q2 Min PRN Nan Loya MD        Or    naloxone (NARCAN) injection 0.2 mg  0.2 mg Intramuscular Q2 Min PRN Nan Loya MD        Or    naloxone (NARCAN) injection 0.4 mg  0.4 mg Intramuscular Q2 Min PRN Nan Loya MD        nicotine (COMMIT) lozenge 4 mg  4 mg Buccal Q1H PRN Foster Batista MD   4 mg at 10/11/24 1618    Or    nicotine polacrilex (NICORETTE) gum 4 mg  4 mg Oral Q1H PRN Foster Batista MD   4 mg at 10/14/24 2104    OLANZapine (zyPREXA) tablet 10 mg  10 mg Oral TID PRN Berkley Arreola MD   10 mg at 10/07/24 1923    Or    OLANZapine (zyPREXA) injection 10 mg  10 mg Intramuscular TID PRN Berkley Arreola MD   10 mg at 09/12/24 0121    polyethylene glycol (MIRALAX) Packet 17 g  17 g Oral BID PRN Cathy Carver MD   17 g at 09/27/24 1458    QUEtiapine (SEROquel) tablet 25 mg  25 mg Oral Q6H PRN Neto Bolanos MD   25 mg at 10/13/24 5885       Allergies   Allergen Reactions    Cefuroxime Unknown     PN: LW Reaction: Rash, Generalized    Other reaction(s): Unknown   PN: LW Reaction: Rash, Generalized   PN: LW Reaction: Rash, Generalized    No Clinical Screening - See Comments Other (See Comments)     Patient had a reaction to some medication when he went to the dentist as a toddler    Other Allergy (See Comments) [External Allergen Needs Reconciliation - See Comment] Unknown     Other reaction(s): *Unknown - Childhood Rxn, Patient had a reaction to some medication when he went to the dentist as a toddler    Other Drug Allergy (See Comments)      Other  reaction(s): *Unknown - Childhood Rxn   Patient had a reaction to some medication when he went to the dentist as a toddler

## 2024-10-15 NOTE — PLAN OF CARE
BEH IP Unit Acuity Rating Score (UARS)  Patient is given one point for every criteria they meet.    CRITERIA SCORING   On a 72 hour hold, court hold, committed, stay of commitment, or revocation. 1    Patient LOS on BEH unit exceeds 20 days. 1 LOS: 42   Patient under guardianship, 55+, otherwise medically complex, or under age 11. 0   Suicide ideation without relief of precipitating factors. 0   Current plan for suicide. 0   Current plan for homicide. 0   Imminent risk or actual attempt to seriously harm another without relief of factors precipitating the attempt. 0   Severe dysfunction in daily living (ex: complete neglect for self care, extreme disruption in vegetative function, extreme deterioration in social interactions). 0   Recent (last 7 days) or current physical aggression in the ED or on unit. 0   Restraints or seclusion episode in past 72 hours. 0   Recent (last 7 days) or current verbal aggression, agitation, yelling, etc., while in the ED or unit. 0   Active psychosis. 0   Need for constant or near constant redirection (from leaving, from others, etc).  0   Intrusive or disruptive behaviors. 0   Patient requires 3 or more hours of individualized nursing care per 8-hour shift (i.e. for ADLs, meds, therapeutic interventions). 0   TOTAL 2

## 2024-10-16 PROCEDURE — 250N000013 HC RX MED GY IP 250 OP 250 PS 637: Performed by: PSYCHIATRY & NEUROLOGY

## 2024-10-16 PROCEDURE — 250N000013 HC RX MED GY IP 250 OP 250 PS 637

## 2024-10-16 PROCEDURE — 124N000002 HC R&B MH UMMC

## 2024-10-16 PROCEDURE — 99232 SBSQ HOSP IP/OBS MODERATE 35: CPT | Performed by: STUDENT IN AN ORGANIZED HEALTH CARE EDUCATION/TRAINING PROGRAM

## 2024-10-16 PROCEDURE — 250N000012 HC RX MED GY IP 250 OP 636 PS 637: Performed by: STUDENT IN AN ORGANIZED HEALTH CARE EDUCATION/TRAINING PROGRAM

## 2024-10-16 PROCEDURE — 250N000013 HC RX MED GY IP 250 OP 250 PS 637: Performed by: STUDENT IN AN ORGANIZED HEALTH CARE EDUCATION/TRAINING PROGRAM

## 2024-10-16 RX ORDER — LITHIUM CARBONATE 300 MG/1
300 TABLET, FILM COATED, EXTENDED RELEASE ORAL AT BEDTIME
Status: COMPLETED | OUTPATIENT
Start: 2024-10-16 | End: 2024-10-16

## 2024-10-16 RX ORDER — PREGABALIN 100 MG/1
300 CAPSULE ORAL 2 TIMES DAILY PRN
Status: DISCONTINUED | OUTPATIENT
Start: 2024-10-16 | End: 2024-10-18

## 2024-10-16 RX ORDER — POLYETHYLENE GLYCOL 3350 17 G
4 POWDER IN PACKET (EA) ORAL
Status: DISCONTINUED | OUTPATIENT
Start: 2024-10-16 | End: 2024-10-25 | Stop reason: HOSPADM

## 2024-10-16 RX ADMIN — OLANZAPINE 10 MG: 10 TABLET, ORALLY DISINTEGRATING ORAL at 08:21

## 2024-10-16 RX ADMIN — NICOTINE 1 PATCH: 21 PATCH, EXTENDED RELEASE TRANSDERMAL at 08:21

## 2024-10-16 RX ADMIN — NICOTINE POLACRILEX 8 MG: 4 GUM, CHEWING BUCCAL at 18:00

## 2024-10-16 RX ADMIN — METFORMIN HYDROCHLORIDE 1000 MG: 500 TABLET, FILM COATED ORAL at 17:45

## 2024-10-16 RX ADMIN — THERA TABS 1 TABLET: TAB at 08:21

## 2024-10-16 RX ADMIN — LITHIUM CARBONATE 300 MG: 300 TABLET, EXTENDED RELEASE ORAL at 20:39

## 2024-10-16 RX ADMIN — METFORMIN HYDROCHLORIDE 1000 MG: 500 TABLET, FILM COATED ORAL at 08:22

## 2024-10-16 RX ADMIN — SENNOSIDES AND DOCUSATE SODIUM 1 TABLET: 8.6; 5 TABLET ORAL at 08:21

## 2024-10-16 RX ADMIN — BUPRENORPHINE AND NALOXONE 1 FILM: 4; 1 FILM, SOLUBLE BUCCAL; SUBLINGUAL at 19:19

## 2024-10-16 RX ADMIN — TAMSULOSIN HYDROCHLORIDE 0.4 MG: 0.4 CAPSULE ORAL at 08:21

## 2024-10-16 RX ADMIN — NICOTINE POLACRILEX 8 MG: 4 GUM, CHEWING BUCCAL at 22:00

## 2024-10-16 RX ADMIN — NICOTINE POLACRILEX 8 MG: 4 GUM, CHEWING BUCCAL at 16:13

## 2024-10-16 RX ADMIN — ASPIRIN 81 MG CHEWABLE TABLET 81 MG: 81 TABLET CHEWABLE at 08:22

## 2024-10-16 RX ADMIN — NICOTINE POLACRILEX 8 MG: 4 GUM, CHEWING BUCCAL at 20:38

## 2024-10-16 RX ADMIN — NICOTINE POLACRILEX 4 MG: 2 GUM, CHEWING BUCCAL at 13:20

## 2024-10-16 RX ADMIN — PREGABALIN 200 MG: 100 CAPSULE ORAL at 08:31

## 2024-10-16 RX ADMIN — BUPRENORPHINE AND NALOXONE 1 FILM: 4; 1 FILM, SOLUBLE BUCCAL; SUBLINGUAL at 08:20

## 2024-10-16 RX ADMIN — SENNOSIDES AND DOCUSATE SODIUM 1 TABLET: 8.6; 5 TABLET ORAL at 19:19

## 2024-10-16 RX ADMIN — ARIPIPRAZOLE 5 MG: 5 TABLET ORAL at 19:19

## 2024-10-16 RX ADMIN — PREGABALIN 200 MG: 100 CAPSULE ORAL at 13:55

## 2024-10-16 RX ADMIN — OLANZAPINE 20 MG: 20 TABLET, ORALLY DISINTEGRATING ORAL at 19:19

## 2024-10-16 ASSESSMENT — ACTIVITIES OF DAILY LIVING (ADL)
ADLS_ACUITY_SCORE: 28
DRESS: INDEPENDENT;SCRUBS (BEHAVIORAL HEALTH)
ADLS_ACUITY_SCORE: 28
HYGIENE/GROOMING: INDEPENDENT
ADLS_ACUITY_SCORE: 28
ORAL_HYGIENE: INDEPENDENT
ADLS_ACUITY_SCORE: 28
LAUNDRY: UNABLE TO COMPLETE
DRESS: SCRUBS (BEHAVIORAL HEALTH);INDEPENDENT
ORAL_HYGIENE: INDEPENDENT
ADLS_ACUITY_SCORE: 28
HYGIENE/GROOMING: INDEPENDENT
ADLS_ACUITY_SCORE: 28

## 2024-10-16 NOTE — PLAN OF CARE
Team Note Due:  Wednesday     Assessment/Intervention/Current Symtoms and Care Coordination:  Chart review and met with team, discussed pt progress, symptomology, and response to treatment. Discussed the discharge plan and any potential impediments to discharge.    Per team, pt denies having any mental health symptoms,doesn't want to go to [place], says he wants to stop suboxone and go to group home even though today he took Suboxone. Pt has not been observed to shower for a while. Provider will speak to him.     I called [facility]  and left voicemail asking if she would accept Eloy back on Sublocade injection.     Behavioral team note complete.     Pt has been observed with red noel cheeks the last two days, I asked if his room was hot he said no. He said he is feeling physically fine. He says his neck hurts a bit. He wants to see if he can get into another group home. I shared that I have left a voicemail asking if his old group home will accept Sublocade instead of Suboxone as well. It does not appear he really wants to stop Suboxone. Flat affect, avoidant eye contact. Says he showered two days ago but appears disheveled. I told him I would try another group home option but that we need to work on finding discharge option due to extended stay in hospital and him declining [facility], an option that was available.     [facility] and left a voicemail asking about openings.    I called [facility] and left a voicemail asking about openings.     I sent [facility] referral for their group home locations.     Discharge Plan or Goal:  Cannot return to  previous  housing due to Suboxone use.   TBD - [facility]. He has a virtual tour on Tuesday 10/15 at 1PM and will most likely be accepted if all goes well.   10/15: Eloy wants to return to his old group home - will have to ask them again if he is able to return once off Suboxone.       Barriers to Discharge:  Placement / stabilizing     Referral Status:  [facility] Tues 10/15 at 1pm  [facility] - on waitlist (months away)      Suboxone at [facility] Monday 10/28 at 2PM with Janelle Molina DNP  [facility] [address] [place] [address]    [facility] 9:30AM in Person, [address] [facility].     Legal Status:  Committed and [place]   [facility]: [place]  File Number: [id number]  Start and expiration date of commitment:   9/30: Submitted [place] Amendment to remove Invega and add Prolixin.  New [place] meds as of 10/1: Zyprexa, Seroquel, Prolixin, Abilify     Contacts (include KELBY status):  Psych: Dr. Khan, [facility] - called from cell phone for an update #[phone] (Eloy signed KELBY 10/1 for medications related info)  Michelle Cortes/Mother: [phone]    -   New [facility] CM is: Sonia Burdick Ph: [phone]  Email: [email or web address] (KELBY signed 10/1/24)    New Commitment CM:  [email or web address] [phone] - No KELBY needed.    [facility] , Rekha Little - Ph: [phone]    Upcoming Meetings and Dates/Important Information and next steps:  Update discharge referral form at discharge   PD and COS at discharge

## 2024-10-16 NOTE — PLAN OF CARE
"Calm, cooperative. Visible in milieu. Endorses mild anxiety and depression. States that mood is \"bored\". Denies having any thoughts of harming self or others. Denies A/V halluncinations. Good eye contact. Makes needs known.     Denies pain. Denies having any acute physical concerns. No acute behavioral or safety concerns identified this shift.     /74   Pulse 91   Temp 98.6  F (37  C) (Temporal)   Resp 18   Ht 1.854 m (6' 1\")   Wt 103.1 kg (227 lb 6.4 oz)   SpO2 98%   BMI 30.00 kg/m      "

## 2024-10-16 NOTE — PROVIDER NOTIFICATION
10/16/24 1208   Individualization/Patient Specific Goals   Patient Personal Strengths expressive of needs;resilient   Patient Vulnerabilities history of unsuccessful treatment;substance abuse/addiction   Interprofessional Rounds   Participants psychiatrist;nursing;[facility]   Behavioral Team Discussion   Participants MD Simone and Dr Aburto resident MD, [facility] Keisha RN Jemma Jamil Pt has been observed with red noel cheeks the last two days, I asked if his room was hot he said no. He said he is feeling physically fine. He says his neck hurts a bit. He wants to see if he can get into another group home. I shared that I have left a voicemail asking if his old group home will accept Sublocade instead of Suboxone as well. Flat affect, avoidant eye contact. Says he showered two days ago but appears disheveled.   Anticipated length of stay Until placement is found, hopefully early to mid next week.   Medical/Physical See H&P, hx of PE   Precautions see below   Plan He is [facility], consider another group home.   Safety Plan Completed with unit therapist.   Anticipated Discharge Disposition other (see comments);group home;assisted living     Goal Outcome Evaluation:PRECAUTIONS AND SAFETY    Behavioral Orders   Procedures    Assault precautions    Cheeking Precautions (behavioral units)     Patient Observed swallowing PO medications; Patient asked to drink water after swallowing medication; Patient in Staff line of sight for 15 minutes after medication given; Mouth checks after PO administration (patient asked to open mouth and stick out their tongue).    Code 1 - Restrict to Unit    Routine Programming     As clinically indicated    Status 15     Every 15 minutes.    Suicide precautions: Suicide Risk: MODERATE; Clinical rationale to override score: Exhibiting Suicidal/self-harm behaviors or thoughts     Patients on Suicide Precautions should have a Combination Diet ordered that includes a Diet selection(s)  AND a Behavioral Tray selection for Safe Tray - with utensils, or Safe Tray - NO utensils       Order Specific Question:   Suicide Risk     Answer:   MODERATE     Order Specific Question:   Clinical rationale to override score:     Answer:   Exhibiting Suicidal/self-harm behaviors or thoughts       Safety  Safety WDL: WDL  Patient Location: hallway, [facility], patient room, own  Observed Behavior: calm  Observed Behavior (Comment): writing on the board  Safety Measures: safety rounds completed, suicide check-in completed  Diversional Activity: television  De-Escalation Techniques: appropriate behavior reinforced, medication administered, medication offered  Suicidality: Status 15  Seizure precautions: clutter free environment  Assault: status 15, private room  Elopement Interventions: status 15  Additional Documentation:  (jose alberto)

## 2024-10-16 NOTE — PLAN OF CARE
"Problem: Behavioral Disturbance  Goal: Behavioral Disturbance  Description: Signs and symptoms of listed problems will be absent or manageable by discharge or transition of care.  Outcome: Progressing  Patient was up at the start of shift, calm and compliant with vitals check and medication administration. He was visible in the lounge area, attended group, but was not social with peers. His affect is blunted and mood is calm. His is dismissive, and he seem reluctant to answer questions. At 1:50 he approached writer and asked for prn lyrica for pain. Writer told him that the prn is schedule 2x daily and he already took it once today. Patient at that time got tense and said \"no, I did not take it this morning. I have not taken it today. Show me\" Writer told him that he took it at 8:31 am, and he seemed surprised to learn that. He denies urine retention and he told writer that he had a bm last night. He complained of pain and received prn. Patient is unkempt and he did not take a shower this shift.  Patient denies all psych symptoms at this time.     /72 (BP Location: Left arm, Patient Position: Sitting, Cuff Size: Adult Large)   Pulse 85   Temp 97.2  F (36.2  C) (Temporal)   Resp 16   Ht 1.854 m (6' 1\")   Wt 103.1 kg (227 lb 6.4 oz)   SpO2 98%   BMI 30.00 kg/m     "
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
## 2024-10-16 NOTE — PLAN OF CARE
BEH IP Unit Acuity Rating Score (UARS)  Patient is given one point for every criteria they meet.    CRITERIA SCORING   On a 72 hour hold, court hold, committed, stay of commitment, or revocation. 1    Patient LOS on BEH unit exceeds 20 days. 1 LOS: 43   Patient under guardianship, 55+, otherwise medically complex, or under age 11. 0   Suicide ideation without relief of precipitating factors. 0   Current plan for suicide. 0   Current plan for homicide. 0   Imminent risk or actual attempt to seriously harm another without relief of factors precipitating the attempt. 0   Severe dysfunction in daily living (ex: complete neglect for self care, extreme disruption in vegetative function, extreme deterioration in social interactions). 0   Recent (last 7 days) or current physical aggression in the ED or on unit. 0   Restraints or seclusion episode in past 72 hours. 0   Recent (last 7 days) or current verbal aggression, agitation, yelling, etc., while in the ED or unit. 0   Active psychosis. 0   Need for constant or near constant redirection (from leaving, from others, etc).  0   Intrusive or disruptive behaviors. 0   Patient requires 3 or more hours of individualized nursing care per 8-hour shift (i.e. for ADLs, meds, therapeutic interventions). 0   TOTAL 2
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
## 2024-10-17 PROCEDURE — 97150 GROUP THERAPEUTIC PROCEDURES: CPT | Mod: GO

## 2024-10-17 PROCEDURE — 250N000013 HC RX MED GY IP 250 OP 250 PS 637: Performed by: PSYCHIATRY & NEUROLOGY

## 2024-10-17 PROCEDURE — 250N000013 HC RX MED GY IP 250 OP 250 PS 637

## 2024-10-17 PROCEDURE — 250N000012 HC RX MED GY IP 250 OP 636 PS 637: Performed by: STUDENT IN AN ORGANIZED HEALTH CARE EDUCATION/TRAINING PROGRAM

## 2024-10-17 PROCEDURE — 99232 SBSQ HOSP IP/OBS MODERATE 35: CPT | Performed by: STUDENT IN AN ORGANIZED HEALTH CARE EDUCATION/TRAINING PROGRAM

## 2024-10-17 PROCEDURE — 250N000013 HC RX MED GY IP 250 OP 250 PS 637: Performed by: STUDENT IN AN ORGANIZED HEALTH CARE EDUCATION/TRAINING PROGRAM

## 2024-10-17 PROCEDURE — 124N000002 HC R&B MH UMMC

## 2024-10-17 RX ORDER — LITHIUM CARBONATE 300 MG/1
600 TABLET, FILM COATED, EXTENDED RELEASE ORAL AT BEDTIME
Status: DISCONTINUED | OUTPATIENT
Start: 2024-10-17 | End: 2024-10-23

## 2024-10-17 RX ORDER — LITHIUM CARBONATE 300 MG/1
300 TABLET, FILM COATED, EXTENDED RELEASE ORAL AT BEDTIME
Status: DISCONTINUED | OUTPATIENT
Start: 2024-10-17 | End: 2024-10-17

## 2024-10-17 RX ADMIN — NICOTINE POLACRILEX 8 MG: 4 GUM, CHEWING BUCCAL at 12:27

## 2024-10-17 RX ADMIN — ARIPIPRAZOLE 5 MG: 5 TABLET ORAL at 19:10

## 2024-10-17 RX ADMIN — NICOTINE POLACRILEX 8 MG: 4 GUM, CHEWING BUCCAL at 22:02

## 2024-10-17 RX ADMIN — POLYETHYLENE GLYCOL 3350 17 G: 17 POWDER, FOR SOLUTION ORAL at 07:43

## 2024-10-17 RX ADMIN — ACETAMINOPHEN 650 MG: 325 TABLET, FILM COATED ORAL at 10:14

## 2024-10-17 RX ADMIN — NICOTINE POLACRILEX 8 MG: 4 GUM, CHEWING BUCCAL at 09:02

## 2024-10-17 RX ADMIN — OLANZAPINE 10 MG: 10 TABLET, ORALLY DISINTEGRATING ORAL at 07:44

## 2024-10-17 RX ADMIN — NICOTINE POLACRILEX 8 MG: 4 GUM, CHEWING BUCCAL at 14:02

## 2024-10-17 RX ADMIN — TAMSULOSIN HYDROCHLORIDE 0.4 MG: 0.4 CAPSULE ORAL at 07:44

## 2024-10-17 RX ADMIN — NICOTINE POLACRILEX 8 MG: 4 GUM, CHEWING BUCCAL at 15:58

## 2024-10-17 RX ADMIN — BUPRENORPHINE AND NALOXONE 1 FILM: 4; 1 FILM, SOLUBLE BUCCAL; SUBLINGUAL at 07:44

## 2024-10-17 RX ADMIN — POLYETHYLENE GLYCOL 3350 17 G: 17 POWDER, FOR SOLUTION ORAL at 12:44

## 2024-10-17 RX ADMIN — METFORMIN HYDROCHLORIDE 1000 MG: 500 TABLET, FILM COATED ORAL at 07:44

## 2024-10-17 RX ADMIN — SENNOSIDES AND DOCUSATE SODIUM 1 TABLET: 8.6; 5 TABLET ORAL at 07:44

## 2024-10-17 RX ADMIN — OLANZAPINE 20 MG: 20 TABLET, ORALLY DISINTEGRATING ORAL at 19:10

## 2024-10-17 RX ADMIN — NICOTINE POLACRILEX 8 MG: 4 GUM, CHEWING BUCCAL at 20:49

## 2024-10-17 RX ADMIN — BUPRENORPHINE AND NALOXONE 1 FILM: 4; 1 FILM, SOLUBLE BUCCAL; SUBLINGUAL at 19:09

## 2024-10-17 RX ADMIN — METFORMIN HYDROCHLORIDE 1000 MG: 500 TABLET, FILM COATED ORAL at 16:03

## 2024-10-17 RX ADMIN — THERA TABS 1 TABLET: TAB at 07:43

## 2024-10-17 RX ADMIN — NICOTINE POLACRILEX 8 MG: 4 GUM, CHEWING BUCCAL at 17:52

## 2024-10-17 RX ADMIN — SENNOSIDES AND DOCUSATE SODIUM 1 TABLET: 8.6; 5 TABLET ORAL at 19:10

## 2024-10-17 RX ADMIN — ASPIRIN 81 MG CHEWABLE TABLET 81 MG: 81 TABLET CHEWABLE at 07:43

## 2024-10-17 RX ADMIN — LITHIUM CARBONATE 600 MG: 300 TABLET, EXTENDED RELEASE ORAL at 20:22

## 2024-10-17 RX ADMIN — PREGABALIN 300 MG: 100 CAPSULE ORAL at 07:46

## 2024-10-17 RX ADMIN — PREGABALIN 300 MG: 100 CAPSULE ORAL at 14:38

## 2024-10-17 RX ADMIN — NICOTINE 1 PATCH: 21 PATCH, EXTENDED RELEASE TRANSDERMAL at 07:45

## 2024-10-17 RX ADMIN — ALUMINUM HYDROXIDE, MAGNESIUM HYDROXIDE, AND DIMETHICONE 30 ML: 200; 20; 200 SUSPENSION ORAL at 10:14

## 2024-10-17 ASSESSMENT — ACTIVITIES OF DAILY LIVING (ADL)
ORAL_HYGIENE: INDEPENDENT
ADLS_ACUITY_SCORE: 28
ADLS_ACUITY_SCORE: 28
DRESS: SCRUBS (BEHAVIORAL HEALTH)
ADLS_ACUITY_SCORE: 28
HYGIENE/GROOMING: INDEPENDENT
ADLS_ACUITY_SCORE: 28
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
## 2024-10-17 NOTE — PLAN OF CARE
Problem: Adult Inpatient Plan of Care  Goal: Absence of Hospital-Acquired Illness or Injury  Outcome: Progressing     Problem: Adult Inpatient Plan of Care  Goal: Optimal Comfort and Wellbeing  Outcome: Progressing   Infection and accident prevention protocols are in place to help prevent injury and/or illness to pt. Pt has PRN medication available to assist with dysregulation and coping skills as well as therapeutic groups and one on one time with staff to help with comfort and wellbeing. Pt reports his mood as okay, affect is blunted. He denies all mental health concerns, he took his medication as prescribed and has been appropriate and in behavioral control in groups and in the milieu. He has used his PRN nicotine and lyrica this shift. He denies difficulty voiding and there are no acute safety risks at this time.

## 2024-10-17 NOTE — PLAN OF CARE
Rehab Group    Start time: 1030  End time: 1200  Patient time total: 90 minutes    attended full group    #5 attended   Group Type: OT Clinic   Group Topic Covered: coping skills     Group Session Detail:    Intervention: Pt participated in a OT Clinic group to facilitate coping skills exploration and creative expression through personally meaningful activities, and to encourage utilization of these healthy coping skills to promote overall health and wellness. Group included clinical observation of social, cognitive and kinesthetic performance skills to inform treatment and safe discharge planning.    Mood/Affect: Pleasant      Plan: Patient encouraged to maintain attendance for continued ongoing support in working towards occupational therapy goals to support overall treatment/care.        Patient Detail:    Present for full duration of group, working on various projects. Is ind to work on these after setup. Did have two instances where patient attempted to keep an item that he was using and walk to his room with it, hiding in his fist under sweatshirt sleeve. Did give writer back these items when directly asked about it and was receptive to not being able to take other items that patient requested to use outside of group. Continues to show motivation to participate in this type of hands on group.       [id number] OT Group (2 or more in attendance)    Patient Active Problem List   Diagnosis    Suicidal ideation    Psychosis (H)    Acute pulmonary embolism without acute cor pulmonale (H)    Alcohol use disorder, moderate, in sustained remission, dependence (H)    Anxiety    Cannabis use disorder, severe, dependence (H)    Class 1 drug-induced obesity without serious comorbidity with body mass index (BMI) of 33.0 to 33.9 in adult    Depression, major, single episode, moderate (H)    Episodic mood disorder (H)    KATHE (generalized anxiety disorder)    History of marijuana use    Obesity (BMI 30-39.9)    Obesity, Class I,  BMI 30-34.9    Tobacco use disorder, moderate, in sustained remission    Schizoaffective disorder, bipolar type (H)    Psychosis, unspecified psychosis type (H)    Tardive dyskinesia

## 2024-10-17 NOTE — PLAN OF CARE
Team Note Due:  Wednesday     Assessment/Intervention/Current Symtoms and Care Coordination:  Chart review and met with team, discussed pt progress, symptomology, and response to treatment. Discussed the discharge plan and any potential impediments to discharge.    Per team, took Lithium for the first time yesterday. Will monitor this.     I returned call from Megan at [facility], left voicemail.     I received a call from Eloy's psychiatric provider RN at [facility] office stating Eloy Storm's mom called to say that Master wants to speak to Dr. Khan. I shared we don't have an KELBY for Eloy's mom besides a specific discharge info so I'm not sure where Eloy's mom was getting that info. I shared as far as I know the provider did not request to speak to [facility]. I confirmed his upcoming psych appt and shared we would send a discharge summary with med updates at discharge. She said thank you.     I met with Eloy to ask if he was for sure declining [facility] and he stated yes he was. I emailed [facility] back (they accepted him) and I shared he was declining this placement.     [facility] responded back that they have an opening for a group home. I asked for the location and to see if they accept Suboxone.     Discharge Plan or Goal:  Cannot return to  previous  housing due to Suboxone use.   TBD - [facility] is pending. He has a virtual tour on Tuesday 10/15 at 1PM and will most likely be accepted if all goes well.   10/15: Eloy wants to return to his old group home - will have to ask them again if he is able to return once off Suboxone.   10/17: Called old [facility] to see if he can return     Barriers to Discharge:  Placement / stabilizing     Referral Status:  [facility] 10/15 - they accepted but Eloy is declining   [facility] - on waitlist (months away)      Suboxone at [facility] Monday 10/28 at 2PM with Janelle Molina DNP  [facility] [address] [place] [address]    Psychiatry appt: [facility] outpatient Appt: 10/25/24 9:30AM in Person, [address] [facility].     10/16: I called [facility] and left a voicemail asking about openings.    10/16: I called [facility] and left a voicemail asking about openings.     10/16: I sent [facility] referral for their group home locations.            10/17: Baron Chavira confirmed receipt and shared he will review the referral.     10/17: [facility] group home responded back that they have an opening for a group home. I asked for the location and to see if they accept Suboxone.     Legal Status:  Committed and [place]   [facility]: [place]  File Number: [id number]  Start and expiration date of commitment:   9/30: Submitted [place] Amendment to remove Invega and add Prolixin.  New [place] meds as of 10/1: Zyprexa, Seroquel, Prolixin, Abilify     Contacts (include KELBY status):  Psych: Dr. Khan, [place] [facility] - called from cell phone for an update #[phone] (Eloy signed KELBY 10/1 for medications related info)  Michelle Cortes/Mother: [phone]    -   New [facility] CM is: Sonia Burdick Ph: [phone]  Email: [email or web address] (KELBY signed 10/1/24)    New Commitment CM:  [email or web address] [phone] - No KELBY needed.    [facility] , Rekha Little - Ph: [phone]    Upcoming Meetings and Dates/Important Information and next steps:  Update discharge referral form at discharge   PD and COS at discharge  If he's off Suboxone at discharge - cancel addiction medicine option.

## 2024-10-17 NOTE — PROGRESS NOTES
CLINICAL NUTRITION SERVICES - BRIEF NOTE     Nutrition Prescription    RECOMMENDATIONS FOR MDs/PROVIDERS TO ORDER:  None at this time    Recommendations already ordered by Registered Dietitian (RD):  -Carrots + celery @ 2pm    Future/Additional Recommendations:  RD to sign off at this time, but will remain available by consult if new nutrition problem arises.       REASON FOR ASSESSMENT  Eloy Storm is a/an 24 year old male assessed by the dietitian for Provider Order - Pt would like healthy snacks between meals     Findings  RD met with Eloy at bedside who is requesting snack of celery and carrots in the afternoon, pt declined other snack options. No other requests or concerns at this time.     INTERVENTIONS  Implementation  Modify composition of meals/snacks     Follow up/Monitoring  RD to sign off at this time, but will remain available by consult if new nutrition problem arises.      Octavia Han MPH, RDN, LD  Behavioral Health Adult & Pediatric Dietitian  BEH Clinical Dietitian [place], Teams, or Desk: [phone]  Weekend/Holiday Vocera: Weekend Holiday Clinical Dietitian [Multi Site Groups]

## 2024-10-17 NOTE — PLAN OF CARE
Rehab Group    Start time: 1315  End time: 1400  Patient time total: 45 minutes    attended full group     #3 attended   Group Type: occupational therapy   Group Topic Covered: cognitive activities and healthy leisure time    Doodle Dice      Group Session Detail:    Patient Response: Pt participated in a group dice activity for leisure exploration and participation as a healthy coping skill, socialization, problem solving and sequencing.     Mood/Affect: Pleasant       Plan: Patient encouraged to maintain attendance for continued ongoing support in working towards occupational therapy goals to support overall treatment/care.        Patient Detail:    Was an active participant for the duration of time in group. Approached writer as they entered the unit and selected one of the two activities writer brought to choose from. Was ind to participate in the task after brief instructions. Was social off and on with peers.        [id number] OT Group (2 or more in attendance)    Patient Active Problem List   Diagnosis    Suicidal ideation    Psychosis (H)    Acute pulmonary embolism without acute cor pulmonale (H)    Alcohol use disorder, moderate, in sustained remission, dependence (H)    Anxiety    Cannabis use disorder, severe, dependence (H)    Class 1 drug-induced obesity without serious comorbidity with body mass index (BMI) of 33.0 to 33.9 in adult    Depression, major, single episode, moderate (H)    Episodic mood disorder (H)    KATHE (generalized anxiety disorder)    History of marijuana use    Obesity (BMI 30-39.9)    Obesity, Class I, BMI 30-34.9    Tobacco use disorder, moderate, in sustained remission    Schizoaffective disorder, bipolar type (H)    Psychosis, unspecified psychosis type (H)    Tardive dyskinesia         No

## 2024-10-17 NOTE — PLAN OF CARE
Eloy appeared sleeping for 6 hours  with uneventful night, breathing was quiet and unlabored.  No distress noted or reported this shift  continue on 15 minutes checks for safety and no problem noted  No pain or discomfort noted or verbalized  No  aggressive  behaviors demonstrated.    Problem: Behavioral Disturbance  Goal: Behavioral Disturbance  Description: Signs and symptoms of listed problems will be absent or manageable by discharge or transition of care.  Outcome: Progressing   Goal Outcome Evaluation:

## 2024-10-17 NOTE — PLAN OF CARE
BEH IP Unit Acuity Rating Score (UARS)  Patient is given one point for every criteria they meet.    CRITERIA SCORING   On a 72 hour hold, court hold, committed, stay of commitment, or revocation. 1    Patient LOS on BEH unit exceeds 20 days. 1 LOS: 44   Patient under guardianship, 55+, otherwise medically complex, or under age 11. 0   Suicide ideation without relief of precipitating factors. 0   Current plan for suicide. 0   Current plan for homicide. 0   Imminent risk or actual attempt to seriously harm another without relief of factors precipitating the attempt. 0   Severe dysfunction in daily living (ex: complete neglect for self care, extreme disruption in vegetative function, extreme deterioration in social interactions). 0   Recent (last 7 days) or current physical aggression in the ED or on unit. 0   Restraints or seclusion episode in past 72 hours. 0   Recent (last 7 days) or current verbal aggression, agitation, yelling, etc., while in the ED or unit. 0   Active psychosis. 0   Need for constant or near constant redirection (from leaving, from others, etc).  0   Intrusive or disruptive behaviors. 0   Patient requires 3 or more hours of individualized nursing care per 8-hour shift (i.e. for ADLs, meds, therapeutic interventions). 0   TOTAL 2
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
## 2024-10-18 PROCEDURE — 250N000013 HC RX MED GY IP 250 OP 250 PS 637: Performed by: STUDENT IN AN ORGANIZED HEALTH CARE EDUCATION/TRAINING PROGRAM

## 2024-10-18 PROCEDURE — H2032 ACTIVITY THERAPY, PER 15 MIN: HCPCS

## 2024-10-18 PROCEDURE — 250N000013 HC RX MED GY IP 250 OP 250 PS 637: Performed by: PSYCHIATRY & NEUROLOGY

## 2024-10-18 PROCEDURE — 250N000013 HC RX MED GY IP 250 OP 250 PS 637

## 2024-10-18 PROCEDURE — 99232 SBSQ HOSP IP/OBS MODERATE 35: CPT | Performed by: STUDENT IN AN ORGANIZED HEALTH CARE EDUCATION/TRAINING PROGRAM

## 2024-10-18 PROCEDURE — 90853 GROUP PSYCHOTHERAPY: CPT

## 2024-10-18 PROCEDURE — 250N000012 HC RX MED GY IP 250 OP 636 PS 637: Performed by: STUDENT IN AN ORGANIZED HEALTH CARE EDUCATION/TRAINING PROGRAM

## 2024-10-18 PROCEDURE — 124N000002 HC R&B MH UMMC

## 2024-10-18 RX ORDER — HYDROXYZINE HYDROCHLORIDE 50 MG/1
50 TABLET, FILM COATED ORAL EVERY 6 HOURS PRN
Status: DISCONTINUED | OUTPATIENT
Start: 2024-10-18 | End: 2024-10-25 | Stop reason: HOSPADM

## 2024-10-18 RX ORDER — PREGABALIN 100 MG/1
300 CAPSULE ORAL 2 TIMES DAILY
Status: DISCONTINUED | OUTPATIENT
Start: 2024-10-18 | End: 2024-10-25 | Stop reason: HOSPADM

## 2024-10-18 RX ORDER — HYDROXYZINE HYDROCHLORIDE 25 MG/1
25 TABLET, FILM COATED ORAL EVERY 6 HOURS PRN
Status: DISCONTINUED | OUTPATIENT
Start: 2024-10-18 | End: 2024-10-25 | Stop reason: HOSPADM

## 2024-10-18 RX ADMIN — TAMSULOSIN HYDROCHLORIDE 0.4 MG: 0.4 CAPSULE ORAL at 09:02

## 2024-10-18 RX ADMIN — NICOTINE POLACRILEX 8 MG: 4 GUM, CHEWING BUCCAL at 14:31

## 2024-10-18 RX ADMIN — ALUMINUM HYDROXIDE, MAGNESIUM HYDROXIDE, AND DIMETHICONE 30 ML: 200; 20; 200 SUSPENSION ORAL at 20:40

## 2024-10-18 RX ADMIN — SENNOSIDES AND DOCUSATE SODIUM 1 TABLET: 8.6; 5 TABLET ORAL at 19:16

## 2024-10-18 RX ADMIN — POLYETHYLENE GLYCOL 3350 17 G: 17 POWDER, FOR SOLUTION ORAL at 16:37

## 2024-10-18 RX ADMIN — BUPRENORPHINE AND NALOXONE 1 FILM: 4; 1 FILM, SOLUBLE BUCCAL; SUBLINGUAL at 19:17

## 2024-10-18 RX ADMIN — METFORMIN HYDROCHLORIDE 1000 MG: 500 TABLET, FILM COATED ORAL at 09:02

## 2024-10-18 RX ADMIN — PREGABALIN 300 MG: 100 CAPSULE ORAL at 19:17

## 2024-10-18 RX ADMIN — NICOTINE POLACRILEX 8 MG: 4 GUM, CHEWING BUCCAL at 16:18

## 2024-10-18 RX ADMIN — ASPIRIN 81 MG CHEWABLE TABLET 81 MG: 81 TABLET CHEWABLE at 09:02

## 2024-10-18 RX ADMIN — NICOTINE 1 PATCH: 21 PATCH, EXTENDED RELEASE TRANSDERMAL at 09:02

## 2024-10-18 RX ADMIN — HYDROXYZINE HYDROCHLORIDE 50 MG: 50 TABLET, FILM COATED ORAL at 17:35

## 2024-10-18 RX ADMIN — METFORMIN HYDROCHLORIDE 1000 MG: 500 TABLET, FILM COATED ORAL at 16:36

## 2024-10-18 RX ADMIN — POLYETHYLENE GLYCOL 3350 17 G: 17 POWDER, FOR SOLUTION ORAL at 22:27

## 2024-10-18 RX ADMIN — HYDROXYZINE HYDROCHLORIDE 50 MG: 50 TABLET, FILM COATED ORAL at 22:46

## 2024-10-18 RX ADMIN — OLANZAPINE 10 MG: 10 TABLET, ORALLY DISINTEGRATING ORAL at 09:02

## 2024-10-18 RX ADMIN — NICOTINE POLACRILEX 8 MG: 4 GUM, CHEWING BUCCAL at 11:57

## 2024-10-18 RX ADMIN — BUPRENORPHINE AND NALOXONE 1 FILM: 4; 1 FILM, SOLUBLE BUCCAL; SUBLINGUAL at 09:02

## 2024-10-18 RX ADMIN — SENNOSIDES AND DOCUSATE SODIUM 1 TABLET: 8.6; 5 TABLET ORAL at 09:02

## 2024-10-18 RX ADMIN — NICOTINE POLACRILEX 8 MG: 4 GUM, CHEWING BUCCAL at 21:41

## 2024-10-18 RX ADMIN — OLANZAPINE 20 MG: 20 TABLET, ORALLY DISINTEGRATING ORAL at 19:16

## 2024-10-18 RX ADMIN — THERA TABS 1 TABLET: TAB at 09:02

## 2024-10-18 RX ADMIN — ACETAMINOPHEN, ASPIRIN, CAFFEINE 1 TABLET: 250; 65; 250 TABLET, FILM COATED ORAL at 09:46

## 2024-10-18 RX ADMIN — LITHIUM CARBONATE 600 MG: 300 TABLET, EXTENDED RELEASE ORAL at 19:20

## 2024-10-18 RX ADMIN — HYDROXYZINE HYDROCHLORIDE 25 MG: 25 TABLET, FILM COATED ORAL at 11:25

## 2024-10-18 RX ADMIN — ARIPIPRAZOLE 5 MG: 5 TABLET ORAL at 19:16

## 2024-10-18 RX ADMIN — PREGABALIN 300 MG: 100 CAPSULE ORAL at 09:06

## 2024-10-18 RX ADMIN — NICOTINE POLACRILEX 8 MG: 4 GUM, CHEWING BUCCAL at 13:12

## 2024-10-18 ASSESSMENT — ACTIVITIES OF DAILY LIVING (ADL)
ADLS_ACUITY_SCORE: 28
ORAL_HYGIENE: INDEPENDENT
ADLS_ACUITY_SCORE: 28
DRESS: SCRUBS (BEHAVIORAL HEALTH)
ADLS_ACUITY_SCORE: 28
HYGIENE/GROOMING: INDEPENDENT
ADLS_ACUITY_SCORE: 28

## 2024-10-18 NOTE — PLAN OF CARE
BEH IP Unit Acuity Rating Score (UARS)  Patient is given one point for every criteria they meet.    CRITERIA SCORING   On a 72 hour hold, court hold, committed, stay of commitment, or revocation. 1    Patient LOS on BEH unit exceeds 20 days. 1 LOS: 45   Patient under guardianship, 55+, otherwise medically complex, or under age 11. 0   Suicide ideation without relief of precipitating factors. 0   Current plan for suicide. 0   Current plan for homicide. 0   Imminent risk or actual attempt to seriously harm another without relief of factors precipitating the attempt. 0   Severe dysfunction in daily living (ex: complete neglect for self care, extreme disruption in vegetative function, extreme deterioration in social interactions). 0   Recent (last 7 days) or current physical aggression in the ED or on unit. 0   Restraints or seclusion episode in past 72 hours. 0   Recent (last 7 days) or current verbal aggression, agitation, yelling, etc., while in the ED or unit. 0   Active psychosis. 0   Need for constant or near constant redirection (from leaving, from others, etc).  0   Intrusive or disruptive behaviors. 0   Patient requires 3 or more hours of individualized nursing care per 8-hour shift (i.e. for ADLs, meds, therapeutic interventions). 0   TOTAL 2

## 2024-10-18 NOTE — PLAN OF CARE
"Goal Outcome Evaluation:  Problem: Adult Inpatient Plan of Care  Goal: Plan of Care Review  Outcome: Progressing    Patient was awake at the start of the shift.  Remains in his room and only comes out to the desk for medication. He described is mood as not what he wants. Refused to tell writer  how he wants his mood to be. He denies any thoughts of wanting to harm himself or others.  He also  denies, depression, visual and auditory hallucinations He however endorses anxiety and requested PRN  Hydroxyzine  given at 1125. He attended the group but had minimal interaction. Denies constipation and reaction to medications.  There was no behavioral or medical concerns during the shift.     Vital signs:  Temp: 99.3  F (37.4  C) Temp src: Temporal BP: 116/72 Pulse: 109   Resp: 16 SpO2: 98 %     Height: 185.4 cm (6' 1\") Weight: 103.1 kg (227 lb 6.4 oz)  Estimated body mass index is 30 kg/m  as calculated from the following:    Height as of this encounter: 1.854 m (6' 1\").    Weight as of this encounter: 103.1 kg (227 lb 6.4 oz).            "

## 2024-10-18 NOTE — PLAN OF CARE
Team Note Due:  Wednesday     Assessment/Intervention/Current Symtoms and Care Coordination:  Chart review and met with team, discussed pt progress, symptomology, and response to treatment. Discussed the discharge plan and any potential impediments to discharge.    Per team, provider is working on some minor med changes that could potentially be followed up on outpatient basis but pending placement.    I spoke to Megan  (at his old group home). She said sublocade is still similar. When I reviewed his diagnoses for discharge she said that she thinks he needs substance use treatment and declined to take him at this time until he has more stability with sobriety.       10/18: [facility] says [facility] would like to proceed and they take Suboxone - they did not receive a referral yet however, I connected CADI waiver and CM to verify this placement fits CADI requirements prior to sending referral docs. Address of [facility] is [address] [place], [place] [address] [address] and they accept Suboxone.     I met with Eloy to share that his old group home is not accepting him back at this time but maybe in the future after some sobriety. Eloy continues to display minimal insight stating he does not need substance use treatment or mental health treatment stating he knows everything already and doesn't need groups. He wants an apartment or group home. I shared about [facility] and that we are looking at that location, he agreed. I also shared that we can't keep him in the hospital forever so he will have to agree to a placement at some point vs picking and choosing. Eloy was observed following the provider closely. She went to speak to another peer and I stated that she was busy speaking with another peer and she would speak to him when she was available. He continued to stand right outside the door waiting for provider despite writer telling him this. He presents in a low mood, flat affect, disheveled. His hygiene  "appears poor.     I reached out to commitment CM Chasity Hill and CADI CM Sonia Burdick to share an update and ask for help with discharge planning. [facility]od [facility].     \"Hi all,    I will have to send a form to [facility] to determine if [facility] group homes is covered under Eloy's CADI waiver. This may take a few days to get a response, but I will keep you updated as soon as I hear back. Please reach out to me if anything changes in the meantime.    Thank you,    Sonia Maldonadod\"    Discharge Plan or Goal:  Cannot return to  previous  housing due to Suboxone use.   TBD - group home?   [facility] accepted Eloy but he declined as he doesn't want to give his money and only have $150 remaining.  10/15: Eloy wants to return to his old group home - will have to ask them again if he is able to return once off Suboxone.   10/17: Called old [facility] to see if he can return  10/18: Declined Eloy - suggest substance use treatment      Barriers to Discharge:  Placement / stabilizing     Referral Status:  [facility] - they accepted but Eloy is declining   [facility] - on waitlist (months away)      [facility] at [facility] Monday 10/28 at 2PM with Janelle Molina DNP  [facility] [address] [place] [id number]    Psychiatry appt: Tenisha [facility] outpatient Appt: 10/25/24 9:30AM in Person, [address] [facility].     10/16: I called [facility] group homes and left a voicemail asking about openings.    10/16: I called [facility] and left a voicemail asking about openings.     10/16: I sent [facility] referral for their group home locations.            10/17: Baron Chavira confirmed receipt and shared he will review the referral.     10/17: [facility] responded back that they have an opening for a group home. I asked for the location and to see if they accept Suboxone.      10/18: [facility] and they take Suboxone - they did not receive a referral yet however, I connected CADI waiver and CM to verify this placement fits CADI requirements prior to sending referral docs. Address of [facility] is [address] [place], [place] [address] [address] and they accept Suboxone.     Legal Status:  Committed and [place]   [facility]: [facility]  File Number: [id number]  Start and expiration date of commitment:   9/30: Submitted [place] Amendment to remove Invega and add Prolixin.  New [place] meds as of 10/1: Zyprexa, Seroquel, Prolixin, Abilify     Contacts (include KELBY status):  Psych: [facility]. [facility] - called from cell phone for an update #[phone] (Eloy signed KELBY 10/1 for medications related info)  Michelle Cortes/Mother: [phone]    -   New CADI CM is: Sonia Burdick Ph: [phone]  Email: [email or web address] (KELBY signed 10/1/24)    New Commitment CM:  Chasity [email or web address] [phone] - No KELBY needed.    [facility] , Rekha Little - Ph: [phone]    Upcoming Meetings and Dates/Important Information and next steps:  Update discharge referral form at discharge   PD and COS at discharge  If he's off Suboxone at discharge - cancel addiction medicine option.

## 2024-10-18 NOTE — PROVIDER NOTIFICATION
10/18/24 0600   Sleep/Rest   Sleep/Rest/Relaxation no problem identified;appears asleep   Sleep Hygiene Promotion noise level reduced   Night Time # Hours 5.5 hours     Patient had an uneventful and restful night without any complaints.No behavioral problems this shift.Woke up once during the night to use the restroom for void, went back to sleep following.Safety maintained. Will continue to monitor.

## 2024-10-18 NOTE — PLAN OF CARE
Group Attendance:  attended full group    Time session began: 1315  Time session ended: 1400  Patient's total time in group: 45    Total # Attendees   3   Group Type psychotherapeutic     Group Topic Covered insight improvement, relationships, and goals and motivation     Group Session Detail Group members checked in before the activity. Writer described the activity and had pts follow along with tracing each hand and filling them in with what was in their past and what they want in their future. After completing the activity, pts reflected on what is in each, how the activity went, how they felt before and after and if they noticed anything that surprised them with the activity.      Patient's response to the group topic/interactions:  positive affect, cooperative with task, organized, socially appropriate, listened actively, expressed understanding of topic, attentive, and actively engaged     Patient Details: Pt engaged in activity and worked on the project for about 15 minutes before deciding to be done. Pt then continued to draw and engage with peers for the rest of group            [id number] - Group psychotherapy - 1 Session  Patient Active Problem List   Diagnosis    Suicidal ideation    Psychosis (H)    Acute pulmonary embolism without acute cor pulmonale (H)    Alcohol use disorder, moderate, in sustained remission, dependence (H)    Anxiety    Cannabis use disorder, severe, dependence (H)    Class 1 drug-induced obesity without serious comorbidity with body mass index (BMI) of 33.0 to 33.9 in adult    Depression, major, single episode, moderate (H)    Episodic mood disorder (H)    KATHE (generalized anxiety disorder)    History of marijuana use    Obesity (BMI 30-39.9)    Obesity, Class I, BMI 30-34.9    Tobacco use disorder, moderate, in sustained remission    Schizoaffective disorder, bipolar type (H)    Psychosis, unspecified psychosis type (H)    Tardive dyskinesia

## 2024-10-18 NOTE — PROGRESS NOTES
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 Comments)     Patient had a reaction to some medication when he went to the dentist as a toddler    Other Allergy (See Comments) [External Allergen Needs Reconciliation - See Comment] Unknown     Other reaction(s): *Unknown - Childhood Rxn, Patient had a reaction to some medication when he went to the dentist as a toddler    Other Drug Allergy (See Comments)      Other reaction(s): *Unknown - Childhood Rxn   Patient had a reaction to some medication when he went to the dentist as a toddler

## 2024-10-19 PROCEDURE — 250N000013 HC RX MED GY IP 250 OP 250 PS 637

## 2024-10-19 PROCEDURE — 250N000013 HC RX MED GY IP 250 OP 250 PS 637: Performed by: STUDENT IN AN ORGANIZED HEALTH CARE EDUCATION/TRAINING PROGRAM

## 2024-10-19 PROCEDURE — 250N000012 HC RX MED GY IP 250 OP 636 PS 637: Performed by: STUDENT IN AN ORGANIZED HEALTH CARE EDUCATION/TRAINING PROGRAM

## 2024-10-19 PROCEDURE — 250N000013 HC RX MED GY IP 250 OP 250 PS 637: Performed by: PSYCHIATRY & NEUROLOGY

## 2024-10-19 PROCEDURE — 124N000002 HC R&B MH UMMC

## 2024-10-19 RX ADMIN — HYDROXYZINE HYDROCHLORIDE 50 MG: 50 TABLET, FILM COATED ORAL at 23:31

## 2024-10-19 RX ADMIN — BUPRENORPHINE AND NALOXONE 1 FILM: 4; 1 FILM, SOLUBLE BUCCAL; SUBLINGUAL at 19:12

## 2024-10-19 RX ADMIN — HYDROXYZINE HYDROCHLORIDE 50 MG: 50 TABLET, FILM COATED ORAL at 16:52

## 2024-10-19 RX ADMIN — SENNOSIDES AND DOCUSATE SODIUM 1 TABLET: 8.6; 5 TABLET ORAL at 19:13

## 2024-10-19 RX ADMIN — SENNOSIDES AND DOCUSATE SODIUM 1 TABLET: 8.6; 5 TABLET ORAL at 10:06

## 2024-10-19 RX ADMIN — PREGABALIN 300 MG: 100 CAPSULE ORAL at 19:14

## 2024-10-19 RX ADMIN — NICOTINE POLACRILEX 8 MG: 4 GUM, CHEWING BUCCAL at 13:13

## 2024-10-19 RX ADMIN — ARIPIPRAZOLE 5 MG: 5 TABLET ORAL at 19:14

## 2024-10-19 RX ADMIN — ASPIRIN 81 MG CHEWABLE TABLET 81 MG: 81 TABLET CHEWABLE at 10:05

## 2024-10-19 RX ADMIN — HYDROXYZINE HYDROCHLORIDE 50 MG: 50 TABLET, FILM COATED ORAL at 10:10

## 2024-10-19 RX ADMIN — NICOTINE POLACRILEX 8 MG: 4 GUM, CHEWING BUCCAL at 16:57

## 2024-10-19 RX ADMIN — BUPRENORPHINE AND NALOXONE 1 FILM: 4; 1 FILM, SOLUBLE BUCCAL; SUBLINGUAL at 09:36

## 2024-10-19 RX ADMIN — ACETAMINOPHEN 650 MG: 325 TABLET, FILM COATED ORAL at 16:53

## 2024-10-19 RX ADMIN — OLANZAPINE 10 MG: 10 TABLET, ORALLY DISINTEGRATING ORAL at 10:05

## 2024-10-19 RX ADMIN — POLYETHYLENE GLYCOL 3350 17 G: 17 POWDER, FOR SOLUTION ORAL at 10:05

## 2024-10-19 RX ADMIN — NICOTINE POLACRILEX 8 MG: 4 GUM, CHEWING BUCCAL at 11:03

## 2024-10-19 RX ADMIN — NICOTINE POLACRILEX 8 MG: 4 GUM, CHEWING BUCCAL at 10:06

## 2024-10-19 RX ADMIN — METFORMIN HYDROCHLORIDE 1000 MG: 500 TABLET, FILM COATED ORAL at 16:57

## 2024-10-19 RX ADMIN — LITHIUM CARBONATE 600 MG: 300 TABLET, EXTENDED RELEASE ORAL at 19:15

## 2024-10-19 RX ADMIN — OLANZAPINE 20 MG: 20 TABLET, ORALLY DISINTEGRATING ORAL at 19:14

## 2024-10-19 RX ADMIN — ACETAMINOPHEN, ASPIRIN, CAFFEINE 1 TABLET: 250; 65; 250 TABLET, FILM COATED ORAL at 11:03

## 2024-10-19 RX ADMIN — NICOTINE 1 PATCH: 21 PATCH, EXTENDED RELEASE TRANSDERMAL at 10:05

## 2024-10-19 RX ADMIN — THERA TABS 1 TABLET: TAB at 10:06

## 2024-10-19 RX ADMIN — TAMSULOSIN HYDROCHLORIDE 0.4 MG: 0.4 CAPSULE ORAL at 10:06

## 2024-10-19 RX ADMIN — ALUMINUM HYDROXIDE, MAGNESIUM HYDROXIDE, AND DIMETHICONE 30 ML: 200; 20; 200 SUSPENSION ORAL at 17:55

## 2024-10-19 RX ADMIN — METFORMIN HYDROCHLORIDE 1000 MG: 500 TABLET, FILM COATED ORAL at 10:05

## 2024-10-19 RX ADMIN — PREGABALIN 300 MG: 100 CAPSULE ORAL at 10:05

## 2024-10-19 ASSESSMENT — ACTIVITIES OF DAILY LIVING (ADL)
ADLS_ACUITY_SCORE: 28
DRESS: SCRUBS (BEHAVIORAL HEALTH)
ADLS_ACUITY_SCORE: 28
ORAL_HYGIENE: INDEPENDENT
ADLS_ACUITY_SCORE: 28
HYGIENE/GROOMING: INDEPENDENT
ADLS_ACUITY_SCORE: 28

## 2024-10-19 NOTE — PLAN OF CARE
Problem: Sleep Disturbance  Goal: Adequate Sleep/Rest  Outcome: Progressing   Goal Outcome Evaluation:  Pt appeared to be sleeping each time during night safety checks.Woke up once for snacks and went right back to sleep.Provided safe environment, safety checks done per protocol. Denied pain/anxiety/depression/SI/HI while awake.Slept for 5.75 hours and currently still sleeping.

## 2024-10-19 NOTE — PLAN OF CARE
Nursing assessment completed. Patient slept in, and then visible in the lounge throughout the shift. Cooperative with scheduled am medications. Requested and received PRN hydroxazine for anxiety with good relief. Patient verbalizes being bored and hopeful for discharge soon. He plays with a ball and looks at the book selection. He presents with a blunted affect, but brightens some on approach. States he has played sports but his favorite is to go on hikes. He denies SI/SIB or thoughts to harm others. Medication compliant. Continue to monitor and assess.

## 2024-10-19 NOTE — PLAN OF CARE
"  Problem: Psychotic Signs/Symptoms  Goal: Improved Behavioral Control (Psychotic Signs/Symptoms)  Outcome: Progressing  Intervention: Manage Behavior  Recent Flowsheet Documentation  Taken 10/18/2024 1826 by Cynthia Lamar RN  De-Escalation Techniques:   quiet time facilitated   medication administered   Goal Outcome Evaluation:    Pt was visible in the milieu. Pt denies pain. Reported anxiety of 7/10. Requested and received hydroxyzine. Denies psych symptoms such SI, HI, SIB and paranoia. Played game with peers. Use multiple prns of nicotine replacement, anxiety and anti-acid. Affect is flat. Mood is \" fine.\" Calm and cooperative with cares. No safety or behavior concern noted.      "

## 2024-10-19 NOTE — PROGRESS NOTES
Rehab Group    Start time: 1615  End time: 1700  Patient time total: 15 minutes    attended partial group    #2 attended   Group Type: recreation   Group Topic Covered: activity therapy, cognitive activities, and healthy leisure time       Group Session Detail:  TR leisure group     Patient Response/Contribution:  cooperative with task, organized, attentive, and actively engaged       Patient Detail:    Pt participated in a structured Therapeutic Recreation group with a focus on leisure participation, stress reduction, and social engagement via a group game. Pt entered group late, but joined in a familiar activity for approximately 15 minutes. Pt was a quiet participant, minimal interaction with anyone in group.Pt took each turn in an organized fashion, taking his turns with some strategies.       Activity Therapy Per 15 min ()      Patient Active Problem List   Diagnosis    Suicidal ideation    Psychosis (H)    Acute pulmonary embolism without acute cor pulmonale (H)    Alcohol use disorder, moderate, in sustained remission, dependence (H)    Anxiety    Cannabis use disorder, severe, dependence (H)    Class 1 drug-induced obesity without serious comorbidity with body mass index (BMI) of 33.0 to 33.9 in adult    Depression, major, single episode, moderate (H)    Episodic mood disorder (H)    KATHE (generalized anxiety disorder)    History of marijuana use    Obesity (BMI 30-39.9)    Obesity, Class I, BMI 30-34.9    Tobacco use disorder, moderate, in sustained remission    Schizoaffective disorder, bipolar type (H)    Psychosis, unspecified psychosis type (H)    Tardive dyskinesia

## 2024-10-20 PROCEDURE — 250N000013 HC RX MED GY IP 250 OP 250 PS 637

## 2024-10-20 PROCEDURE — 250N000013 HC RX MED GY IP 250 OP 250 PS 637: Performed by: PSYCHIATRY & NEUROLOGY

## 2024-10-20 PROCEDURE — 250N000013 HC RX MED GY IP 250 OP 250 PS 637: Performed by: STUDENT IN AN ORGANIZED HEALTH CARE EDUCATION/TRAINING PROGRAM

## 2024-10-20 PROCEDURE — 250N000012 HC RX MED GY IP 250 OP 636 PS 637: Performed by: STUDENT IN AN ORGANIZED HEALTH CARE EDUCATION/TRAINING PROGRAM

## 2024-10-20 PROCEDURE — 124N000002 HC R&B MH UMMC

## 2024-10-20 RX ADMIN — HYDROXYZINE HYDROCHLORIDE 50 MG: 50 TABLET, FILM COATED ORAL at 10:43

## 2024-10-20 RX ADMIN — PREGABALIN 300 MG: 100 CAPSULE ORAL at 19:10

## 2024-10-20 RX ADMIN — PREGABALIN 300 MG: 100 CAPSULE ORAL at 09:41

## 2024-10-20 RX ADMIN — BUPRENORPHINE AND NALOXONE 1 FILM: 4; 1 FILM, SOLUBLE BUCCAL; SUBLINGUAL at 09:41

## 2024-10-20 RX ADMIN — TAMSULOSIN HYDROCHLORIDE 0.4 MG: 0.4 CAPSULE ORAL at 09:41

## 2024-10-20 RX ADMIN — THERA TABS 1 TABLET: TAB at 09:43

## 2024-10-20 RX ADMIN — METFORMIN HYDROCHLORIDE 1000 MG: 500 TABLET, FILM COATED ORAL at 09:43

## 2024-10-20 RX ADMIN — LITHIUM CARBONATE 600 MG: 300 TABLET, EXTENDED RELEASE ORAL at 19:10

## 2024-10-20 RX ADMIN — NICOTINE POLACRILEX 8 MG: 4 GUM, CHEWING BUCCAL at 09:41

## 2024-10-20 RX ADMIN — NICOTINE POLACRILEX 8 MG: 4 GUM, CHEWING BUCCAL at 13:05

## 2024-10-20 RX ADMIN — ASPIRIN 81 MG CHEWABLE TABLET 81 MG: 81 TABLET CHEWABLE at 09:41

## 2024-10-20 RX ADMIN — OLANZAPINE 20 MG: 20 TABLET, ORALLY DISINTEGRATING ORAL at 19:10

## 2024-10-20 RX ADMIN — NICOTINE 1 PATCH: 21 PATCH, EXTENDED RELEASE TRANSDERMAL at 09:41

## 2024-10-20 RX ADMIN — SENNOSIDES AND DOCUSATE SODIUM 1 TABLET: 8.6; 5 TABLET ORAL at 09:41

## 2024-10-20 RX ADMIN — POLYETHYLENE GLYCOL 3350 17 G: 17 POWDER, FOR SOLUTION ORAL at 09:41

## 2024-10-20 RX ADMIN — SENNOSIDES AND DOCUSATE SODIUM 1 TABLET: 8.6; 5 TABLET ORAL at 19:10

## 2024-10-20 RX ADMIN — NICOTINE POLACRILEX 8 MG: 4 GUM, CHEWING BUCCAL at 17:05

## 2024-10-20 RX ADMIN — OLANZAPINE 10 MG: 10 TABLET, ORALLY DISINTEGRATING ORAL at 09:41

## 2024-10-20 RX ADMIN — HYDROXYZINE HYDROCHLORIDE 50 MG: 50 TABLET, FILM COATED ORAL at 17:04

## 2024-10-20 RX ADMIN — BUPRENORPHINE AND NALOXONE 1 FILM: 4; 1 FILM, SOLUBLE BUCCAL; SUBLINGUAL at 19:11

## 2024-10-20 RX ADMIN — ACETAMINOPHEN, ASPIRIN, CAFFEINE 1 TABLET: 250; 65; 250 TABLET, FILM COATED ORAL at 11:56

## 2024-10-20 RX ADMIN — METFORMIN HYDROCHLORIDE 1000 MG: 500 TABLET, FILM COATED ORAL at 16:24

## 2024-10-20 RX ADMIN — ARIPIPRAZOLE 5 MG: 5 TABLET ORAL at 19:10

## 2024-10-20 RX ADMIN — ALUMINUM HYDROXIDE, MAGNESIUM HYDROXIDE, AND DIMETHICONE 30 ML: 200; 20; 200 SUSPENSION ORAL at 20:04

## 2024-10-20 ASSESSMENT — ACTIVITIES OF DAILY LIVING (ADL)
ADLS_ACUITY_SCORE: 28
HYGIENE/GROOMING: INDEPENDENT
ADLS_ACUITY_SCORE: 28
DRESS: SCRUBS (BEHAVIORAL HEALTH)
ADLS_ACUITY_SCORE: 28
ORAL_HYGIENE: INDEPENDENT

## 2024-10-20 NOTE — PLAN OF CARE
Nursing assessment completed. Patient slept in, and then visible in the lounge throughout the shift. Cooperative with scheduled am medications. Patient verbalizes being bored and hopeful for discharge soon. He presents with a blunted affect, but brightens some on approach.  He denies SI/SIB or thoughts to harm others. Medication compliant. Continue to monitor and assess.

## 2024-10-20 NOTE — PLAN OF CARE
Problem: Psychotic Signs/Symptoms  Goal: Improved Psychomotor Symptoms (Psychotic Signs/Symptoms)  Outcome: Progressing  Intervention: Manage Psychomotor Movement  Recent Flowsheet Documentation  Taken 10/19/2024 1759 by Cynthia Lamar RN  Patient Performed Hygiene: dressed  Diversional Activity:   television   music  Activity (Behavioral Health): up ad neto   Goal Outcome Evaluation:    Pt was visible in the milieu throughout the shift. Isolative to self. Seen using the ipad at pod 1 lounge. Requested to have his phone for music, but was encouraged to wait later. But has not renewed his desire to use this phone. Received few prn medications this shift. Denies pain and psych symptoms. Affect is flat. Appetite is good. Behavior under control. Safety concerns not observed.

## 2024-10-20 NOTE — PROVIDER NOTIFICATION
10/20/24 0700   Sleep/Rest   Sleep/Rest/Relaxation appears asleep;no problem identified   Sleep Hygiene Promotion noise level reduced   Night Time # Hours 5.5 hours     Assumed care of patient at 1130 pm, in bed sleeping without any s/s distress.Breathing easy,regular non-labored.No behavioral issues noted.No signs of SI,HI,SIB or psychosis noted.15 minute safety checks maintained as per policy.No management concerns. Slept for 5.5 hours

## 2024-10-21 PROCEDURE — 250N000013 HC RX MED GY IP 250 OP 250 PS 637: Performed by: PSYCHIATRY & NEUROLOGY

## 2024-10-21 PROCEDURE — 250N000013 HC RX MED GY IP 250 OP 250 PS 637: Performed by: STUDENT IN AN ORGANIZED HEALTH CARE EDUCATION/TRAINING PROGRAM

## 2024-10-21 PROCEDURE — 124N000002 HC R&B MH UMMC

## 2024-10-21 PROCEDURE — 250N000013 HC RX MED GY IP 250 OP 250 PS 637

## 2024-10-21 PROCEDURE — 97150 GROUP THERAPEUTIC PROCEDURES: CPT | Mod: GO

## 2024-10-21 PROCEDURE — 250N000012 HC RX MED GY IP 250 OP 636 PS 637: Performed by: STUDENT IN AN ORGANIZED HEALTH CARE EDUCATION/TRAINING PROGRAM

## 2024-10-21 PROCEDURE — 99232 SBSQ HOSP IP/OBS MODERATE 35: CPT | Mod: GC | Performed by: PSYCHIATRY & NEUROLOGY

## 2024-10-21 RX ADMIN — METFORMIN HYDROCHLORIDE 1000 MG: 500 TABLET, FILM COATED ORAL at 16:01

## 2024-10-21 RX ADMIN — QUETIAPINE FUMARATE 25 MG: 25 TABLET ORAL at 14:40

## 2024-10-21 RX ADMIN — OLANZAPINE 10 MG: 10 TABLET, ORALLY DISINTEGRATING ORAL at 09:52

## 2024-10-21 RX ADMIN — NICOTINE POLACRILEX 8 MG: 4 GUM, CHEWING BUCCAL at 16:04

## 2024-10-21 RX ADMIN — THERA TABS 1 TABLET: TAB at 09:52

## 2024-10-21 RX ADMIN — ASPIRIN 81 MG CHEWABLE TABLET 81 MG: 81 TABLET CHEWABLE at 09:52

## 2024-10-21 RX ADMIN — TAMSULOSIN HYDROCHLORIDE 0.4 MG: 0.4 CAPSULE ORAL at 09:52

## 2024-10-21 RX ADMIN — BUPRENORPHINE AND NALOXONE 1 FILM: 4; 1 FILM, SOLUBLE BUCCAL; SUBLINGUAL at 19:11

## 2024-10-21 RX ADMIN — HYDROXYZINE HYDROCHLORIDE 50 MG: 50 TABLET, FILM COATED ORAL at 20:20

## 2024-10-21 RX ADMIN — POLYETHYLENE GLYCOL 3350 17 G: 17 POWDER, FOR SOLUTION ORAL at 09:53

## 2024-10-21 RX ADMIN — METFORMIN HYDROCHLORIDE 1000 MG: 500 TABLET, FILM COATED ORAL at 09:52

## 2024-10-21 RX ADMIN — OLANZAPINE 20 MG: 20 TABLET, ORALLY DISINTEGRATING ORAL at 19:10

## 2024-10-21 RX ADMIN — LITHIUM CARBONATE 600 MG: 300 TABLET, EXTENDED RELEASE ORAL at 19:11

## 2024-10-21 RX ADMIN — ARIPIPRAZOLE 5 MG: 5 TABLET ORAL at 19:11

## 2024-10-21 RX ADMIN — NICOTINE 1 PATCH: 21 PATCH, EXTENDED RELEASE TRANSDERMAL at 09:53

## 2024-10-21 RX ADMIN — NICOTINE POLACRILEX 8 MG: 4 GUM, CHEWING BUCCAL at 13:19

## 2024-10-21 RX ADMIN — NICOTINE POLACRILEX 8 MG: 4 GUM, CHEWING BUCCAL at 14:40

## 2024-10-21 RX ADMIN — NICOTINE POLACRILEX 8 MG: 4 GUM, CHEWING BUCCAL at 21:48

## 2024-10-21 RX ADMIN — NICOTINE POLACRILEX 8 MG: 4 GUM, CHEWING BUCCAL at 18:23

## 2024-10-21 RX ADMIN — ACETAMINOPHEN, ASPIRIN, CAFFEINE 1 TABLET: 250; 65; 250 TABLET, FILM COATED ORAL at 16:02

## 2024-10-21 RX ADMIN — SENNOSIDES AND DOCUSATE SODIUM 1 TABLET: 8.6; 5 TABLET ORAL at 19:11

## 2024-10-21 RX ADMIN — BUPRENORPHINE AND NALOXONE 1 FILM: 4; 1 FILM, SOLUBLE BUCCAL; SUBLINGUAL at 09:52

## 2024-10-21 RX ADMIN — NICOTINE POLACRILEX 8 MG: 4 GUM, CHEWING BUCCAL at 11:04

## 2024-10-21 RX ADMIN — PREGABALIN 300 MG: 100 CAPSULE ORAL at 19:11

## 2024-10-21 RX ADMIN — SENNOSIDES AND DOCUSATE SODIUM 1 TABLET: 8.6; 5 TABLET ORAL at 09:52

## 2024-10-21 RX ADMIN — PREGABALIN 300 MG: 100 CAPSULE ORAL at 09:52

## 2024-10-21 RX ADMIN — HYDROXYZINE HYDROCHLORIDE 25 MG: 25 TABLET, FILM COATED ORAL at 11:04

## 2024-10-21 ASSESSMENT — ACTIVITIES OF DAILY LIVING (ADL)
DRESS: SCRUBS (BEHAVIORAL HEALTH);INDEPENDENT
ORAL_HYGIENE: INDEPENDENT
ADLS_ACUITY_SCORE: 28
LAUNDRY: UNABLE TO COMPLETE
ADLS_ACUITY_SCORE: 38
ADLS_ACUITY_SCORE: 28
ORAL_HYGIENE: INDEPENDENT;PROMPTS
ADLS_ACUITY_SCORE: 28
ADLS_ACUITY_SCORE: 28
ADLS_ACUITY_SCORE: 38
HYGIENE/GROOMING: INDEPENDENT;PROMPTS
ADLS_ACUITY_SCORE: 28
ADLS_ACUITY_SCORE: 38
ADLS_ACUITY_SCORE: 38
ADLS_ACUITY_SCORE: 28
ADLS_ACUITY_SCORE: 28
HYGIENE/GROOMING: INDEPENDENT
DRESS: SCRUBS (BEHAVIORAL HEALTH);INDEPENDENT
ADLS_ACUITY_SCORE: 28

## 2024-10-21 NOTE — PLAN OF CARE
Team Note Due:  Wednesday     Assessment/Intervention/Current Symtoms and Care Coordination:  Chart review and met with team, discussed pt progress, symptomology, and response to treatment. Discussed the discharge plan and any potential impediments to discharge.    Friday - Tenisha Khan called expressing concerns that Eloy doesn't do well attending his psych appts without his mom reminding him of appts, she is strongly advocating for ACT team. She also asked about his course at hospital, I did say she will get the discharge summary but she requests a call from provider. I emailed Chasity Hill to see if she started the referral for an ACT team.    Per team, Pt is stabilizing and may have to consider shelter or crisis if there is no placement found due to Eloy declining [facility] and not meeting medical necessity. I contacted CM again asking if she has other options otherwise we may need to explore crisis or shelter until group home becomes open.      I spoke to Eloy and shared that we may have to consider crisis or shelter due to him declining options such as [place], IRTS, substance use treatment. He says he will consider IRTS and wants to see information. I printed him information about several IRTS locations. I educated him on what IRTS entails and he says he doesn't want to go to groups, he says he would rather just stay here until he gets a group home. I reiterated he is not meeting medical necessity and depending how long it takes he may not have that option. He asked me to call his mom to get more group home ideas and I tasked him with doing that and getting a list to be involved in his treatment discharge planning. He presents with a flat affect, limited insight. Continues to decline treatment. Continues to appear disheveled.     Discharge Plan or Goal:  Cannot return to  previous  housing due to Suboxone use.   TBD - group home?   [facility] accepted Eloy but he declined as he doesn't want to give  his money and only have $150 remaining.  10/15: Eloy wants to return to his old group home - will have to ask them again if he is able to return once off Suboxone. 10/17: Called [facility] to see if he can return 10/18: [facility] Declined Eloy - suggest substance use treatment      Barriers to Discharge:  Placement / stabilizing     Referral Status:  [facility] 10/15 - they accepted but Eloy is declining   [facility] - on waitlist (months away)      Suboxone at [facility]   Monday 10/28 at 2PM with Janelle Molina DNP  [facility] [address] [place] [address]    Psychiatry appt:   Tenisha Khan [facility] outpatient   Appt: 10/25/24 9:30AM in Person  [address] [facility].     10/16: I called [facility] group homes and left a voicemail asking about openings.    10/16: I called [facility] and left a voicemail asking about openings.     10/16: I sent [facility] referral for their group home locations.            10/17: Baron Chavira confirmed receipt and shared he will review the referral.     10/17: [facility] group home responded back that they have an opening for a group home. I asked for the location and to see if they accept Suboxone.      10/18: [facility] says they would like to proceed and they take Suboxone - they did not receive a referral yet however, I connected CADI waiver and CM to verify this placement fits CADI requirements prior to sending referral docs. Address of [facility] is [address] [place] [address] and they accept Suboxone. CADI CM stated she will check [facility] NPI and follow up with referral on her end. She stated this will take a couple of days.     Legal Status:  Committed and [place]   [facility]: [place]  File Number: [id number]  Start and expiration date of commitment:   9/30: Submitted [place] Amendment to remove Invega and add Prolixin.  New [place] meds as of 10/1: Zyprexa, Seroquel, Prolixin, Abilify     Contacts (include KELBY  status):  Psych: Dr. Khan, [place] [facility] - called from cell phone for an update #[phone] (Eloy signed KELBY 10/1 for medications related info)  Michelle Cortes/Mother: [phone]    -   New CADI CM is: Sonia Burdick Ph: [phone]  Email: [email or web address] (KELBY signed 10/1/24)    New Commitment CM:  Chasity [email or web address] [phone] - No KELBY needed.    [facility] , Rekha Little - Ph: [phone]    Upcoming Meetings and Dates/Important Information and next steps:  Update discharge referral form at discharge   PD and COS at discharge  If he's off Suboxone at discharge - cancel addiction medicine option.

## 2024-10-21 NOTE — PLAN OF CARE
"  Problem: Psychotic Signs/Symptoms  Goal: Improved Behavioral Control (Psychotic Signs/Symptoms)  Outcome: Progressing  Intervention: Manage Behavior  Recent Flowsheet Documentation  Taken 10/20/2024 1957 by Cynthia Lamar RN  De-Escalation Techniques:   medication offered   medication administered   quiet time facilitated   Goal Outcome Evaluation:    Pt was out in the lounge. Watched TV but declined evening group. Denies pain. Reported mood as \" ok \". His affect is flat. Denies psych symptoms such as SI, HI, SIB, anxiety and depression. Medications and other cares compliant.  Pt requested his phone to retrieve few songs to listen. No behavior or safety issues throughout the shift.      " no

## 2024-10-21 NOTE — PLAN OF CARE
Problem: Sleep Disturbance  Goal: Adequate Sleep/Rest  Outcome: Progressing   Goal Outcome Evaluation:  [id number]: Pt sleeping at the start of the shift in no apparent distress. Continue on suicide/assault/cheeking precautions without any related behavior noted.Safety mainatined. Slept for 5.75 hours.

## 2024-10-21 NOTE — PLAN OF CARE
"Problem: Behavioral Disturbance  Goal: Behavioral Disturbance  Description: Signs and symptoms of listed problems will be absent or manageable by discharge or transition of care.  Outcome: Progressing  Patient is alert and oriented x 3, calm and compliant with vitals and medication administration. He was visible in the lounge area, attended group, but was not social with peers. He did not complain of urine retention this shift. Patient did not complain of pain, but he endorsed anxiety x2 rated 6/10 and received prn hydroxyzine 25 mg without relief. Patient denies all other psych symptoms. Patient is eating and drinking without issues.     Prn given  Nicotine gum  Hydroxyzine 25 mg   Seroquel 25 mg     /72 (BP Location: Right arm, Patient Position: Sitting, Cuff Size: Adult Regular)   Pulse 89   Temp 98.1  F (36.7  C) (Temporal)   Resp 14   Ht 1.854 m (6' 1\")   Wt 103.1 kg (227 lb 6.4 oz)   SpO2 95%   BMI 30.00 kg/m        "

## 2024-10-21 NOTE — CARE PLAN
Rehab Group    Start time: 1030   End time: 1200  Patient time total: 45 minutes    attended partial group     #3 attended   Group Type: OT Clinic and general health and coping   Group Topic Covered: activity therapy, coping skills, and healthy leisure time     Group Session Detail:  Check-in, OT clinic  OT clinic group provides an opportunity for individual-based goal directed activity within a group setting - allowing for interaction and socialization.  Hands-on task work help to build/restore/or maintain skills such as: focus, concentration, decision making, and problem solving.  Patients are encouraged to carry over potential new interests/hobbies learned in group following discharge.       Patient Response/Contribution:  positive affect, cooperative with task, actively engaged, and left group on several occasions     Patient Detail:    Quiet during check-in, states nothing much is new, alludes to the same thing day after day, being here so long.  When asked if there was anything that would be of interest, pt replied not really.  Had some difficulty getting started in OT clinic, wandered in and out of group several times, though eventually became involved in a beading project.      [id number] OT Group (2 or more in attendance)      Patient Active Problem List   Diagnosis    Suicidal ideation    Psychosis (H)    Acute pulmonary embolism without acute cor pulmonale (H)    Alcohol use disorder, moderate, in sustained remission, dependence (H)    Anxiety    Cannabis use disorder, severe, dependence (H)    Class 1 drug-induced obesity without serious comorbidity with body mass index (BMI) of 33.0 to 33.9 in adult    Depression, major, single episode, moderate (H)    Episodic mood disorder (H)    KATHE (generalized anxiety disorder)    History of marijuana use    Obesity (BMI 30-39.9)    Obesity, Class I, BMI 30-34.9    Tobacco use disorder, moderate, in sustained remission    Schizoaffective disorder, bipolar type (H)     Psychosis, unspecified psychosis type (H)    Tardive dyskinesia

## 2024-10-21 NOTE — PLAN OF CARE
"  Problem: Psychotic Signs/Symptoms  Goal: Improved Behavioral Control (Psychotic Signs/Symptoms)  Outcome: Progressing  Intervention: Manage Behavior  Recent Flowsheet Documentation  Taken 10/21/2024 1726 by Cynthia Lamar RN  De-Escalation Techniques:   medication administered   medication offered   quiet time facilitated   Goal Outcome Evaluation:    Pt spent most of the shift in the lounge. He was sitting at TV area. He was not interacting with any peer or staff. Reported back pain of 5/10. Received a prn of tylenol with helps. Reported his mood as \" good\". His affect remained flat and restricted. He denies all psych symptoms. Had some prn of nicotine replacement. He was medications compliant. He was calm and cooperative with other nursing care. His BP was low at some time. But when rechecked, it became normal. He requested to weigh himself. He did and showed a thumb up to writer.  He denies any physical symptoms besides back pain. No behavioral concerns throughout the shift.      "

## 2024-10-21 NOTE — PROGRESS NOTES
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 25 mg  25 mg Oral Q6H PRN Cathy Carver MD   25 mg at 10/13/24 2355       Allergies   Allergen Reactions    Cefuroxime Unknown     PN: LW Reaction: Rash, Generalized    Other reaction(s): Unknown   PN: LW Reaction: Rash, Generalized   PN: LW Reaction: Rash, Generalized    No Clinical Screening - See Comments Other (See Comments)     Patient had a reaction to some medication when he went to the dentist as a toddler    Other Allergy (See Comments) [External Allergen Needs Reconciliation - See Comment] Unknown     Other reaction(s): *Unknown - Childhood Rxn, Patient had a reaction to some medication when he went to the dentist as a toddler    Other Drug Allergy (See Comments)      Other reaction(s): *Unknown - Childhood Rxn   Patient had a reaction to some medication when he went to the dentist as a toddler

## 2024-10-21 NOTE — PLAN OF CARE
BEH IP Unit Acuity Rating Score (UARS)  Patient is given one point for every criteria they meet.    CRITERIA SCORING   On a 72 hour hold, court hold, committed, stay of commitment, or revocation. 1    Patient LOS on BEH unit exceeds 20 days. 1 LOS: 48   Patient under guardianship, 55+, otherwise medically complex, or under age 11. 0   Suicide ideation without relief of precipitating factors. 0   Current plan for suicide. 0   Current plan for homicide. 0   Imminent risk or actual attempt to seriously harm another without relief of factors precipitating the attempt. 0   Severe dysfunction in daily living (ex: complete neglect for self care, extreme disruption in vegetative function, extreme deterioration in social interactions). 0   Recent (last 7 days) or current physical aggression in the ED or on unit. 0   Restraints or seclusion episode in past 72 hours. 0   Recent (last 7 days) or current verbal aggression, agitation, yelling, etc., while in the ED or unit. 0   Active psychosis. 0   Need for constant or near constant redirection (from leaving, from others, etc).  0   Intrusive or disruptive behaviors. 0   Patient requires 3 or more hours of individualized nursing care per 8-hour shift (i.e. for ADLs, meds, therapeutic interventions). 0   TOTAL 2

## 2024-10-22 LAB — LITHIUM SERPL-SCNC: 0.32 MMOL/L (ref 0.6–1.2)

## 2024-10-22 PROCEDURE — 124N000002 HC R&B MH UMMC

## 2024-10-22 PROCEDURE — 250N000013 HC RX MED GY IP 250 OP 250 PS 637: Performed by: PSYCHIATRY & NEUROLOGY

## 2024-10-22 PROCEDURE — 84439 ASSAY OF FREE THYROXINE: CPT

## 2024-10-22 PROCEDURE — 250N000013 HC RX MED GY IP 250 OP 250 PS 637: Performed by: CLINICAL NURSE SPECIALIST

## 2024-10-22 PROCEDURE — 82306 VITAMIN D 25 HYDROXY: CPT

## 2024-10-22 PROCEDURE — 80178 ASSAY OF LITHIUM: CPT

## 2024-10-22 PROCEDURE — 250N000013 HC RX MED GY IP 250 OP 250 PS 637

## 2024-10-22 PROCEDURE — 80053 COMPREHEN METABOLIC PANEL: CPT

## 2024-10-22 PROCEDURE — 36415 COLL VENOUS BLD VENIPUNCTURE: CPT

## 2024-10-22 PROCEDURE — 97150 GROUP THERAPEUTIC PROCEDURES: CPT | Mod: GO

## 2024-10-22 PROCEDURE — 250N000013 HC RX MED GY IP 250 OP 250 PS 637: Performed by: STUDENT IN AN ORGANIZED HEALTH CARE EDUCATION/TRAINING PROGRAM

## 2024-10-22 PROCEDURE — 250N000012 HC RX MED GY IP 250 OP 636 PS 637: Performed by: STUDENT IN AN ORGANIZED HEALTH CARE EDUCATION/TRAINING PROGRAM

## 2024-10-22 PROCEDURE — 84443 ASSAY THYROID STIM HORMONE: CPT

## 2024-10-22 RX ORDER — LITHIUM CARBONATE 300 MG/1
300 TABLET, FILM COATED, EXTENDED RELEASE ORAL DAILY
Status: DISCONTINUED | OUTPATIENT
Start: 2024-10-22 | End: 2024-10-23

## 2024-10-22 RX ADMIN — ASPIRIN 81 MG CHEWABLE TABLET 81 MG: 81 TABLET CHEWABLE at 08:22

## 2024-10-22 RX ADMIN — PREGABALIN 300 MG: 100 CAPSULE ORAL at 19:28

## 2024-10-22 RX ADMIN — PREGABALIN 300 MG: 100 CAPSULE ORAL at 08:22

## 2024-10-22 RX ADMIN — LITHIUM CARBONATE 300 MG: 300 TABLET, EXTENDED RELEASE ORAL at 15:50

## 2024-10-22 RX ADMIN — ARIPIPRAZOLE 5 MG: 5 TABLET ORAL at 19:29

## 2024-10-22 RX ADMIN — SENNOSIDES AND DOCUSATE SODIUM 1 TABLET: 8.6; 5 TABLET ORAL at 19:30

## 2024-10-22 RX ADMIN — NICOTINE POLACRILEX 8 MG: 4 GUM, CHEWING BUCCAL at 12:52

## 2024-10-22 RX ADMIN — NICOTINE 1 PATCH: 21 PATCH, EXTENDED RELEASE TRANSDERMAL at 08:23

## 2024-10-22 RX ADMIN — HYDROXYZINE HYDROCHLORIDE 50 MG: 50 TABLET, FILM COATED ORAL at 17:05

## 2024-10-22 RX ADMIN — OLANZAPINE 10 MG: 10 TABLET, ORALLY DISINTEGRATING ORAL at 08:23

## 2024-10-22 RX ADMIN — BUPRENORPHINE AND NALOXONE 1 FILM: 4; 1 FILM, SOLUBLE BUCCAL; SUBLINGUAL at 08:22

## 2024-10-22 RX ADMIN — NICOTINE POLACRILEX 8 MG: 4 GUM, CHEWING BUCCAL at 09:04

## 2024-10-22 RX ADMIN — METFORMIN HYDROCHLORIDE 1000 MG: 500 TABLET, FILM COATED ORAL at 17:06

## 2024-10-22 RX ADMIN — OLANZAPINE 20 MG: 20 TABLET, ORALLY DISINTEGRATING ORAL at 19:29

## 2024-10-22 RX ADMIN — LITHIUM CARBONATE 600 MG: 300 TABLET, EXTENDED RELEASE ORAL at 19:30

## 2024-10-22 RX ADMIN — NICOTINE POLACRILEX 8 MG: 4 GUM, CHEWING BUCCAL at 16:37

## 2024-10-22 RX ADMIN — BUPRENORPHINE AND NALOXONE 1 FILM: 4; 1 FILM, SOLUBLE BUCCAL; SUBLINGUAL at 19:29

## 2024-10-22 RX ADMIN — THERA TABS 1 TABLET: TAB at 08:23

## 2024-10-22 RX ADMIN — METFORMIN HYDROCHLORIDE 1000 MG: 500 TABLET, FILM COATED ORAL at 08:23

## 2024-10-22 RX ADMIN — SENNOSIDES AND DOCUSATE SODIUM 1 TABLET: 8.6; 5 TABLET ORAL at 08:23

## 2024-10-22 RX ADMIN — ACETAMINOPHEN, ASPIRIN, CAFFEINE 1 TABLET: 250; 65; 250 TABLET, FILM COATED ORAL at 12:52

## 2024-10-22 RX ADMIN — TAMSULOSIN HYDROCHLORIDE 0.4 MG: 0.4 CAPSULE ORAL at 08:23

## 2024-10-22 ASSESSMENT — ACTIVITIES OF DAILY LIVING (ADL)
ADLS_ACUITY_SCORE: 28
ADLS_ACUITY_SCORE: 28
LAUNDRY: UNABLE TO COMPLETE
ADLS_ACUITY_SCORE: 28
ORAL_HYGIENE: INDEPENDENT
DRESS: SCRUBS (BEHAVIORAL HEALTH)
ADLS_ACUITY_SCORE: 28
HYGIENE/GROOMING: INDEPENDENT
ADLS_ACUITY_SCORE: 28
HYGIENE/GROOMING: HANDWASHING;INDEPENDENT
ADLS_ACUITY_SCORE: 28

## 2024-10-22 NOTE — PLAN OF CARE
"  Problem: Adult Inpatient Plan of Care  Goal: Plan of Care Review  Description: The Plan of Care Review/Shift note should be completed every shift.  The Outcome Evaluation is a brief statement about your assessment that the patient is improving, declining, or no change.  This information will be displayed automatically on your shift  note.  Outcome: Progressing  Flowsheets (Taken 10/22/2024 4263)  Plan of Care Reviewed With: patient  Overall Patient Progress: improving   Goal Outcome Evaluation:    Plan of Care Reviewed With: patient Plan of Care Reviewed With: patient    Overall Patient Progress: improvingOverall Patient Progress: improving     Patient present in the milieu throughout the day mainly isolative to self though does attend group therapy. Patient upon approach flat in affect, calm and cooperative with verbal assessment. Patient reporting overall mood as \"good\". Patient denies SI/SIB, AH/VH, anxiety, depression,  or thoughts of harming others.     Patient cooperative with vital sign assessments which were stable. Patient diet good eating 100% of breakfast and lunch. Patient independent with requesting and accepting of scheduled medications with no stated or observed side effects. Patient requested PRN Excedrin for headache/back pain 6/10, observed sleeping a short time after given.  Patient independent with identifying needs and completing adl's.       "

## 2024-10-22 NOTE — PLAN OF CARE
Rehab Group    Start time: 1030  End time: 1115  Patient time total: 30 minutes    attended partial group    #4 attended   Group Type: occupational therapy   Group Topic Covered: coping skills, mindfulness, relaxation , and self-care    Coping skills Bingo discussion activity      Group Session Detail:    Patient Response: Pt participated in Bingo activity with emphasis on healthy coping skills and different emotional regulation and self-care practices to support overall health and wellness through group discussion questions embedded within activity.       Mood/Affect: Flat         Plan: Patient encouraged to maintain attendance for continued ongoing support in working towards occupational therapy goals to support overall treatment/care.        Patient Detail:    Was observed to be following along with the activity, crossing off numbers as he went. Did however require encouragement and prompting to engage in the discussion portion of the activity, would otherwise follow along quietly. Offered a few insightful responses to coping skills discussion questions when prompted by writer.       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

## 2024-10-22 NOTE — PLAN OF CARE
Team Note Due:  Wednesday     Assessment/Intervention/Current Symtoms and Care Coordination:  Chart review and met with team, discussed pt progress, symptomology, and response to treatment. Discussed the discharge plan and any potential impediments to discharge.    Per team, Pt had no issues yesterday.     I reached out to commitment CM Chasity to share Eloy changed mind and is willing to go to [facility], and that I had reached out to them yesterday to see what needs to be done for him to go there.     Discharge Plan or Goal:  Cannot return to  previous  housing due to Suboxone use.   TBD - group home?   [facility] accepted Eloy but he declined as he doesn't want to give his money and only have $150 remaining.  10/15: Eloy wants to return to his old group home - will have to ask them again if he is able to return once off Suboxone. 10/17: Called old [facility] to see if he can return 10/18:  Declined Eloy - suggest substance use treatment      Barriers to Discharge:  Placement / stabilizing     Referral Status:  [facility] 10/15 - they accepted but Eloy is declining   [facility] - on waitlist (months away)      [facility] at [facility]   Monday 10/28 at 2PM with Janelle Molina DNP  [facility] [address] [address]    Psychiatry appt:   [facility]   Appt: 10/25/24 9:30AM in Person  [address] [place] [address].     10/16: I called [facility] and left a voicemail asking about openings.    10/16: I called [facility] and left a voicemail asking about openings.     10/16: I sent [facility] referral for their group home locations.            10/17: Baron Chavira confirmed receipt and shared he will review the referral.     10/17: [facility] responded back that they have an opening for a group home. I asked for the location and to see if they accept Suboxone.      10/18: [facility] says they would like to proceed and  they take Suboxone - they did not receive a referral yet however, I connected CADI waiver and CM to verify this placement fits CADI requirements prior to sending referral docs. Address of [facility] is [address] [place] [address] [place] [address] and they accept Suboxone. CADI CM stated she will check [facility] NPI and follow up with referral on her end. She stated this will take a couple of days.     Legal Status:  Committed and [place]   [facility]: [place]  File Number: [id number]  Start and expiration date of commitment:   9/30: Submitted [place] Amendment to remove Invega and add Prolixin.  New [place] meds as of 10/1: Zyprexa, Seroquel, Prolixin, Abilify     Contacts (include KELBY status):  Psych: Dr. Khan, [facility] - called from cell phone for an update #[phone] (Eloy signed KLEBY 10/1 for medications related info)  Michelle Cortes/Mother: [phone]    -   New CADI CM is: Sonia Burdick Ph: [phone]  Email: [email or web address] (KELBY signed 10/1/24)    New Commitment CM:  Chasity [email or web address] [phone] - No KELBY needed.    [facility] , Rekha Little - Ph: [phone]    Upcoming Meetings and Dates/Important Information and next steps:  Update discharge referral form at discharge   PD and COS at discharge  If he's off Suboxone at discharge - cancel addiction medicine option.

## 2024-10-22 NOTE — PLAN OF CARE
BEH IP Unit Acuity Rating Score (UARS)  Patient is given one point for every criteria they meet.    CRITERIA SCORING   On a 72 hour hold, court hold, committed, stay of commitment, or revocation. 1    Patient LOS on BEH unit exceeds 20 days. 1 LOS: 49   Patient under guardianship, 55+, otherwise medically complex, or under age 11. 0   Suicide ideation without relief of precipitating factors. 0   Current plan for suicide. 0   Current plan for homicide. 0   Imminent risk or actual attempt to seriously harm another without relief of factors precipitating the attempt. 0   Severe dysfunction in daily living (ex: complete neglect for self care, extreme disruption in vegetative function, extreme deterioration in social interactions). 0   Recent (last 7 days) or current physical aggression in the ED or on unit. 0   Restraints or seclusion episode in past 72 hours. 0   Recent (last 7 days) or current verbal aggression, agitation, yelling, etc., while in the ED or unit. 0   Active psychosis. 0   Need for constant or near constant redirection (from leaving, from others, etc).  0   Intrusive or disruptive behaviors. 0   Patient requires 3 or more hours of individualized nursing care per 8-hour shift (i.e. for ADLs, meds, therapeutic interventions). 0   TOTAL 2

## 2024-10-22 NOTE — CARE PLAN
Start time: 1115   End time: 1200  Patient time total: 40 minutes    attended full group     #3 attended   Group Type: OT Clinic   Group Topic Covered: activity therapy and coping skills     Group Session Detail:  The purpose of occupational therapy clinic is to facilitate coping skill exploration, creative expression within personally meaningful activities, and clinical observation of social, cognitive, and kinesthetic performance skills.      Patient Response/Contribution:  cooperative with task     Patient Detail:    Pt requested OT clinic given a few options for group time, though was indecisive about how he wanted to spend his activity time.  Eventually chose to a paint a small wood piece.  Minimal conversation with writer or peers.      [id number] OT Group (2 or more in attendance)    Patient Active Problem List   Diagnosis    Suicidal ideation    Psychosis (H)    Acute pulmonary embolism without acute cor pulmonale (H)    Alcohol use disorder, moderate, in sustained remission, dependence (H)    Anxiety    Cannabis use disorder, severe, dependence (H)    Class 1 drug-induced obesity without serious comorbidity with body mass index (BMI) of 33.0 to 33.9 in adult    Depression, major, single episode, moderate (H)    Episodic mood disorder (H)    KATHE (generalized anxiety disorder)    History of marijuana use    Obesity (BMI 30-39.9)    Obesity, Class I, BMI 30-34.9    Tobacco use disorder, moderate, in sustained remission    Schizoaffective disorder, bipolar type (H)    Psychosis, unspecified psychosis type (H)    Tardive dyskinesia

## 2024-10-22 NOTE — PROGRESS NOTES
Lithium lab levels returned low at 0.32. Attending provider Dr. Daniel notified and report passed off to oncoming nursing team.

## 2024-10-22 NOTE — PLAN OF CARE
Problem: Sleep Disturbance  Goal: Adequate Sleep/Rest  Outcome: Progressing   Goal Outcome Evaluation:[id number]: Pt sleeping at the start of the shift in no apparent distress. Continue on suicide/assault and cheeking precautions without any related behavior noted.Safety maintained. Slept for 4.25 hours.

## 2024-10-23 LAB
ALBUMIN SERPL BCG-MCNC: 4.3 G/DL (ref 3.5–5.2)
ALP SERPL-CCNC: 94 U/L (ref 40–150)
ALT SERPL W P-5'-P-CCNC: 16 U/L (ref 0–70)
ANION GAP SERPL CALCULATED.3IONS-SCNC: 11 MMOL/L (ref 7–15)
AST SERPL W P-5'-P-CCNC: 27 U/L (ref 0–45)
BASOPHILS # BLD AUTO: 0 10E3/UL (ref 0–0.2)
BASOPHILS NFR BLD AUTO: 0 %
BILIRUB SERPL-MCNC: 0.4 MG/DL
BUN SERPL-MCNC: 13.2 MG/DL (ref 6–20)
CALCIUM SERPL-MCNC: 9.8 MG/DL (ref 8.8–10.4)
CHLORIDE SERPL-SCNC: 101 MMOL/L (ref 98–107)
CREAT SERPL-MCNC: 0.83 MG/DL (ref 0.67–1.17)
EGFRCR SERPLBLD CKD-EPI 2021: >90 ML/MIN/1.73M2
EOSINOPHIL # BLD AUTO: 0.2 10E3/UL (ref 0–0.7)
EOSINOPHIL NFR BLD AUTO: 2 %
ERYTHROCYTE [DISTWIDTH] IN BLOOD BY AUTOMATED COUNT: 11.3 % (ref 10–15)
FOLATE SERPL-MCNC: 22.7 NG/ML (ref 4.6–34.8)
GLUCOSE SERPL-MCNC: 80 MG/DL (ref 70–99)
HCO3 SERPL-SCNC: 29 MMOL/L (ref 22–29)
HCT VFR BLD AUTO: 39.6 % (ref 40–53)
HGB BLD-MCNC: 13.7 G/DL (ref 13.3–17.7)
IMM GRANULOCYTES # BLD: 0 10E3/UL
IMM GRANULOCYTES NFR BLD: 0 %
LYMPHOCYTES # BLD AUTO: 2.1 10E3/UL (ref 0.8–5.3)
LYMPHOCYTES NFR BLD AUTO: 25 %
MCH RBC QN AUTO: 31 PG (ref 26.5–33)
MCHC RBC AUTO-ENTMCNC: 34.6 G/DL (ref 31.5–36.5)
MCV RBC AUTO: 90 FL (ref 78–100)
MONOCYTES # BLD AUTO: 0.6 10E3/UL (ref 0–1.3)
MONOCYTES NFR BLD AUTO: 7 %
NEUTROPHILS # BLD AUTO: 5.5 10E3/UL (ref 1.6–8.3)
NEUTROPHILS NFR BLD AUTO: 66 %
NRBC # BLD AUTO: 0 10E3/UL
NRBC BLD AUTO-RTO: 0 /100
PLATELET # BLD AUTO: 259 10E3/UL (ref 150–450)
POTASSIUM SERPL-SCNC: 4.1 MMOL/L (ref 3.4–5.3)
PROT SERPL-MCNC: 6.4 G/DL (ref 6.4–8.3)
RBC # BLD AUTO: 4.42 10E6/UL (ref 4.4–5.9)
SODIUM SERPL-SCNC: 141 MMOL/L (ref 135–145)
T4 FREE SERPL-MCNC: 0.81 NG/DL (ref 0.9–1.7)
TSH SERPL DL<=0.005 MIU/L-ACNC: 8.28 UIU/ML (ref 0.3–4.2)
VIT B12 SERPL-MCNC: 753 PG/ML (ref 232–1245)
VIT D+METAB SERPL-MCNC: 34 NG/ML (ref 20–50)
WBC # BLD AUTO: 8.4 10E3/UL (ref 4–11)

## 2024-10-23 PROCEDURE — 250N000012 HC RX MED GY IP 250 OP 636 PS 637: Performed by: STUDENT IN AN ORGANIZED HEALTH CARE EDUCATION/TRAINING PROGRAM

## 2024-10-23 PROCEDURE — 250N000013 HC RX MED GY IP 250 OP 250 PS 637: Performed by: PSYCHIATRY & NEUROLOGY

## 2024-10-23 PROCEDURE — 250N000013 HC RX MED GY IP 250 OP 250 PS 637: Performed by: STUDENT IN AN ORGANIZED HEALTH CARE EDUCATION/TRAINING PROGRAM

## 2024-10-23 PROCEDURE — 82746 ASSAY OF FOLIC ACID SERUM: CPT

## 2024-10-23 PROCEDURE — 36415 COLL VENOUS BLD VENIPUNCTURE: CPT

## 2024-10-23 PROCEDURE — 124N000002 HC R&B MH UMMC

## 2024-10-23 PROCEDURE — 82607 VITAMIN B-12: CPT

## 2024-10-23 PROCEDURE — 250N000013 HC RX MED GY IP 250 OP 250 PS 637

## 2024-10-23 PROCEDURE — 97150 GROUP THERAPEUTIC PROCEDURES: CPT | Mod: GO

## 2024-10-23 PROCEDURE — 250N000013 HC RX MED GY IP 250 OP 250 PS 637: Performed by: CLINICAL NURSE SPECIALIST

## 2024-10-23 PROCEDURE — 85025 COMPLETE CBC W/AUTO DIFF WBC: CPT

## 2024-10-23 PROCEDURE — 99232 SBSQ HOSP IP/OBS MODERATE 35: CPT | Mod: GC | Performed by: PSYCHIATRY & NEUROLOGY

## 2024-10-23 RX ORDER — LITHIUM CARBONATE 300 MG/1
600 TABLET, FILM COATED, EXTENDED RELEASE ORAL AT BEDTIME
Status: COMPLETED | OUTPATIENT
Start: 2024-10-23 | End: 2024-10-23

## 2024-10-23 RX ORDER — LITHIUM CARBONATE 450 MG
900 TABLET, EXTENDED RELEASE ORAL AT BEDTIME
Status: DISCONTINUED | OUTPATIENT
Start: 2024-10-24 | End: 2024-10-25 | Stop reason: HOSPADM

## 2024-10-23 RX ADMIN — OLANZAPINE 20 MG: 20 TABLET, ORALLY DISINTEGRATING ORAL at 19:42

## 2024-10-23 RX ADMIN — SENNOSIDES AND DOCUSATE SODIUM 1 TABLET: 8.6; 5 TABLET ORAL at 19:43

## 2024-10-23 RX ADMIN — METFORMIN HYDROCHLORIDE 1000 MG: 500 TABLET, FILM COATED ORAL at 08:12

## 2024-10-23 RX ADMIN — NICOTINE 1 PATCH: 21 PATCH, EXTENDED RELEASE TRANSDERMAL at 08:18

## 2024-10-23 RX ADMIN — POLYETHYLENE GLYCOL 3350 17 G: 17 POWDER, FOR SOLUTION ORAL at 08:13

## 2024-10-23 RX ADMIN — NICOTINE POLACRILEX 8 MG: 4 GUM, CHEWING BUCCAL at 16:39

## 2024-10-23 RX ADMIN — HYDROXYZINE HYDROCHLORIDE 50 MG: 50 TABLET, FILM COATED ORAL at 13:07

## 2024-10-23 RX ADMIN — SENNOSIDES AND DOCUSATE SODIUM 1 TABLET: 8.6; 5 TABLET ORAL at 08:13

## 2024-10-23 RX ADMIN — METFORMIN HYDROCHLORIDE 1000 MG: 500 TABLET, FILM COATED ORAL at 18:02

## 2024-10-23 RX ADMIN — PREGABALIN 300 MG: 100 CAPSULE ORAL at 08:12

## 2024-10-23 RX ADMIN — BUPRENORPHINE AND NALOXONE 1 FILM: 4; 1 FILM, SOLUBLE BUCCAL; SUBLINGUAL at 08:12

## 2024-10-23 RX ADMIN — LITHIUM CARBONATE 600 MG: 300 TABLET, EXTENDED RELEASE ORAL at 19:43

## 2024-10-23 RX ADMIN — OLANZAPINE 10 MG: 10 TABLET, ORALLY DISINTEGRATING ORAL at 08:12

## 2024-10-23 RX ADMIN — ACETAMINOPHEN, ASPIRIN, CAFFEINE 1 TABLET: 250; 65; 250 TABLET, FILM COATED ORAL at 08:47

## 2024-10-23 RX ADMIN — HYDROXYZINE HYDROCHLORIDE 25 MG: 25 TABLET, FILM COATED ORAL at 09:57

## 2024-10-23 RX ADMIN — THERA TABS 1 TABLET: TAB at 08:12

## 2024-10-23 RX ADMIN — ARIPIPRAZOLE 5 MG: 5 TABLET ORAL at 19:43

## 2024-10-23 RX ADMIN — TAMSULOSIN HYDROCHLORIDE 0.4 MG: 0.4 CAPSULE ORAL at 08:13

## 2024-10-23 RX ADMIN — LITHIUM CARBONATE 300 MG: 300 TABLET, EXTENDED RELEASE ORAL at 08:12

## 2024-10-23 RX ADMIN — NICOTINE POLACRILEX 8 MG: 4 GUM, CHEWING BUCCAL at 13:27

## 2024-10-23 RX ADMIN — ASPIRIN 81 MG CHEWABLE TABLET 81 MG: 81 TABLET CHEWABLE at 08:12

## 2024-10-23 RX ADMIN — NICOTINE POLACRILEX 8 MG: 4 GUM, CHEWING BUCCAL at 21:23

## 2024-10-23 RX ADMIN — BUPRENORPHINE AND NALOXONE 1 FILM: 4; 1 FILM, SOLUBLE BUCCAL; SUBLINGUAL at 19:42

## 2024-10-23 RX ADMIN — NICOTINE POLACRILEX 8 MG: 4 GUM, CHEWING BUCCAL at 08:47

## 2024-10-23 RX ADMIN — PREGABALIN 300 MG: 100 CAPSULE ORAL at 19:43

## 2024-10-23 ASSESSMENT — ACTIVITIES OF DAILY LIVING (ADL)
ADLS_ACUITY_SCORE: 0
HYGIENE/GROOMING: INDEPENDENT
ADLS_ACUITY_SCORE: 0
ADLS_ACUITY_SCORE: 0
HYGIENE/GROOMING: INDEPENDENT
ADLS_ACUITY_SCORE: 0
ADLS_ACUITY_SCORE: 0
DRESS: INDEPENDENT
ADLS_ACUITY_SCORE: 0
ORAL_HYGIENE: INDEPENDENT
ADLS_ACUITY_SCORE: 0
ADLS_ACUITY_SCORE: 0
ADLS_ACUITY_SCORE: 28
ADLS_ACUITY_SCORE: 0
LAUNDRY: UNABLE TO COMPLETE
ADLS_ACUITY_SCORE: 0
ADLS_ACUITY_SCORE: 0

## 2024-10-23 NOTE — PLAN OF CARE
"  Problem: Adult Behavioral Health Plan of Care  Goal: Plan of Care Review  Recent Flowsheet Documentation  Taken 10/22/2024 1800 by Klever Olivares RN  Patient Agreement with Plan of Care: agrees  Goal: Develops/Participates in Therapeutic Dixie to Support Successful Transition  Intervention: Foster Therapeutic Dixie  Recent Flowsheet Documentation  Taken 10/22/2024 1800 by Klever Olivares RN  Trust Relationship/Rapport:   care explained   choices provided  Milieu Participation:Behavior: Pt calm and cooperative on the unit this evening and continues to be able to make needs known appropriately. Pt requested and received education on hydroxyzine and its common uses. Pt stated he has moderate anxiety and received hydroxyzine 50 mg for agitation. Denies all other mental health s/s including depression, SI, SIB, AVH, and HI. Medication compliant. Does not appear to be cheeking and took medications without incident. Denies any physical pain. Pt does appear guarded and restricted during conversation and check in.     Safety: status 15 SI/Self harm: denies  Aggression/agitation/HI: denies, exhibited safe behavior  AVH: denies Affect: blunted Mood: calm    Physical Complaints/Issues: denies    PRN Med:Hydroxyzine 50 mg , malinda gum  Medication AE: denies    I & O: eating and drinking well 75%  Sleep: denies concerns  LBM: denies concerns  ADLs: independent  Visits/calls: Mother called  Lithium level 0.32    Vitals:  Blood pressure 106/65, pulse 86, temperature 98.1  F (36.7  C), temperature source Temporal, resp. rate 18, height 1.854 m (6' 1\"), weight 103.1 kg (227 lb 6.4 oz), SpO2 98%.                 "

## 2024-10-23 NOTE — PROGRESS NOTES
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 Carver MD   50 mg at 10/22/24 1705    nicotine (COMMIT) lozenge 4 mg  4 mg Buccal Q1H PRN Cathy Carver MD        Or    nicotine polacrilex (NICORETTE) gum 8 mg  8 mg Oral Q1H PRN Cathy Carver MD   8 mg at 10/23/24 0847    OLANZapine (zyPREXA) tablet 10 mg  10 mg Oral TID PRN Berkley Arreola MD   10 mg at 10/07/24 1923    Or    OLANZapine (zyPREXA) injection 10 mg  10 mg Intramuscular TID PRN Berkley Arreola MD   10 mg at 09/12/24 0121    polyethylene glycol (MIRALAX) Packet 17 g  17 g Oral BID PRN Cathy Carver MD   17 g at 10/18/24 2227    QUEtiapine (SEROquel) tablet 25 mg  25 mg Oral Q6H PRN Cathy Carver MD   25 mg at 10/21/24 1440       Allergies   Allergen Reactions    Cefuroxime Unknown     PN: LW Reaction: Rash, Generalized    Other reaction(s): Unknown   PN: LW Reaction: Rash, Generalized   PN: LW Reaction: Rash, Generalized    No Clinical Screening - See Comments Other (See Comments)     Patient had a reaction to some medication when he went to the dentist as a toddler    Other Allergy (See Comments) [External Allergen Needs Reconciliation - See Comment] Unknown     Other reaction(s): *Unknown - Childhood Rxn, Patient had a reaction to some medication when he went to the dentist  as a toddler    Other Drug Allergy (See Comments)      Other reaction(s): *Unknown - Childhood Rxn   Patient had a reaction to some medication when he went to the dentist as a toddler

## 2024-10-23 NOTE — PROVIDER NOTIFICATION
10/23/24 1115   Individualization/Patient Specific Goals   Patient Personal Strengths expressive of needs;resilient   Patient Vulnerabilities history of unsuccessful treatment;substance abuse/addiction;lacks insight into illness;housing insecurity;family/relationship conflict;poor impulse control   Interprofessional Rounds   Participants psychiatrist;nursing;[facility]   Behavioral Team Discussion   Participants Dr. Batista and Dr Loya, Psychiatry, [facility] MELIDA Valdes [place]   Progress Pt ready for discharge, pending placement. He agreed to go to [facility]. Working on getting this placement finalized.   Medical/Physical See H&P, hx of PE   Precautions see below   Plan [facility]   Anticipated Discharge Disposition assisted living     Goal Outcome Evaluation:  PRECAUTIONS AND SAFETY    Behavioral Orders   Procedures    Assault precautions    Cheeking Precautions (behavioral units)     Patient Observed swallowing PO medications; Patient asked to drink water after swallowing medication; Patient in Staff line of sight for 15 minutes after medication given; Mouth checks after PO administration (patient asked to open mouth and stick out their tongue).    Code 1 - Restrict to Unit    Routine Programming     As clinically indicated    Status 15     Every 15 minutes.    Suicide precautions: Suicide Risk: MODERATE; Clinical rationale to override score: Exhibiting Suicidal/self-harm behaviors or thoughts     Patients on Suicide Precautions should have a Combination Diet ordered that includes a Diet selection(s) AND a Behavioral Tray selection for Safe Tray - with utensils, or Safe Tray - NO utensils       Order Specific Question:   Suicide Risk     Answer:   MODERATE     Order Specific Question:   Clinical rationale to override score:     Answer:   Exhibiting Suicidal/self-harm behaviors or thoughts       Safety  Safety WDL: .WDL except  Patient Location: lounge, patient room, own  Observed Behavior: sleeping  Observed  Behavior (Comment): talking with staff  Safety Measures: environmental rounds completed, suicide assessment completed, suicide check-in completed  Diversional Activity: television  De-Escalation Techniques: quiet time facilitated, stimulation decreased  Suicidality: Status 15, Optimize communication / relationship to minimize opportunity for self-harm  Seizure precautions: calm, consistent lighting, clutter free environment  Assault: status 15, private room  Elopement Assessment: Statements about wanting to leave  Elopement Interventions: status 15, no shoes, room away from unit doors, behavioral scrubs (pajamas), engagement in unit activities  Additional Documentation:  (cheeking)

## 2024-10-23 NOTE — PLAN OF CARE
BEH IP Unit Acuity Rating Score (UARS)  Patient is given one point for every criteria they meet.    CRITERIA SCORING   On a 72 hour hold, court hold, committed, stay of commitment, or revocation. 1    Patient LOS on BEH unit exceeds 20 days. 1 LOS: 50   Patient under guardianship, 55+, otherwise medically complex, or under age 11. 0   Suicide ideation without relief of precipitating factors. 0   Current plan for suicide. 0   Current plan for homicide. 0   Imminent risk or actual attempt to seriously harm another without relief of factors precipitating the attempt. 0   Severe dysfunction in daily living (ex: complete neglect for self care, extreme disruption in vegetative function, extreme deterioration in social interactions). 0   Recent (last 7 days) or current physical aggression in the ED or on unit. 0   Restraints or seclusion episode in past 72 hours. 0   Recent (last 7 days) or current verbal aggression, agitation, yelling, etc., while in the ED or unit. 0   Active psychosis. 0   Need for constant or near constant redirection (from leaving, from others, etc).  0   Intrusive or disruptive behaviors. 0   Patient requires 3 or more hours of individualized nursing care per 8-hour shift (i.e. for ADLs, meds, therapeutic interventions). 0   TOTAL 2

## 2024-10-23 NOTE — PLAN OF CARE
"Pt had an uneventful shift this day. Pt reports mood as \"ok\" with flat affect. Pt was medication compliant. Pt denies SI, SIB, HI and thoughts of hurting others. Pt denies depression and endorses anxiety as \"low\" and requested PRN 25mg hydroxyzine with reported relief.    Pt participated in groups with appropriate interactions. No behavioral concerns this day.    Pt c/o headache at the start of shift and was given PRN exedrine with stated benefit.    VS reviewed: /76 (BP Location: Right arm, Patient Position: Sitting, Cuff Size: Adult Regular)   Pulse 86   Temp 97.9  F (36.6  C)   Resp 16   Ht 1.854 m (6' 1\")   Wt 103.1 kg (227 lb 6.4 oz)   SpO2 99%   BMI 30.00 kg/m   . Patient denies  pain.    Length of stay: 50    "

## 2024-10-23 NOTE — PLAN OF CARE
Team Note Due:  Wednesday     Assessment/Intervention/Current Symtoms and Care Coordination:  Chart review and met with team, discussed pt progress, symptomology, and response to treatment. Discussed the discharge plan and any potential impediments to discharge.    Per team, Pt had no issues yesterday.     Behavioral team note complete.     [facility] sent a bunch of forms for Eloy to fill out. I met with Eloy to review and help him fill these out. I secure sent them back to [facility] and asked them to provide a rough estimate of discharge timeline as he is ready for discharge asap. I also asked his  if she can assist him with getting his belongings from his old group home. I still haven't heard back from  about ACT Team referral so I asked her about this again.     [facility] states: Timeline [for discharge] depends on the funding through CAD being approved at the [facility]. I've seen it get approved within a week or two.    Discharge Plan or Goal:  Cannot return to  previous  housing due to Suboxone use.   [facility] accepted Eloy but he declined as he doesn't want to give his money and only have $150 remaining.  10/15: Eloy wants to return to his old group home - will have to ask them again if he is able to return once off Suboxone. 10/17: Called old [facility] to see if he can return   10/18:  Declined Eloy - suggest substance use treatment   10/22: Eloy agrees to try [facility] as his options are crisis/shelter soon as he is not needing hospitalization. I let [facility] director know.   10/23: [facility] sends forms for him to fill out, submitted these back to [facility] and asked for a discharge date estimate from them.      Barriers to Discharge:  Placement    Referral Status:  [facility] 10/15 - they accepted but Eloy is declining   [facility] - on waitlist (months away)      Suboxone at [facility]   Monday 10/28 at 2PM with Janelle Molina DNP  [facility] [address] [address]    Psychiatry appt:   Tenisha Khan, [facility] outpatient   Appt: 10/25/24 9:30AM in Person  [address] [facility].     10/16: I called [facility] and left a voicemail asking about openings.    10/16: I called [facility] and left a voicemail asking about openings.     10/16: I sent [facility] referral for their group home locations.            10/17: Baron Chavira confirmed receipt and shared he will review the referral.     10/17: [facility] group home responded back that they have an opening for a group home. I asked for the location and to see if they accept Suboxone.      10/18: [facility] says they would like to proceed and they take Suboxone - they did not receive a referral yet however, I connected CADI waiver and CM to verify this placement fits CADI requirements prior to sending referral docs. Address of [facility] is [address] [place] [address] and they accept Suboxone. CADI CM stated she will check [facility] NPI and follow up with referral on her end. She stated this will take a couple of days.     Legal Status:  Committed and [place]   [facility]: [place]  File Number: [id number]  Start and expiration date of commitment:   9/30: Submitted [place] Amendment to remove Invega and add Prolixin.  New [place] meds as of 10/1: Zyprexa, Seroquel, Prolixin, Abilify     Contacts (include KELBY status):  Psych: Dr. Khan, [facility] - called from cell phone for an update #[phone] (Eloy signed KELBY 10/1 for medications related info)  Michelle Cortes/Mother: [phone]    -   New CADI CM is: Sonia Burdick Ph: [phone]  Email: [email or web address] (KELBY signed 10/1/24)    New Commitment CM:  Chasity [email or web address] [phone] - No KELBY needed.    [facility] , Rekha Little - Ph: [phone]    Upcoming Meetings and Dates/Important Information and next steps:  Update  discharge referral form at discharge   PD and COS at discharge  If he's off Suboxone at discharge - cancel addiction medicine option.

## 2024-10-23 NOTE — PLAN OF CARE
Problem: Sleep Disturbance  Goal: Adequate Sleep/Rest  Outcome: Progressing  Intervention: Promote Sleep/Rest  Recent Flowsheet Documentation  Taken 10/23/2024 0000 by Claritza Knight RN  Sleep/Rest Enhancement: noise level reduced   Goal Outcome Evaluation:     9143-7251: Pt sleeping at the start of the shift in no apparent distress. Continue on suicide/assault and cheeking precautions without any related behavior noted. Woke up once during the night for snacks and went back to sleep without any problem. Slept for 5.25 hours this night.

## 2024-10-23 NOTE — PLAN OF CARE
Rehab Group    Start time: 1030  End time: 1200  Patient time total: 80 minutes    attended partial group    #4 attended   Group Type: OT Clinic   Group Topic Covered: coping skills     Group Session Detail:    Intervention: Pt participated in a OT Clinic group to facilitate coping skills exploration and creative expression through personally meaningful activities, and to encourage utilization of these healthy coping skills to promote overall health and wellness. Group included clinical observation of social, cognitive and kinesthetic performance skills to inform treatment and safe discharge planning.    Mood/Affect: Flat, Pleasant       Plan: Patient encouraged to maintain attendance for continued ongoing support in working towards occupational therapy goals to support overall treatment/care.        Patient Detail:    Active participant for duration of group, only leaving briefly to meet with provider. Continues to be motivated to attend hands on task group. Selecting a project and working on for the duration of time. Is briefly social with others during this time. Discussed music with another peer for period of time and asked other peers what types of concerts they had been to in the past. Appropriate with group materials and assisted writer in cleanup at end of group.       [id number] OT Group (2 or more in attendance)    Patient Active Problem List   Diagnosis    Suicidal ideation    Psychosis (H)    Acute pulmonary embolism without acute cor pulmonale (H)    Alcohol use disorder, moderate, in sustained remission, dependence (H)    Anxiety    Cannabis use disorder, severe, dependence (H)    Class 1 drug-induced obesity without serious comorbidity with body mass index (BMI) of 33.0 to 33.9 in adult    Depression, major, single episode, moderate (H)    Episodic mood disorder (H)    KATHE (generalized anxiety disorder)    History of marijuana use    Obesity (BMI 30-39.9)    Obesity, Class I, BMI 30-34.9    Tobacco use  disorder, moderate, in sustained remission    Schizoaffective disorder, bipolar type (H)    Psychosis, unspecified psychosis type (H)    Tardive dyskinesia

## 2024-10-24 PROCEDURE — 250N000013 HC RX MED GY IP 250 OP 250 PS 637: Performed by: PSYCHIATRY & NEUROLOGY

## 2024-10-24 PROCEDURE — 250N000012 HC RX MED GY IP 250 OP 636 PS 637: Performed by: PSYCHIATRY & NEUROLOGY

## 2024-10-24 PROCEDURE — 97150 GROUP THERAPEUTIC PROCEDURES: CPT | Mod: GO

## 2024-10-24 PROCEDURE — 124N000002 HC R&B MH UMMC

## 2024-10-24 PROCEDURE — 99232 SBSQ HOSP IP/OBS MODERATE 35: CPT | Performed by: PSYCHIATRY & NEUROLOGY

## 2024-10-24 PROCEDURE — 250N000013 HC RX MED GY IP 250 OP 250 PS 637: Performed by: PHYSICIAN ASSISTANT

## 2024-10-24 PROCEDURE — 99232 SBSQ HOSP IP/OBS MODERATE 35: CPT | Performed by: PHYSICIAN ASSISTANT

## 2024-10-24 PROCEDURE — 250N000013 HC RX MED GY IP 250 OP 250 PS 637

## 2024-10-24 PROCEDURE — 250N000013 HC RX MED GY IP 250 OP 250 PS 637: Performed by: STUDENT IN AN ORGANIZED HEALTH CARE EDUCATION/TRAINING PROGRAM

## 2024-10-24 PROCEDURE — 250N000012 HC RX MED GY IP 250 OP 636 PS 637: Performed by: STUDENT IN AN ORGANIZED HEALTH CARE EDUCATION/TRAINING PROGRAM

## 2024-10-24 RX ORDER — BUPRENORPHINE AND NALOXONE 2; .5 MG/1; MG/1
1 FILM, SOLUBLE BUCCAL; SUBLINGUAL AT BEDTIME
Status: DISCONTINUED | OUTPATIENT
Start: 2024-10-24 | End: 2024-10-24

## 2024-10-24 RX ORDER — PREGABALIN 300 MG/1
300 CAPSULE ORAL 2 TIMES DAILY
Qty: 60 CAPSULE | Refills: 1 | Status: SHIPPED | OUTPATIENT
Start: 2024-10-24 | End: 2024-10-25

## 2024-10-24 RX ORDER — MULTIVITAMIN,THERAPEUTIC
1 TABLET ORAL DAILY
Qty: 30 TABLET | Refills: 1 | Status: SHIPPED | OUTPATIENT
Start: 2024-10-25

## 2024-10-24 RX ORDER — QUETIAPINE FUMARATE 25 MG/1
25 TABLET, FILM COATED ORAL 2 TIMES DAILY PRN
Qty: 60 TABLET | Refills: 1 | Status: SHIPPED | OUTPATIENT
Start: 2024-10-24

## 2024-10-24 RX ORDER — LEVOTHYROXINE SODIUM 150 UG/1
150 TABLET ORAL
Qty: 30 TABLET | Refills: 1 | Status: SHIPPED | OUTPATIENT
Start: 2024-10-24

## 2024-10-24 RX ORDER — LITHIUM CARBONATE 450 MG
900 TABLET, EXTENDED RELEASE ORAL AT BEDTIME
Qty: 60 TABLET | Refills: 1 | Status: SHIPPED | OUTPATIENT
Start: 2024-10-24

## 2024-10-24 RX ORDER — LEVOTHYROXINE SODIUM 150 UG/1
150 TABLET ORAL
Status: DISCONTINUED | OUTPATIENT
Start: 2024-10-24 | End: 2024-10-25 | Stop reason: HOSPADM

## 2024-10-24 RX ORDER — ARIPIPRAZOLE 5 MG/1
5 TABLET ORAL DAILY
Qty: 30 TABLET | Refills: 1 | Status: SHIPPED | OUTPATIENT
Start: 2024-10-24

## 2024-10-24 RX ORDER — BUPRENORPHINE AND NALOXONE 4; 1 MG/1; MG/1
1 FILM, SOLUBLE BUCCAL; SUBLINGUAL DAILY
Qty: 30 FILM | Refills: 1 | Status: SHIPPED | OUTPATIENT
Start: 2024-10-25 | End: 2024-10-25

## 2024-10-24 RX ORDER — TAMSULOSIN HYDROCHLORIDE 0.4 MG/1
0.4 CAPSULE ORAL DAILY
Qty: 30 CAPSULE | Refills: 1 | Status: SHIPPED | OUTPATIENT
Start: 2024-10-25

## 2024-10-24 RX ORDER — ASPIRIN 81 MG/1
81 TABLET, CHEWABLE ORAL DAILY
Qty: 30 TABLET | Refills: 1 | Status: SHIPPED | OUTPATIENT
Start: 2024-10-25

## 2024-10-24 RX ORDER — OLANZAPINE 10 MG/1
TABLET ORAL
Qty: 90 TABLET | Refills: 1 | Status: SHIPPED | OUTPATIENT
Start: 2024-10-24

## 2024-10-24 RX ORDER — BUPRENORPHINE AND NALOXONE 2; .5 MG/1; MG/1
1 FILM, SOLUBLE BUCCAL; SUBLINGUAL AT BEDTIME
Status: DISCONTINUED | OUTPATIENT
Start: 2024-10-24 | End: 2024-10-25 | Stop reason: HOSPADM

## 2024-10-24 RX ORDER — BUPRENORPHINE AND NALOXONE 2; .5 MG/1; MG/1
1 FILM, SOLUBLE BUCCAL; SUBLINGUAL AT BEDTIME
Qty: 30 FILM | Refills: 1 | Status: SHIPPED | OUTPATIENT
Start: 2024-10-24 | End: 2024-10-25

## 2024-10-24 RX ORDER — BUPRENORPHINE AND NALOXONE 4; 1 MG/1; MG/1
1 FILM, SOLUBLE BUCCAL; SUBLINGUAL DAILY
Status: DISCONTINUED | OUTPATIENT
Start: 2024-10-25 | End: 2024-10-25 | Stop reason: HOSPADM

## 2024-10-24 RX ADMIN — OLANZAPINE 10 MG: 10 TABLET, ORALLY DISINTEGRATING ORAL at 08:50

## 2024-10-24 RX ADMIN — TAMSULOSIN HYDROCHLORIDE 0.4 MG: 0.4 CAPSULE ORAL at 08:50

## 2024-10-24 RX ADMIN — PREGABALIN 300 MG: 100 CAPSULE ORAL at 08:50

## 2024-10-24 RX ADMIN — BUPRENORPHINE AND NALOXONE 1 FILM: 2; .5 FILM BUCCAL; SUBLINGUAL at 20:20

## 2024-10-24 RX ADMIN — METFORMIN HYDROCHLORIDE 1000 MG: 500 TABLET, FILM COATED ORAL at 17:45

## 2024-10-24 RX ADMIN — BUPRENORPHINE AND NALOXONE 1 FILM: 4; 1 FILM, SOLUBLE BUCCAL; SUBLINGUAL at 08:50

## 2024-10-24 RX ADMIN — THERA TABS 1 TABLET: TAB at 08:51

## 2024-10-24 RX ADMIN — NICOTINE POLACRILEX 8 MG: 4 GUM, CHEWING BUCCAL at 16:48

## 2024-10-24 RX ADMIN — ARIPIPRAZOLE 5 MG: 5 TABLET ORAL at 19:05

## 2024-10-24 RX ADMIN — SENNOSIDES AND DOCUSATE SODIUM 1 TABLET: 8.6; 5 TABLET ORAL at 19:05

## 2024-10-24 RX ADMIN — METFORMIN HYDROCHLORIDE 1000 MG: 500 TABLET, FILM COATED ORAL at 08:50

## 2024-10-24 RX ADMIN — SENNOSIDES AND DOCUSATE SODIUM 1 TABLET: 8.6; 5 TABLET ORAL at 08:51

## 2024-10-24 RX ADMIN — PREGABALIN 300 MG: 100 CAPSULE ORAL at 19:05

## 2024-10-24 RX ADMIN — NICOTINE POLACRILEX 8 MG: 4 GUM, CHEWING BUCCAL at 12:53

## 2024-10-24 RX ADMIN — OLANZAPINE 20 MG: 20 TABLET, ORALLY DISINTEGRATING ORAL at 19:05

## 2024-10-24 RX ADMIN — POLYETHYLENE GLYCOL 3350 17 G: 17 POWDER, FOR SOLUTION ORAL at 08:51

## 2024-10-24 RX ADMIN — LEVOTHYROXINE SODIUM 150 MCG: 150 TABLET ORAL at 12:53

## 2024-10-24 RX ADMIN — ASPIRIN 81 MG CHEWABLE TABLET 81 MG: 81 TABLET CHEWABLE at 08:50

## 2024-10-24 RX ADMIN — LITHIUM CARBONATE 900 MG: 450 TABLET, EXTENDED RELEASE ORAL at 20:20

## 2024-10-24 RX ADMIN — NICOTINE 1 PATCH: 21 PATCH, EXTENDED RELEASE TRANSDERMAL at 08:51

## 2024-10-24 RX ADMIN — NICOTINE POLACRILEX 8 MG: 4 GUM, CHEWING BUCCAL at 10:10

## 2024-10-24 ASSESSMENT — ACTIVITIES OF DAILY LIVING (ADL)
ADLS_ACUITY_SCORE: 0
HYGIENE/GROOMING: INDEPENDENT
DRESS: SCRUBS (BEHAVIORAL HEALTH);INDEPENDENT
ADLS_ACUITY_SCORE: 0
ORAL_HYGIENE: INDEPENDENT
ADLS_ACUITY_SCORE: 0

## 2024-10-24 NOTE — PLAN OF CARE
Problem: Psychotic Signs/Symptoms  Goal: Improved Sleep (Psychotic Signs/Symptoms)  Intervention: Promote Healthy Sleep Hygiene  Recent Flowsheet Documentation  Taken 10/24/2024 0618 by Tasha Talamantes RN  Sleep Hygiene Promotion:   noise level reduced   regular sleep pattern promoted   room lighting adjusted   Goal Outcome Evaluation:    Patient appeared to sleep 6.25 hours this night shift.  No prns or snacks given or requested.  No concerns were reported or noted.  Awaiting [facility] funding prior to discharge.

## 2024-10-24 NOTE — PROGRESS NOTES
Rehab Group     Start time: 1600  End time: 1645  Patient time total: 45 minutes     attended full group     #5 attended   Group Type: general health and coping and self-care   Group Topic Covered: coping skills, mindfulness, and problem solving         Group Session Detail:  Positive Affirmations   Patient Response/Contribution:  cooperative with task, safe use of materials/supplies, attentive, and actively engaged         Patient Detail:     Pt independently attended topic group today and was cooperative.  Pt demonstrated a limited understanding of the topic covered and  limited insight into their own specific issues related to topic.  Pt was minimally social with peers and staff. Pt's affect was flat and attention span was fair.  Pt will continue to be encouraged to attend groups for ongoing assessment and to work toward goals identified on plan of care.        [id number] OT Group (2 or more in attendance)               Patient Active Problem List   Diagnosis    Suicidal ideation    Psychosis (H)    Acute pulmonary embolism without acute cor pulmonale (H)    Alcohol use disorder, moderate, in sustained remission, dependence (H)    Anxiety    Cannabis use disorder, severe, dependence (H)    Class 1 drug-induced obesity without serious comorbidity with body mass index (BMI) of 33.0 to 33.9 in adult    Depression, major, single episode, moderate (H)    Episodic mood disorder (H)    KATHE (generalized anxiety disorder)    History of marijuana use    Obesity (BMI 30-39.9)    Obesity, Class I, BMI 30-34.9    Tobacco use disorder, moderate, in sustained remission    Schizoaffective disorder, bipolar type (H)    Psychosis, unspecified psychosis type (H)    Tardive dyskinesia

## 2024-10-24 NOTE — PLAN OF CARE
BEH IP Unit Acuity Rating Score (UARS)  Patient is given one point for every criteria they meet.    CRITERIA SCORING   On a 72 hour hold, court hold, committed, stay of commitment, or revocation. 1    Patient LOS on BEH unit exceeds 20 days. 1 LOS: 51   Patient under guardianship, 55+, otherwise medically complex, or under age 11. 0   Suicide ideation without relief of precipitating factors. 0   Current plan for suicide. 0   Current plan for homicide. 0   Imminent risk or actual attempt to seriously harm another without relief of factors precipitating the attempt. 0   Severe dysfunction in daily living (ex: complete neglect for self care, extreme disruption in vegetative function, extreme deterioration in social interactions). 0   Recent (last 7 days) or current physical aggression in the ED or on unit. 0   Restraints or seclusion episode in past 72 hours. 0   Recent (last 7 days) or current verbal aggression, agitation, yelling, etc., while in the ED or unit. 0   Active psychosis. 0   Need for constant or near constant redirection (from leaving, from others, etc).  0   Intrusive or disruptive behaviors. 0   Patient requires 3 or more hours of individualized nursing care per 8-hour shift (i.e. for ADLs, meds, therapeutic interventions). 0   TOTAL 2

## 2024-10-24 NOTE — PLAN OF CARE
"Team Note Due:  Wednesday     Assessment/Intervention/Current Symtoms and Care Coordination:  Chart review and met with team, discussed pt progress, symptomology, and response to treatment. Discussed the discharge plan and any potential impediments to discharge.    Per team, Pt appears sedated, falling asleep at phone. Some of his lab levels are high, providers will monitor this/consult IM, and reduce Suboxone dose. Provider suggests discharge to crisis residence tomorrow while awaiting [facility].     I contacted CM Chasitybhavna Hill to share the reasons for ACT referral and also told her to call psychiatrist Tenisha Khan for further information as OP psych is strongly advocating for ACT Team. I also shared our plan is to discharge to crisis tomorrow provided they have a bed while awaiting [facility] and that I can send her the provisional discharge to her if she is in agreement.     I pushed out his OP psych appt to 10/29. Updated AVS.     I went to meet with Pt but he is attending OT group, I shared I will meet with him after group.     I sent HUNTER Gaspar some supporting documents for ACT referral as well as the provisional discharge for her to sign for patient.     I met with patient to share the updated plan for discharge. He shared he felt happy that he gets to leave - though says this with avoidant eye contact and flat affect. I shared we will call for a crisis bed in the morning and hopefully they have a bed. I encouraged him to also try to call himself. I explained his team cannot help him with transferring his belongings from his old group home to [facility] but that maybe he can ask his mom, or make some small trips to get them himself. He said ok.     Eloy stated his mom said there are better crisis residences than others and he said \"do you know which ones those are?\". I stated no I did not and that we would need him to go wherever they had a bed as the beds are limited. I printed Eloy a list of " crisis residences and recommended he call first thing in the morning as beds may fill up for Friday/the weekend. He asked if someone here can call and I shared I didn't know who would be here early morning to call. He has the phone numbers he can call and I shared I would call when I got in as well.     Discharge Plan or Goal:  Crisis Bed until [facility] admission    PSYCHIATRIST:  Dr. Tenisha Khan  Appt: 10/29/24 9:30AM in Person  [address] [facility]  Ph: [phone]    SUBOXONE:  [facility]   Ph: [phone].  Monday October 28th, 2024 at 2PM with Janelle Molina DNP.   [facility] [address] [place] [id number]     Barriers to Discharge:  Placement    Referral Status:  Cannot return to  previous  housing due to Suboxone use.   [facility] accepted Eloy but he declined as he doesn't want to give his money and only have $150 remaining.  10/15: Eloy wants to return to his old group home - will have to ask them again if he is able to return once off Suboxone. 10/17: Called old [facility] to see if he can return   10/16: I called [facility] and left a voicemail asking about openings.  10/16: I called [facility] and left a voicemail asking about openings.   10/16: I sent [facility] referral for their group home locations.            10/17: Baron Chavira confirmed receipt and shared he will review the referral.   10/17: [facility] group home responded back that they have an opening for a group home. I asked for the location and to see if they accept Suboxone.   10/18: [facility] says they would like to proceed and they take Suboxone - they did not receive a referral yet however, I connected MOY arroyo and HUNTER to verify this placement fits CADI requirements prior to sending referral docs. Address of [facility] is [address] [place] [address] [place] [address] and they accept Suboxone. CADI CM stated she will check [facility] NPI and follow up with  referral on her end. She stated this will take a couple of days.   10/18: [facility] Declined Eloy - suggest substance use treatment   10/22: Eloy agrees to try [facility] as his options are crisis/shelter soon as he is not needing hospitalization. I let [facility] director know.   10/23: [facility] sends forms for him to fill out, submitted these back to [facility] and asked for a discharge date estimate from them.   [facility] states 1-2 weeks.    [facility] - on waitlist (months away)     Legal Status:  Committed and [place]   [facility]: [place]  File Number: [id number]  Start and expiration date of commitment:   9/30: Submitted [place] Amendment to remove Invega and add Prolixin.  New [place] meds as of 10/1: Zyprexa, Seroquel, Prolixin, Abilify     Contacts (include KELBY status):  Psych: Dr. Khan, [place] - called from cell phone for an update #[phone] (Eloy signed KELBY 10/1 for medications related info)  Michelle Cortes/Mother: [phone]    -   New CADI CM is: Sonia Burdick Ph: [phone]  Email: [email or web address] (KELBY signed 10/1/24)    New Commitment CM:  Chasity [email or web address] [phone] - No KELBY needed.  [facility] , Rekha Little - Ph: [phone]    Upcoming Meetings and Dates/Important Information and next steps:  Update discharge referral form at discharge   PD and COS at discharge

## 2024-10-24 NOTE — CARE PLAN
Rehab Group    Start time: 1115  End time: 1200  Patient time total: 45 minutes    attended partial group    #4 attended   Group Type: OT Clinic   Group Topic Covered: activity therapy, cognitive activities, and coping skills     Group Session Detail:  OT clinic group provides an opportunity for individual-based goal directed activity within a group setting - allowing for interaction and socialization.  Hands-on task work help to build/restore/or maintain skills such as: focus, concentration, decision making, and problem solving.  Patients are encouraged to carry over potential new interests/hobbies learned in group following discharge.     Patient Response/Contribution:  positive affect and cooperative with task     Patient Detail:  Pt was present for the earlier topic group, though gave the worksheet back to writer, stated he wasn't interested, and was just waiting for OT group and sat quietly for most of the session (not billed)  Congruent and engaged in OT clinic. Polite to writer and peers. Demonstrated consistent performance skills as observed on previous dates - independent with all simple tasks. Observed with calming effect while completing project.         [address] OT Group (2 or more in attendance)    Patient Active Problem List   Diagnosis    Suicidal ideation    Psychosis (H)    Acute pulmonary embolism without acute cor pulmonale (H)    Alcohol use disorder, moderate, in sustained remission, dependence (H)    Anxiety    Cannabis use disorder, severe, dependence (H)    Class 1 drug-induced obesity without serious comorbidity with body mass index (BMI) of 33.0 to 33.9 in adult    Depression, major, single episode, moderate (H)    Episodic mood disorder (H)    KATHE (generalized anxiety disorder)    History of marijuana use    Obesity (BMI 30-39.9)    Obesity, Class I, BMI 30-34.9    Tobacco use disorder, moderate, in sustained remission    Schizoaffective disorder, bipolar type (H)    Psychosis, unspecified  psychosis type (H)    Tardive dyskinesia

## 2024-10-24 NOTE — CONSULTS
[facility]  Consult Note - Hospitalist Service  Date of Admission:  9/3/2024  Consult Requested by: Foster Batista MD   Reason for Consult: Elevated TSH and low T4 in setting of new lithium therapy    Assessment & Plan   Eloy Storm is a 24 year old male admitted on 9/3/2024 to inpatient psychiatry due to concern for jamila. He has a history of provoked PE in 2020, schizoaffective disorder, anxiety, polysubstance use.    # Acquired hypothyroidism  Patient's TSH/T4 were WNL in 2023. He was started on lithium therapy on 10/17. His TSH was 8.28 and T4 was 0.81 on 10/22/24. He denies any recent physical changes, including chest pain, skin changes, constipation. Does endorse some weight gain of ~20 lb in the last month since being in the inpatient psychiatry unit.   - reasonable to assume hypothyroidism is secondary to lithium   - start levothyroxine 150 mcg daily   - recommend recheck of TSH/T4 levels in 4-6 weeks, if still IP could inform medicine to review, if OP would ask PCP to manage.       Medicine service will sign off. Thank you for involving us in the care of this patient. Please consult or contact us with any new medical questions or concerns.       The patient's care was discussed with patient and psychiatry team. Plan of care also communicated to primary team via this note.       Zahira Zimmerman PA-C  Hospitalist Service  Securely message with [employer] (more info)  Text page via [facility] Paging/Directory   ______________________________________________________________________    Chief Complaint   N/A.     History is obtained from the patient    History of Present Illness   Eloy Storm is a 24 year old male who is seen for a medical evaluation. He denies any recent physical changes, including chest pain, skin changes, constipation. Does endorse some weight gain of ~20 lb in the last month since being in the inpatient psychiatry unit.       Past Medical History     Past Medical History:   Diagnosis Date    Obesity     Drug induced    CURTIS (obstructive sleep apnea)     Pulmonary embolism (H)     Subclinical hypothyroidism     Substance abuse (H)     marijuana, Kratom    Tardive dyskinesia 04/08/2024       Past Surgical History   Past Surgical History:   Procedure Laterality Date    NO HISTORY OF SURGERY         Medications   Medications Prior to Admission   Medication Sig Dispense Refill Last Dose/Taking    FLUoxetine (PROZAC) 20 MG capsule Take 20 mg by mouth daily.       metFORMIN (GLUCOPHAGE) 500 MG tablet Take 500 mg by mouth 2 times daily (with meals)       OLANZapine (ZYPREXA) 5 MG tablet Take 5 mg by mouth at bedtime       QUEtiapine ER (SEROQUEL XR) 400 MG 24 hr tablet Take 800 mg by mouth at bedtime.             Review of Systems    The 10 point Review of Systems is negative other than noted in the HPI.     Physical Exam   Vital Signs: Temp: 98.3  F (36.8  C) Temp src: Temporal BP: 115/73 Pulse: 96   Resp: 16 SpO2: 100 % O2 Device: None (Room air)    Weight: 227 lbs 6.4 oz    GENERAL: adult male seen sitting and ambulating without difficulty. NAD.   NEURO / PSYCH: Alert, converses appropriately. No focal deficits. Moves all extremities.   HEENT: Anicteric sclera. PERRL. Mucous membranes moist.   CV: RRR. S1, S2. No murmurs appreciated.    RESPIRATORY: Effort normal. Lungs CTAB with no wheezing, rales, rhonchi.   GI: Abdomen soft and non distended with bowel sounds present. No TTP or guarding.   SKIN: No jaundice. No rashes or lesions to exposed areas.     Medical Decision Making       35 MINUTES SPENT BY ME on the date of service doing chart review, history, exam, documentation & further activities per the note.      Data     I have personally reviewed the following data over the past 24 hrs:    8.4  \   13.7   / 259     N/A N/A N/A /  N/A   N/A N/A N/A \     ALT: N/A AST: N/A AP: N/A TBILI: N/A   ALB: N/A TOT PROTEIN: N/A LIPASE: N/A     TSH: N/A T4: N/A A1C: N/A

## 2024-10-24 NOTE — PLAN OF CARE
"Calm, cooperative. Behaviorally appropriate. Upon assessment, denied anxiety, depression, A/VH, SI/HI. Appears withdrawn, no observed socializing. Visible in milieu, pacing hallway at times.     Come to medication window to take scheduled medications, took without incident.     Denies pain, any acute physical concerns. No TD symptoms noted or reported. No urinary concerns reported.     Expected to discharge tomorrow.     /73   Pulse 84   Temp 98.3  F (36.8  C)   Resp 16   Ht 1.854 m (6' 1\")   Wt 103.1 kg (227 lb 6.4 oz)   SpO2 100%   BMI 30.00 kg/m      "

## 2024-10-24 NOTE — PLAN OF CARE
"Pt had an uneventful shift this day. Mood \"ok\" per pt, with flat affect. Pt spent the day visible in the milieu pacing the hallways, using the telephone, also time spent in room napping. Pt did not shower this day but reported showering yesterday evening.     Pt was medication compliant and requested information regarding levothyroxine. Pt given literature and counseled on med. Pt then requested to stop lithium bc he doesn't want to take another medication \"for the rest of his life\". Pt advised to speak with his provider tomorrow. Pt then advised RN writer that he was going to refuse all medications tomorrow if he was made to take the levothyroxine.    Pt denies SI, SIB, HI and thoughts of hurting others. Pt denies depression, anxiety and A/VH.    Appetite and fluid intake adequate. No bowel or bladder concerns per pt. No acute behavioral concerns. No complaints of pain this shift.    VS reviewed: /73   Pulse 96   Temp 98.3  F (36.8  C) (Temporal)   Resp 16   Ht 1.854 m (6' 1\")   Wt 103.1 kg (227 lb 6.4 oz)   SpO2 100%   BMI 30.00 kg/m   . Patient denies  pain.    Length of stay: 51  "

## 2024-10-24 NOTE — PLAN OF CARE
"  Problem: Adult Inpatient Plan of Care  Goal: Plan of Care Review  Recent Flowsheet Documentation  Taken 10/23/2024 2146 by Klever Olivares RN  Plan of Care Reviewed With: patient      Plan of Care Reviewed With: patient  Milieu Participation:Behavior: Pt spent time in room and in lounge watching TV. Presented as flat and stated that his chief concern is boredom. Pt is cooperative and able to make needs known appropriately with no behavioral issues noted or observed. Denies all mental health s/s with the exception of anxiety at a low level. Medication compliant with no s/s of TD.      Safety: status 15 SI/Self harm: denies  Aggression/agitation/HI: denies, exhibited safe behavior  AVH: denies Affect: blunted Mood: flat  Physical Complaints/Issues: denies  PRN Med: Nicorette gum  Medication AE: denies  I & O: eating and drinking well 100%  Sleep: denies concerns  LBM: denies concerns  ADLs: independent  Visits/calls: Mom called    Vitals: Blood pressure 106/65, pulse 86, temperature 98.1  F (36.7  C), resp. rate 18, height 1.854 m (6' 1\"), weight 103.1 kg (227 lb 6.4 oz), SpO2 99%.                  "

## 2024-10-24 NOTE — PROGRESS NOTES
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 10/24/24 0850    senna-docusate (SENOKOT-S/PERICOLACE) 8.6-50 MG per tablet 1 tablet  1 tablet Oral BID Bo Valle PA-C   1 tablet at 10/24/24 0851    tamsulosin (FLOMAX) capsule 0.4 mg  0.4 mg Oral Daily Berkley Arreola MD   0.4 mg at 10/24/24 0850     Current Facility-Administered Medications   Medication Dose Route Frequency Provider Last Rate Last Admin    acetaminophen (TYLENOL) tablet 650 mg  650 mg Oral Q4H PRN Neto Bolanos MD   650 mg at 10/19/24 1653    alum & mag hydroxide-simethicone (MAALOX) suspension 30 mL  30 mL Oral Q4H PRN Neto Bolanos MD   30 mL at 10/20/24 2004    hydrOXYzine HCl (ATARAX) tablet 25 mg  25 mg Oral Q6H PRN Cathy Carver MD   25 mg at 10/23/24 0957    Or    hydrOXYzine HCl (ATARAX) tablet 50 mg  50 mg Oral Q6H PRN Cathy Carver MD   50 mg at 10/23/24 1307    nicotine (COMMIT) lozenge 4 mg  4 mg Buccal Q1H PRN Cathy Carver MD        Or    nicotine polacrilex (NICORETTE) gum 8 mg  8 mg Oral Q1H PRN Cathy Carver MD   8 mg at 10/23/24 2123    OLANZapine (zyPREXA) tablet 10 mg  10 mg Oral TID PRN Berkley Arreola MD   10 mg at 10/07/24 1923    Or    OLANZapine (zyPREXA) injection 10 mg  10 mg Intramuscular TID PRN Berkley Arreola MD   10 mg at 09/12/24 0121    polyethylene  glycol (MIRALAX) Packet 17 g  17 g Oral BID PRN Cathy Carver MD   17 g at 10/18/24 2227    QUEtiapine (SEROquel) tablet 25 mg  25 mg Oral Q6H PRN Cathy Carver MD   25 mg at 10/21/24 1440       Allergies   Allergen Reactions    Cefuroxime Unknown     PN: LW Reaction: Rash, Generalized    Other reaction(s): Unknown   PN: LW Reaction: Rash, Generalized   PN: LW Reaction: Rash, Generalized    No Clinical Screening - See Comments Other (See Comments)     Patient had a reaction to some medication when he went to the dentist as a toddler    Other Allergy (See Comments) [External Allergen Needs Reconciliation - See Comment] Unknown     Other reaction(s): *Unknown - Childhood Rxn, Patient had a reaction to some medication when he went to the dentist as a toddler    Other Drug Allergy (See Comments)      Other reaction(s): *Unknown - Childhood Rxn   Patient had a reaction to some medication when he went to the dentist as a toddler

## 2024-10-25 VITALS
SYSTOLIC BLOOD PRESSURE: 106 MMHG | RESPIRATION RATE: 16 BRPM | HEIGHT: 73 IN | OXYGEN SATURATION: 96 % | TEMPERATURE: 98.3 F | BODY MASS INDEX: 30.14 KG/M2 | HEART RATE: 85 BPM | DIASTOLIC BLOOD PRESSURE: 72 MMHG | WEIGHT: 227.4 LBS

## 2024-10-25 LAB — LITHIUM SERPL-SCNC: 0.73 MMOL/L (ref 0.6–1.2)

## 2024-10-25 PROCEDURE — 80178 ASSAY OF LITHIUM: CPT | Performed by: PSYCHIATRY & NEUROLOGY

## 2024-10-25 PROCEDURE — 250N000013 HC RX MED GY IP 250 OP 250 PS 637: Performed by: PSYCHIATRY & NEUROLOGY

## 2024-10-25 PROCEDURE — 99239 HOSP IP/OBS DSCHRG MGMT >30: CPT | Performed by: PSYCHIATRY & NEUROLOGY

## 2024-10-25 PROCEDURE — 250N000013 HC RX MED GY IP 250 OP 250 PS 637

## 2024-10-25 PROCEDURE — 36415 COLL VENOUS BLD VENIPUNCTURE: CPT | Performed by: PSYCHIATRY & NEUROLOGY

## 2024-10-25 PROCEDURE — 250N000013 HC RX MED GY IP 250 OP 250 PS 637: Performed by: STUDENT IN AN ORGANIZED HEALTH CARE EDUCATION/TRAINING PROGRAM

## 2024-10-25 PROCEDURE — 250N000013 HC RX MED GY IP 250 OP 250 PS 637: Performed by: PHYSICIAN ASSISTANT

## 2024-10-25 PROCEDURE — 250N000012 HC RX MED GY IP 250 OP 636 PS 637

## 2024-10-25 RX ORDER — BUPRENORPHINE AND NALOXONE 2; .5 MG/1; MG/1
1 FILM, SOLUBLE BUCCAL; SUBLINGUAL AT BEDTIME
Qty: 4 FILM | Refills: 0 | Status: SHIPPED | OUTPATIENT
Start: 2024-10-25

## 2024-10-25 RX ORDER — PREGABALIN 300 MG/1
300 CAPSULE ORAL 2 TIMES DAILY
Qty: 60 CAPSULE | Refills: 0 | Status: SHIPPED | OUTPATIENT
Start: 2024-10-25

## 2024-10-25 RX ORDER — BUPRENORPHINE AND NALOXONE 4; 1 MG/1; MG/1
1 FILM, SOLUBLE BUCCAL; SUBLINGUAL DAILY
Qty: 3 FILM | Refills: 0 | Status: SHIPPED | OUTPATIENT
Start: 2024-10-25

## 2024-10-25 RX ADMIN — THERA TABS 1 TABLET: TAB at 07:55

## 2024-10-25 RX ADMIN — OLANZAPINE 10 MG: 10 TABLET, ORALLY DISINTEGRATING ORAL at 07:55

## 2024-10-25 RX ADMIN — BUPRENORPHINE AND NALOXONE 1 FILM: 4; 1 FILM, SOLUBLE BUCCAL; SUBLINGUAL at 07:55

## 2024-10-25 RX ADMIN — NICOTINE POLACRILEX 8 MG: 4 GUM, CHEWING BUCCAL at 10:50

## 2024-10-25 RX ADMIN — PREGABALIN 300 MG: 100 CAPSULE ORAL at 07:54

## 2024-10-25 RX ADMIN — METFORMIN HYDROCHLORIDE 1000 MG: 500 TABLET, FILM COATED ORAL at 07:54

## 2024-10-25 RX ADMIN — NICOTINE POLACRILEX 8 MG: 4 GUM, CHEWING BUCCAL at 08:04

## 2024-10-25 RX ADMIN — SENNOSIDES AND DOCUSATE SODIUM 1 TABLET: 8.6; 5 TABLET ORAL at 07:56

## 2024-10-25 RX ADMIN — TAMSULOSIN HYDROCHLORIDE 0.4 MG: 0.4 CAPSULE ORAL at 07:56

## 2024-10-25 RX ADMIN — LEVOTHYROXINE SODIUM 150 MCG: 150 TABLET ORAL at 07:59

## 2024-10-25 RX ADMIN — ASPIRIN 81 MG CHEWABLE TABLET 81 MG: 81 TABLET CHEWABLE at 07:55

## 2024-10-25 ASSESSMENT — ACTIVITIES OF DAILY LIVING (ADL)
ADLS_ACUITY_SCORE: 0
DRESS: INDEPENDENT;SCRUBS (BEHAVIORAL HEALTH)
ADLS_ACUITY_SCORE: 0
ORAL_HYGIENE: INDEPENDENT
HYGIENE/GROOMING: INDEPENDENT

## 2024-10-25 NOTE — PLAN OF CARE
BEH IP Unit Acuity Rating Score (UARS)  Patient is given one point for every criteria they meet.    CRITERIA SCORING   On a 72 hour hold, court hold, committed, stay of commitment, or revocation. 1    Patient LOS on BEH unit exceeds 20 days. 1 LOS: 52   Patient under guardianship, 55+, otherwise medically complex, or under age 11. 0   Suicide ideation without relief of precipitating factors. 0   Current plan for suicide. 0   Current plan for homicide. 0   Imminent risk or actual attempt to seriously harm another without relief of factors precipitating the attempt. 0   Severe dysfunction in daily living (ex: complete neglect for self care, extreme disruption in vegetative function, extreme deterioration in social interactions). 0   Recent (last 7 days) or current physical aggression in the ED or on unit. 0   Restraints or seclusion episode in past 72 hours. 0   Recent (last 7 days) or current verbal aggression, agitation, yelling, etc., while in the ED or unit. 0   Active psychosis. 0   Need for constant or near constant redirection (from leaving, from others, etc).  0   Intrusive or disruptive behaviors. 0   Patient requires 3 or more hours of individualized nursing care per 8-hour shift (i.e. for ADLs, meds, therapeutic interventions). 0   TOTAL 2

## 2024-10-25 NOTE — DISCHARGE SUMMARY
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 your call 24 hours a day, 7 days a week. They will be able to get in touch with a Provider if needed. The unit number is [phone].          Attestation:  The patient has been seen and evaluated by me,  Nan Loya MD    > 40 minutes total time that was spent and over 50% of this time was spent in counseling and coordination of care with staff, reviewing medical record, educating patient about treatment options, side effects and benefits and alternative treatments for medications, providing supportive therapy and redirection regarding above symptoms.     This  document is created with the help of Dragon dictation system.  All grammatical/typing errors or context distortion are unintentional and inherent to software.

## 2024-10-25 NOTE — PLAN OF CARE
Problem: Psychotic Signs/Symptoms  Goal: Improved Sleep (Psychotic Signs/Symptoms)  Intervention: Promote Healthy Sleep Hygiene  Recent Flowsheet Documentation  Taken 10/25/2024 0400 by Tasha Talamantes RN  Sleep Hygiene Promotion:   noise level reduced   regular sleep pattern promoted   room lighting adjusted   Goal Outcome Evaluation:      Eloy has been asleep on and off this night shift.  Denies any pain or any physical concerns.  Denies SI/SIB, hallucinations, HI.  Does endorse some anxiety about leaving but is also bored here and feels ready to discharge. Will call for emergency shelter bed this morning at 0600 for possibility of discharging today.  No prns but pudding and candy during the night.  Calm, cooperative, and polite.     Appeared to sleep a total of 4.75 hours this night shift.

## 2024-10-25 NOTE — PLAN OF CARE
Team Note Due:  Wednesday     Assessment/Intervention/Current Symtoms and Care Coordination:  Chart review and met with team, discussed pt progress, symptomology, and response to treatment. Discussed the discharge plan and any potential impediments to discharge.    Per team, O/n staff found a bed at [facility]. I called Juju at [facility] and she said she will review with clinical team. She verified his insurance and asked if we could cab him if accepted and I stated yes. She said she would call back within the hour by 11am. I let Pt know the plan.     Juju called back around 11am and said that the team is in a meeting but she will get back to us as soon as she can, she said thanks for your patience.     The last known PCP on file from 8/28/24 is Teo Lucas MD at [facility] [phone] - pt hasn't seen him in 6 years and this provider has a closed practice, so I scheduled with:   PCP: Dr. Mikey Vazquez in person   Appt: Nov 5/24 at 2:30PM  Fax: [phone]  Address: [address] [place] [address] [facility] [address]  Ph: [phone]    I called Juju at 12PM, and she shared he has been accepted. Bring 10 days of meds, and fax discharge paperwork and MAR to: [phone]  Cab to:   [address] [place] [address]    Faxed PD and COS to [facility].    Discharge Plan or Goal:  Crisis Bed until [facility] admission    PSYCHIATRIST:  Dr. Tenisha Khan  Appt: 10/29/24 9:30AM in Person  [address] [facility]  Ph: [phone]    SUBOXONE:  [facility]   Ph: [phone].  Monday October 28th, 2024 at 2PM with Janelle Molina DNP.   [facility] [address] [place] [address]     Barriers to Discharge:  Placement    Referral Status:  Cannot return to  previous  housing due to Suboxone use.   [facility] accepted Eloy but he declined as he doesn't want to give his money and only have $150 remaining.  10/15: Eloy wants to return to his old group home - will have  to ask them again if he is able to return once off Suboxone. 10/17: Called old correction to see if he can return   10/16: I called Robert Applebaum MD [facility] group homes and left a voicemail asking about openings.  [facility]16[facility] and left a voicemail asking about openings.   10/16: I sent [facility] referral for their group home locations.            10/17: Baron Escobarchris confirmed receipt and shared he will review the referral.   10/17: [facility] group home responded back that they have an opening for a group home. I asked for the location and to see if they accept Suboxone.   10/18: [facility] says [facility] would like to proceed and they take Suboxone - they did not receive a referral yet however, I connected CADI waiver and CM to verify this placement fits CADI requirements prior to sending referral docs. Address of [facility] is [address] [place] [address] and they accept Suboxone. MOY HARRISON stated she will check [facility] NPI and follow up with referral on her end. She stated this will take a couple of days.   10/18: [facility] Declined Eloy - suggest substance use treatment   10/22: Eloy agrees to try [facility] as his options are crisis/shelter soon as he is not needing hospitalization. I let [facility] director know.   10/23: [facility] sends forms for him to fill out, submitted these back to Maria E and asked for a discharge date estimate from them.   [facility] states 1-2 weeks.    [facility] - on waitlist (months away)     Legal Status:  Committed and [place]   [facility]: [place]  File Number: [id number]  Start and expiration date of commitment:   9/30: Submitted [place] Amendment to remove Invega and add Prolixin.  New [place] meds as of 10/1: Zyprexa, Seroquel, Prolixin, Abilify     Contacts (include KELBY status):  Psych: Dr. Khan, [place] [facility] - called from cell phone for an update #[phone] (Eloy signed KELBY 10/1 for medications related info)  Michelle  Sophia/Mother: [phone]    -   New CADI CM is: Sonia Burdick Ph: [phone]  Email: [email or web address] (KELBY signed 10/1/24)    New Commitment CM:  Chasity [email or web address] [phone] - No KELBY needed.  [facility] - Ph: [phone]    Upcoming Meetings and Dates/Important Information and next steps:  Update discharge referral form at discharge   PD and COS at discharge

## 2024-10-25 NOTE — PLAN OF CARE
"Pt discharged via cab to [facility]. Given discharge medications and reviewed and given belongings. Reviewed AVS with patient and questions answered.     Denied having any thoughts of harming self or others. Denied having access to firearms or other lethal means. Denied hallucinations. Calm, cooperative, behaviorally appropriate. Flat affect, appears withdrawn. Good attention. Stated his mood was \"excited\".     No physical concerns voiced. VS WDL.     /72 (BP Location: Left arm)   Pulse 85   Temp 98.3  F (36.8  C)   Resp 16   Ht 1.854 m (6' 1\")   Wt 103.1 kg (227 lb 6.4 oz)   SpO2 96%   BMI 30.00 kg/m            "

## 2024-10-29 ENCOUNTER — TELEPHONE (OUTPATIENT)
Dept: PSYCHIATRY | Facility: CLINIC | Age: 24
End: 2024-10-29

## 2024-10-29 NOTE — TELEPHONE ENCOUNTER
MTM referral from: Transitions of Care (recent hospital discharge, TCU discharge, or ED visit)    MTM referral outreach attempt #2 on October 29, 2024 at 11:46 AM      Outcome: Patient not reachable after several attempts, sent ListMinut message    Use farida thurston  for the carrier/Plan on the flowsheet      Appsee Message Sent    MARY Kahn   [phone]

## 2025-04-11 NOTE — DISCHARGE INSTRUCTIONS
Discharge Instructions  Mental Health Concerns    You were seen today for mental health concerns, such as depression, anxiety, or suicidal thinking. Your provider feels that you do not require hospitalization at this time. However, your symptoms may become worse, and you may need to return to the Emergency Department. Most treatments of depression and suicidal thoughts are a process rather than a single intervention.  Medications and counseling can take several weeks or more to help.    Generally, every Emergency Department visit should have a follow-up clinic visit with either a primary or a specialty clinic/provider. Please follow-up as instructed by your emergency provider today.    By accepting these discharge instructions:  You promise to not harm yourself or others.  You agree that if you feel you are becoming unable to keep that promise, you will do something to help yourself before you do anything to harm yourself or others.   You agree to keep any safety plan arranged on your visit here today.  You agree to take any medication prescribed or recommended by your provider.  If you are getting worse, you can contact a friend or a family member, contact your counselor or family provider, contact a crisis line, or other options discussed with the provider or therapist today.  At any time, you can call 911 and return to the Emergency Department for more help.  You understand that follow-up is essential to your treatment, and you will make and keep appointments recommended on your visit today.    How to improve your mental health and prevent suicide:  Involve others by letting family, friends, counselors know.  Do not isolate yourself.  Avoid alcohol or drugs. Remove weapons, poisons from your home.  Try to stick to routines for eating, sleeping and getting regular exercise.    Try to get into sunlight. Bright natural light not only treats seasonal affective disorder but also depression.  Increase safe activities  Patient is accepted at [facility].  Patient is accepted by Dr. James Gee.     Transportation is arranged with [facility].     Transportation is scheduled for TBD.      Nurse report is to be called to [phone] prior to patient transfer.           that you enjoy.    If you feel worse, contact [phone] ([phone]), or call 911, or your primary provider/counselor for additional assistance.    If you were given a prescription for medicine here today, be sure to read all of the information (including the package insert) that comes with your prescription.  This will include important information about the medicine, its side effects, and any warnings that you need to know about.  The pharmacist who fills the prescription can provide more information and answer questions you may have about the medicine.  If you have questions or concerns that the pharmacist cannot address, please call or return to the Emergency Department.   Remember that you can always come back to the Emergency Department if you are not able to see your regular provider in the amount of time listed above, if you get any new symptoms, or if there is anything that worries you.    Aftercare Plan      If I am feeling unsafe or I am in a crisis, I will:   Contact my established care providers   Call the National Suicide Prevention Lifeline: 448.364.6425   Go to the nearest emergency room   Call 911     Your [facility] has a mental health crisis team you can call 24/7: [facility], [phone]    Warning signs that I or other people might notice when a crisis is developing for me: crying, feeling bad about self    Things I am able to do on my own to cope or help me feel better:   -Practice square breathing when I begin to feel anxious - in breath through the nose for the count   of 4 and the first line on the square. Out breath through the mouth for the count of 4 for the second line   of the square. Repeat to complete the square. Repeat the square as many times as needed.    Things that I am able to do with others to cope or help me feel better: -Use community resources, including hotline numbers, [facility] crisis and support meetings    Things I can use or do for distraction:  "-Distraction skills of: going for walks, watching TV, spending time outside, calling a friend or family member  -Download a meditation brigido and spend 15-20 minutes per day mediating/relaxing. Some apps to   download include: Calm, Headspace and Insight Timer. All 3 of these apps have free version    Changes I can make to support my mental health and wellness:   -Attend scheduled mental health therapy and psychiatric appointments and follow all recommendations  -Maintain a daily schedule/routine  -Practice deep breathing skills  -Abstain from all mood altering chemicals not currently prescribed to me     People in my life that I can ask for help: [employer] [place] on Mental Illness (SARAH)  248.250.1835 or 1.888.SARAH.HELPS    Other things that are important when I m in crisis: -Commit to 30 minutes of self care daily - this can be as simple as taking a shower, going for a walk, cooking a meal, read, writing, etc        Crisis Lines  Crisis Text Line  Text 076193  You will be connected with a trained live crisis counselor to provide support.    Por espanol, texto  RAMIREZ a 707092 o texto a 442-AYUDAME en WhatsApp    National Hope Line  1.800.SUICIDE [5535899]      Community Resources  Fast Tracker  Linking people to mental health and substance use disorder resources  fasttrackOmnidriven.org     [place] Mental Health Warm Line  Peer to peer support  Monday thru Saturday, 12 pm to 10 pm  883.394.2710 or 8.958.147.3604  Text \"Support\" to 20848    [employer] [place] on Mental Illness (SARAH)  090.713.7330 or 1.888.SARAH.HELPS        Mental Health Apps  My3  https://my3app.org/    VirtualHopeBox  https://"[employer].org/apps/virtual-hope-box/      Additional Information  Today you were seen by a licensed mental health professional through Triage and Transition services, [facility] ([facility])  for a crisis assessment in the Emergency Department at [facility].  It is recommended that you follow up " with your established providers (psychiatrist, mental health therapist, and/or primary care doctor - as relevant) as soon as possible. Coordinators from [facility] will be calling you in the next 24-48 hours to ensure that you have the resources you need.  You can also contact [facility] coordinators directly at [phone]. You may have been scheduled for or offered an appointment with a mental health provider. [facility] maintains an extensive network of licensed behavioral health providers to connect patients with the services they need.  We do not charge providers a fee to participate in our referral network.  We match patients with providers based on a patient's specific needs, insurance coverage, and location.  Our first effort will be to refer you to a provider within your care system, and will utilize providers outside your care system as needed.

## 2025-04-13 ENCOUNTER — HEALTH MAINTENANCE LETTER (OUTPATIENT)
Age: 25
End: 2025-04-13